# Patient Record
Sex: MALE | Race: ASIAN | NOT HISPANIC OR LATINO | URBAN - METROPOLITAN AREA
[De-identification: names, ages, dates, MRNs, and addresses within clinical notes are randomized per-mention and may not be internally consistent; named-entity substitution may affect disease eponyms.]

---

## 2017-10-23 ENCOUNTER — INPATIENT (INPATIENT)
Facility: HOSPITAL | Age: 52
LOS: 1 days | Discharge: HOME | DRG: 310 | End: 2017-10-25
Attending: INTERNAL MEDICINE | Admitting: INTERNAL MEDICINE
Payer: MEDICAID

## 2017-10-23 VITALS
HEART RATE: 79 BPM | DIASTOLIC BLOOD PRESSURE: 86 MMHG | RESPIRATION RATE: 18 BRPM | SYSTOLIC BLOOD PRESSURE: 136 MMHG | OXYGEN SATURATION: 97 %

## 2017-10-23 DIAGNOSIS — R00.2 PALPITATIONS: ICD-10-CM

## 2017-10-23 DIAGNOSIS — R42 DIZZINESS AND GIDDINESS: ICD-10-CM

## 2017-10-23 DIAGNOSIS — R07.9 CHEST PAIN, UNSPECIFIED: ICD-10-CM

## 2017-10-23 LAB
ALBUMIN SERPL ELPH-MCNC: 4.6 G/DL — SIGNIFICANT CHANGE UP (ref 3.3–5)
ALP SERPL-CCNC: 51 U/L — SIGNIFICANT CHANGE UP (ref 40–120)
ALT FLD-CCNC: 10 U/L RC — SIGNIFICANT CHANGE UP (ref 10–45)
ANION GAP SERPL CALC-SCNC: 12 MMOL/L — SIGNIFICANT CHANGE UP (ref 5–17)
APTT BLD: 26.8 SEC — LOW (ref 27.5–37.4)
AST SERPL-CCNC: 18 U/L — SIGNIFICANT CHANGE UP (ref 10–40)
BASOPHILS # BLD AUTO: 0.1 K/UL — SIGNIFICANT CHANGE UP (ref 0–0.2)
BASOPHILS NFR BLD AUTO: 0.9 % — SIGNIFICANT CHANGE UP (ref 0–2)
BILIRUB SERPL-MCNC: 0.4 MG/DL — SIGNIFICANT CHANGE UP (ref 0.2–1.2)
BUN SERPL-MCNC: 11 MG/DL — SIGNIFICANT CHANGE UP (ref 7–23)
CALCIUM SERPL-MCNC: 8.9 MG/DL — SIGNIFICANT CHANGE UP (ref 8.4–10.5)
CHLORIDE SERPL-SCNC: 106 MMOL/L — SIGNIFICANT CHANGE UP (ref 96–108)
CO2 SERPL-SCNC: 25 MMOL/L — SIGNIFICANT CHANGE UP (ref 22–31)
CREAT SERPL-MCNC: 0.79 MG/DL — SIGNIFICANT CHANGE UP (ref 0.5–1.3)
EOSINOPHIL # BLD AUTO: 0.2 K/UL — SIGNIFICANT CHANGE UP (ref 0–0.5)
EOSINOPHIL NFR BLD AUTO: 4 % — SIGNIFICANT CHANGE UP (ref 0–6)
GAS PNL BLDV: SIGNIFICANT CHANGE UP
GLUCOSE SERPL-MCNC: 97 MG/DL — SIGNIFICANT CHANGE UP (ref 70–99)
HCT VFR BLD CALC: 47.5 % — SIGNIFICANT CHANGE UP (ref 39–50)
HGB BLD-MCNC: 16 G/DL — SIGNIFICANT CHANGE UP (ref 13–17)
INR BLD: 0.93 RATIO — SIGNIFICANT CHANGE UP (ref 0.88–1.16)
LYMPHOCYTES # BLD AUTO: 1.3 K/UL — SIGNIFICANT CHANGE UP (ref 1–3.3)
LYMPHOCYTES # BLD AUTO: 21.6 % — SIGNIFICANT CHANGE UP (ref 13–44)
MCHC RBC-ENTMCNC: 30.4 PG — SIGNIFICANT CHANGE UP (ref 27–34)
MCHC RBC-ENTMCNC: 33.6 GM/DL — SIGNIFICANT CHANGE UP (ref 32–36)
MCV RBC AUTO: 90.4 FL — SIGNIFICANT CHANGE UP (ref 80–100)
MONOCYTES # BLD AUTO: 0.3 K/UL — SIGNIFICANT CHANGE UP (ref 0–0.9)
MONOCYTES NFR BLD AUTO: 4.4 % — SIGNIFICANT CHANGE UP (ref 2–14)
NEUTROPHILS # BLD AUTO: 4.2 K/UL — SIGNIFICANT CHANGE UP (ref 1.8–7.4)
NEUTROPHILS NFR BLD AUTO: 69.2 % — SIGNIFICANT CHANGE UP (ref 43–77)
PLATELET # BLD AUTO: 146 K/UL — LOW (ref 150–400)
POTASSIUM SERPL-MCNC: 4.3 MMOL/L — SIGNIFICANT CHANGE UP (ref 3.5–5.3)
POTASSIUM SERPL-SCNC: 4.3 MMOL/L — SIGNIFICANT CHANGE UP (ref 3.5–5.3)
PROT SERPL-MCNC: 6.8 G/DL — SIGNIFICANT CHANGE UP (ref 6–8.3)
PROTHROM AB SERPL-ACNC: 10 SEC — SIGNIFICANT CHANGE UP (ref 9.8–12.7)
RBC # BLD: 5.25 M/UL — SIGNIFICANT CHANGE UP (ref 4.2–5.8)
RBC # FLD: 11.6 % — SIGNIFICANT CHANGE UP (ref 10.3–14.5)
SODIUM SERPL-SCNC: 143 MMOL/L — SIGNIFICANT CHANGE UP (ref 135–145)
TROPONIN T SERPL-MCNC: <0.01 NG/ML — SIGNIFICANT CHANGE UP (ref 0–0.06)
TROPONIN T SERPL-MCNC: <0.01 NG/ML — SIGNIFICANT CHANGE UP (ref 0–0.06)
WBC # BLD: 6.2 K/UL — SIGNIFICANT CHANGE UP (ref 3.8–10.5)
WBC # FLD AUTO: 6.2 K/UL — SIGNIFICANT CHANGE UP (ref 3.8–10.5)

## 2017-10-23 PROCEDURE — 99285 EMERGENCY DEPT VISIT HI MDM: CPT

## 2017-10-23 PROCEDURE — 71020: CPT | Mod: 26

## 2017-10-23 RX ORDER — INFLUENZA VIRUS VACCINE 15; 15; 15; 15 UG/.5ML; UG/.5ML; UG/.5ML; UG/.5ML
0.5 SUSPENSION INTRAMUSCULAR ONCE
Qty: 0 | Refills: 0 | Status: DISCONTINUED | OUTPATIENT
Start: 2017-10-23 | End: 2017-10-25

## 2017-10-23 RX ORDER — ENOXAPARIN SODIUM 100 MG/ML
40 INJECTION SUBCUTANEOUS DAILY
Qty: 0 | Refills: 0 | Status: DISCONTINUED | OUTPATIENT
Start: 2017-10-23 | End: 2017-10-25

## 2017-10-23 RX ADMIN — ENOXAPARIN SODIUM 40 MILLIGRAM(S): 100 INJECTION SUBCUTANEOUS at 22:28

## 2017-10-23 NOTE — H&P ADULT - NEGATIVE GASTROINTESTINAL SYMPTOMS
no flatulence/no hematochezia/no abdominal pain/no vomiting/no constipation/no change in bowel habits/no diarrhea/no nausea/no melena

## 2017-10-23 NOTE — ED PROVIDER NOTE - ATTENDING CONTRIBUTION TO CARE
Attending Note (Justin): patient complaining of feeling dizzy. deneis chest pain.  h/o defibrilator, reports last shock 1 week ago.  last saw cardiologist 1 yr ago.  resting comfortably in stretcher.  ekg with st elevations without reciprocal changes.

## 2017-10-23 NOTE — ED PROVIDER NOTE - MEDICAL DECISION MAKING DETAILS
51 yo cardiac arrest 2 years ago, defib placed, recently went off 1 week ago, has not followed with cards over the last year, c/o dizziness that occurred this am after going to the gym. Required pulling over and calling EMS. No CP at the time or now. No other associated sx.   -Placed call to his cardiologist, Dr. Taylor, also consulted cards here and EP for interrogation   -pt HD stable, but with EKG changes concerning for potential ischemia, awaiting recs from cards, asa given, 1st trop was negative, r/o ACS  Evangelina Murillo, PGY-1 EM

## 2017-10-23 NOTE — PROCEDURE NOTE - ADDITIONAL PROCEDURE DETAILS
Indication for interrogation: r/o possible ICD shock   Measured data WNL, NL PM function; sensing and pacing thresholds are wnl. Pt is  not PM dependent  Stored data revealed 2 VT episodes on 10/19 which were stored in the VF zone,  lasting 10 and 32 seconds respectively.  First episode ( 10 seconds) was successfully treated with  shock therapy x1 and the second episode ( 32 seconds) was treated with  burst ATP during charging and shock therapy x1. Patient also with episodes of NSVT between 1/17/17  to  1019/17  lasting from 1 second to 5 seconds.   Changes made: none   Discussed above  findings with ED Attending Dr Marlo Levi.    Felicia/ [place]  [id number] Indication for interrogation: r/o possible ICD shock   Measured data WNL, NL PM function; sensing and pacing thresholds are wnl. Pt is  not PM dependent  Stored data revealed 2 VT episodes on  6/19/17 and 10/19/17 which were stored in the VF zone,  lasting 10 and 32 seconds respectively.  First episode ( 10 seconds) was successfully treated with  burst ATP during charging x1 and   shock therapy x1 and the second episode ( 32 seconds) was treated with  burst ATP during charging and shock therapy x1. Patient also with episodes of NSVT between 1/17/17  to  1019/17  lasting from 1 second to 5 seconds.   Changes made: none   Discussed above  findings with ED Attending Dr Marlo Levi.    Felicia/ [place]., [place]  [id number] Indication for interrogation:  Admitted with dizziness+ palpitations; check for possible ICD shock   Measured data WNL, NL PM function; sensing and pacing thresholds wnl. Pt is not PM dependent; Vpaced <0.1%  Stored data since last interrogation 11/28/16 revealed: 2 VT episodes on  6/19/17 and 10/19/17 which were stored in the VF zone,  lasting 10 and 32 seconds respectively.  Review of available EGMs c/w VT @ 250bpm  on 6/19/17 which was successfully treated with  burst ATP during charging x1 and   shock therapy x1. Review of available EGM on 10/19/17 c/w VT @ 240 bpm which  was also successfully treated with  burst [id number] shock therapy x1. Patient also with  8 episodes of NSVT between 1/17/17  to  10/19/17  lasting from 1 second to 5 seconds. No arrythmia episodes to correlate with today's symptom of dizziness.  OptiVol thresholds wnl indicating euvolemia.     Changes made: none   Discussed above  findings with ED Attending Dr Marlo Levi.    Felicia/ [place]  [id number]

## 2017-10-23 NOTE — CONSULT NOTE ADULT - SUBJECTIVE AND OBJECTIVE BOX
Date of Admission: 10/23/17     Patient is a 52y old  Male who presents with a chief complaint of lightheadedness    HISTORY OF PRESENT ILLNESS:     53 yo gentleman former smoker w/PMHx unclear cardiac surgery for "hole in my heart" suspect VSD/ASD approx 3 yrs ago at [place] with an ICD placed perioperatively for cardiac arrest per patient p/w lightheadedness during exercise. Reports that he exercises 5x a week cardio including cycling and light weight lifting. He reports that today he felt lightheaded without any associated chest pain, palpitations, SOB, or nausea/vomiting, diaphoresis. He does report that if he does strenuous lifting he describes his chest as "uncomfortable" denying pain or pressure. He does not take any medications, and quit smoking last week. Previous 1 PPD for 30 yrs. Reports that last week he experienced a "shock" from his defibrillator precipitated by only palpitations. He did seek medical care at that time and has not seen a cardiologist for 1 yr.     Allergies    No Known Allergies    Intolerances    	    MEDICATIONS:  None       PAST MEDICAL & SURGICAL HISTORY:  Unspecified cardiac surgery with Dr. Taylor.     FAMILY HISTORY: No known FHx of CAD      SOCIAL HISTORY:  Former smoker; 1PPD for 30 yrs quit 1 week ago    REVIEW OF SYSTEMS:    CONSTITUTIONAL: No weakness, fevers or chills  EYES/ENT: No visual changes;  No dysphagia  NECK: No pain or stiffness  RESPIRATORY: No cough, wheezing, hemoptysis; No shortness of breath  CARDIOVASCULAR: No chest pain or palpitations; No lower extremity edema  GASTROINTESTINAL: No abdominal or epigastric pain. No nausea, vomiting, or hematemesis; No diarrhea or constipation. No melena or hematochezia.  BACK: No back pain  GENITOURINARY: No dysuria, frequency or hematuria  NEUROLOGICAL: No numbness or weakness  SKIN: No itching, burning, rashes, or lesions   All other review of systems is negative unless indicated above.      PHYSICAL EXAM:  T(C): 37 (10-23-17 @ 10:39), Max: 37 (10-23-17 @ 10:39)  HR: 76 (10-23-17 @ 10:39) (76 - 79)  BP: 143/86 (10-23-17 @ 10:39) (136/86 - 143/86)  RR: 18 (10-23-17 @ 10:39) (18 - 18)  SpO2: 98% (10-23-17 @ 10:39) (97% - 98%)  Wt(kg): --  I&O's Summary      Appearance: Well appearing, sitting up comfortably, pleasant, conversant 	  HEENT:   Normal oral mucosa, PERRL, EOMI	  Lymphatic: No lymphadenopathy  Cardiovascular: Normal S1 S2, No JVD, No murmurs, No edema  Respiratory: Lungs clear to auscultation	  Psychiatry: A & O x 3, Mood & affect appropriate  Gastrointestinal:  Soft, Non-tender, + BS	  Skin: No rashes, No ecchymoses, No cyanosis	  Neurologic: Non-focal  Extremities: Normal range of motion, No clubbing, cyanosis or edema  Vascular: Peripheral pulses palpable 2+ bilaterally        LABS:	 	    CBC Full  -  ( 23 Oct 2017 11:06 )  WBC Count : 6.2 K/uL  Hemoglobin : 16.0 g/dL  Hematocrit : 47.5 %  Platelet Count - Automated : 146 K/uL  Mean Cell Volume : 90.4 fl  Mean Cell Hemoglobin : 30.4 pg  Mean Cell Hemoglobin Concentration : 33.6 gm/dL  Auto Neutrophil # : 4.2 K/uL  Auto Lymphocyte # : 1.3 K/uL  Auto Monocyte # : 0.3 K/uL  Auto Eosinophil # : 0.2 K/uL  Auto Basophil # : 0.1 K/uL  Auto Neutrophil % : 69.2 %  Auto Lymphocyte % : 21.6 %  Auto Monocyte % : 4.4 %  Auto Eosinophil % : 4.0 %  Auto Basophil % : 0.9 %    10-23    143  |  106  |  11  ----------------------------<  97  4.3   |  25  |  0.79    Ca    8.9      23 Oct 2017 11:06    TPro  6.8  /  Alb  4.6  /  TBili  0.4  /  DBili  x   /  AST  18  /  ALT  10  /  AlkPhos  51  10-23    ECG:  	SR with 1st degree block; J point elevation in V2-V4 with TWI V5-V6   	  ASSESSMENT/PLAN: 	    53 yo gentleman former smoker w/PMHx unclear cardiac surgery for "hole in my heart" suspect VSD/ASD approx 3 yrs ago at [place] with an ICD placed perioperatively for cardiac arrest per patient p/w lightheadedness during exercise cardiology consulted for EKG changes.       #EKG changes not consistent with acute STEMI; Currently asymptomatic, no CP or SOB/LANE.   - Recommend serial cardiac enzymes and EKGs; Initial trop negative.   - Check TTE  - Obtain OP records re of cardiac procedure and indication for ICD placement.   - ASA loaded en route per patient  - EP for ICD interrogation.   - Cath consult d/w Dr. Ramsey Date of Admission: 10/23/17     Patient is a 52y old  Male who presents with a chief complaint of lightheadedness    HISTORY OF PRESENT ILLNESS:     53 yo gentleman former smoker w/PMHx unclear cardiac surgery for "hole in my heart" suspect VSD/ASD approx 3 yrs ago at [place] with an ICD placed perioperatively for cardiac arrest per patient p/w lightheadedness during exercise. Reports that he exercises 5x a week cardio including cycling and light weight lifting. He reports that today he felt lightheaded without any associated chest pain, palpitations, SOB, or nausea/vomiting, diaphoresis. He does report that if he does strenuous lifting he describes his chest as "uncomfortable" denying pain or pressure. He does not take any medications, and quit smoking last week. Previous 1 PPD for 30 yrs. Reports that last week he experienced a "shock" from his defibrillator precipitated by only palpitations. He did seek medical care at that time and has not seen a cardiologist for 1 yr.     Allergies    No Known Allergies    Intolerances    	    MEDICATIONS:  None       PAST MEDICAL & SURGICAL HISTORY:  Unspecified cardiac surgery with Dr. Taylor.     FAMILY HISTORY: No known FHx of CAD      SOCIAL HISTORY:  Former smoker; 1PPD for 30 yrs quit 1 week ago    REVIEW OF SYSTEMS:    CONSTITUTIONAL: No weakness, fevers or chills  EYES/ENT: No visual changes;  No dysphagia  NECK: No pain or stiffness  RESPIRATORY: No cough, wheezing, hemoptysis; No shortness of breath  CARDIOVASCULAR: No chest pain or palpitations; No lower extremity edema  GASTROINTESTINAL: No abdominal or epigastric pain. No nausea, vomiting, or hematemesis; No diarrhea or constipation. No melena or hematochezia.  BACK: No back pain  GENITOURINARY: No dysuria, frequency or hematuria  NEUROLOGICAL: No numbness or weakness  SKIN: No itching, burning, rashes, or lesions   All other review of systems is negative unless indicated above.      PHYSICAL EXAM:  T(C): 37 (10-23-17 @ 10:39), Max: 37 (10-23-17 @ 10:39)  HR: 76 (10-23-17 @ 10:39) (76 - 79)  BP: 143/86 (10-23-17 @ 10:39) (136/86 - 143/86)  RR: 18 (10-23-17 @ 10:39) (18 - 18)  SpO2: 98% (10-23-17 @ 10:39) (97% - 98%)  Wt(kg): --  I&O's Summary      Appearance: Well appearing, sitting up comfortably, pleasant, conversant 	  HEENT:   Normal oral mucosa, PERRL, EOMI	  Lymphatic: No lymphadenopathy  Cardiovascular: Normal S1 S2, No JVD, No murmurs, No edema  Respiratory: Lungs clear to auscultation	  Psychiatry: A & O x 3, Mood & affect appropriate  Gastrointestinal:  Soft, Non-tender, + BS	  Skin: No rashes, No ecchymoses, No cyanosis	  Neurologic: Non-focal  Extremities: Normal range of motion, No clubbing, cyanosis or edema  Vascular: Peripheral pulses palpable 2+ bilaterally        LABS:	 	    CBC Full  -  ( 23 Oct 2017 11:06 )  WBC Count : 6.2 K/uL  Hemoglobin : 16.0 g/dL  Hematocrit : 47.5 %  Platelet Count - Automated : 146 K/uL  Mean Cell Volume : 90.4 fl  Mean Cell Hemoglobin : 30.4 pg  Mean Cell Hemoglobin Concentration : 33.6 gm/dL  Auto Neutrophil # : 4.2 K/uL  Auto Lymphocyte # : 1.3 K/uL  Auto Monocyte # : 0.3 K/uL  Auto Eosinophil # : 0.2 K/uL  Auto Basophil # : 0.1 K/uL  Auto Neutrophil % : 69.2 %  Auto Lymphocyte % : 21.6 %  Auto Monocyte % : 4.4 %  Auto Eosinophil % : 4.0 %  Auto Basophil % : 0.9 %    10-23    143  |  106  |  11  ----------------------------<  97  4.3   |  25  |  0.79    Ca    8.9      23 Oct 2017 11:06    TPro  6.8  /  Alb  4.6  /  TBili  0.4  /  DBili  x   /  AST  18  /  ALT  10  /  AlkPhos  51  10-23    ECG:  	SR with 1st degree block; J point elevation in V2-V4 with TWI V5-V6   	  ASSESSMENT/PLAN: 	    53 yo gentleman former smoker w/PMHx unclear cardiac surgery for "hole in my heart" suspect VSD/ASD approx 3 yrs ago at [place] with an ICD placed perioperatively for cardiac arrest per patient p/w lightheadedness during exercise cardiology consulted for EKG changes.       #EKG changes not consistent with acute STEMI; Currently asymptomatic, no CP or SOB/LANE.   - Recommend serial cardiac enzymes and EKGs; Initial trop negative.   - Check TTE  - Obtain OP records re of cardiac procedure and indication for ICD placement.   - ASA loaded en route per patient  - EP for ICD interrogation.   - Cath consult d/w Dr. Ramsey and no plan for urgent cath at this time.

## 2017-10-23 NOTE — ED PROVIDER NOTE - OBJECTIVE STATEMENT
51 yo M cardiac arrest 2 yrs ago, defibrillator placed, ASD also repaired at the same time (Dr. Devendra Taylor, [facility]) c/o dizziness that occurred this morning after going to the gym. Pt reports his defibrillator went off 3x over the last year, most recently last week-has not followed with Dr. Taylor or any cardiologist over the last year. Took asa this morning, given 2 more from EMS. Reports he was driving home and felt very dizzy, had to pull over at a gas station and EMS was called. Pt HD stable. Never had any chest pain, SOB, or other associated sx. Not having active CP right now. [employer] defib device. Patient denies ha, loc, sob, back pain, abd pain/n/v/d, urinary symptoms, recent travel and sickness.

## 2017-10-23 NOTE — H&P ADULT - ASSESSMENT
53 yo gentleman former smoker w/PMHx unclear cardiac surgery for "hole in my heart" suspect VSD/ASD approx 3 yrs ago at [place] with an ICD placed perioperatively for cardiac arrest per patient p/w lightheadedness during exercise. Reports that he exercises 5x a week cardio including cycling and light weight lifting. He reports that today he felt lightheaded without any associated chest pain, palpitations, SOB, or nausea/vomiting, diaphoresis. He does report that if he does strenuous lifting he describes his chest as "uncomfortable" denying pain or pressure. He does not take any medications, and quit smoking last week. Previous 1 PPD for 30 yrs. Reports that last week he experienced a "shock" from his defibrillator precipitated by only palpitations. He did seek medical care at that time and has not seen a cardiologist for 1 yr.

## 2017-10-23 NOTE — ED ADULT NURSE NOTE - OBJECTIVE STATEMENT
52 year old male presents to the ED complaining of SOB and dizziness after working out at the gym. Pt has a Hx of heart attack, defibrillator and open hear surgery in June of 2015. As per EMS the pt went into cardiac arrest in 2015 prior to open heart. As per patient his defibrillator has been making a sound every morning at 7:56am, it has fired three times since it was placed. Pt denies NVD, denies CP at this time. Pt is A&O x 4, VSS, afebrile, ambulating independently. Pt denies fever, chills, NVD, SOB, or chest pain. IV placed by EMS, + blood return flushes easily. Labs drawn, bed in low position, safety measures in place. EKG completed on arrival and given to attending, pt showing NS with T wave abnormality. As per patient he took  ASA, unsure of the dosage he took.

## 2017-10-24 LAB
ANION GAP SERPL CALC-SCNC: 15 MMOL/L — SIGNIFICANT CHANGE UP (ref 5–17)
BUN SERPL-MCNC: 13 MG/DL — SIGNIFICANT CHANGE UP (ref 7–23)
CALCIUM SERPL-MCNC: 9.3 MG/DL — SIGNIFICANT CHANGE UP (ref 8.4–10.5)
CHLORIDE SERPL-SCNC: 103 MMOL/L — SIGNIFICANT CHANGE UP (ref 96–108)
CO2 SERPL-SCNC: 25 MMOL/L — SIGNIFICANT CHANGE UP (ref 22–31)
CREAT SERPL-MCNC: 0.98 MG/DL — SIGNIFICANT CHANGE UP (ref 0.5–1.3)
GLUCOSE SERPL-MCNC: 90 MG/DL — SIGNIFICANT CHANGE UP (ref 70–99)
HCT VFR BLD CALC: 47.4 % — SIGNIFICANT CHANGE UP (ref 39–50)
HGB BLD-MCNC: 16.7 G/DL — SIGNIFICANT CHANGE UP (ref 13–17)
MCHC RBC-ENTMCNC: 31.7 PG — SIGNIFICANT CHANGE UP (ref 27–34)
MCHC RBC-ENTMCNC: 35.2 GM/DL — SIGNIFICANT CHANGE UP (ref 32–36)
MCV RBC AUTO: 90.2 FL — SIGNIFICANT CHANGE UP (ref 80–100)
PLATELET # BLD AUTO: 159 K/UL — SIGNIFICANT CHANGE UP (ref 150–400)
POTASSIUM SERPL-MCNC: 4.4 MMOL/L — SIGNIFICANT CHANGE UP (ref 3.5–5.3)
POTASSIUM SERPL-SCNC: 4.4 MMOL/L — SIGNIFICANT CHANGE UP (ref 3.5–5.3)
RBC # BLD: 5.26 M/UL — SIGNIFICANT CHANGE UP (ref 4.2–5.8)
RBC # FLD: 11.4 % — SIGNIFICANT CHANGE UP (ref 10.3–14.5)
SODIUM SERPL-SCNC: 143 MMOL/L — SIGNIFICANT CHANGE UP (ref 135–145)
T4 FREE SERPL-MCNC: 1.4 NG/DL — SIGNIFICANT CHANGE UP (ref 0.9–1.8)
TROPONIN T SERPL-MCNC: <0.01 NG/ML — SIGNIFICANT CHANGE UP (ref 0–0.06)
TSH SERPL-MCNC: 1.96 UIU/ML — SIGNIFICANT CHANGE UP (ref 0.27–4.2)
WBC # BLD: 5.4 K/UL — SIGNIFICANT CHANGE UP (ref 3.8–10.5)
WBC # FLD AUTO: 5.4 K/UL — SIGNIFICANT CHANGE UP (ref 3.8–10.5)

## 2017-10-24 PROCEDURE — 93306 TTE W/DOPPLER COMPLETE: CPT | Mod: 26

## 2017-10-24 PROCEDURE — 99223 1ST HOSP IP/OBS HIGH 75: CPT | Mod: GC

## 2017-10-24 RX ORDER — METOPROLOL TARTRATE 50 MG
12.5 TABLET ORAL
Qty: 0 | Refills: 0 | Status: DISCONTINUED | OUTPATIENT
Start: 2017-10-24 | End: 2017-10-25

## 2017-10-24 RX ADMIN — Medication 12.5 MILLIGRAM(S): at 21:37

## 2017-10-24 RX ADMIN — ENOXAPARIN SODIUM 40 MILLIGRAM(S): 100 INJECTION SUBCUTANEOUS at 09:15

## 2017-10-24 NOTE — PROVIDER CONTACT NOTE (OTHER) - ASSESSMENT
Patient asymptomatic. Patient denies chest pain and shortness of breath. Patient's Vital Signs: Temperature 97.8 F Oral, Heart Rate 70, Blood Pressure 121/75, Respiratory Rate 18 and O2 Saturation 98% Room Air.

## 2017-10-24 NOTE — PROGRESS NOTE ADULT - ASSESSMENT
51 yo gentleman former smoker w/PMHx unclear cardiac surgery for "hole in my heart" suspect VSD/ASD approx 3 yrs ago at [place] with an ICD placed perioperatively for cardiac arrest per patient p/w lightheadedness during exercise. Reports that he exercises 5x a week cardio including cycling and light weight lifting. He reports that today he felt lightheaded without any associated chest pain, palpitations, SOB, or nausea/vomiting, diaphoresis. He does report that if he does strenuous lifting he describes his chest as "uncomfortable" denying pain or pressure. He does not take any medications, and quit smoking last week. Previous 1 PPD for 30 yrs. Reports that last week he experienced a "shock" from his defibrillator precipitated by only palpitations. He did seek medical care at that time and has not seen a cardiologist for 1 yr.      Problem/Plan - 1:  ·  Problem: Dizziness.  Plan: Asymptomatic and walking around fine.  likely NSVT .      Problem/Plan - 2:  ·  Problem: Chest pain.  Plan: R/O ACS and cardiology to follow . Echo pending .      Problem/Plan - 3:  ·  Problem: Palpitations.  Plan: S/P AICD Interrogation so sec to NSVT  . EP consult pending. TFT okay and TTE pending.      Disposition: Patient  adamant in going home today.

## 2017-10-24 NOTE — PROVIDER CONTACT NOTE (OTHER) - BACKGROUND
Pt admitted for lightheadedness. Former smoker, Hx of unclear cardiac surgery, cardiac arrest s/p AICD placed 3 years ago

## 2017-10-24 NOTE — CONSULT NOTE ADULT - ASSESSMENT
-start metoprolol 12.mg BID  -patient would benefit from exercise stress test to evaluate for NSVT/VT while exercising   -patient may need AICD upgraded to dual chamber or may need ablation 53 yo gentleman former smoker w/PMHx unclear cardiac surgery for "hole in my heart" suspect VSD/ASD approx 3 yrs ago at [place] with an ICD placed perioperatively for cardiac arrest per patient p/w lightheadedness during exercise. Patient with VT a week ago, requiring shock. No correlating events to most recent symptoms.  -start metoprolol 12.mg BID  -patient would benefit from exercise stress test to evaluate for arrythmia while exercising   -patient may need AICD upgraded to dual chamber or may need ablation

## 2017-10-24 NOTE — PROVIDER CONTACT NOTE (OTHER) - BACKGROUND
Patient admitted for Dizziness, Palpitations, Chest Pain. History of Atrial Septal Defect. Patient has an AICD.

## 2017-10-24 NOTE — PROGRESS NOTE ADULT - SUBJECTIVE AND OBJECTIVE BOX
INTERVAL HPI/OVERNIGHT EVENTS: I want to go home today as working. I have to go.   Vital Signs Last 24 Hrs  T(C): 36.6 (24 Oct 2017 04:20), Max: 36.8 (24 Oct 2017 00:04)  T(F): 97.8 (24 Oct 2017 04:20), Max: 98.2 (24 Oct 2017 00:04)  HR: 57 (24 Oct 2017 04:20) (50 - 73)  BP: 128/82 (24 Oct 2017 04:20) (117/85 - 128/82)  BP(mean): --  RR: 18 (24 Oct 2017 04:20) (16 - 18)  SpO2: 98% (24 Oct 2017 04:20) (97% - 99%)  I&O's Summary    23 Oct 2017 07:01  -  24 Oct 2017 07:00  --------------------------------------------------------  IN: 480 mL / OUT: 0 mL / NET: 480 mL    24 Oct 2017 07:01  -  24 Oct 2017 13:16  --------------------------------------------------------  IN: 240 mL / OUT: 0 mL / NET: 240 mL      MEDICATIONS  (STANDING):  enoxaparin Injectable 40 milliGRAM(s) SubCutaneous daily  influenza   Vaccine 0.5 milliLiter(s) IntraMuscular once    MEDICATIONS  (PRN):    LABS:                        16.7   5.4   )-----------( 159      ( 24 Oct 2017 06:41 )             47.4     10-24    143  |  103  |  13  ----------------------------<  90  4.4   |  25  |  0.98    Ca    9.3      24 Oct 2017 06:41    TPro  6.8  /  Alb  4.6  /  TBili  0.4  /  DBili  x   /  AST  18  /  ALT  10  /  AlkPhos  51  10-23    PT/INR - ( 23 Oct 2017 11:06 )   PT: 10.0 sec;   INR: 0.93 ratio         PTT - ( 23 Oct 2017 11:06 )  PTT:26.8 sec    CAPILLARY BLOOD GLUCOSE              REVIEW OF SYSTEMS:  CONSTITUTIONAL: No fever, weight loss, or fatigue  EYES: No eye pain, visual disturbances, or discharge  ENMT:  No difficulty hearing, tinnitus, vertigo; No sinus or throat pain  NECK: No pain or stiffness  BREASTS: No pain, masses, or nipple discharge  RESPIRATORY: No cough, wheezing, chills or hemoptysis; No shortness of breath  CARDIOVASCULAR: No chest pain, palpitations, dizziness, or leg swelling  GASTROINTESTINAL: No abdominal or epigastric pain. No nausea, vomiting, or hematemesis; No diarrhea or constipation. No melena or hematochezia.  GENITOURINARY: No dysuria, frequency, hematuria, or incontinence  NEUROLOGICAL: No headaches, memory loss, loss of strength, numbness, or tremors  SKIN: No itching, burning, rashes, or lesions   LYMPH NODES: No enlarged glands  ENDOCRINE: No heat or cold intolerance; No hair loss  MUSCULOSKELETAL: No joint pain or swelling; No muscle, back, or extremity pain  PSYCHIATRIC: No depression, anxiety, mood swings, or difficulty sleeping  HEME/LYMPH: No easy bruising, or bleeding gums  ALLERY AND IMMUNOLOGIC: No hives or eczema    RADIOLOGY & ADDITIONAL TESTS:    Consultant(s) Notes Reviewed:  [x ] YES  [ ] NO    PHYSICAL EXAM:  GENERAL: NAD, well-groomed, well-developed,not in any distress ,  HEAD:  Atraumatic, Normocephalic  EYES: EOMI, PERRLA, conjunctiva and sclera clear  ENMT: No tonsillar erythema, exudates, or enlargement; Moist mucous membranes, Good dentition, No lesions  NECK: Supple, No JVD, Normal thyroid  NERVOUS SYSTEM:  Alert & Oriented X3, No focal deficit   CHEST/LUNG: Good air entry bilateral with no  rales, rhonchi, wheezing, or rubs  HEART: Regular rate and rhythm; No murmurs, rubs, or gallops,Scar of old surgery   ABDOMEN: Soft, Nontender, Nondistended; Bowel sounds present  EXTREMITIES:  2+ Peripheral Pulses, No clubbing, cyanosis, or edema  SKIN: No rashes or lesions    Care Discussed with Consultants/Other Providers [ x] YES  [ ] NO

## 2017-10-24 NOTE — PROVIDER CONTACT NOTE (OTHER) - SITUATION
Patient's Heart Rhythm converting back & forth from Normal Sinus Rhythm / Sinus Bradycardia to Accelerated Junctional Rhythm on the cardiac monitor and patient's Heart Rate 50's - 80's on the monitor.

## 2017-10-24 NOTE — PROVIDER CONTACT NOTE (OTHER) - ASSESSMENT
Pt Aox4. Pt was sleeping during the time of the event. Pt asymptomatic. Pt denies chest pain, sob or any other distress. VSS. Temp 98.2 F, /78, HR 50, RR 18, O2 99% on RA

## 2017-10-24 NOTE — PROVIDER CONTACT NOTE (OTHER) - ASSESSMENT
Pt Aox4. Pt was sleeping during the time of the event. Pt asymptomatic. Pt denies chest pain, sob or any other distress. VSS. Temp 98.2 F, /85, HR 65, RR 18, O2 99% on room air.

## 2017-10-24 NOTE — CONSULT NOTE ADULT - SUBJECTIVE AND OBJECTIVE BOX
Patient seen and evaluated at bedside    Chief Complaint:    HPI:  51 yo gentleman former smoker w/PMHx unclear cardiac surgery for "hole in my heart" suspect VSD/ASD approx 3 yrs ago at [place] with an ICD placed perioperatively for cardiac arrest per patient p/w lightheadedness during exercise. Reports that he exercises 5x a week cardio including cycling and light weight lifting. He reports that today he felt lightheaded without any associated chest pain, palpitations, SOB, or nausea/vomiting, diaphoresis. He does report that if he does strenuous lifting he describes his chest as "uncomfortable" denying pain or pressure. He does not take any medications, and quit smoking last week. Previous 1 PPD for 30 yrs. Reports that last week he experienced a "shock" from his defibrillator precipitated by only palpitations. He did seek medical care at that time and has not seen a cardiologist for 1 yr.   Patient had trop neg x3. TTE showed moderate concentric left ventricular hypertrophy, normal LV systolic function.    PMH:   ASD (atrial septal defect)  No pertinent past medical history      PSH:       Medications:   enoxaparin Injectable 40 milliGRAM(s) SubCutaneous daily  influenza   Vaccine 0.5 milliLiter(s) IntraMuscular once      Allergies:  No Known Allergies      FAMILY HISTORY:      Social History:  Smoking History:  Alcohol Use:  Drug Use:    Review of Systems:  REVIEW OF SYSTEMS:    CONSTITUTIONAL: No weakness, fevers or chills  EYES/ENT: No visual changes;  No dysphagia  NECK: No pain or stiffness  RESPIRATORY: No cough, wheezing, hemoptysis; No shortness of breath  CARDIOVASCULAR: No chest pain, + palpitations; No lower extremity edema  GASTROINTESTINAL: No abdominal or epigastric pain. No nausea, vomiting, or hematemesis; No diarrhea or constipation. No melena or hematochezia.  BACK: No back pain  GENITOURINARY: No dysuria, frequency or hematuria  NEUROLOGICAL: No numbness or weakness  SKIN: No itching, burning, rashes, or lesions   All other review of systems is negative unless indicated above.    Physical Exam:  T(F): 97.4 (10-24), Max: 98.2 (10-24)  HR: 67 (10-24) (50 - 71)  BP: 146/93 (10-24) (117/85 - 146/93)  RR: 17 (10-24)  SpO2: 99% (10-24)  GENERAL: No acute distress, well-developed  HEAD:  Atraumatic, Normocephalic  ENT: EOMI, PERRLA, conjunctiva and sclera clear, Neck supple, No JVD, moist mucosa  CHEST/LUNG: Clear to auscultation bilaterally; No wheeze, equal breath sounds bilaterally   BACK: No spinal tenderness  HEART: Regular rate and rhythm; No murmurs, rubs, or gallops  ABDOMEN: Soft, Nontender, Nondistended; Bowel sounds present  EXTREMITIES:  No clubbing, cyanosis, or edema  PSYCH: Nl behavior, nl affect  NEUROLOGY: AAOx3, non-focal, cranial nerves intact  SKIN: Normal color, No rashes or lesions  LINES:    Cardiovascular Diagnostic Testing:    ECG: Personally reviewed    Echo:    Stress Testing:    Cath:    Interpretation of Telemetry:    Imaging:    Labs: Personally reviewed                        16.7   5.4   )-----------( 159      ( 24 Oct 2017 06:41 )             47.4     10-24    143  |  103  |  13  ----------------------------<  90  4.4   |  25  |  0.98    Ca    9.3      24 Oct 2017 06:41    TPro  6.8  /  Alb  4.6  /  TBili  0.4  /  DBili  x   /  AST  18  /  ALT  10  /  AlkPhos  51  10-23    PT/INR - ( 23 Oct 2017 11:06 )   PT: 10.0 sec;   INR: 0.93 ratio         PTT - ( 23 Oct 2017 11:06 )  PTT:26.8 sec  CARDIAC MARKERS ( 24 Oct 2017 06:41 )  x     / <0.01 ng/mL / x     / x     / x      CARDIAC MARKERS ( 23 Oct 2017 18:57 )  x     / <0.01 ng/mL / x     / x     / x      CARDIAC MARKERS ( 23 Oct 2017 11:06 )  x     / <0.01 ng/mL / x     / x     / x                Thyroid Stimulating Hormone, Serum: 1.96 uIU/mL (10-24 @ 07:28) Patient seen and evaluated at bedside    Chief Complaint:  Lighthead/dizziness    HPI:  53 yo gentleman former smoker w/PMHx unclear cardiac surgery for "hole in my heart" suspect VSD/ASD approx 3 yrs ago at [place] with an ICD placed perioperatively for cardiac arrest per patient p/w lightheadedness during exercise. Reports that he exercises 5x a week cardio including cycling and light weight lifting. He reports that today he felt lightheaded without any associated chest pain, palpitations, SOB, or nausea/vomiting, diaphoresis. He does report that if he does strenuous lifting he describes his chest as "uncomfortable" denying pain or pressure. He does not take any medications, and quit smoking last week. Previous 1 PPD for 30 yrs. Reports that last week he experienced a "shock" from his defibrillator precipitated by only palpitations. He did seek medical care at that time and has not seen a cardiologist for 1 yr.   Patient had trop neg x3. TTE showed moderate concentric left ventricular hypertrophy, normal LV systolic function. AICD was interroaged, has only RV lead. Patient had 2 VT episodes on  6/19/17 and 10/19/17 which were stored in the VF zone,  lasting 10 and 32 seconds respectively.  Review of available EGMs c/w VT @ 250bpm  on 6/19/17 which was successfully treated with  burst ATP during charging x1 and   shock therapy x1. Review of available EGM on 10/19/17 c/w VT @ 240 bpm which  was also successfully treated with  burst [id number] shock therapy x1. Patient also with  8 episodes of NSVT between 1/17/17  to  10/19/17  lasting from 1 second to 5 seconds. No arrythmia episodes to correlate with today's symptom of dizziness.      PMH:   ASD (atrial septal defect)  No pertinent past medical history      PSH:       Medications:   enoxaparin Injectable 40 milliGRAM(s) SubCutaneous daily  influenza   Vaccine 0.5 milliLiter(s) IntraMuscular once      Allergies:  No Known Allergies      FAMILY HISTORY:      Social History:  Smoking History:  Alcohol Use:  Drug Use:    Review of Systems:  REVIEW OF SYSTEMS:    CONSTITUTIONAL: No weakness, fevers or chills  EYES/ENT: No visual changes;  No dysphagia  NECK: No pain or stiffness  RESPIRATORY: No cough, wheezing, hemoptysis; No shortness of breath  CARDIOVASCULAR: No chest pain, + palpitations; No lower extremity edema  GASTROINTESTINAL: No abdominal or epigastric pain. No nausea, vomiting, or hematemesis; No diarrhea or constipation. No melena or hematochezia.  BACK: No back pain  GENITOURINARY: No dysuria, frequency or hematuria  NEUROLOGICAL: No numbness or weakness  SKIN: No itching, burning, rashes, or lesions   All other review of systems is negative unless indicated above.    Physical Exam:  T(F): 97.4 (10-24), Max: 98.2 (10-24)  HR: 67 (10-24) (50 - 71)  BP: 146/93 (10-24) (117/85 - 146/93)  RR: 17 (10-24)  SpO2: 99% (10-24)  Appearance: Well appearing, sitting up comfortably, pleasant, conversant 	  HEENT:   Normal oral mucosa, PERRL, EOMI	  Lymphatic: No lymphadenopathy  Cardiovascular: Normal S1 S2, No JVD, No murmurs, No edema  Respiratory: Lungs clear to auscultation	  Psychiatry: A & O x 3, Mood & affect appropriate  Gastrointestinal:  Soft, Non-tender, + BS	  Skin: No rashes, No ecchymoses, No cyanosis	  Neurologic: Non-focal  Extremities: Normal range of motion, No clubbing, cyanosis or edema  Vascular: Peripheral pulses palpable 2+ bilaterally    ECG- SR with 1st degree block; J point elevation in V2-V4 with TWI V5-V6     Labs: Personally reviewed                        16.7   5.4   )-----------( 159      ( 24 Oct 2017 06:41 )             47.4     10-24    143  |  103  |  13  ----------------------------<  90  4.4   |  25  |  0.98    Ca    9.3      24 Oct 2017 06:41    TPro  6.8  /  Alb  4.6  /  TBili  0.4  /  DBili  x   /  AST  18  /  ALT  10  /  AlkPhos  51  10-23    PT/INR - ( 23 Oct 2017 11:06 )   PT: 10.0 sec;   INR: 0.93 ratio         PTT - ( 23 Oct 2017 11:06 )  PTT:26.8 sec  CARDIAC MARKERS ( 24 Oct 2017 06:41 )  x     / <0.01 ng/mL / x     / x     / x      CARDIAC MARKERS ( 23 Oct 2017 18:57 )  x     / <0.01 ng/mL / x     / x     / x      CARDIAC MARKERS ( 23 Oct 2017 11:06 )  x     / <0.01 ng/mL / x     / x     / x                Thyroid Stimulating Hormone, Serum: 1.96 uIU/mL (10-24 @ 07:28)

## 2017-10-25 ENCOUNTER — TRANSCRIPTION ENCOUNTER (OUTPATIENT)
Age: 52
End: 2017-10-25

## 2017-10-25 VITALS
OXYGEN SATURATION: 97 % | TEMPERATURE: 98 F | HEART RATE: 58 BPM | SYSTOLIC BLOOD PRESSURE: 121 MMHG | DIASTOLIC BLOOD PRESSURE: 78 MMHG | RESPIRATION RATE: 18 BRPM

## 2017-10-25 PROCEDURE — 82947 ASSAY GLUCOSE BLOOD QUANT: CPT

## 2017-10-25 PROCEDURE — 82330 ASSAY OF CALCIUM: CPT

## 2017-10-25 PROCEDURE — 84295 ASSAY OF SERUM SODIUM: CPT

## 2017-10-25 PROCEDURE — 80053 COMPREHEN METABOLIC PANEL: CPT

## 2017-10-25 PROCEDURE — 85610 PROTHROMBIN TIME: CPT

## 2017-10-25 PROCEDURE — 99285 EMERGENCY DEPT VISIT HI MDM: CPT

## 2017-10-25 PROCEDURE — 71046 X-RAY EXAM CHEST 2 VIEWS: CPT

## 2017-10-25 PROCEDURE — 99233 SBSQ HOSP IP/OBS HIGH 50: CPT | Mod: GC

## 2017-10-25 PROCEDURE — 84439 ASSAY OF FREE THYROXINE: CPT

## 2017-10-25 PROCEDURE — 93306 TTE W/DOPPLER COMPLETE: CPT

## 2017-10-25 PROCEDURE — 85027 COMPLETE CBC AUTOMATED: CPT

## 2017-10-25 PROCEDURE — 84443 ASSAY THYROID STIM HORMONE: CPT

## 2017-10-25 PROCEDURE — 84484 ASSAY OF TROPONIN QUANT: CPT

## 2017-10-25 PROCEDURE — 85730 THROMBOPLASTIN TIME PARTIAL: CPT

## 2017-10-25 PROCEDURE — 82435 ASSAY OF BLOOD CHLORIDE: CPT

## 2017-10-25 PROCEDURE — 83605 ASSAY OF LACTIC ACID: CPT

## 2017-10-25 PROCEDURE — 84132 ASSAY OF SERUM POTASSIUM: CPT

## 2017-10-25 PROCEDURE — 82803 BLOOD GASES ANY COMBINATION: CPT

## 2017-10-25 PROCEDURE — 80048 BASIC METABOLIC PNL TOTAL CA: CPT

## 2017-10-25 PROCEDURE — 85014 HEMATOCRIT: CPT

## 2017-10-25 RX ORDER — METOPROLOL TARTRATE 50 MG
1 TABLET ORAL
Qty: 60 | Refills: 0 | OUTPATIENT
Start: 2017-10-25 | End: 2017-11-24

## 2017-10-25 RX ORDER — METOPROLOL TARTRATE 50 MG
25 TABLET ORAL
Qty: 0 | Refills: 0 | Status: DISCONTINUED | OUTPATIENT
Start: 2017-10-25 | End: 2017-10-25

## 2017-10-25 RX ADMIN — Medication 25 MILLIGRAM(S): at 09:41

## 2017-10-25 NOTE — DISCHARGE NOTE ADULT - HOSPITAL COURSE
51 yo gentleman former smoker w/PMHx unclear cardiac surgery for "hole in my heart" suspect VSD/ASD approx 3 yrs ago at [place] with an ICD placed perioperatively for cardiac arrest per patient p/w lightheadedness during exercise. Reports that he exercises 5x a week cardio including cycling and light weight lifting. He reports that today he felt lightheaded without any associated chest pain, palpitations, SOB, or nausea/vomiting, diaphoresis. He does report that if he does strenuous lifting he describes his chest as "uncomfortable" denying pain or pressure. He does not take any medications, and quit smoking last week. Previous 1 PPD for 30 yrs. Reports that last week he experienced a "shock" from his defibrillator precipitated by only palpitations. He did seek medical care at that time and has not seen a cardiologist for 1 yr. EP followed pt here, S/P AICD Interrogation. Patient with VT a week ago, requiring shock. No correlating events to most recent symptoms. Started on Lopressor. Pt stable for discharge with follow up wit his cardiologist and EP

## 2017-10-25 NOTE — DISCHARGE NOTE ADULT - CARE PROVIDER_API CALL
Pedro Saavedra), Cardiac Electrophysiology; Cardiovascular Disease  [address], [place] [id number]  Phone: [phone]  Fax: [phone]    Claudia Logan  [facility]  Phone: (   )    -  Fax: (   )    -

## 2017-10-25 NOTE — DISCHARGE NOTE ADULT - CARE PROVIDERS DIRECT ADDRESSES
[email or web address]

## 2017-10-25 NOTE — DISCHARGE NOTE ADULT - CARE PLAN
Principal Discharge DX:	Palpitations  Goal:	improved  Instructions for follow-up, activity and diet:	likely related to arrythmia   AICD was interrogated on this admission  pt is to follow up with EP -Dr. Benedict in 1-2 weeks  started on Lopressor to control palpitations  Secondary Diagnosis:	Dizziness  Instructions for follow-up, activity and diet:	likely related to arrythmia   follow up with EP 1-2 weeks after discharge- Dr. BENEDICT

## 2017-10-25 NOTE — DISCHARGE NOTE ADULT - PLAN OF CARE
improved likely related to arrythmia   AICD was interrogated on this admission  pt is to follow up with EP -Dr. Saavedra in 1-2 weeks  started on Lopressor to control palpitations likely related to arrythmia   follow up with EP 1-2 weeks after discharge- Dr. BENEDICT

## 2017-10-25 NOTE — CHART NOTE - NSCHARTNOTEFT_GEN_A_CORE
Request from Dr. Shelby to facilitate discharge.  Medication reconciliation  reviewed, revised and resolved with Dr. Shelby who has medically cleared pt for discharge with follow up as advised. Please refer to discharge note for detailed hospital course.   Murtaza MACHADO

## 2017-10-25 NOTE — DISCHARGE NOTE ADULT - ADDITIONAL INSTRUCTIONS
follow up with cardiologist at [facility] - Dr. Devendra Taylor  follow up with EP - Dr. Saavedra

## 2017-10-25 NOTE — DISCHARGE NOTE ADULT - MEDICATION SUMMARY - MEDICATIONS TO TAKE
I will START or STAY ON the medications listed below when I get home from the hospital:    metoprolol tartrate 25 mg oral tablet  -- 1 tab(s) by mouth 2 times a day  -- Indication: For Arrythmia

## 2017-10-25 NOTE — DISCHARGE NOTE ADULT - PATIENT PORTAL LINK FT
“You can access the Follow[facility] Patient Portal, offered by [facility], by registering with the following website: [email or web address]”

## 2017-10-25 NOTE — PROVIDER CONTACT NOTE (OTHER) - ACTION/TREATMENT ORDERED:
NP notified. No new orders at this time. Will continue to monitor pt.
NP notified. No new orders at this time. Will continue to monitor pt.
will continue to monitor
NP aware. NP reviewed all of patient's orders and lab results. NP states no new orders at this time. Continue to monitor.

## 2017-10-25 NOTE — PROGRESS NOTE ADULT - SUBJECTIVE AND OBJECTIVE BOX
24H hour events:   No further episodes of VT. No further ICD discharges  MEDICATIONS:  enoxaparin Injectable 40 milliGRAM(s) SubCutaneous daily  metoprolol     tartrate 25 milliGRAM(s) Oral two times a day              influenza   Vaccine 0.5 milliLiter(s) IntraMuscular once      REVIEW OF SYSTEMS:  Complete 10point ROS negative.    PHYSICAL EXAM:  T(C): 36.3 (10-25-17 @ 03:44), Max: 36.6 (10-24-17 @ 20:58)  HR: 55 (10-25-17 @ 03:44) (55 - 67)  BP: 118/81 (10-25-17 @ 03:44) (118/81 - 146/93)  RR: 18 (10-25-17 @ 03:44) (17 - 18)  SpO2: 98% (10-25-17 @ 03:44) (97% - 99%)  Wt(kg): --  I&O's Summary    24 Oct 2017 07:01  -  25 Oct 2017 07:00  --------------------------------------------------------  IN: 1160 mL / OUT: 0 mL / NET: 1160 mL        Appearance: Normal	  HEENT:   Normal oral mucosa, PERRL, EOMI	  Lymphatic: No lymphadenopathy  Cardiovascular: Normal S1 S2, No JVD, No murmurs, No edema  Respiratory: Lungs clear to auscultation	  Psychiatry: A & O x 3, Mood & affect appropriate  Gastrointestinal:  Soft, Non-tender, + BS	  Skin: No rashes, No ecchymoses, No cyanosis	  Neurologic: Non-focal  Extremities: Normal range of motion, No clubbing, cyanosis or edema  Vascular: Peripheral pulses palpable 2+ bilaterally        LABS:	 	    CBC Full  -  ( 24 Oct 2017 06:41 )  WBC Count : 5.4 K/uL  Hemoglobin : 16.7 g/dL  Hematocrit : 47.4 %  Platelet Count - Automated : 159 K/uL  Mean Cell Volume : 90.2 fl  Mean Cell Hemoglobin : 31.7 pg  Mean Cell Hemoglobin Concentration : 35.2 gm/dL  Auto Neutrophil # : x  Auto Lymphocyte # : x  Auto Monocyte # : x  Auto Eosinophil # : x  Auto Basophil # : x  Auto Neutrophil % : x  Auto Lymphocyte % : x  Auto Monocyte % : x  Auto Eosinophil % : x  Auto Basophil % : x    10-24    143  |  103  |  13  ----------------------------<  90  4.4   |  25  |  0.98  10-23    143  |  106  |  11  ----------------------------<  97  4.3   |  25  |  0.79    Ca    9.3      24 Oct 2017 06:41  Ca    8.9      23 Oct 2017 11:06    TPro  6.8  /  Alb  4.6  /  TBili  0.4  /  DBili  x   /  AST  18  /  ALT  10  /  AlkPhos  51  10-23      TELEMETRY: 	NSR      PREVIOUS DIAGNOSTIC TESTING:    [ ] Echocardiogram:  Conclusions:  1. Moderate concentric left ventricular hypertrophy.  2. Normal left ventricular systolic function. No segmental  wall motion abnormalities.  *** No previous Echo exam    	  ASSESSMENT/PLAN: 	  51 yo gentleman former smoker w/PMHx unclear cardiac surgery for "hole in my heart" suspect VSD/ASD approx 3 yrs ago at [place] with an ICD placed perioperatively for cardiac arrest per patient p/w lightheadedness during exercise. Patient with VT a week ago, requiring shock. No correlating events to most recent symptoms.  -Increase metoprolol to 25mg BID  -He would benefit from exercise stress test to evaluate whether exercise induces the VT on an appropriate beta blocker dose  -He will likely need a VT ablation    Reggie Avila [id number]

## 2017-10-25 NOTE — DISCHARGE NOTE ADULT - PROVIDER TOKENS
TOKEN:'[id number]',FREE:[LAST:[Dr. Ramsay],FIRST:[Love],PHONE:[(   )    -],FAX:[(   )    -],ADDRESS:[[facility]]]

## 2017-10-25 NOTE — PROGRESS NOTE ADULT - SUBJECTIVE AND OBJECTIVE BOX
INTERVAL HPI/OVERNIGHT EVENTS: I want to go home so please discharge me. I will go to [place]   Vital Signs Last 24 Hrs  T(C): 36.4 (25 Oct 2017 12:09), Max: 36.6 (24 Oct 2017 20:58)  T(F): 97.6 (25 Oct 2017 12:09), Max: 97.9 (24 Oct 2017 20:58)  HR: 58 (25 Oct 2017 12:09) (55 - 64)  BP: 121/78 (25 Oct 2017 12:09) (118/81 - 132/81)  BP(mean): --  RR: 18 (25 Oct 2017 12:09) (18 - 18)  SpO2: 97% (25 Oct 2017 12:09) (97% - 98%)  I&O's Summary    24 Oct 2017 07:01  -  25 Oct 2017 07:00  --------------------------------------------------------  IN: 1160 mL / OUT: 0 mL / NET: 1160 mL    25 Oct 2017 07:01  -  25 Oct 2017 16:46  --------------------------------------------------------  IN: 360 mL / OUT: 0 mL / NET: 360 mL      MEDICATIONS  (STANDING):  enoxaparin Injectable 40 milliGRAM(s) SubCutaneous daily  influenza   Vaccine 0.5 milliLiter(s) IntraMuscular once  metoprolol     tartrate 25 milliGRAM(s) Oral two times a day    MEDICATIONS  (PRN):    LABS:                        16.7   5.4   )-----------( 159      ( 24 Oct 2017 06:41 )             47.4     10-24    143  |  103  |  13  ----------------------------<  90  4.4   |  25  |  0.98    Ca    9.3      24 Oct 2017 06:41          CAPILLARY BLOOD GLUCOSE            Consultant(s) Notes Reviewed:  [x ] YES  [ ] NO    PHYSICAL EXAM:  GENERAL: NAD, well-groomed, well-developed, not in any distress ,  HEAD:  Atraumatic, Normocephalic  EYES: EOMI, PERRLA, conjunctiva and sclera clear  ENMT: No tonsillar erythema, exudates, or enlargement; Moist mucous membranes, Good dentition, No lesions  NECK: Supple, No JVD, Normal thyroid  NERVOUS SYSTEM:  Alert & Oriented X3, No focal deficit   CHEST/LUNG: Good air entry bilateral with no  rales, rhonchi, wheezing, or rubs  HEART: Regular rate and rhythm; No murmurs, rubs, or gallops, sternal scar   ABDOMEN: Soft, Nontender, Nondistended; Bowel sounds present  EXTREMITIES:  2+ Peripheral Pulses, No clubbing, cyanosis, or edema  SKIN: No rashes or lesions    Care Discussed with Consultants/Other Providers [ x] YES  [ ] NO

## 2017-10-25 NOTE — PROGRESS NOTE ADULT - ASSESSMENT
53 yo gentleman former smoker w/PMHx unclear cardiac surgery for "hole in my heart" suspect VSD/ASD approx 3 yrs ago at [place] with an ICD placed perioperatively for cardiac arrest per patient p/w lightheadedness during exercise. Reports that he exercises 5x a week cardio including cycling and light weight lifting. He reports that today he felt lightheaded without any associated chest pain, palpitations, SOB, or nausea/vomiting, diaphoresis. He does report that if he does strenuous lifting he describes his chest as "uncomfortable" denying pain or pressure. He does not take any medications, and quit smoking last week. Previous 1 PPD for 30 yrs. Reports that last week he experienced a "shock" from his defibrillator precipitated by only palpitations. He did seek medical care at that time and has not seen a cardiologist for 1 yr.      Problem/Plan - 1:  ·  Problem: Dizziness.  Plan: Asymptomatic and walking around fine.  likely NSVT .      Problem/Plan - 2:  ·  Problem: Chest pain.  Plan: R/O ACS and cardiology to follow . Echo noted .      Problem/Plan - 3:  ·  Problem: Palpitations.  Plan: S/P AICD Interrogation showing  NSVT  . EP consult noted and D/w attending and will see pt.

## 2017-10-26 ENCOUNTER — INPATIENT (INPATIENT)
Facility: HOSPITAL | Age: 52
LOS: 0 days | Discharge: HOME | DRG: 310 | End: 2017-10-27
Attending: INTERNAL MEDICINE | Admitting: INTERNAL MEDICINE
Payer: MEDICAID

## 2017-10-26 VITALS
RESPIRATION RATE: 18 BRPM | DIASTOLIC BLOOD PRESSURE: 88 MMHG | WEIGHT: 141.98 LBS | OXYGEN SATURATION: 98 % | TEMPERATURE: 97 F | HEART RATE: 56 BPM | HEIGHT: 66 IN | SYSTOLIC BLOOD PRESSURE: 153 MMHG

## 2017-10-26 LAB
ALBUMIN SERPL ELPH-MCNC: 4.9 G/DL — SIGNIFICANT CHANGE UP (ref 3.3–5)
ALP SERPL-CCNC: 56 U/L — SIGNIFICANT CHANGE UP (ref 40–120)
ALT FLD-CCNC: 12 U/L RC — SIGNIFICANT CHANGE UP (ref 10–45)
ANION GAP SERPL CALC-SCNC: 12 MMOL/L — SIGNIFICANT CHANGE UP (ref 5–17)
APTT BLD: 29.5 SEC — SIGNIFICANT CHANGE UP (ref 27.5–37.4)
AST SERPL-CCNC: 21 U/L — SIGNIFICANT CHANGE UP (ref 10–40)
BASOPHILS # BLD AUTO: 0 K/UL — SIGNIFICANT CHANGE UP (ref 0–0.2)
BASOPHILS NFR BLD AUTO: 0.8 % — SIGNIFICANT CHANGE UP (ref 0–2)
BILIRUB SERPL-MCNC: 0.4 MG/DL — SIGNIFICANT CHANGE UP (ref 0.2–1.2)
BUN SERPL-MCNC: 12 MG/DL — SIGNIFICANT CHANGE UP (ref 7–23)
CALCIUM SERPL-MCNC: 9.2 MG/DL — SIGNIFICANT CHANGE UP (ref 8.4–10.5)
CHLORIDE SERPL-SCNC: 103 MMOL/L — SIGNIFICANT CHANGE UP (ref 96–108)
CK MB BLD-MCNC: 2.8 % — SIGNIFICANT CHANGE UP (ref 0–3.5)
CK MB CFR SERPL CALC: 3.6 NG/ML — SIGNIFICANT CHANGE UP (ref 0–6.7)
CK SERPL-CCNC: 130 U/L — SIGNIFICANT CHANGE UP (ref 30–200)
CO2 SERPL-SCNC: 27 MMOL/L — SIGNIFICANT CHANGE UP (ref 22–31)
CREAT SERPL-MCNC: 0.85 MG/DL — SIGNIFICANT CHANGE UP (ref 0.5–1.3)
EOSINOPHIL # BLD AUTO: 0.3 K/UL — SIGNIFICANT CHANGE UP (ref 0–0.5)
EOSINOPHIL NFR BLD AUTO: 5.6 % — SIGNIFICANT CHANGE UP (ref 0–6)
GAS PNL BLDV: SIGNIFICANT CHANGE UP
GLUCOSE SERPL-MCNC: 88 MG/DL — SIGNIFICANT CHANGE UP (ref 70–99)
HCT VFR BLD CALC: 45 % — SIGNIFICANT CHANGE UP (ref 39–50)
HGB BLD-MCNC: 16.1 G/DL — SIGNIFICANT CHANGE UP (ref 13–17)
INR BLD: 0.96 RATIO — SIGNIFICANT CHANGE UP (ref 0.88–1.16)
LYMPHOCYTES # BLD AUTO: 1.6 K/UL — SIGNIFICANT CHANGE UP (ref 1–3.3)
LYMPHOCYTES # BLD AUTO: 27.9 % — SIGNIFICANT CHANGE UP (ref 13–44)
MAGNESIUM SERPL-MCNC: 2.2 MG/DL — SIGNIFICANT CHANGE UP (ref 1.6–2.6)
MCHC RBC-ENTMCNC: 32.3 PG — SIGNIFICANT CHANGE UP (ref 27–34)
MCHC RBC-ENTMCNC: 35.8 GM/DL — SIGNIFICANT CHANGE UP (ref 32–36)
MCV RBC AUTO: 90.2 FL — SIGNIFICANT CHANGE UP (ref 80–100)
MONOCYTES # BLD AUTO: 0.4 K/UL — SIGNIFICANT CHANGE UP (ref 0–0.9)
MONOCYTES NFR BLD AUTO: 6.9 % — SIGNIFICANT CHANGE UP (ref 2–14)
NEUTROPHILS # BLD AUTO: 3.3 K/UL — SIGNIFICANT CHANGE UP (ref 1.8–7.4)
NEUTROPHILS NFR BLD AUTO: 58.8 % — SIGNIFICANT CHANGE UP (ref 43–77)
PHOSPHATE SERPL-MCNC: 3.2 MG/DL — SIGNIFICANT CHANGE UP (ref 2.5–4.5)
PLATELET # BLD AUTO: 145 K/UL — LOW (ref 150–400)
POTASSIUM SERPL-MCNC: 4.3 MMOL/L — SIGNIFICANT CHANGE UP (ref 3.5–5.3)
POTASSIUM SERPL-SCNC: 4.3 MMOL/L — SIGNIFICANT CHANGE UP (ref 3.5–5.3)
PROT SERPL-MCNC: 6.8 G/DL — SIGNIFICANT CHANGE UP (ref 6–8.3)
PROTHROM AB SERPL-ACNC: 10.5 SEC — SIGNIFICANT CHANGE UP (ref 9.8–12.7)
RBC # BLD: 4.99 M/UL — SIGNIFICANT CHANGE UP (ref 4.2–5.8)
RBC # FLD: 11.3 % — SIGNIFICANT CHANGE UP (ref 10.3–14.5)
SODIUM SERPL-SCNC: 142 MMOL/L — SIGNIFICANT CHANGE UP (ref 135–145)
TROPONIN T SERPL-MCNC: <0.01 NG/ML — SIGNIFICANT CHANGE UP (ref 0–0.06)
WBC # BLD: 5.7 K/UL — SIGNIFICANT CHANGE UP (ref 3.8–10.5)
WBC # FLD AUTO: 5.7 K/UL — SIGNIFICANT CHANGE UP (ref 3.8–10.5)

## 2017-10-26 PROCEDURE — 99220: CPT | Mod: 25

## 2017-10-26 PROCEDURE — 71010: CPT | Mod: 26

## 2017-10-26 PROCEDURE — 93010 ELECTROCARDIOGRAM REPORT: CPT

## 2017-10-26 RX ORDER — METOPROLOL TARTRATE 50 MG
25 TABLET ORAL
Qty: 0 | Refills: 0 | Status: DISCONTINUED | OUTPATIENT
Start: 2017-10-26 | End: 2017-10-27

## 2017-10-26 RX ORDER — ASPIRIN/CALCIUM CARB/MAGNESIUM 324 MG
325 TABLET ORAL ONCE
Qty: 0 | Refills: 0 | Status: COMPLETED | OUTPATIENT
Start: 2017-10-26 | End: 2017-10-26

## 2017-10-26 RX ORDER — SODIUM CHLORIDE 9 MG/ML
3 INJECTION INTRAMUSCULAR; INTRAVENOUS; SUBCUTANEOUS ONCE
Qty: 0 | Refills: 0 | Status: COMPLETED | OUTPATIENT
Start: 2017-10-26 | End: 2017-10-26

## 2017-10-26 RX ORDER — SODIUM CHLORIDE 9 MG/ML
1000 INJECTION INTRAMUSCULAR; INTRAVENOUS; SUBCUTANEOUS ONCE
Qty: 0 | Refills: 0 | Status: COMPLETED | OUTPATIENT
Start: 2017-10-26 | End: 2017-10-26

## 2017-10-26 RX ADMIN — SODIUM CHLORIDE 3 MILLILITER(S): 9 INJECTION INTRAMUSCULAR; INTRAVENOUS; SUBCUTANEOUS at 14:49

## 2017-10-26 RX ADMIN — Medication 325 MILLIGRAM(S): at 14:51

## 2017-10-26 RX ADMIN — SODIUM CHLORIDE 1000 MILLILITER(S): 9 INJECTION INTRAMUSCULAR; INTRAVENOUS; SUBCUTANEOUS at 23:46

## 2017-10-26 NOTE — ED CDU PROVIDER INITIAL DAY NOTE - OBJECTIVE STATEMENT
52 M pmh ASD repair, MI s/p AICD p/w dizziness, palpitations and chest pressure episode while walking at work today.  Pt was just discharged yesterday for same symptoms that occurred 3 days ago while walking. pt reports defibrillator "shocked" him last week, at that time just felt minimal discomfort to chest.  While in Hospital this week pt had A1CD interrogated and had TTE.  He was scheduled for stress in hospital but left prior to test.  Then today became alarmed when symptoms returned while walking at work.  pt reports when symptoms come, pt is very unsteady and needs to hold on to wall until it passes. Symptoms then pass on their own. pt now feels well without complaint.  Pt expresses sense of fear ad anxiety with current sensation.    Denies diaphoresis, jaw pain, arm pain, back pain, shortness of breath, abd pain, fever/chills, vomiting/diarrhea.  Pt just started taking metoprolol 25mg last night, prescribed by his EP Doctor.    	Dr. Saavedra (EP)   	PMD Dr. Devendra Taylor ([place])

## 2017-10-26 NOTE — ED PROVIDER NOTE - CARE PLAN
Principal Discharge DX:	Palpitations  Instructions for follow-up, activity and diet:	- Follow up with your EP doctor ( Dr. Saavedra)  - Please see your PMD in 1-2 days

## 2017-10-26 NOTE — ED PROVIDER NOTE - ATTENDING CONTRIBUTION TO CARE
ATTENDING MD:  I, Nacho Hoyos, personally have seen and examined this patient.  I have discussed all aspects of care with the resident physician. Resident note reviewed and agree on plan of care and except where noted.  See HPI, PE, and MDM for details.     generally well apeparing male in NAD. AOx4. NCAT, mucus membranes are moist, oropharynx is within normal limits, no JVD. lungs clear to auscultation bilaterally, equal breath sounds bilaterally. heart normal rate, regular rhythm. PM in place. ABd soft nontender. no extremity swelling or assymetry or tenderness. gross strength, sensation, facial symmetry and coordination intact, normal gait. normal skin exam.    MDM: cardiac workup, EP consult, CXR, tele, ICD interrogation, dispo pending workup.

## 2017-10-26 NOTE — ED PROVIDER NOTE - PHYSICAL EXAMINATION
Gen: NAD, AOx3  Head: NCAT  HEENT: PERRL, oral mucosa moist, normal conjunctiva  Lung: CTAB, no respiratory distress  CV: rrr, no murmurs, Normal perfusion  Abd: soft, NTND, no CVA tenderness  MSK: No edema, no visible deformities  Neuro: No focal neurologic deficits, normal gait  Skin: No rash   Psych: normal affect

## 2017-10-26 NOTE — ED PROVIDER NOTE - OBJECTIVE STATEMENT
52 M pmh ASD, MI s/p AICD p/w dizziness, palpitations and chest pressure.  Pt was just discharged yesterday for dizziness and defibrillator that "shocked" him last week that was checked by EP.  Today patient felt dizzy and with palpitations (described as racing heart) this morning and felt really unbalanced and unable to walk.  Pt expresses sense of fear ad anxiety with current sensation.  Pt had nausea on way to ED.  Denies shortness of breath, abd pain, fever/chills, vomiting/diarrhea.  Pt just started taking metoprolol 25mg last night.    Pt has follow up appointment with Dr. Saavedra (EP) next week.    PMD Dr. Devendra Taylor ([place])  - Chasidy Hutchins,  52 M pmh ASD, MI s/p AICD p/w dizziness, palpitations and chest pressure.  Pt was just discharged yesterday for dizziness and defibrillator that "shocked" him last week that was checked by EP.  Was recommended stress test but left AMA yesterday. Today patient felt dizzy and with palpitations (described as racing heart) this morning and felt really unbalanced and unable to walk.  Pt expresses sense of fear ad anxiety with current sensation.  Pt had nausea on way to ED.  Denies shortness of breath, abd pain, fever/chills, vomiting/diarrhea.  Pt just started taking metoprolol 25mg last night.    Pt has follow up appointment with Dr. Saavedra (EP) next week.    PMD Dr. Devendra Taylor ([place])  - Chasidy Hutchins DO

## 2017-10-26 NOTE — ED CDU PROVIDER INITIAL DAY NOTE - ATTENDING CONTRIBUTION TO CARE
I have personally performed a face to face diagnostic evaluation on this patient.  I have reviewed the ACP note and agree with the history, exam, and plan of care, except as noted.

## 2017-10-26 NOTE — ED CDU PROVIDER INITIAL DAY NOTE - PROGRESS NOTE DETAILS
CDU NOTE JAXON Robles: pt resting comfortably, feels well except for experiencing some palpitations. no cp. NAD VSS. no events on tele. EKG- unchanged. Discussed with Dr. Huang- recommends EP to consult, as pt has only had interrogation of AICD so far.

## 2017-10-26 NOTE — ED PROVIDER NOTE - PROGRESS NOTE DETAILS
JOAQUIN Meredith: Patient was endorsed to me by Dr. Hoyos at the usual hour of signout to follow up results of trop #2 and ppm eval and dispo pending these results and re-evaluation. At this time the patient feels ok, pacemaker wo events, 2 trops neg. Pt ambulating without difficulty in ED  - Chasidy Hutchins, DO

## 2017-10-26 NOTE — ED PROVIDER NOTE - MEDICAL DECISION MAKING DETAILS
52 M with AICD and recent admission for dizziness and defibrillator discharge p/w dizziness, palpitations, chest pressure.  Will obtain ekgs, cxr, labs, electrolytes, CE and likely admit. 52 M with AICD and recent admission for dizziness and defibrillator discharge p/w dizziness, palpitations, chest pressure.  Will obtain ekgs, cxr, labs, electrolytes, CE and consult cardiology/EP. Dispo per above.

## 2017-10-26 NOTE — ED PROVIDER NOTE - OBJECTIVE STATEMENT
52M former smoker h/o ?VSD/ASD approx 3 yrs ago (Dr. Taylor, [facility]) s/p ICD placed rl-cardiac arrest per patient, presents  Discharged yesterday, here for dizziness and ACS workup. ECHO demonstrated concentric left ventricular hypertrophy with normal left ventricular systolic function. No segmental wall motion abnormalities.

## 2017-10-26 NOTE — ED ADULT NURSE REASSESSMENT NOTE - NS ED NURSE REASSESS COMMENT FT1
No previous note from day shift RN
Pt report received from RN. Pt transferred to cdu for dizziness/palpitations. Pt a/ox3 denies respiratory distress, sob, dyspnea, C/P, no n/v/d at this time. Pt states feeling dizzy and periods of palpitations. Cardiac monitor in place NSR HR 63bpm. Awaiting lab results/ stress test. VSS, afebrile, IV clean/dry/intact. Pt aware of plan of care, call bell within reach ,safety maintained. Will monitor and assess.

## 2017-10-26 NOTE — ED ADULT NURSE NOTE - OBJECTIVE STATEMENT
52 year old male presents to the ED complaining of dizziness and chest palpitation. Pt was here on Monday and treated on 3DSU with instructions to follow up with his cardiologist. Pt denies CP at this time., Hx of open heart, MI, and defibrillator placement. Pt is A&O x 4, VSS, afebrile, ambulating independently. Pt denies fever, chills, NVD, SOB, or chest pain. IV placed, labs drawn, bed in low position, safety measures in place. EKG completed and given to MD, pt placed on , will continue to monitor.

## 2017-10-26 NOTE — PROCEDURE NOTE - ADDITIONAL PROCEDURE DETAILS
Indication for interrogation: Presents with dizziness, palpitations and chest pain  Measured data WNL, NL ICD function; sensing and pacing thresholds are wnl. Pt is not  PM dependent  Stored data revealed; no events for review since last interrogation 10/23/17.  Changes made: none     Felicia/ SARA Kaur.,  NPs  [id number]

## 2017-10-27 ENCOUNTER — TRANSCRIPTION ENCOUNTER (OUTPATIENT)
Age: 52
End: 2017-10-27

## 2017-10-27 VITALS — SYSTOLIC BLOOD PRESSURE: 116 MMHG | DIASTOLIC BLOOD PRESSURE: 71 MMHG | HEART RATE: 66 BPM

## 2017-10-27 DIAGNOSIS — Z29.9 ENCOUNTER FOR PROPHYLACTIC MEASURES, UNSPECIFIED: ICD-10-CM

## 2017-10-27 DIAGNOSIS — R63.8 OTHER SYMPTOMS AND SIGNS CONCERNING FOOD AND FLUID INTAKE: ICD-10-CM

## 2017-10-27 DIAGNOSIS — Q21.1 ATRIAL SEPTAL DEFECT: ICD-10-CM

## 2017-10-27 DIAGNOSIS — R00.2 PALPITATIONS: ICD-10-CM

## 2017-10-27 DIAGNOSIS — Z87.74 PERSONAL HISTORY OF (CORRECTED) CONGENITAL MALFORMATIONS OF HEART AND CIRCULATORY SYSTEM: Chronic | ICD-10-CM

## 2017-10-27 DIAGNOSIS — R42 DIZZINESS AND GIDDINESS: ICD-10-CM

## 2017-10-27 LAB — HBA1C BLD-MCNC: 5.3 % — SIGNIFICANT CHANGE UP (ref 4–5.6)

## 2017-10-27 PROCEDURE — 93017 CV STRESS TEST TRACING ONLY: CPT

## 2017-10-27 PROCEDURE — 93005 ELECTROCARDIOGRAM TRACING: CPT

## 2017-10-27 PROCEDURE — 82550 ASSAY OF CK (CPK): CPT

## 2017-10-27 PROCEDURE — 93016 CV STRESS TEST SUPVJ ONLY: CPT

## 2017-10-27 PROCEDURE — 84295 ASSAY OF SERUM SODIUM: CPT

## 2017-10-27 PROCEDURE — 99285 EMERGENCY DEPT VISIT HI MDM: CPT | Mod: 25

## 2017-10-27 PROCEDURE — 80053 COMPREHEN METABOLIC PANEL: CPT

## 2017-10-27 PROCEDURE — 83735 ASSAY OF MAGNESIUM: CPT

## 2017-10-27 PROCEDURE — 82435 ASSAY OF BLOOD CHLORIDE: CPT

## 2017-10-27 PROCEDURE — 85610 PROTHROMBIN TIME: CPT

## 2017-10-27 PROCEDURE — 83605 ASSAY OF LACTIC ACID: CPT

## 2017-10-27 PROCEDURE — 82565 ASSAY OF CREATININE: CPT

## 2017-10-27 PROCEDURE — 84100 ASSAY OF PHOSPHORUS: CPT

## 2017-10-27 PROCEDURE — 84132 ASSAY OF SERUM POTASSIUM: CPT

## 2017-10-27 PROCEDURE — 93018 CV STRESS TEST I&R ONLY: CPT

## 2017-10-27 PROCEDURE — 83036 HEMOGLOBIN GLYCOSYLATED A1C: CPT

## 2017-10-27 PROCEDURE — 82553 CREATINE MB FRACTION: CPT

## 2017-10-27 PROCEDURE — 84484 ASSAY OF TROPONIN QUANT: CPT

## 2017-10-27 PROCEDURE — 71045 X-RAY EXAM CHEST 1 VIEW: CPT

## 2017-10-27 PROCEDURE — 82947 ASSAY GLUCOSE BLOOD QUANT: CPT

## 2017-10-27 PROCEDURE — 85027 COMPLETE CBC AUTOMATED: CPT

## 2017-10-27 PROCEDURE — 99223 1ST HOSP IP/OBS HIGH 75: CPT

## 2017-10-27 PROCEDURE — G0378: CPT

## 2017-10-27 PROCEDURE — 80061 LIPID PANEL: CPT

## 2017-10-27 PROCEDURE — 99217: CPT | Mod: 25

## 2017-10-27 PROCEDURE — 82803 BLOOD GASES ANY COMBINATION: CPT

## 2017-10-27 PROCEDURE — 85014 HEMATOCRIT: CPT

## 2017-10-27 PROCEDURE — 82330 ASSAY OF CALCIUM: CPT

## 2017-10-27 PROCEDURE — 85730 THROMBOPLASTIN TIME PARTIAL: CPT

## 2017-10-27 RX ORDER — METOPROLOL TARTRATE 50 MG
25 TABLET ORAL
Qty: 0 | Refills: 0 | Status: DISCONTINUED | OUTPATIENT
Start: 2017-10-27 | End: 2017-10-27

## 2017-10-27 RX ORDER — METOPROLOL TARTRATE 50 MG
1 TABLET ORAL
Qty: 0 | Refills: 0 | COMMUNITY
Start: 2017-10-27

## 2017-10-27 RX ORDER — ENOXAPARIN SODIUM 100 MG/ML
40 INJECTION SUBCUTANEOUS EVERY 24 HOURS
Qty: 0 | Refills: 0 | Status: DISCONTINUED | OUTPATIENT
Start: 2017-10-27 | End: 2017-10-27

## 2017-10-27 RX ADMIN — Medication 25 MILLIGRAM(S): at 09:52

## 2017-10-27 RX ADMIN — Medication 25 MILLIGRAM(S): at 18:13

## 2017-10-27 NOTE — DISCHARGE NOTE ADULT - MEDICATION SUMMARY - MEDICATIONS TO TAKE
I will START or STAY ON the medications listed below when I get home from the hospital:    metoprolol tartrate 25 mg oral tablet  -- 1 tab(s) by mouth 2 times a day  -- Indication: For Palpitations

## 2017-10-27 NOTE — ED CDU PROVIDER DISPOSITION NOTE - CLINICAL COURSE
53 y/o M h/o MI, s/p AICD, ASD repair presented to ED after episode of dizziness, weakness, palpitations and chest pressure.  pt had similar episode 3 days prior and was admitted to hospital, had TTE and EP interrogation.  There was plan for stress test but patient left prior to testing.  pt returned yesterday because symptoms reoccurred. episodes occurred with walking. resolved with rest/time. Patient was sent to CDU for continuous telemetry monitoring, repeat cardiac enzymes and EKGs and a stress test while on BB.  pt followed by EP. Patient did not have any acute events on telemetry overnight while in the CDU,  **be completed by AM - and tolerated stress test well. Results of provocative cardiac testing were within normal limits and patient was discharged with instructions to continue to take home medications as directed and follow up with cardiologist this week for continued evaluation. 51 y/o M h/o MI, s/p AICD, ASD repair presented to ED after episode of dizziness, weakness, palpitations and chest pressure.  pt had similar episode 3 days prior and was admitted to hospital, had TTE and EP interrogation.  There was plan for stress test but patient left prior to testing.  pt returned yesterday because symptoms reoccurred. episodes occurred with walking. resolved with rest/time. Patient was sent to CDU for continuous telemetry monitoring, repeat cardiac enzymes and EKGs and a stress test while on BB.  pt followed by EP. Patient did not have any acute events on telemetry overnight while in the CDU,  Discussed case with EP fellow, recommending admission for potential angiogram.

## 2017-10-27 NOTE — DISCHARGE NOTE ADULT - CARE PLAN
Principal Discharge DX:	Dizziness  Goal:	symptoms resolving  Instructions for follow-up, activity and diet:	s/p ETT which had to be terminated due to symptoms of dizziness and had increased frequency of VPDs  - Continue with beta blocker   f/u with Dr. Pedro Saavedra  for possible ablation Principal Discharge DX:	Dizziness  Goal:	symptoms resolved  Instructions for follow-up, activity and diet:	s/p ETT which had to be terminated due to symptoms of dizziness and had increased frequency of VPDs.   Continue with beta blocker  denies any complains currently.   f/u with Dr. Pedro Saavedra  for possible ablation

## 2017-10-27 NOTE — DISCHARGE NOTE ADULT - CARE PROVIDER_API CALL
Pedro Saavedra), Cardiac Electrophysiology; Cardiovascular Disease  [address], [place] [id number]  Phone: [phone]  Fax: [phone]

## 2017-10-27 NOTE — ED CDU PROVIDER SUBSEQUENT DAY NOTE - ATTENDING CONTRIBUTION TO CARE
ED attending Dr Miguel Parmar note:  Patient re-evaluated and doing well.  No acute issues at  this time.  Lab and radiology tests reviewed with patient and/or family.  Patient stable for discharge.  I have personally performed a face to face diagnostic evaluation on this patient.  I have reviewed the ACP note and agree with the history, exam, and plan of care, except as noted.  History and Exam by me show improvement of sts, seen by ep  and felt patient can be d.c home with outpt holter monitor and follow up.

## 2017-10-27 NOTE — H&P ADULT - NSHPLABSRESULTS_GEN_ALL_CORE
.  LABS:                         16.1   5.7   )-----------( 145      ( 26 Oct 2017 14:31 )             45.0     10-26    142  |  103  |  12  ----------------------------<  88  4.3   |  27  |  0.85    Ca    9.2      26 Oct 2017 14:31  Phos  3.2     10-26  Mg     2.2     10-26    TPro  6.8  /  Alb  4.9  /  TBili  0.4  /  DBili  x   /  AST  21  /  ALT  12  /  AlkPhos  56  10-26    PT/INR - ( 26 Oct 2017 14:31 )   PT: 10.5 sec;   INR: 0.96 ratio         PTT - ( 26 Oct 2017 14:31 )  PTT:29.5 sec    CARDIAC MARKERS ( 26 Oct 2017 22:55 )  x     / <0.01 ng/mL / x     / x     / x      CARDIAC MARKERS ( 26 Oct 2017 16:31 )  x     / <0.01 ng/mL / x     / x     / x      CARDIAC MARKERS ( 26 Oct 2017 14:31 )  x     / <0.01 ng/mL / 130 U/L / x     / 3.6 ng/mL            EKG Reviewed by me: NSR with 1st degree AVB    Xray reviewed by me: No evidence of infiltrate    Consultant notes reviewed: ED

## 2017-10-27 NOTE — H&P ADULT - HISTORY OF PRESENT ILLNESS
53 yo male with pmhx of ASD repaired at [facility] 3 years ago complicated by cardiac arrest requiring ICD placement presents here today for further evaluation of dizziness.  Pt has been in the hospital over the past few days getting evaluated for symptomatic dizziness on exertion, for which he had an echo and interrogation of his ICD which did not show any dysfunction.  He has had multiple PVCs, and was started on Lopressor upon d/c.  Since d/c he has had continuous dizziness on minimal exertion.  He denies any CP, SOB, n/v.  Pt states that the dizziness is not positional in nature, and he has not had any syncopal episodes.  Pt states that this all began last week after he felt a shock of his ICD.  Prior to that he was exercising 4-5 days/week without any difficulty and no episodes of dizziness.  ROS otherwise negative.

## 2017-10-27 NOTE — DISCHARGE NOTE ADULT - HOSPITAL COURSE
51 y/o M h/o MI, s/p AICD, ASD repair presented to ED after episode of dizziness, weakness, palpitations and chest pressure.  pt had similar episode 3 days prior and was admitted to hospital, had TTE and EP interrogation.  There was plan for stress test but patient left prior to testing.  pt returned yesterday because symptoms reoccurred. episodes occurred with walking. resolved with rest/time.He had non diagnostic ETT during this admission and did developed dizziness after 1.23 of exercise and test was terminated. Frequency of VPD increased with Stress. Recommended  f/u with Dr. Pedro Saavedra  for possible ablation.

## 2017-10-27 NOTE — H&P ADULT - NSHPREVIEWOFSYSTEMS_GEN_ALL_CORE
Review of Systems:  Constitutional: no malaise/fatigue/fevers  HEENT: no headache/sore throat/rhinorrhea  Respiratory: no cough/SOB/chest pain  Cardiac: no palpitations/chest pain  Vascular: no leg swelling  Gastrointestinal: no nausea/vomiting/diarrhea/constipation  Genitourinary: no dysuria/nocturia/polyuria  MSK: no joint or muscle pain  Skin: no rashes  Neuro:+dizziness  Psych: no depression or anxiety

## 2017-10-27 NOTE — ED CDU PROVIDER SUBSEQUENT DAY NOTE - PROGRESS NOTE DETAILS
Patient seen at bedside in NAD.  VSS.  Patient resting comfortably without complaints. Patient denies recent episodes of palpiations/cp.  no cardiac events noted on tele.  WIll await final EP recs.  -Kranthi Oscar PA-C

## 2017-10-27 NOTE — DISCHARGE NOTE ADULT - PATIENT PORTAL LINK FT
“You can access the Follow[employer] Patient Portal, offered by [facility], by registering with the following website: [email or web address]”

## 2017-10-27 NOTE — DISCHARGE NOTE ADULT - CONDITION (STATED IN TERMS THAT PERMIT A SPECIFIC MEASURABLE COMPARISON WITH CONDITION ON ADMISSION):
Medically stable for discharge after discussing with PCP and EP. Medically stable for discharge after discussing with  (attending) and EP.

## 2017-10-27 NOTE — CONSULT NOTE ADULT - SUBJECTIVE AND OBJECTIVE BOX
Patient seen and evaluated at bedside    Chief Complaint: palpitations    HPI:  52 M with ASD repair, MI s/p AICD p/w dizziness, palpitations and chest pressure episode while walking at work today.  Pt was just discharged yesterday for same symptoms that occurred 3 days ago while walking. pt reports defibrillator "shocked" him last week, at that time just felt minimal discomfort to chest.  While in Hospital this week pt had A1CD interrogated and had TTE.  He was scheduled for stress in hospital but left prior to test.  Then today became alarmed when symptoms returned while walking at work.  pt reports when symptoms come, pt is very unsteady and needs to hold on to wall until it passes. Symptoms then pass on their own. pt now feels well without complaint.  Pt expresses sense of fear ad anxiety with current sensation. Denies diaphoresis, jaw pain, arm pain, back pain, shortness of breath, abd pain, fever/chills, vomiting/diarrhea.  Pt just started taking metoprolol 25mg last night, prescribed by his EP Doctor.    Last hospitalization metoprolol was initiated for VT and ablation as outpatient was considered.      PMH:   ASD (atrial septal defect)  No pertinent past medical history      Medications:   metoprolol     tartrate 25 milliGRAM(s) Oral two times a day      Allergies:  No Known Allergies      FAMILY HISTORY:  No pertinent family history in first degree relatives    Review of Systems:  REVIEW OF SYSTEMS:    CONSTITUTIONAL: No weakness, fevers or chills  EYES/ENT: No visual changes;  No dysphagia  RESPIRATORY: No cough, wheezing, hemoptysis; No shortness of breath  CARDIOVASCULAR: + palpitations; No lower extremity edema  GASTROINTESTINAL: No abdominal or epigastric pain. No nausea, vomiting, or hematemesis; No diarrhea or constipation. No melena or hematochezia.  GENITOURINARY: No dysuria, frequency or hematuria  NEUROLOGICAL: No numbness or weakness  SKIN: No itching, burning, rashes, or lesions   All other review of systems is negative unless indicated above.    Physical Exam:  T(F): 97.7 (10-27), Max: 98.3 (10-26)  HR: 55 (10-27) (54 - 63)  BP: 120/81 (10-27) (116/72 - 153/88)  RR: 18 (10-27)  SpO2: 97% (10-27)  GENERAL: No acute distress, well-developed  HEAD:  Atraumatic, Normocephalic  ENT: EOMI, PERRLA, conjunctiva and sclera clear, Neck supple, No JVD, moist mucosa  CHEST/LUNG: Clear to auscultation bilaterally; No wheeze, equal breath sounds bilaterally   HEART: Regular rate and rhythm; No murmurs, rubs, or gallops  ABDOMEN: Soft, Nontender, Nondistended; Bowel sounds present  EXTREMITIES:  No clubbing, cyanosis, or edema  NEUROLOGY: AAOx3, non-focal, cranial nerves intact      Cardiovascular Diagnostic Testing:    ECG: Personally reviewed  SR, 1st degree AV block, PVC    Labs: Personally reviewed                        16.1   5.7   )-----------( 145      ( 26 Oct 2017 14:31 )             45.0     10-26    142  |  103  |  12  ----------------------------<  88  4.3   |  27  |  0.85    Ca    9.2      26 Oct 2017 14:31  Phos  3.2     10-26  Mg     2.2     10-26    TPro  6.8  /  Alb  4.9  /  TBili  0.4  /  DBili  x   /  AST  21  /  ALT  12  /  AlkPhos  56  10-26    PT/INR - ( 26 Oct 2017 14:31 )   PT: 10.5 sec;   INR: 0.96 ratio         PTT - ( 26 Oct 2017 14:31 )  PTT:29.5 sec  CARDIAC MARKERS ( 26 Oct 2017 22:55 )  x     / <0.01 ng/mL / x     / x     / x      CARDIAC MARKERS ( 26 Oct 2017 16:31 )  x     / <0.01 ng/mL / x     / x     / x      CARDIAC MARKERS ( 26 Oct 2017 14:31 )  x     / <0.01 ng/mL / 130 U/L / x     / 3.6 ng/mL    Thyroid Stimulating Hormone, Serum: 1.96 uIU/mL (10-24 @ 07:28)      A/P:  52 M with ASD repair, MI s/p AICD p/w dizziness, palpitations and chest pressure episode while walking at work today.    Palpitations. Worse with exertion. Could be 2/2 PVCs.   - Can consider exercise stress test on BB  - Will speak with EP attending regarding ablation    Lane Suero  Cardiology fellow

## 2017-10-27 NOTE — DISCHARGE NOTE ADULT - PLAN OF CARE
symptoms resolving s/p ETT which had to be terminated due to symptoms of dizziness and had increased frequency of VPDs  - Continue with beta blocker   f/u with Dr. Pedro Saavedra  for possible ablation symptoms resolved s/p ETT which had to be terminated due to symptoms of dizziness and had increased frequency of VPDs.   Continue with beta blocker  denies any complains currently.   f/u with Dr. Pedro Saavedra  for possible ablation

## 2017-10-27 NOTE — ED CDU PROVIDER SUBSEQUENT DAY NOTE - HISTORY
No significant interval changes since initial CDU provider note. Pt feels well without complaint. pt had a few PVCs overnight with associated palpitations. no repeat episodes of dizziness/chest pressure. NAD VSS. no events on tele.  pt was seen by Cardiology Fellow, to continue monitoring. will hold Metoprolol for now and await attending consult in AM.

## 2017-10-27 NOTE — H&P ADULT - NSHPPHYSICALEXAM_GEN_ALL_CORE
VITALS  Vital Signs Last 24 Hrs  T(C): 36.9 (27 Oct 2017 11:37), Max: 36.9 (27 Oct 2017 11:37)  T(F): 98.4 (27 Oct 2017 11:37), Max: 98.4 (27 Oct 2017 11:37)  HR: 65 (27 Oct 2017 16:08) (55 - 65)  BP: 124/84 (27 Oct 2017 16:08) (107/65 - 125/78)  BP(mean): --  RR: 16 (27 Oct 2017 16:08) (14 - 19)  SpO2: 98% (27 Oct 2017 16:08) (97% - 99%)    I&O's Summary      PHYSICAL EXAM  General: A&Ox3; NAD  Head: NC/AT;  Eyes: PERRL; EOMI; anicteric sclera  Neck: Supple; no JVD  Respiratory: CTA B/L; no wheezes/crackles/rales   Cardiovascular: Regular rhythm/rate; S1/S2  Gastrointestinal: Soft; NTND w/out rebound tenderness or guarding; bowel sounds normal  Extremities: WWP; no edema or cyanosis  Neurological:  CNII-XII grossly intact; no obvious focal deficits

## 2017-11-03 DIAGNOSIS — R42 DIZZINESS AND GIDDINESS: ICD-10-CM

## 2017-11-03 DIAGNOSIS — R07.89 OTHER CHEST PAIN: ICD-10-CM

## 2017-11-06 ENCOUNTER — NON-APPOINTMENT (OUTPATIENT)
Age: 52
End: 2017-11-06

## 2017-11-06 ENCOUNTER — APPOINTMENT (OUTPATIENT)
Dept: ELECTROPHYSIOLOGY | Facility: CLINIC | Age: 52
End: 2017-11-06
Payer: MEDICAID

## 2017-11-06 VITALS
HEIGHT: 66 IN | DIASTOLIC BLOOD PRESSURE: 88 MMHG | HEART RATE: 67 BPM | OXYGEN SATURATION: 98 % | SYSTOLIC BLOOD PRESSURE: 153 MMHG | WEIGHT: 142 LBS | BODY MASS INDEX: 22.82 KG/M2

## 2017-11-06 PROCEDURE — 99215 OFFICE O/P EST HI 40 MIN: CPT

## 2017-11-06 PROCEDURE — 93000 ELECTROCARDIOGRAM COMPLETE: CPT

## 2017-12-20 ENCOUNTER — INPATIENT (INPATIENT)
Facility: HOSPITAL | Age: 52
LOS: 1 days | Discharge: HOME | DRG: 274 | End: 2017-12-22
Attending: INTERNAL MEDICINE | Admitting: HOSPITALIST
Payer: MEDICAID

## 2017-12-20 VITALS
SYSTOLIC BLOOD PRESSURE: 164 MMHG | DIASTOLIC BLOOD PRESSURE: 109 MMHG | HEART RATE: 83 BPM | WEIGHT: 141.98 LBS | OXYGEN SATURATION: 96 % | RESPIRATION RATE: 18 BRPM | TEMPERATURE: 98 F | HEIGHT: 66 IN

## 2017-12-20 DIAGNOSIS — Z95.810 PRESENCE OF AUTOMATIC (IMPLANTABLE) CARDIAC DEFIBRILLATOR: Chronic | ICD-10-CM

## 2017-12-20 DIAGNOSIS — R00.2 PALPITATIONS: ICD-10-CM

## 2017-12-20 DIAGNOSIS — Z87.74 PERSONAL HISTORY OF (CORRECTED) CONGENITAL MALFORMATIONS OF HEART AND CIRCULATORY SYSTEM: Chronic | ICD-10-CM

## 2017-12-20 LAB
ALBUMIN SERPL ELPH-MCNC: 4.6 G/DL — SIGNIFICANT CHANGE UP (ref 3.3–5)
ALP SERPL-CCNC: 57 U/L — SIGNIFICANT CHANGE UP (ref 40–120)
ALT FLD-CCNC: 32 U/L RC — SIGNIFICANT CHANGE UP (ref 10–45)
ANION GAP SERPL CALC-SCNC: 16 MMOL/L — SIGNIFICANT CHANGE UP (ref 5–17)
APTT BLD: 28.8 SEC — SIGNIFICANT CHANGE UP (ref 27.5–37.4)
AST SERPL-CCNC: 27 U/L — SIGNIFICANT CHANGE UP (ref 10–40)
BASE EXCESS BLDV CALC-SCNC: 0.1 MMOL/L — SIGNIFICANT CHANGE UP (ref -2–2)
BASOPHILS # BLD AUTO: 0.1 K/UL — SIGNIFICANT CHANGE UP (ref 0–0.2)
BASOPHILS NFR BLD AUTO: 1 % — SIGNIFICANT CHANGE UP (ref 0–2)
BILIRUB SERPL-MCNC: 0.5 MG/DL — SIGNIFICANT CHANGE UP (ref 0.2–1.2)
BUN SERPL-MCNC: 16 MG/DL — SIGNIFICANT CHANGE UP (ref 7–23)
CA-I SERPL-SCNC: 1.24 MMOL/L — SIGNIFICANT CHANGE UP (ref 1.12–1.3)
CALCIUM SERPL-MCNC: 9 MG/DL — SIGNIFICANT CHANGE UP (ref 8.4–10.5)
CHLORIDE BLDV-SCNC: 108 MMOL/L — SIGNIFICANT CHANGE UP (ref 96–108)
CHLORIDE SERPL-SCNC: 104 MMOL/L — SIGNIFICANT CHANGE UP (ref 96–108)
CK MB BLD-MCNC: 2.2 % — SIGNIFICANT CHANGE UP (ref 0–3.5)
CK MB CFR SERPL CALC: 4.2 NG/ML — SIGNIFICANT CHANGE UP (ref 0–6.7)
CK SERPL-CCNC: 187 U/L — SIGNIFICANT CHANGE UP (ref 30–200)
CO2 BLDV-SCNC: 27 MMOL/L — SIGNIFICANT CHANGE UP (ref 22–30)
CO2 SERPL-SCNC: 22 MMOL/L — SIGNIFICANT CHANGE UP (ref 22–31)
CREAT SERPL-MCNC: 0.9 MG/DL — SIGNIFICANT CHANGE UP (ref 0.5–1.3)
EOSINOPHIL # BLD AUTO: 0.2 K/UL — SIGNIFICANT CHANGE UP (ref 0–0.5)
EOSINOPHIL NFR BLD AUTO: 3.8 % — SIGNIFICANT CHANGE UP (ref 0–6)
GAS PNL BLDV: 143 MMOL/L — SIGNIFICANT CHANGE UP (ref 136–145)
GAS PNL BLDV: SIGNIFICANT CHANGE UP
GAS PNL BLDV: SIGNIFICANT CHANGE UP
GLUCOSE BLDV-MCNC: 129 MG/DL — HIGH (ref 70–99)
GLUCOSE SERPL-MCNC: 128 MG/DL — HIGH (ref 70–99)
HCO3 BLDV-SCNC: 25 MMOL/L — SIGNIFICANT CHANGE UP (ref 21–29)
HCT VFR BLD CALC: 44.9 % — SIGNIFICANT CHANGE UP (ref 39–50)
HCT VFR BLDA CALC: 48 % — SIGNIFICANT CHANGE UP (ref 39–50)
HGB BLD CALC-MCNC: 15.5 G/DL — SIGNIFICANT CHANGE UP (ref 13–17)
HGB BLD-MCNC: 16 G/DL — SIGNIFICANT CHANGE UP (ref 13–17)
INR BLD: 0.89 RATIO — SIGNIFICANT CHANGE UP (ref 0.88–1.16)
LACTATE BLDV-MCNC: 2.8 MMOL/L — HIGH (ref 0.7–2)
LYMPHOCYTES # BLD AUTO: 1.5 K/UL — SIGNIFICANT CHANGE UP (ref 1–3.3)
LYMPHOCYTES # BLD AUTO: 22.5 % — SIGNIFICANT CHANGE UP (ref 13–44)
MCHC RBC-ENTMCNC: 32 PG — SIGNIFICANT CHANGE UP (ref 27–34)
MCHC RBC-ENTMCNC: 35.7 GM/DL — SIGNIFICANT CHANGE UP (ref 32–36)
MCV RBC AUTO: 89.6 FL — SIGNIFICANT CHANGE UP (ref 80–100)
MONOCYTES # BLD AUTO: 0.4 K/UL — SIGNIFICANT CHANGE UP (ref 0–0.9)
MONOCYTES NFR BLD AUTO: 6.4 % — SIGNIFICANT CHANGE UP (ref 2–14)
NEUTROPHILS # BLD AUTO: 4.3 K/UL — SIGNIFICANT CHANGE UP (ref 1.8–7.4)
NEUTROPHILS NFR BLD AUTO: 66.3 % — SIGNIFICANT CHANGE UP (ref 43–77)
PCO2 BLDV: 45 MMHG — SIGNIFICANT CHANGE UP (ref 35–50)
PH BLDV: 7.37 — SIGNIFICANT CHANGE UP (ref 7.35–7.45)
PLATELET # BLD AUTO: 167 K/UL — SIGNIFICANT CHANGE UP (ref 150–400)
PO2 BLDV: 58 MMHG — HIGH (ref 25–45)
POTASSIUM BLDV-SCNC: 3.7 MMOL/L — SIGNIFICANT CHANGE UP (ref 3.5–5)
POTASSIUM SERPL-MCNC: 4 MMOL/L — SIGNIFICANT CHANGE UP (ref 3.5–5.3)
POTASSIUM SERPL-SCNC: 4 MMOL/L — SIGNIFICANT CHANGE UP (ref 3.5–5.3)
PROT SERPL-MCNC: 6.6 G/DL — SIGNIFICANT CHANGE UP (ref 6–8.3)
PROTHROM AB SERPL-ACNC: 9.7 SEC — LOW (ref 9.8–12.7)
RBC # BLD: 5.02 M/UL — SIGNIFICANT CHANGE UP (ref 4.2–5.8)
RBC # FLD: 11.5 % — SIGNIFICANT CHANGE UP (ref 10.3–14.5)
SAO2 % BLDV: 89 % — HIGH (ref 67–88)
SODIUM SERPL-SCNC: 142 MMOL/L — SIGNIFICANT CHANGE UP (ref 135–145)
TROPONIN T SERPL-MCNC: <0.01 NG/ML — SIGNIFICANT CHANGE UP (ref 0–0.06)
TSH SERPL-MCNC: 1.14 UIU/ML — SIGNIFICANT CHANGE UP (ref 0.27–4.2)
WBC # BLD: 6.5 K/UL — SIGNIFICANT CHANGE UP (ref 3.8–10.5)
WBC # FLD AUTO: 6.5 K/UL — SIGNIFICANT CHANGE UP (ref 3.8–10.5)

## 2017-12-20 PROCEDURE — 99223 1ST HOSP IP/OBS HIGH 75: CPT | Mod: GC

## 2017-12-20 PROCEDURE — 71020: CPT | Mod: 26

## 2017-12-20 PROCEDURE — 93010 ELECTROCARDIOGRAM REPORT: CPT

## 2017-12-20 PROCEDURE — 99285 EMERGENCY DEPT VISIT HI MDM: CPT | Mod: 25

## 2017-12-20 RX ORDER — SODIUM CHLORIDE 9 MG/ML
1000 INJECTION INTRAMUSCULAR; INTRAVENOUS; SUBCUTANEOUS ONCE
Qty: 0 | Refills: 0 | Status: COMPLETED | OUTPATIENT
Start: 2017-12-20 | End: 2017-12-20

## 2017-12-20 RX ORDER — METOPROLOL TARTRATE 50 MG
25 TABLET ORAL
Qty: 0 | Refills: 0 | Status: DISCONTINUED | OUTPATIENT
Start: 2017-12-20 | End: 2017-12-20

## 2017-12-20 RX ADMIN — SODIUM CHLORIDE 2000 MILLILITER(S): 9 INJECTION INTRAMUSCULAR; INTRAVENOUS; SUBCUTANEOUS at 18:39

## 2017-12-20 NOTE — ED PROVIDER NOTE - PHYSICAL EXAMINATION
CONSTITUTIONAL: Patient is awake, alert and oriented x 3. Patient is well appearing and in no acute distress.  EYES: PERRL b/l, EOMI,   ENT: Airway patent, Nasal mucosa clear. Mouth with normal mucosa. Throat has no vesicles, no oropharyngeal exudates and uvula is midline.  LUNGS: CTA B/L,  HEART: RRR.+S1S2 no murmurs,   ABDOMEN: Soft nd/nt+bs no rebound or guarding.   EXTREMITY: no edema or calf tenderness b/l, FROM upper and lower ext b/l  SKIN: with no rash or lesions.   NEURO: Cn3-12 grossly intact. Strength5/5UE/LE.NmlSensation.

## 2017-12-20 NOTE — ED PROVIDER NOTE - SHIFT CHANGE DETAILS
Attending MD Lyons: 52M with palpitations and dizziness, recent work up for same, pending EP consult, dispo as per EP recommendations

## 2017-12-20 NOTE — ED ADULT TRIAGE NOTE - CHIEF COMPLAINT QUOTE
pt states feels some dizziness and palpations has been going on for few days since Monday after lifting heavy objects Monday pt states no respitory difficulties

## 2017-12-20 NOTE — H&P ADULT - NSHPLABSRESULTS_GEN_ALL_CORE
LABS:                        16.0   6.5   )-----------( 167      ( 20 Dec 2017 15:25 )             44.9     12-20    142  |  104  |  16  ----------------------------<  128<H>  4.0   |  22  |  0.90    Ca    9.0      20 Dec 2017 15:25    TPro  6.6  /  Alb  4.6  /  TBili  0.5  /  DBili  x   /  AST  27  /  ALT  32  /  AlkPhos  57  12-20    PT/INR - ( 20 Dec 2017 15:25 )   PT: 9.7 sec;   INR: 0.89 ratio         PTT - ( 20 Dec 2017 15:25 )  PTT:28.8 sec        RADIOLOGY & ADDITIONAL TESTS:

## 2017-12-20 NOTE — H&P ADULT - HISTORY OF PRESENT ILLNESS
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  Patient with recent hospital stay in October for similar symptoms where stress test and echo was performed. He states that since d/c from hospital he has seen his cardiologist Dr. Saavedra regarding palpitations and was told he "will need a procedure"     He came to ED today secondary to worsening dizziness and some chest pain associated with his palpitations. Pt states that the dizziness is not positional in nature, and he has not had any syncopal episodes. He denies sob, fevers, chills, uri symptoms, nausea, vomiting.

## 2017-12-20 NOTE — ED PROVIDER NOTE - ATTENDING CONTRIBUTION TO CARE
Attending MD Bagley.  Agree with above.  Pt is a 52 yr old male presenting to ED with complaint of same complaint as 1 mo ago.  Pt has hx of ASD repair 3 yrs ago, defib placed.  Now has palpitations x 2 mos and has seen Dr. Saavedra (cards) previously after found down 1 mo ago with syncope.  Brought to ED and had work-up inc echo/stress test, d/c'd home.  Pt now endorses persistent palpitations and dizziness with exertion.  Over the past year he has felt his defibrillator fire 3 times.  Had last fire 1 mo ago.  Pt has seen Dr. Saavedra since that time.  No actual syncopal episodes in last week.  Intermittent CP assoc with palpitations.  No assoc sxs.  No other known major medical problems.  Pt takes lipitor only.

## 2017-12-20 NOTE — ED ADULT NURSE REASSESSMENT NOTE - NS ED NURSE REASSESS COMMENT FT1
patient is resting in the room waiting for a dispo. states palpitations have resolved. vss/nad. will continue to monitor.

## 2017-12-20 NOTE — ED ADULT NURSE NOTE - OBJECTIVE STATEMENT
52 y m came to the ed c/o of two months of palpitations. patient states having a stress test and seeing a cardiologist for his issue without any resolving of symptoms. denies any chest pain or sob. denies anything makes the pain worse or better. states the palpitations are intermittent. denies any fevers, chills, n/v/d. abdomen is soft and nontender. skin is warm and dry.

## 2017-12-20 NOTE — H&P ADULT - ASSESSMENT
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  Patient with recent hospital stay in October for similar symptoms where stress test and echo was performed. He states that since d/c from hospital he has seen his cardiologist Dr. Saavedra regarding palpitations and was told he "will need a procedure"     He came to ED today secondary to worsening dizziness and some chest pain associated with his palpitations. Pt states that the dizziness is not positional in nature, and he has not had any syncopal episodes. He denies sob, fevers, chills, uri symptoms, nausea, vomiting. 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 3 years ago complicated by cardiac arrest requiring ICD placement presents c/o palpitations for the past 2 months and dizziness.  Patient with recent hospital stay in October for similar symptoms where stress test and echo was performed. He states that since d/c from hospital he has seen his cardiologist Dr. Saavedra regarding palpitations and was told he "will need a procedure"     He came to ED today secondary to worsening dizziness and some chest pain associated with his palpitations. Pt states that the dizziness is not positional in nature, and he has not had any syncopal episodes. He denies sob, fevers, chills, uri symptoms, nausea, vomiting.     CV: EP consult note reviesed in full. Ablation in AM. Hold Lopressor per EP note. NPO after midnight.

## 2017-12-20 NOTE — CONSULT NOTE ADULT - PROBLEM SELECTOR RECOMMENDATION 9
-With VT and ICD shock in June and October 2017   -PVC's seen on EKG during treadmill test  -Hold Metoprolol   -NPO after midnight for PVC ablation on 12/21 with [place] Qu[place] [id number]

## 2017-12-20 NOTE — ED PROVIDER NOTE - OBJECTIVE STATEMENT
53 yo male with pmhx of ASD repaired at [facility] 3 years ago complicated by cardiac arrest requiring ICD placement presents c/o papitations for the past 2 months and dizziness.  Patient with recent hospital stay for similar symptoms.  He has had multiple PVCs, and was started on Lopressor upon d/c.  Since d/c he has had continuous dizziness on minimal exertion.  He denies any CP, SOB, n/v.  Pt states that the dizziness is not positional in nature, and he has not had any syncopal episodes. 53 yo male with pmhx of ASD repaired at [facility] 3 years ago complicated by cardiac arrest requiring ICD placement presents c/o palpitations for the past 2 months and dizziness.  Patient with recent hospital stay in October for similar symptoms where stress test and echo was performed. He states that since d/c from hospital he has seen his cardiologist Dr. Saavedra regarding palpitations and was told he "will need a procedure" He came to ED today secondary to worsening dizziness and some chest pain associated with his palpitations. Pt states that the dizziness is not positional in nature, and he has not had any syncopal episodes. He denies sob, fevers, chills, uri symptoms, nausea, vomiting,

## 2017-12-20 NOTE — H&P ADULT - NSHPPHYSICALEXAM_GEN_ALL_CORE
PHYSICAL EXAMINATION:  Vital Signs Last 24 Hrs  T(C): 36.9 (20 Dec 2017 19:16), Max: 37.1 (20 Dec 2017 14:33)  T(F): 98.5 (20 Dec 2017 19:16), Max: 98.7 (20 Dec 2017 14:33)  HR: 81 (20 Dec 2017 19:16) (74 - 83)  BP: 151/81 (20 Dec 2017 19:16) (137/76 - 167/94)  BP(mean): --  RR: 18 (20 Dec 2017 19:16) (17 - 18)  SpO2: 97% (20 Dec 2017 19:16) (95% - 97%)  CAPILLARY BLOOD GLUCOSE          GENERAL: NAD, well-groomed, well-developed  HEAD:  atraumatic, normocephalic  EYES: sclera anicteric  ENMT: mucous membranes moist  NECK: supple, No JVD  CHEST/LUNG: clear to auscultation bilaterally; no rales, rhonchi, or wheezing b/l  HEART: normal S1, S2  ABDOMEN: BS+, soft, ND, NT   EXTREMITIES:  pulses palpable; no clubbing, cyanosis, or edema b/l LEs  NEURO: awake, alert, interactive; moves all extremities  SKIN: no rashes or lesions

## 2017-12-20 NOTE — CONSULT NOTE ADULT - SUBJECTIVE AND OBJECTIVE BOX
HPI: 52 year old male with past medical history significant for congenital ASD s/p closure with ICD implantation at that time for VT, patient with ICD shock in June and October for VT.  Patient was started on a Betablocker and continues to complain of worsening palpitations and dizziness. Patient was noted to have PVC's on EKG during treadmill test. Patient admits to palpitations occurring both at rest and with exertion. Patient denies fever, chills, CP, SOB, n/v/d or abdominal pain.       PAST MEDICAL & SURGICAL HISTORY:  ASD (atrial septal defect)  AICD (automatic cardioverter/defibrillator) present  Spontaneous ASD closure      ROS:    General: Pt denies recent weight loss/fever/chills    Neurological: denies numbness or  sensation loss    HEENT: denies visual changes, no hearing loss, denies sore throat    Cardiovascular: denies chest pain, admits to worsening palpitations and dizziness     Respiratory and Thorax: denies SOB/cough/wheezing    Gastrointestinal: denies abdominal pain/diarrhea/constipation/bloody stool    Genitourinary: denies urinary frequency/urgency/ dysuria    Musculoskeletal: denies joint pain or swelling, denies restricted motion    Skin: denies rashes/sores    Endocrine: denies heat or cold intolerance/excessive thirst    Hematologic: denies abnormal bleeding  	    MEDICATIONS  (STANDING):  Metoprolol 25mg BID     Allergies    No Known Allergies    Social History: Former Smoker     FAMILY HISTORY:  No pertinent family history in first degree relatives      Vital Signs Last 24 Hrs  T(C): 37.1 (20 Dec 2017 14:33), Max: 37.1 (20 Dec 2017 14:33)  T(F): 98.7 (20 Dec 2017 14:33), Max: 98.7 (20 Dec 2017 14:33)  HR: 74 (20 Dec 2017 17:21) (74 - 83)  BP: 137/76 (20 Dec 2017 17:21) (137/76 - 167/94)  RR: 18 (20 Dec 2017 17:21) (17 - 18)  SpO2: 97% (20 Dec 2017 17:21) (95% - 97%)    Physical Exam:    Constitutional: well developed, well nourished, no deformities and no acute distress    Neurological: Alert & Oriented x 3, BUTCHER, no focal deficits    HEENT: NC/AT,  Neck supple.    Respiratory: CTA B/L, No wheezing/crackles/rhonchi    Cardiovascular: (+) S1 & S2, RRR, No m/r/g    Gastrointestinal: soft, NT, nondistended, (+) BS    Genitourinary: non distended bladder, voiding freely    Extremities: No pedal edema, No clubbing, No cyanosis    Skin:  normal skin color and pigmentation, no skin lesions      LABS:                        16.0   6.5   )-----------( 167      ( 20 Dec 2017 15:25 )             44.9     12-20    142  |  104  |  16  ----------------------------<  128<H>  4.0   |  22  |  0.90    Ca    9.0      20 Dec 2017 15:25    TPro  6.6  /  Alb  4.6  /  TBili  0.5  /  DBili  x   /  AST  27  /  ALT  32  /  AlkPhos  57  12-20    PT/INR - ( 20 Dec 2017 15:25 )   PT: 9.7 sec;   INR: 0.89 ratio         PTT - ( 20 Dec 2017 15:25 )  PTT:28.8 sec      TELE: NSR 80's   EKG: NSR, 1st Degree Heart block 80 bpm

## 2017-12-20 NOTE — CONSULT NOTE ADULT - ASSESSMENT
52 year old male with past medical history significant for congenital ASD s/p closure with ICD implantation at that time for VT, patient with ICD shock in June and October for VT.  Patient was started on a Betablocker and continues to complain of worsening palpitations and dizziness. Patient was noted to have PVC's on EKG during treadmill test. Patient admits to palpitations occurring both at rest and with exertion. Patient denies fever, chills, CP, SOB, n/v/d or abdominal pain.

## 2017-12-20 NOTE — ED PROVIDER NOTE - PROGRESS NOTE DETAILS
ARMY:  Pt endorses resolution of palpitations.  Given recurrent palpitations and recent cards work-up, EP called re concern for defib dysfunction etc.  Pt to be seen by EP. Stable at time of signout ot incoming team pending EP recs. Attending MD Lyons: Patient seen by EP, recommended admission for PVC ablation tomorrow, npo after midnight, will admit

## 2017-12-21 ENCOUNTER — TRANSCRIPTION ENCOUNTER (OUTPATIENT)
Age: 52
End: 2017-12-21

## 2017-12-21 PROCEDURE — 93623 PRGRMD STIMJ&PACG IV RX NFS: CPT | Mod: 26

## 2017-12-21 PROCEDURE — 93010 ELECTROCARDIOGRAM REPORT: CPT

## 2017-12-21 PROCEDURE — 93654 COMPRE EP EVAL TX VT: CPT

## 2017-12-21 PROCEDURE — 99233 SBSQ HOSP IP/OBS HIGH 50: CPT

## 2017-12-21 RX ORDER — ACETAMINOPHEN 500 MG
650 TABLET ORAL EVERY 6 HOURS
Qty: 0 | Refills: 0 | Status: DISCONTINUED | OUTPATIENT
Start: 2017-12-21 | End: 2017-12-22

## 2017-12-21 RX ORDER — SODIUM CHLORIDE 9 MG/ML
1000 INJECTION INTRAMUSCULAR; INTRAVENOUS; SUBCUTANEOUS
Qty: 0 | Refills: 0 | Status: DISCONTINUED | OUTPATIENT
Start: 2017-12-21 | End: 2017-12-21

## 2017-12-21 RX ORDER — METOPROLOL TARTRATE 50 MG
25 TABLET ORAL
Qty: 0 | Refills: 0 | Status: DISCONTINUED | OUTPATIENT
Start: 2017-12-21 | End: 2017-12-22

## 2017-12-21 RX ORDER — INFLUENZA VIRUS VACCINE 15; 15; 15; 15 UG/.5ML; UG/.5ML; UG/.5ML; UG/.5ML
0.5 SUSPENSION INTRAMUSCULAR ONCE
Qty: 0 | Refills: 0 | Status: COMPLETED | OUTPATIENT
Start: 2017-12-21 | End: 2017-12-22

## 2017-12-21 RX ADMIN — SODIUM CHLORIDE 75 MILLILITER(S): 9 INJECTION INTRAMUSCULAR; INTRAVENOUS; SUBCUTANEOUS at 09:57

## 2017-12-21 NOTE — PROGRESS NOTE ADULT - SUBJECTIVE AND OBJECTIVE BOX
INTERVAL HPI/OVERNIGHT EVENTS: Patient resting comfortably in bed. Denies c/o CP, palpitations or SOB.     MEDICATIONS  (STANDING):  sodium chloride 0.9%. 1000 milliLiter(s) (75 mL/Hr) IV Continuous <Continuous>      Allergies    No Known Allergies      ROS:  General: Pt denies fever/chills    Cardiovascular: denies chest pain/palpitations/leg edema    Respiratory and Thorax: denies SOB/cough/wheezing    Gastrointestinal: denies abdominal pain/diarrhea/constipation/bloody stool    Musculoskeletal: denies joint pain or swelling, denies restricted motion    Skin: denies rashes/sores    Hematologic: denies abnormal bleeding    Vital Signs Last 24 Hrs  T(C): 36.4 (21 Dec 2017 07:17), Max: 37.1 (20 Dec 2017 14:33)  T(F): 97.5 (21 Dec 2017 07:17), Max: 98.7 (20 Dec 2017 14:33)  HR: 59 (21 Dec 2017 07:17) (59 - 83)  BP: 123/77 (21 Dec 2017 07:17) (123/77 - 167/94)  RR: 18 (21 Dec 2017 07:17) (17 - 18)  SpO2: 96% (21 Dec 2017 07:17) (95% - 98%)    Physical Exam:  Constitutional: well developed, well nourished,  and no acute distress    Neurological: Alert & Oriented x 3,  no focal deficits    HEENT:   Neck supple.    Respiratory: CTA B/L, No wheezing/crackles/rhonchi    Cardiovascular: (+) S1 & S2, RRR    Gastrointestinal: soft, NT, nondistended, (+) BS    Genitourinary: non distended bladder, voiding freely    Extremities: No pedal edema, No clubbing, No cyanosis    Skin:  normal skin color and pigmentation, no skin lesions      LABS:                        16.0   6.5   )-----------( 167      ( 20 Dec 2017 15:25 )             44.9     12-20    142  |  104  |  16  ----------------------------<  128<H>  4.0   |  22  |  0.90    Ca    9.0      20 Dec 2017 15:25    TPro  6.6  /  Alb  4.6  /  TBili  0.5  /  DBili  x   /  AST  27  /  ALT  32  /  AlkPhos  57  12-20    PT/INR - ( 20 Dec 2017 15:25 )   PT: 9.7 sec;   INR: 0.89 ratio         PTT - ( 20 Dec 2017 15:25 )  PTT:28.8 sec      RADIOLOGY & ADDITIONAL TESTS:    TELE: NSR, 1st Degree HB  60    EKG: NSR, 1st Degree HB  60 bpm. T wave inversions V5, V6 same as prior EKG in October.

## 2017-12-21 NOTE — PROGRESS NOTE ADULT - SUBJECTIVE AND OBJECTIVE BOX
CC/F/U for:  arrhythmia    INTERVAL HPI/OVERNIGHT EVENTS:  Pt seen and examined at bedside.     Allergies/Intolerance: No Known Allergies      MEDICATIONS  (STANDING):    MEDICATIONS  (PRN):        ROS: all systems reviewed and wnl      PHYSICAL EXAMINATION:  Vital Signs Last 24 Hrs  T(C): 36.4 (21 Dec 2017 07:17), Max: 37.1 (20 Dec 2017 14:33)  T(F): 97.5 (21 Dec 2017 07:17), Max: 98.7 (20 Dec 2017 14:33)  HR: 59 (21 Dec 2017 07:17) (59 - 83)  BP: 123/77 (21 Dec 2017 07:17) (123/77 - 167/94)  BP(mean): --  RR: 18 (21 Dec 2017 07:17) (17 - 18)  SpO2: 96% (21 Dec 2017 07:17) (95% - 98%)  CAPILLARY BLOOD GLUCOSE            GENERAL:   NECK: supple, No JVD  CHEST/LUNG: clear to auscultation bilaterally; no rales, rhonchi, or wheezing b/l  HEART: normal S1, S2  ABDOMEN: BS+, soft, ND, NT   EXTREMITIES:  pulses palpable; no clubbing, cyanosis, or edema b/l LEs  NEURO: awake, alert, interactive; moves all extremities  SKIN: no rashes or lesions      LABS:                        16.0   6.5   )-----------( 167      ( 20 Dec 2017 15:25 )             44.9     12-20    142  |  104  |  16  ----------------------------<  128<H>  4.0   |  22  |  0.90    Ca    9.0      20 Dec 2017 15:25    TPro  6.6  /  Alb  4.6  /  TBili  0.5  /  DBili  x   /  AST  27  /  ALT  32  /  AlkPhos  57  12-20    PT/INR - ( 20 Dec 2017 15:25 )   PT: 9.7 sec;   INR: 0.89 ratio         PTT - ( 20 Dec 2017 15:25 )  PTT:28.8 sec CC/F/U for:  arrhythmia    INTERVAL HPI/OVERNIGHT EVENTS:  Pt seen and examined at bedside.     Allergies/Intolerance: No Known Allergies      MEDICATIONS  (STANDING):    MEDICATIONS  (PRN):        ROS: all systems reviewed and wnl      PHYSICAL EXAMINATION:  Vital Signs Last 24 Hrs  T(C): 36.4 (21 Dec 2017 07:17), Max: 37.1 (20 Dec 2017 14:33)  T(F): 97.5 (21 Dec 2017 07:17), Max: 98.7 (20 Dec 2017 14:33)  HR: 59 (21 Dec 2017 07:17) (59 - 83)  BP: 123/77 (21 Dec 2017 07:17) (123/77 - 167/94)  BP(mean): --  RR: 18 (21 Dec 2017 07:17) (17 - 18)  SpO2: 96% (21 Dec 2017 07:17) (95% - 98%)  CAPILLARY BLOOD GLUCOSE            GENERAL: stable, no complaints over night  NECK: supple, No JVD  CHEST/LUNG: clear to auscultation bilaterally; no rales, rhonchi, or wheezing b/l  HEART: normal S1, S2  ABDOMEN: BS+, soft, ND, NT   EXTREMITIES:  pulses palpable; no clubbing, cyanosis, or edema b/l LEs  NEURO: awake, alert, interactive; moves all extremities  SKIN: no rashes or lesions      LABS:                        16.0   6.5   )-----------( 167      ( 20 Dec 2017 15:25 )             44.9     12-20    142  |  104  |  16  ----------------------------<  128<H>  4.0   |  22  |  0.90    Ca    9.0      20 Dec 2017 15:25    TPro  6.6  /  Alb  4.6  /  TBili  0.5  /  DBili  x   /  AST  27  /  ALT  32  /  AlkPhos  57  12-20    PT/INR - ( 20 Dec 2017 15:25 )   PT: 9.7 sec;   INR: 0.89 ratio         PTT - ( 20 Dec 2017 15:25 )  PTT:28.8 sec

## 2017-12-22 ENCOUNTER — OTHER (OUTPATIENT)
Age: 52
End: 2017-12-22

## 2017-12-22 VITALS
RESPIRATION RATE: 18 BRPM | TEMPERATURE: 98 F | OXYGEN SATURATION: 100 % | SYSTOLIC BLOOD PRESSURE: 134 MMHG | DIASTOLIC BLOOD PRESSURE: 82 MMHG | HEART RATE: 76 BPM

## 2017-12-22 DIAGNOSIS — Z98.890 OTHER SPECIFIED POSTPROCEDURAL STATES: ICD-10-CM

## 2017-12-22 LAB
ANION GAP SERPL CALC-SCNC: 13 MMOL/L — SIGNIFICANT CHANGE UP (ref 5–17)
BUN SERPL-MCNC: 11 MG/DL — SIGNIFICANT CHANGE UP (ref 7–23)
CALCIUM SERPL-MCNC: 9.1 MG/DL — SIGNIFICANT CHANGE UP (ref 8.4–10.5)
CHLORIDE SERPL-SCNC: 104 MMOL/L — SIGNIFICANT CHANGE UP (ref 96–108)
CO2 SERPL-SCNC: 24 MMOL/L — SIGNIFICANT CHANGE UP (ref 22–31)
CREAT SERPL-MCNC: 0.85 MG/DL — SIGNIFICANT CHANGE UP (ref 0.5–1.3)
GLUCOSE SERPL-MCNC: 101 MG/DL — HIGH (ref 70–99)
HCT VFR BLD CALC: 43.9 % — SIGNIFICANT CHANGE UP (ref 39–50)
HGB BLD-MCNC: 15.7 G/DL — SIGNIFICANT CHANGE UP (ref 13–17)
MCHC RBC-ENTMCNC: 31.8 PG — SIGNIFICANT CHANGE UP (ref 27–34)
MCHC RBC-ENTMCNC: 35.6 GM/DL — SIGNIFICANT CHANGE UP (ref 32–36)
MCV RBC AUTO: 89.3 FL — SIGNIFICANT CHANGE UP (ref 80–100)
PLATELET # BLD AUTO: 152 K/UL — SIGNIFICANT CHANGE UP (ref 150–400)
POTASSIUM SERPL-MCNC: 3.9 MMOL/L — SIGNIFICANT CHANGE UP (ref 3.5–5.3)
POTASSIUM SERPL-SCNC: 3.9 MMOL/L — SIGNIFICANT CHANGE UP (ref 3.5–5.3)
RBC # BLD: 4.92 M/UL — SIGNIFICANT CHANGE UP (ref 4.2–5.8)
RBC # FLD: 11.6 % — SIGNIFICANT CHANGE UP (ref 10.3–14.5)
SODIUM SERPL-SCNC: 141 MMOL/L — SIGNIFICANT CHANGE UP (ref 135–145)
WBC # BLD: 6.6 K/UL — SIGNIFICANT CHANGE UP (ref 3.8–10.5)
WBC # FLD AUTO: 6.6 K/UL — SIGNIFICANT CHANGE UP (ref 3.8–10.5)

## 2017-12-22 PROCEDURE — 84443 ASSAY THYROID STIM HORMONE: CPT

## 2017-12-22 PROCEDURE — 93654 COMPRE EP EVAL TX VT: CPT

## 2017-12-22 PROCEDURE — 84484 ASSAY OF TROPONIN QUANT: CPT

## 2017-12-22 PROCEDURE — 85014 HEMATOCRIT: CPT

## 2017-12-22 PROCEDURE — 82550 ASSAY OF CK (CPK): CPT

## 2017-12-22 PROCEDURE — 84132 ASSAY OF SERUM POTASSIUM: CPT

## 2017-12-22 PROCEDURE — 82803 BLOOD GASES ANY COMBINATION: CPT

## 2017-12-22 PROCEDURE — 80048 BASIC METABOLIC PNL TOTAL CA: CPT

## 2017-12-22 PROCEDURE — 82330 ASSAY OF CALCIUM: CPT

## 2017-12-22 PROCEDURE — 93010 ELECTROCARDIOGRAM REPORT: CPT

## 2017-12-22 PROCEDURE — 84295 ASSAY OF SERUM SODIUM: CPT

## 2017-12-22 PROCEDURE — 93005 ELECTROCARDIOGRAM TRACING: CPT

## 2017-12-22 PROCEDURE — 90686 IIV4 VACC NO PRSV 0.5 ML IM: CPT

## 2017-12-22 PROCEDURE — 82553 CREATINE MB FRACTION: CPT

## 2017-12-22 PROCEDURE — 82947 ASSAY GLUCOSE BLOOD QUANT: CPT

## 2017-12-22 PROCEDURE — 85730 THROMBOPLASTIN TIME PARTIAL: CPT

## 2017-12-22 PROCEDURE — 71046 X-RAY EXAM CHEST 2 VIEWS: CPT

## 2017-12-22 PROCEDURE — 99285 EMERGENCY DEPT VISIT HI MDM: CPT | Mod: 25

## 2017-12-22 PROCEDURE — 85610 PROTHROMBIN TIME: CPT

## 2017-12-22 PROCEDURE — 99233 SBSQ HOSP IP/OBS HIGH 50: CPT

## 2017-12-22 PROCEDURE — 82435 ASSAY OF BLOOD CHLORIDE: CPT

## 2017-12-22 PROCEDURE — 83605 ASSAY OF LACTIC ACID: CPT

## 2017-12-22 PROCEDURE — C1894: CPT

## 2017-12-22 PROCEDURE — 80053 COMPREHEN METABOLIC PANEL: CPT

## 2017-12-22 PROCEDURE — 85027 COMPLETE CBC AUTOMATED: CPT

## 2017-12-22 PROCEDURE — 93623 PRGRMD STIMJ&PACG IV RX NFS: CPT

## 2017-12-22 RX ADMIN — Medication 25 MILLIGRAM(S): at 06:05

## 2017-12-22 RX ADMIN — INFLUENZA VIRUS VACCINE 0.5 MILLILITER(S): 15; 15; 15; 15 SUSPENSION INTRAMUSCULAR at 09:18

## 2017-12-22 NOTE — DISCHARGE NOTE ADULT - MEDICATION SUMMARY - MEDICATIONS TO TAKE
I will START or STAY ON the medications listed below when I get home from the hospital:    metoprolol tartrate 25 mg oral tablet  -- 1 tab(s) by mouth 2 times a day  -- Indication: For Palpitations I will START or STAY ON the medications listed below when I get home from the hospital:  None

## 2017-12-22 NOTE — DISCHARGE NOTE ADULT - CARE PROVIDERS DIRECT ADDRESSES
[email or web address]

## 2017-12-22 NOTE — DISCHARGE NOTE ADULT - HOSPITAL COURSE
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  Patient with recent hospital stay in October for similar symptoms where stress test and echo was performed. He states that since d/c from hospital he has seen his cardiologist Dr. Saavedra regarding palpitations and was told he "will need a procedure"   He came to ED today secondary to worsening dizziness and some chest pain associated with his palpitations. Pt states that the dizziness is not positional in nature, and he has not had any syncopal episodes. He denies sob, fevers, chills, uri symptoms, nausea, vomiting.   Pt s/p PVC ablation via R groin. Pt tolerated procedure well and overnight remained uneventful. No c/o chest pain or SOB. Pt is hemodynamically stable, EKG and all lab results reviewed. Insertion/incision site benign, no bleeding or hematoma. Discharge teaching provided to Pt/caretaker and verbalized understanding the instruction. Pt is stable for discharge home as per attending. 53 yo male PNH ASD repaired at [facility] 3 years ago complicated by cardiac arrest requiring ICD placement presents c/o palpitations for the past 2 months and dizziness.  Patient with recent hospital stay in October for similar symptoms where stress test and echo was performed. He states that since d/c from hospital he has seen his cardiologist Dr. Saavedra regarding palpitations and was told he "will need a procedure"   He came to ED today secondary to worsening dizziness and some chest pain associated with his palpitations. Pt states that the dizziness is not positional in nature, and he has not had any syncopal episodes. He denies sob, fevers, chills, uri symptoms, nausea, vomiting. Pt is now s/p PVC ablation via RFV access on 12/21/2017. He tolerated procedure well and had no events on Telemetry overnight.  He remains hemodynamically stable, EKG and all lab results reviewed. RFV site benign, no bleeding or hematoma, pedal pulses palpable, patient without complaints.  His hospital course was otherwise uneventful.  Case discussed with Dr. Saavedra and Dr. Harden, patient is now medically stable for discharge home today. 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 3 years ago complicated by cardiac arrest requiring ICD placement presents c/o palpitations for the past 2 months and dizziness.  Patient with recent hospital stay in October for similar symptoms where stress test and echo was performed. He states that since d/c from hospital he has seen his cardiologist Dr. Saavedra regarding palpitations and was told he "will need a procedure" .     He came to ED today secondary to worsening dizziness and some chest pain associated with his palpitations. Pt states that the dizziness is not positional in nature and he has not had any syncopal episodes. He denies sob, fevers, chills, uri symptoms, nausea, vomiting.     Pt had PVC ablation via RFV access on 12/21/2017. He tolerated procedure well and had no events on Telemetry overnight.  He remains hemodynamically stable, EKG and all lab results reviewed. RFV site benign, no bleeding or hematoma, pedal pulses palpable, patient without complaints.  His hospital course was otherwise uneventful.  Case discussed with Dr. Saavedra and Dr. Harden, patient is now medically stable for discharge home today.

## 2017-12-22 NOTE — DISCHARGE NOTE ADULT - CARE PROVIDER_API CALL
Pedro Saavedra), Cardiac Electrophysiology; Cardiovascular Disease  [address], [place] [id number]  Phone: [phone]  Fax: [phone]

## 2017-12-22 NOTE — PROGRESS NOTE ADULT - SUBJECTIVE AND OBJECTIVE BOX
CC/F/U for: ablation of arrhythmia    INTERVAL HPI/OVERNIGHT EVENTS:  Pt seen and examined at bedside.     Allergies/Intolerance: No Known Allergies      MEDICATIONS  (STANDING):  influenza   Vaccine 0.5 milliLiter(s) IntraMuscular once  metoprolol     tartrate 25 milliGRAM(s) Oral two times a day    MEDICATIONS  (PRN):  acetaminophen   Tablet. 650 milliGRAM(s) Oral every 6 hours PRN Moderate Pain (4 - 6)        ROS: all systems reviewed and wnl      PHYSICAL EXAMINATION:  Vital Signs Last 24 Hrs  T(C): 36.4 (22 Dec 2017 05:35), Max: 36.4 (21 Dec 2017 18:00)  T(F): 97.5 (22 Dec 2017 05:35), Max: 97.6 (21 Dec 2017 21:00)  HR: 69 (22 Dec 2017 05:35) (67 - 82)  BP: 130/78 (22 Dec 2017 05:35) (122/81 - 158/99)  BP(mean): --  RR: 16 (22 Dec 2017 05:35) (16 - 16)  SpO2: 96% (22 Dec 2017 05:35) (96% - 98%)  CAPILLARY BLOOD GLUCOSE          12-21 @ 07:01  -  12-22 @ 07:00  --------------------------------------------------------  IN: 820 mL / OUT: 700 mL / NET: 120 mL        GENERAL:   NECK: supple, No JVD  CHEST/LUNG: clear to auscultation bilaterally; no rales, rhonchi, or wheezing b/l  HEART: normal S1, S2  ABDOMEN: BS+, soft, ND, NT   EXTREMITIES:  pulses palpable; no clubbing, cyanosis, or edema b/l LEs  NEURO: awake, alert, interactive; moves all extremities  SKIN: no rashes or lesions      LABS:                        15.7   6.6   )-----------( 152      ( 22 Dec 2017 06:13 )             43.9     12-22    141  |  104  |  11  ----------------------------<  101<H>  3.9   |  24  |  0.85    Ca    9.1      22 Dec 2017 06:13    TPro  6.6  /  Alb  4.6  /  TBili  0.5  /  DBili  x   /  AST  27  /  ALT  32  /  AlkPhos  57  12-20    PT/INR - ( 20 Dec 2017 15:25 )   PT: 9.7 sec;   INR: 0.89 ratio         PTT - ( 20 Dec 2017 15:25 )  PTT:28.8 sec CC/F/U for: ablation of arrhythmia    INTERVAL HPI/OVERNIGHT EVENTS:  Pt seen and examined at bedside.     Allergies/Intolerance: No Known Allergies      MEDICATIONS  (STANDING):  influenza   Vaccine 0.5 milliLiter(s) IntraMuscular once  metoprolol     tartrate 25 milliGRAM(s) Oral two times a day    MEDICATIONS  (PRN):  acetaminophen   Tablet. 650 milliGRAM(s) Oral every 6 hours PRN Moderate Pain (4 - 6)        ROS: all systems reviewed and wnl      PHYSICAL EXAMINATION:  Vital Signs Last 24 Hrs  T(C): 36.4 (22 Dec 2017 05:35), Max: 36.4 (21 Dec 2017 18:00)  T(F): 97.5 (22 Dec 2017 05:35), Max: 97.6 (21 Dec 2017 21:00)  HR: 69 (22 Dec 2017 05:35) (67 - 82)  BP: 130/78 (22 Dec 2017 05:35) (122/81 - 158/99)  BP(mean): --  RR: 16 (22 Dec 2017 05:35) (16 - 16)  SpO2: 96% (22 Dec 2017 05:35) (96% - 98%)  CAPILLARY BLOOD GLUCOSE          12-21 @ 07:01  -  12-22 @ 07:00  --------------------------------------------------------  IN: 820 mL / OUT: 700 mL / NET: 120 mL        GENERAL: stable, No new complaints   NECK: supple, No JVD  CHEST/LUNG: clear to auscultation bilaterally; no rales, rhonchi, or wheezing b/l  HEART: normal S1, S2  ABDOMEN: BS+, soft, ND, NT   EXTREMITIES:  pulses palpable; no clubbing, cyanosis, or edema b/l LEs  NEURO: awake, alert, interactive; moves all extremities  SKIN: no rashes or lesions      LABS:                        15.7   6.6   )-----------( 152      ( 22 Dec 2017 06:13 )             43.9     12-22    141  |  104  |  11  ----------------------------<  101<H>  3.9   |  24  |  0.85    Ca    9.1      22 Dec 2017 06:13    TPro  6.6  /  Alb  4.6  /  TBili  0.5  /  DBili  x   /  AST  27  /  ALT  32  /  AlkPhos  57  12-20    PT/INR - ( 20 Dec 2017 15:25 )   PT: 9.7 sec;   INR: 0.89 ratio         PTT - ( 20 Dec 2017 15:25 )  PTT:28.8 sec

## 2017-12-22 NOTE — PROGRESS NOTE ADULT - SUBJECTIVE AND OBJECTIVE BOX
INTERVAL HPI/OVERNIGHT EVENTS: No significant cardiac events overnight.  Patient denies chest pain/sob/dizziness/palpitations.     MEDICATIONS  (STANDING):    MEDICATIONS  (PRN):  acetaminophen   Tablet. 650 milliGRAM(s) Oral every 6 hours PRN Moderate Pain (4 - 6)      Allergies  No Known Allergies    ROS:  General: Pt denies fevers/ constitutional discomfort  Cardiovascular: denies chest pain/palpitations  Respiratory and Thorax: denies difficulty breathing  Gastrointestinal: denies abdominal pain/constipation/bloody stool  Musculoskeletal: denies back pain/ restricted motion  Hematologic: denies abnormal bleeding    Vital Signs Last 24 Hrs  T(C): 36.4 (22 Dec 2017 05:35), Max: 36.4 (21 Dec 2017 18:00)  T(F): 97.5 (22 Dec 2017 05:35), Max: 97.6 (21 Dec 2017 21:00)  HR: 69 (22 Dec 2017 05:35) (67 - 82)  BP: 130/78 (22 Dec 2017 05:35) (122/81 - 158/99)  BP(mean): --  RR: 16 (22 Dec 2017 05:35) (16 - 16)  SpO2: 96% (22 Dec 2017 05:35) (96% - 98%)    Physical Exam:  Constitutional: well developed, well nourished,  and in  no acute distress  Neurological: Alert & Oriented x 3,  no focal deficits, BUTCHER  HEENT:   Neck supple.  Respiratory:  breathing nonlabored; CTA bilaterally.  Cardiovascular: (+) S1 & S2, RRR  Gastrointestinal: soft, NT, nondistended, (+) BS  Genitourinary: non distended bladder, voiding freely  Extremities: +2 bilateral radial and PT pulses.  No pedal edema  Skin:  Right groin site soft, with clean, dry and intact dressing. No hematoma/ active external bleed/erythema.   LABS:                        15.7   6.6   )-----------( 152      ( 22 Dec 2017 06:13 )             43.9     12-22    141  |  104  |  11  ----------------------------<  101<H>  3.9   |  24  |  0.85    Ca    9.1      22 Dec 2017 06:13    TPro  6.6  /  Alb  4.6  /  TBili  0.5  /  DBili  x   /  AST  27  /  ALT  32  /  AlkPhos  57  12-20    PT/INR - ( 20 Dec 2017 15:25 )   PT: 9.7 sec;   INR: 0.89 ratio         PTT - ( 20 Dec 2017 15:25 )  PTT:28.8 sec      RADIOLOGY & ADDITIONAL TESTS: Cxr 12/20: clear lungs     TELE: SR w/ first degree HB 60's to 80's ( 60's to 70's overnight                EKG: INTERVAL HPI/OVERNIGHT EVENTS: No significant cardiac events overnight.  Patient denies chest pain/sob/dizziness/palpitations.     MEDICATIONS  (STANDING):    MEDICATIONS  (PRN):  acetaminophen   Tablet. 650 milliGRAM(s) Oral every 6 hours PRN Moderate Pain (4 - 6)      Allergies  No Known Allergies    ROS:  General: Pt denies fevers/ constitutional discomfort  Cardiovascular: denies chest pain/palpitations  Respiratory and Thorax: denies difficulty breathing  Gastrointestinal: denies abdominal pain/constipation/bloody stool  Musculoskeletal: denies back pain/ restricted motion  Hematologic: denies abnormal bleeding    Vital Signs Last 24 Hrs  T(C): 36.4 (22 Dec 2017 05:35), Max: 36.4 (21 Dec 2017 18:00)  T(F): 97.5 (22 Dec 2017 05:35), Max: 97.6 (21 Dec 2017 21:00)  HR: 69 (22 Dec 2017 05:35) (67 - 82)  BP: 130/78 (22 Dec 2017 05:35) (122/81 - 158/99)  BP(mean): --  RR: 16 (22 Dec 2017 05:35) (16 - 16)  SpO2: 96% (22 Dec 2017 05:35) (96% - 98%)    Physical Exam:  Constitutional: well developed, well nourished,  and in  no acute distress  Neurological: Alert & Oriented x 3,  no focal deficits, BUTCHER  HEENT:   Neck supple.  Respiratory:  breathing nonlabored; CTA bilaterally.  Cardiovascular: (+) S1 & S2, RRR  Gastrointestinal: soft, NT, nondistended, (+) BS  Genitourinary: non distended bladder, voiding freely  Extremities: +2 bilateral radial and PT pulses.  No pedal edema  Skin:  Right groin site soft, with clean, dry and intact dressing. No hematoma/ active external bleed/erythema.   LABS:                        15.7   6.6   )-----------( 152      ( 22 Dec 2017 06:13 )             43.9         141  |  104  |  11  ----------------------------<  101<H>  3.9   |  24  |  0.85    Ca    9.1      22 Dec 2017 06:13    TPro  6.6  /  Alb  4.6  /  TBili  0.5  /  DBili  x   /  AST  27  /  ALT  32  /  AlkPhos  57  12-    PT/INR - ( 20 Dec 2017 15:25 )   PT: 9.7 sec;   INR: 0.89 ratio         PTT - ( 20 Dec 2017 15:25 )  PTT:28.8 sec      RADIOLOGY & ADDITIONAL TESTS: Cxr : clear lungs     TELE: SR w/ first degree HB 60's to 80's ( 60's to 70's overnight                EK/22 reviewed w/ attending:  SR w/ first degree HB ST /T wave changes unchanged from previous EKG 10/26/17. corrected QTC checked; 461ms

## 2017-12-22 NOTE — PROGRESS NOTE ADULT - SUBJECTIVE AND OBJECTIVE BOX
INTERVAL HPI/OVERNIGHT EVENTS: HPI:  51 yo male PNH ASD repaired at [facility] 3 years ago complicated by cardiac arrest requiring ICD placement presents c/o palpitations for the past 2 months and dizziness.  Patient with recent hospital stay in October for similar symptoms where stress test and echo was performed. He states that since d/c from hospital he has seen his cardiologist Dr. Saavedra regarding palpitations and was told he "will need a procedure"     He came to ED today secondary to worsening dizziness and some chest pain associated with his palpitations. Pt states that the dizziness is not positional in nature, and he has not had any syncopal episodes. He denies sob, fevers, chills, uri symptoms, nausea, vomiting. (20 Dec 2017 20:14)      MEDICATIONS  (STANDING):    MEDICATIONS  (PRN):  acetaminophen   Tablet. 650 milliGRAM(s) Oral every 6 hours PRN Moderate Pain (4 - 6)      Allergies    No Known Allergies    Intolerances      ROS:  General: Pt denies recent weight loss/fever/chills    Neurological: denies numbness or  sensation loss    HEENT: denies visual changes, no hearing loss, denies sore throat    Cardiovascular: denies chest pain/palpitations/leg edema    Respiratory and Thorax: denies SOB/cough/wheezing    Gastrointestinal: denies abdominal pain/diarrhea/constipation/bloody stool    Genitourinary: denies urinary frequency/urgency/ dysuria    Musculoskeletal: denies joint pain or swelling, denies restricted motion    Skin: denies rashes/sores    Endocrine: denies heat or cold intolerance/excessive thirst    Hematologic: denies abnormal bleeding  	    	  	    		        	    	            Vital Signs Last 24 Hrs  T(C): 36.4 (22 Dec 2017 05:35), Max: 36.4 (21 Dec 2017 18:00)  T(F): 97.5 (22 Dec 2017 05:35), Max: 97.6 (21 Dec 2017 21:00)  HR: 69 (22 Dec 2017 05:35) (67 - 82)  BP: 130/78 (22 Dec 2017 05:35) (122/81 - 158/99)  BP(mean): --  RR: 16 (22 Dec 2017 05:35) (16 - 16)  SpO2: 96% (22 Dec 2017 05:35) (96% - 98%)  Physical Exam:  Vital Signs : BP        HR       RR    Constitutional: well developed, well nourished, no deformities and no acute distress    Neurological: Alert & Oriented x 3, BUTCHER, no focal deficits    HEENT: NC/AT, PERRLA, EOMI,  Neck supple.    Respiratory: CTA B/L, No wheezing/crackles/rhonchi    Cardiovascular: (+) S1 & S2, RRR, No m/r/g    Gastrointestinal: soft, NT, nondistended, (+) BS    Genitourinary: non distended bladder, voiding freely    Extremities: No pedal edema, No clubbing, No cyanosis    Skin:  normal skin color and pigmentation, no skin lesions            LABS:                        15.7   6.6   )-----------( 152      ( 22 Dec 2017 06:13 )             43.9     12-22    141  |  104  |  11  ----------------------------<  101<H>  3.9   |  24  |  0.85    Ca    9.1      22 Dec 2017 06:13    TPro  6.6  /  Alb  4.6  /  TBili  0.5  /  DBili  x   /  AST  27  /  ALT  32  /  AlkPhos  57  12-20    PT/INR - ( 20 Dec 2017 15:25 )   PT: 9.7 sec;   INR: 0.89 ratio         PTT - ( 20 Dec 2017 15:25 )  PTT:28.8 sec      RADIOLOGY & ADDITIONAL TESTS:    TELE:    EKG: INTERVAL HPI/OVERNIGHT EVENTS: HPI:  53 yo male PNH ASD repaired at [facility] 3 years ago complicated by cardiac arrest requiring ICD placement presents c/o palpitations for the past 2 months and dizziness.  Patient with recent hospital stay in October for similar symptoms where stress test and echo was performed. He states that since d/c from hospital he has seen his cardiologist Dr. Saavedra regarding palpitations and was told he "will need a procedure"     He came to ED today secondary to worsening dizziness and some chest pain associated with his palpitations. Pt states that the dizziness is not positional in nature, and he has not had any syncopal episodes. He denies sob, fevers, chills, uri symptoms, nausea, vomiting. (20 Dec 2017 20:14)      MEDICATIONS  (STANDING):    MEDICATIONS  (PRN):  acetaminophen   Tablet. 650 milliGRAM(s) Oral every 6 hours PRN Moderate Pain (4 - 6)      Allergies    No Known Allergies    Intolerances      ROS:  General: Pt denies recent weight loss/fever/chills    Neurological: denies numbness or  sensation loss    HEENT: denies visual changes, no hearing loss, denies sore throat    Cardiovascular: denies chest pain/palpitations/leg edema    Respiratory and Thorax: denies SOB/cough/wheezing    Gastrointestinal: denies abdominal pain/diarrhea/constipation/bloody stool    Genitourinary: denies urinary frequency/urgency/ dysuria    Musculoskeletal: denies joint pain or swelling, denies restricted motion    Skin: denies rashes/sores    Endocrine: denies heat or cold intolerance/excessive thirst    Hematologic: denies abnormal bleeding  	    	  	    		        	    	            Vital Signs Last 24 Hrs  T(C): 36.4 (22 Dec 2017 05:35), Max: 36.4 (21 Dec 2017 18:00)  T(F): 97.5 (22 Dec 2017 05:35), Max: 97.6 (21 Dec 2017 21:00)  HR: 69 (22 Dec 2017 05:35) (67 - 82)  BP: 130/78 (22 Dec 2017 05:35) (122/81 - 158/99)  BP(mean): --  RR: 16 (22 Dec 2017 05:35) (16 - 16)  SpO2: 96% (22 Dec 2017 05:35) (96% - 98%)  Physical Exam:  Vital Signs : BP        HR       RR    Constitutional: well developed, well nourished, no deformities and no acute distress    Neurological: Alert & Oriented x 3, BUTCHER, no focal deficits    HEENT: NC/AT, PERRLA, EOMI,  Neck supple.    Respiratory: CTA B/L, No wheezing/crackles/rhonchi    Cardiovascular: (+) S1 & S2, RRR, No m/r/g    Gastrointestinal: soft, NT, nondistended, (+) BS    Genitourinary: non distended bladder, voiding freely    Extremities: No pedal edema, No clubbing, No cyanosis.  RFV access site benign without presence of bleeding/hematoma, pedal pulses palpable     Skin:  normal skin color and pigmentation, no skin lesions            LABS:                        15.7   6.6   )-----------( 152      ( 22 Dec 2017 06:13 )             43.9     12-22    141  |  104  |  11  ----------------------------<  101<H>  3.9   |  24  |  0.85    Ca    9.1      22 Dec 2017 06:13    TPro  6.6  /  Alb  4.6  /  TBili  0.5  /  DBili  x   /  AST  27  /  ALT  32  /  AlkPhos  57  12-20    PT/INR - ( 20 Dec 2017 15:25 )   PT: 9.7 sec;   INR: 0.89 ratio         PTT - ( 20 Dec 2017 15:25 )  PTT:28.8 sec      RADIOLOGY & ADDITIONAL TESTS:    TELE:  SR 60's-70's    EKG:  RSR @ 66bpm with 1st degree AVB, TWI I, aVL, prolonged QT

## 2017-12-22 NOTE — DISCHARGE NOTE ADULT - PLAN OF CARE
Your heartbeat will be controlled. Your catheter/groin site will be free of infection and severe bleeding. Appointments: please call [phone] for follow-up appointment with your electrophysiology (EP) cardiologist within 2-3 weeks after discharge.   Groin Site Care: You can take shower in 24 hours. Keep the area clean and dry. No submersion of your catheter/groin site in bath tubs, pools, or any water for 1 week.   Activity: No driving for 24 hours. No strenuous activity or heavy lifting more than 10 pounds for 1 week.  You may need to: Quit smoking. Limit your alcohol intake.   Call Your Doctor immediately if you have: A fast or irregular heart beat; lightheadedness; severe chest pain or shortness of breath; fever or chills; bleeding, pain, redness, swelling, warmth or drainage at or near the catheter/groin site at [phone]

## 2017-12-22 NOTE — DISCHARGE NOTE ADULT - ADDITIONAL INSTRUCTIONS
Follow-up with Dr. Saavedra in the EP clinic on 1/18/2018 @ 225 pm Follow-up with Dr. Saavedra in the EP clinic on 1/29/2018 on 1015 am  prior to your appointment you are scheduled to have a stress test on 1/29/2018 at 9am

## 2017-12-22 NOTE — DISCHARGE NOTE ADULT - CARE PLAN
Principal Discharge DX:	Palpitations  Goal:	Your heartbeat will be controlled. Your catheter/groin site will be free of infection and severe bleeding.  Instructions for follow-up, activity and diet:	Appointments: please call [phone] for follow-up appointment with your electrophysiology (EP) cardiologist within 2-3 weeks after discharge.   Groin Site Care: You can take shower in 24 hours. Keep the area clean and dry. No submersion of your catheter/groin site in bath tubs, pools, or any water for 1 week.   Activity: No driving for 24 hours. No strenuous activity or heavy lifting more than 10 pounds for 1 week.  You may need to: Quit smoking. Limit your alcohol intake.   Call Your Doctor immediately if you have: A fast or irregular heart beat; lightheadedness; severe chest pain or shortness of breath; fever or chills; bleeding, pain, redness, swelling, warmth or drainage at or near the catheter/groin site at [phone]

## 2017-12-22 NOTE — PROGRESS NOTE ADULT - ASSESSMENT
52 year old male with past medical history significant for congenital ASD s/p closure with ICD implantation at that time for VT, patient with ICD shock in June and October for VT.  Patient was started on a Betablocker and continues to complain of worsening palpitations and dizziness. Patient was noted to have PVC's on EKG during treadmill test. Patient admits to palpitations occurring both at rest and with exertion.
52 year old male with past medical history significant for congenital ASD s/p closure with ICD implantation at that time for VT, patient with ICD shock in June and October for VT.  Patient was started on a Betablocker and continues to complain of worsening palpitations and dizziness. Patient was noted to have PVC's on EKG during treadmill test. Patient now s/p PVC ablation 12/21/17.
53 yo male PNH ASD repaired at [facility] 3 years ago complicated by cardiac arrest requiring ICD placement presents c/o palpitations for the past 2 months and dizziness.  Patient with recent hospital stay in October for similar symptoms where stress test and echo was performed. He states that since d/c from hospital he has seen his cardiologist Dr. Saavedra regarding palpitations and was told he "will need a procedure" He came to ED today secondary to worsening dizziness and some chest pain associated with his palpitations. Pt states that the dizziness is not positional in nature, and he has not had any syncopal episodes. He denies sob, fevers, chills, uri symptoms, nausea, vomiting.     CV: EP consult note reviesed in full. Ablation in AM. Hold Lopressor per EP note. NPO after midnight. IVF while NPO. Discharge home today after ablation if OK with EP.
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  Patient with recent hospital stay in October for similar symptoms where stress test and echo was performed. He states that since d/c from hospital he has seen his cardiologist Dr. Saavedra regarding palpitations and was told he "will need a procedure" He came to ED today secondary to worsening dizziness and some chest pain associated with his palpitations. Pt states that the dizziness is not positional in nature, and he has not had any syncopal episodes. He denies sob, fevers, chills, uri symptoms, nausea, vomiting.     CV: EP consult note reviesed in full. Ablation completed yesterday. Lopressor resumed now. Regular diet. Discharge home later today after seen  by EP attending. Stress test as outpatient.
53 yo male PNH ASD repaired at [facility] 3 years ago complicated by cardiac arrest requiring ICD placement presents c/o palpitations for the past 2 months and dizziness.  Patient with recent hospital stay in October for similar symptoms where stress test and echo was performed. He states that since d/c from hospital he has seen his cardiologist Dr. Saavedra regarding palpitations and was told he "will need a procedure".  He came to ED on 12/20 secondary to worsening dizziness and some chest pain associated with his palpitations.   He is now s/p PVC ablation via RFV access on 12/21/2017.      Plan:  1. D/C Beta-blocker  2. Exercise stress test as outpatient for assess for inducible arrhythmia on 1/29/2018  3. D/C Home today  4. Follow-up in EP clinic 1/18/2018 @ 4121

## 2017-12-22 NOTE — DISCHARGE NOTE ADULT - MEDICATION SUMMARY - MEDICATIONS TO STOP TAKING
I will STOP taking the medications listed below when I get home from the hospital:  None I will STOP taking the medications listed below when I get home from the hospital:    metoprolol tartrate 25 mg oral tablet  -- 1 tab(s) by mouth 2 times a day

## 2017-12-22 NOTE — PROGRESS NOTE ADULT - SUBJECTIVE AND OBJECTIVE BOX
Subjective/Objective:  Patient is a 52y old  Male who presents with a chief complaint of palpitations (22 Dec 2017 01:26)  S/P PVC ablation via R groin.     Allergies    No Known Allergies    Intolerances      Medications:  acetaminophen   Tablet. 650 milliGRAM(s) Oral every 6 hours PRN  influenza   Vaccine 0.5 milliLiter(s) IntraMuscular once  metoprolol     tartrate 25 milliGRAM(s) Oral two times a day      Review of Systems:   No c/o chest pain or SOB, and all others negative.    Vitals:  T(C): 36.4 (12-21-17 @ 21:00), Max: 36.4 (12-21-17 @ 07:17)  HR: 80 (12-21-17 @ 23:00) (59 - 82)  BP: 132/89 (12-21-17 @ 23:00) (122/81 - 158/99)  BP(mean): --  RR: 16 (12-21-17 @ 23:00) (16 - 18)  SpO2: 98% (12-21-17 @ 23:00) (96% - 98%)  Wt(kg): --  Daily     Daily   I&O's Summary    21 Dec 2017 07:01  -  22 Dec 2017 01:30  --------------------------------------------------------  IN: 480 mL / OUT: 700 mL / NET: -220 mL      Physical Exam:  Appearance: Pt in NAD, non-toxic  Cardiovascular: S1 S2  Respiratory: Clear to auscultation bilaterally  Gastrointestinal: Soft, NT/ND, Bowel Sounds +  Neurologic: Non-focal                          16.0   6.5   )-----------( 167      ( 20 Dec 2017 15:25 )             44.9     12-20    142  |  104  |  16  ----------------------------<  128<H>  4.0   |  22  |  0.90    Ca    9.0      20 Dec 2017 15:25    TPro  6.6  /  Alb  4.6  /  TBili  0.5  /  DBili  x   /  AST  27  /  ALT  32  /  AlkPhos  57  12-20    PT/INR - ( 20 Dec 2017 15:25 )   PT: 9.7 sec;   INR: 0.89 ratio         PTT - ( 20 Dec 2017 15:25 )  PTT:28.8 sec  CARDIAC MARKERS ( 20 Dec 2017 15:25 )  x     / <0.01 ng/mL / 187 U/L / x     / 4.2 ng/mL        Lipid panel   Hgb A1c     ECG:  at HR , no significant changes from previous EKG.    Interpretation of Telemetry:  at HR .  No special event over night.    Assessment/Plan:   S/P PVC ablation via R groin. Pt tolerated procedure well and overnight remained uneventful. No c/o chest pain or SOB. Pt is hemodynamically stable, EKG and all lab results reviewed. Insertion/incision site benign, no bleeding or hematoma. Discharge teaching provided to Pt/caretaker and verbalized understanding the instruction. Pt is stable for discharge home as per attending.   F/U with cardiologist in 1-2 weeks.  This am labs pending at presents and need to be f/u before d/c home.  Plan to d/c home in Am if stable.  cont assess and monitor. Subjective/Objective:  Patient is a 52y old  Male who presents with a chief complaint of palpitations (22 Dec 2017 01:26)  S/P PVC ablation via R groin.     Allergies    No Known Allergies    Intolerances      Medications:  acetaminophen   Tablet. 650 milliGRAM(s) Oral every 6 hours PRN  influenza   Vaccine 0.5 milliLiter(s) IntraMuscular once  metoprolol     tartrate 25 milliGRAM(s) Oral two times a day      Review of Systems:   No c/o chest pain or SOB, and all others negative.    Vitals:  T(C): 36.4 (12-21-17 @ 21:00), Max: 36.4 (12-21-17 @ 07:17)  HR: 80 (12-21-17 @ 23:00) (59 - 82)  BP: 132/89 (12-21-17 @ 23:00) (122/81 - 158/99)  BP(mean): --  RR: 16 (12-21-17 @ 23:00) (16 - 18)  SpO2: 98% (12-21-17 @ 23:00) (96% - 98%)  Wt(kg): --  Daily     Daily   I&O's Summary    21 Dec 2017 07:01  -  22 Dec 2017 01:30  --------------------------------------------------------  IN: 480 mL / OUT: 700 mL / NET: -220 mL      Physical Exam:  Appearance: Pt in NAD, non-toxic  Cardiovascular: S1 S2  Respiratory: Clear to auscultation bilaterally  Gastrointestinal: Soft, NT/ND, Bowel Sounds +  Neurologic: Non-focal                          16.0   6.5   )-----------( 167      ( 20 Dec 2017 15:25 )             44.9     12-20    142  |  104  |  16  ----------------------------<  128<H>  4.0   |  22  |  0.90    Ca    9.0      20 Dec 2017 15:25    TPro  6.6  /  Alb  4.6  /  TBili  0.5  /  DBili  x   /  AST  27  /  ALT  32  /  AlkPhos  57  12-20    PT/INR - ( 20 Dec 2017 15:25 )   PT: 9.7 sec;   INR: 0.89 ratio         PTT - ( 20 Dec 2017 15:25 )  PTT:28.8 sec  CARDIAC MARKERS ( 20 Dec 2017 15:25 )  x     / <0.01 ng/mL / 187 U/L / x     / 4.2 ng/mL        Lipid panel   Hgb A1c     ECG: SR at HR 66, no significant changes from previous EKG.    Interpretation of Telemetry: SR at HR 60-70's.  No special event over night.    Assessment/Plan:   S/P PVC ablation via R groin. Pt tolerated procedure well and overnight remained uneventful. No c/o chest pain or SOB. Pt is hemodynamically stable, EKG and all lab results reviewed. Insertion/incision site benign, no bleeding or hematoma. Discharge teaching provided to Pt/caretaker and verbalized understanding the instruction. Pt is stable for discharge home as per attending.   F/U with cardiologist in 1-2 weeks.  This am labs pending at presents and need to be f/u before d/c home.  Plan to d/c home in Am if stable.  cont assess and monitor.

## 2017-12-22 NOTE — PROGRESS NOTE ADULT - PROBLEM SELECTOR PLAN 1
- Patient s/p successful ablation  of RVOT VT 12/21  - Patient tolerated procedure well  - currently NSR w/ first deg HB   - D/c betablocker therapy; d/w [facility] NP  -Post ablation activity restrictions  reviewed with patient  -Patient advised that if  fevers or swelling/redness/discharge from wound,  he should call EP clinic at [phone].  - Threadmill stress test inpatient vs outpatient; If  normal, patient to be observed x3 months with possible extraction of ICD if stable  - Patient to f/u in EP Clinic within 1 week of discharge   - Possible discharge today vs tomorrow. 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  - Threadmill stress test  outpatient booked for 1/29/18 at 9am prior to appointment with Dr Saavedra; If  normal, patient to be observed x3 months with possible extraction of ICD if stable  - Patient to be discharged today with fu with Dr Saavedra on 1/29/18 at 10:15 am; appointment provided.

## 2018-01-18 ENCOUNTER — APPOINTMENT (OUTPATIENT)
Dept: ELECTROPHYSIOLOGY | Facility: CLINIC | Age: 53
End: 2018-01-18

## 2018-01-29 ENCOUNTER — APPOINTMENT (OUTPATIENT)
Dept: ELECTROPHYSIOLOGY | Facility: CLINIC | Age: 53
End: 2018-01-29
Payer: SELF-PAY

## 2018-01-29 ENCOUNTER — APPOINTMENT (OUTPATIENT)
Dept: CV DIAGNOSTICS | Facility: HOSPITAL | Age: 53
End: 2018-01-29

## 2018-01-29 ENCOUNTER — OUTPATIENT (OUTPATIENT)
Dept: OUTPATIENT SERVICES | Facility: HOSPITAL | Age: 53
LOS: 1 days | End: 2018-01-29

## 2018-01-29 ENCOUNTER — NON-APPOINTMENT (OUTPATIENT)
Age: 53
End: 2018-01-29

## 2018-01-29 VITALS — OXYGEN SATURATION: 96 % | HEART RATE: 74 BPM | SYSTOLIC BLOOD PRESSURE: 156 MMHG | DIASTOLIC BLOOD PRESSURE: 81 MMHG

## 2018-01-29 DIAGNOSIS — I47.2 VENTRICULAR TACHYCARDIA: ICD-10-CM

## 2018-01-29 DIAGNOSIS — Z95.810 PRESENCE OF AUTOMATIC (IMPLANTABLE) CARDIAC DEFIBRILLATOR: Chronic | ICD-10-CM

## 2018-01-29 DIAGNOSIS — Z87.74 PERSONAL HISTORY OF (CORRECTED) CONGENITAL MALFORMATIONS OF HEART AND CIRCULATORY SYSTEM: Chronic | ICD-10-CM

## 2018-01-29 DIAGNOSIS — R00.2 PALPITATIONS: ICD-10-CM

## 2018-01-29 PROCEDURE — 99215 OFFICE O/P EST HI 40 MIN: CPT

## 2018-01-29 PROCEDURE — 93000 ELECTROCARDIOGRAM COMPLETE: CPT | Mod: 59

## 2018-01-29 PROCEDURE — 93282 PRGRMG EVAL IMPLANTABLE DFB: CPT

## 2018-01-29 RX ORDER — METOPROLOL TARTRATE 25 MG/1
25 TABLET, FILM COATED ORAL TWICE DAILY
Qty: 60 | Refills: 2 | Status: DISCONTINUED | COMMUNITY
End: 2018-01-29

## 2018-02-01 ENCOUNTER — INPATIENT (INPATIENT)
Facility: HOSPITAL | Age: 53
LOS: 5 days | Discharge: HOME | DRG: 287 | End: 2018-02-07
Attending: INTERNAL MEDICINE | Admitting: HOSPITALIST
Payer: MEDICAID

## 2018-02-01 VITALS
DIASTOLIC BLOOD PRESSURE: 89 MMHG | HEART RATE: 79 BPM | RESPIRATION RATE: 18 BRPM | OXYGEN SATURATION: 97 % | TEMPERATURE: 98 F | SYSTOLIC BLOOD PRESSURE: 163 MMHG

## 2018-02-01 DIAGNOSIS — Z95.810 PRESENCE OF AUTOMATIC (IMPLANTABLE) CARDIAC DEFIBRILLATOR: Chronic | ICD-10-CM

## 2018-02-01 DIAGNOSIS — Z87.74 PERSONAL HISTORY OF (CORRECTED) CONGENITAL MALFORMATIONS OF HEART AND CIRCULATORY SYSTEM: Chronic | ICD-10-CM

## 2018-02-01 DIAGNOSIS — I47.2 VENTRICULAR TACHYCARDIA: ICD-10-CM

## 2018-02-01 DIAGNOSIS — Z29.9 ENCOUNTER FOR PROPHYLACTIC MEASURES, UNSPECIFIED: ICD-10-CM

## 2018-02-01 DIAGNOSIS — R07.9 CHEST PAIN, UNSPECIFIED: ICD-10-CM

## 2018-02-01 LAB
ALBUMIN SERPL ELPH-MCNC: 4.8 G/DL — SIGNIFICANT CHANGE UP (ref 3.3–5)
ALP SERPL-CCNC: 57 U/L — SIGNIFICANT CHANGE UP (ref 40–120)
ALT FLD-CCNC: 20 U/L RC — SIGNIFICANT CHANGE UP (ref 10–45)
ANION GAP SERPL CALC-SCNC: 11 MMOL/L — SIGNIFICANT CHANGE UP (ref 5–17)
APTT BLD: 31 SEC — SIGNIFICANT CHANGE UP (ref 27.5–37.4)
APTT BLD: 43.3 SEC — HIGH (ref 27.5–37.4)
AST SERPL-CCNC: 22 U/L — SIGNIFICANT CHANGE UP (ref 10–40)
BASE EXCESS BLDV CALC-SCNC: 0.2 MMOL/L — SIGNIFICANT CHANGE UP (ref -2–2)
BASOPHILS # BLD AUTO: 0.1 K/UL — SIGNIFICANT CHANGE UP (ref 0–0.2)
BASOPHILS NFR BLD AUTO: 0.9 % — SIGNIFICANT CHANGE UP (ref 0–2)
BILIRUB SERPL-MCNC: 0.5 MG/DL — SIGNIFICANT CHANGE UP (ref 0.2–1.2)
BUN SERPL-MCNC: 20 MG/DL — SIGNIFICANT CHANGE UP (ref 7–23)
CA-I SERPL-SCNC: 1.23 MMOL/L — SIGNIFICANT CHANGE UP (ref 1.12–1.3)
CALCIUM SERPL-MCNC: 9.5 MG/DL — SIGNIFICANT CHANGE UP (ref 8.4–10.5)
CHLORIDE BLDV-SCNC: 110 MMOL/L — HIGH (ref 96–108)
CHLORIDE SERPL-SCNC: 106 MMOL/L — SIGNIFICANT CHANGE UP (ref 96–108)
CK MB BLD-MCNC: 2.5 % — SIGNIFICANT CHANGE UP (ref 0–3.5)
CK MB BLD-MCNC: 2.6 % — SIGNIFICANT CHANGE UP (ref 0–3.5)
CK MB CFR SERPL CALC: 2.7 NG/ML — SIGNIFICANT CHANGE UP (ref 0–6.7)
CK MB CFR SERPL CALC: 3.2 NG/ML — SIGNIFICANT CHANGE UP (ref 0–6.7)
CK SERPL-CCNC: 106 U/L — SIGNIFICANT CHANGE UP (ref 30–200)
CK SERPL-CCNC: 124 U/L — SIGNIFICANT CHANGE UP (ref 30–200)
CO2 BLDV-SCNC: 27 MMOL/L — SIGNIFICANT CHANGE UP (ref 22–30)
CO2 SERPL-SCNC: 25 MMOL/L — SIGNIFICANT CHANGE UP (ref 22–31)
CREAT SERPL-MCNC: 0.89 MG/DL — SIGNIFICANT CHANGE UP (ref 0.5–1.3)
EOSINOPHIL # BLD AUTO: 0.2 K/UL — SIGNIFICANT CHANGE UP (ref 0–0.5)
EOSINOPHIL NFR BLD AUTO: 3.1 % — SIGNIFICANT CHANGE UP (ref 0–6)
GAS PNL BLDV: 141 MMOL/L — SIGNIFICANT CHANGE UP (ref 136–145)
GAS PNL BLDV: SIGNIFICANT CHANGE UP
GAS PNL BLDV: SIGNIFICANT CHANGE UP
GLUCOSE BLDV-MCNC: 102 MG/DL — HIGH (ref 70–99)
GLUCOSE SERPL-MCNC: 107 MG/DL — HIGH (ref 70–99)
HCO3 BLDV-SCNC: 26 MMOL/L — SIGNIFICANT CHANGE UP (ref 21–29)
HCT VFR BLD CALC: 43.3 % — SIGNIFICANT CHANGE UP (ref 39–50)
HCT VFR BLD CALC: 43.5 % — SIGNIFICANT CHANGE UP (ref 39–50)
HCT VFR BLDA CALC: 51 % — HIGH (ref 39–50)
HGB BLD CALC-MCNC: 16.7 G/DL — SIGNIFICANT CHANGE UP (ref 13–17)
HGB BLD-MCNC: 16.1 G/DL — SIGNIFICANT CHANGE UP (ref 13–17)
HGB BLD-MCNC: 16.1 G/DL — SIGNIFICANT CHANGE UP (ref 13–17)
INR BLD: 0.93 RATIO — SIGNIFICANT CHANGE UP (ref 0.88–1.16)
LACTATE BLDV-MCNC: 1.4 MMOL/L — SIGNIFICANT CHANGE UP (ref 0.7–2)
LYMPHOCYTES # BLD AUTO: 1.5 K/UL — SIGNIFICANT CHANGE UP (ref 1–3.3)
LYMPHOCYTES # BLD AUTO: 26.6 % — SIGNIFICANT CHANGE UP (ref 13–44)
MCHC RBC-ENTMCNC: 32.7 PG — SIGNIFICANT CHANGE UP (ref 27–34)
MCHC RBC-ENTMCNC: 32.7 PG — SIGNIFICANT CHANGE UP (ref 27–34)
MCHC RBC-ENTMCNC: 36.9 GM/DL — HIGH (ref 32–36)
MCHC RBC-ENTMCNC: 37 GM/DL — HIGH (ref 32–36)
MCV RBC AUTO: 88.3 FL — SIGNIFICANT CHANGE UP (ref 80–100)
MCV RBC AUTO: 88.5 FL — SIGNIFICANT CHANGE UP (ref 80–100)
MONOCYTES # BLD AUTO: 0.3 K/UL — SIGNIFICANT CHANGE UP (ref 0–0.9)
MONOCYTES NFR BLD AUTO: 5.9 % — SIGNIFICANT CHANGE UP (ref 2–14)
NEUTROPHILS # BLD AUTO: 3.6 K/UL — SIGNIFICANT CHANGE UP (ref 1.8–7.4)
NEUTROPHILS NFR BLD AUTO: 63.5 % — SIGNIFICANT CHANGE UP (ref 43–77)
PCO2 BLDV: 47 MMHG — SIGNIFICANT CHANGE UP (ref 35–50)
PH BLDV: 7.36 — SIGNIFICANT CHANGE UP (ref 7.35–7.45)
PLATELET # BLD AUTO: 181 K/UL — SIGNIFICANT CHANGE UP (ref 150–400)
PLATELET # BLD AUTO: 211 K/UL — SIGNIFICANT CHANGE UP (ref 150–400)
PO2 BLDV: 42 MMHG — SIGNIFICANT CHANGE UP (ref 25–45)
POTASSIUM BLDV-SCNC: 4.2 MMOL/L — SIGNIFICANT CHANGE UP (ref 3.5–5)
POTASSIUM SERPL-MCNC: 4.4 MMOL/L — SIGNIFICANT CHANGE UP (ref 3.5–5.3)
POTASSIUM SERPL-SCNC: 4.4 MMOL/L — SIGNIFICANT CHANGE UP (ref 3.5–5.3)
PROT SERPL-MCNC: 7.1 G/DL — SIGNIFICANT CHANGE UP (ref 6–8.3)
PROTHROM AB SERPL-ACNC: 10 SEC — SIGNIFICANT CHANGE UP (ref 9.8–12.7)
RBC # BLD: 4.91 M/UL — SIGNIFICANT CHANGE UP (ref 4.2–5.8)
RBC # BLD: 4.92 M/UL — SIGNIFICANT CHANGE UP (ref 4.2–5.8)
RBC # FLD: 11.3 % — SIGNIFICANT CHANGE UP (ref 10.3–14.5)
RBC # FLD: 11.4 % — SIGNIFICANT CHANGE UP (ref 10.3–14.5)
SAO2 % BLDV: 73 % — SIGNIFICANT CHANGE UP (ref 67–88)
SODIUM SERPL-SCNC: 142 MMOL/L — SIGNIFICANT CHANGE UP (ref 135–145)
TROPONIN T SERPL-MCNC: <0.01 NG/ML — SIGNIFICANT CHANGE UP (ref 0–0.06)
TROPONIN T SERPL-MCNC: <0.01 NG/ML — SIGNIFICANT CHANGE UP (ref 0–0.06)
WBC # BLD: 5.7 K/UL — SIGNIFICANT CHANGE UP (ref 3.8–10.5)
WBC # BLD: 6.7 K/UL — SIGNIFICANT CHANGE UP (ref 3.8–10.5)
WBC # FLD AUTO: 5.7 K/UL — SIGNIFICANT CHANGE UP (ref 3.8–10.5)
WBC # FLD AUTO: 6.7 K/UL — SIGNIFICANT CHANGE UP (ref 3.8–10.5)

## 2018-02-01 PROCEDURE — 99285 EMERGENCY DEPT VISIT HI MDM: CPT | Mod: 25

## 2018-02-01 PROCEDURE — 99223 1ST HOSP IP/OBS HIGH 75: CPT | Mod: GC

## 2018-02-01 PROCEDURE — 93010 ELECTROCARDIOGRAM REPORT: CPT

## 2018-02-01 PROCEDURE — 71045 X-RAY EXAM CHEST 1 VIEW: CPT | Mod: 26

## 2018-02-01 RX ORDER — TICAGRELOR 90 MG/1
180 TABLET ORAL ONCE
Qty: 0 | Refills: 0 | Status: COMPLETED | OUTPATIENT
Start: 2018-02-01 | End: 2018-02-01

## 2018-02-01 RX ORDER — HEPARIN SODIUM 5000 [USP'U]/ML
5000 INJECTION INTRAVENOUS; SUBCUTANEOUS ONCE
Qty: 0 | Refills: 0 | Status: COMPLETED | OUTPATIENT
Start: 2018-02-01 | End: 2018-02-01

## 2018-02-01 RX ORDER — METOPROLOL TARTRATE 50 MG
25 TABLET ORAL DAILY
Qty: 0 | Refills: 0 | Status: DISCONTINUED | OUTPATIENT
Start: 2018-02-01 | End: 2018-02-07

## 2018-02-01 RX ORDER — NITROGLYCERIN 6.5 MG
0.4 CAPSULE, EXTENDED RELEASE ORAL
Qty: 0 | Refills: 0 | Status: DISCONTINUED | OUTPATIENT
Start: 2018-02-01 | End: 2018-02-07

## 2018-02-01 RX ORDER — ASPIRIN/CALCIUM CARB/MAGNESIUM 324 MG
324 TABLET ORAL ONCE
Qty: 0 | Refills: 0 | Status: COMPLETED | OUTPATIENT
Start: 2018-02-01 | End: 2018-02-01

## 2018-02-01 RX ORDER — SODIUM CHLORIDE 9 MG/ML
3 INJECTION INTRAMUSCULAR; INTRAVENOUS; SUBCUTANEOUS ONCE
Qty: 0 | Refills: 0 | Status: COMPLETED | OUTPATIENT
Start: 2018-02-01 | End: 2018-02-01

## 2018-02-01 RX ORDER — HEPARIN SODIUM 5000 [USP'U]/ML
INJECTION INTRAVENOUS; SUBCUTANEOUS
Qty: 25000 | Refills: 0 | Status: DISCONTINUED | OUTPATIENT
Start: 2018-02-01 | End: 2018-02-03

## 2018-02-01 RX ORDER — HEPARIN SODIUM 5000 [USP'U]/ML
4100 INJECTION INTRAVENOUS; SUBCUTANEOUS EVERY 6 HOURS
Qty: 0 | Refills: 0 | Status: DISCONTINUED | OUTPATIENT
Start: 2018-02-01 | End: 2018-02-03

## 2018-02-01 RX ORDER — FLECAINIDE ACETATE 50 MG
50 TABLET ORAL EVERY 12 HOURS
Qty: 0 | Refills: 0 | Status: DISCONTINUED | OUTPATIENT
Start: 2018-02-01 | End: 2018-02-07

## 2018-02-01 RX ADMIN — TICAGRELOR 180 MILLIGRAM(S): 90 TABLET ORAL at 17:46

## 2018-02-01 RX ADMIN — Medication 324 MILLIGRAM(S): at 11:55

## 2018-02-01 RX ADMIN — Medication 50 MILLIGRAM(S): at 21:29

## 2018-02-01 RX ADMIN — SODIUM CHLORIDE 3 MILLILITER(S): 9 INJECTION INTRAMUSCULAR; INTRAVENOUS; SUBCUTANEOUS at 11:56

## 2018-02-01 RX ADMIN — HEPARIN SODIUM 5000 UNIT(S): 5000 INJECTION INTRAVENOUS; SUBCUTANEOUS at 15:08

## 2018-02-01 RX ADMIN — HEPARIN SODIUM 800 UNIT(S)/HR: 5000 INJECTION INTRAVENOUS; SUBCUTANEOUS at 16:08

## 2018-02-01 RX ADMIN — HEPARIN SODIUM 950 UNIT(S)/HR: 5000 INJECTION INTRAVENOUS; SUBCUTANEOUS at 22:46

## 2018-02-01 RX ADMIN — Medication 0.4 MILLIGRAM(S): at 11:59

## 2018-02-01 NOTE — ED PROVIDER NOTE - PROGRESS NOTE DETAILS
Resolution of symptoms after SLN x 1.   provided after initial evaluation.  Cards consulted given significant symptoms for NSTEMI, agree c hep gtt and recommended Brilinta load.  Will follow.  --BMM

## 2018-02-01 NOTE — MEDICAL STUDENT ADULT H&P (EDUCATION) - NS MD HP STUD RESULTS RAD FT
CXR 2/1/2018  The heart is enlarged. The lungs are clear. Status post sternotomy. A   pacer is in good position.

## 2018-02-01 NOTE — H&P ADULT - PROBLEM SELECTOR PLAN 2
- Vtach and ICD shock multiple times in past year, more recently last week per outpatient records, started on metoprolol and flecainide x1 week  - management as above for ACS w/u - Vtach and ICD shock multiple times in past year, more recently last week per outpatient records, started on metoprolol and flecainide x1 week  - EP cardiology evaluated. ICD interrogation  - Ischemic work up in progress

## 2018-02-01 NOTE — ED PROVIDER NOTE - MEDICAL DECISION MAKING DETAILS
new EKG changes with concerning story for ACS. will give ASA/NO if no relief will start hep gtt, urgent cards consult. otherwise basic labs TBA new EKG changes (TWI V5-6, slight RILEY V2-3 that do not meet emergent cath criteria) with concerning story for NSTEMI/ACS. will give ASA/SLN if no relief will start hep gtt, urgent cards consult. otherwise basic labs and TBA.  --BMM

## 2018-02-01 NOTE — ED PROVIDER NOTE - NS ED ROS FT
ROS: +CP/SOB. no cough. no fever. no n/v/d/c. no abd pain. no rash. no bleeding. no urinary complaints. no weakness. no vision changes. no HA. no neck/back pain. no extremity swelling/deformity. No change in mental status.

## 2018-02-01 NOTE — H&P ADULT - HISTORY OF PRESENT ILLNESS
52M with history of ASD repaired at [facility] in 2015 c/b cardiac arrest and ICD placement presenting with chest pressure, left arm heaviness and left sided headache. He was recently admitted in December for feelings of palpitations and dizziness and had a PVC ablation. He reports since then he has not had any palpitations however he does feel an "abnormal" feeling daily in his heart that he describes as a "spark". He states it is worse with exertion especially at work when he lifts heavy objects. At rest it is improved. He was scheduled for a stress test last week however because of the chest pressure and shock he contacted his cardiologist. His ICD was interrogated and showed VT, he was started on metoprolol succ and flecainide on 1/29. He came to the hospital when the chest pressure continued and went to his left arm and head. He denies changes in vision, dizziness, SOB, cough, N/V.     In the ED, vitals were 163/89, HR 79, 97.9, 18-97%RA. EKG showed new TWI in I, aVL, V5, V6 as well as RILEY in V1-V3. First troponin negative. He was given asa and nitro. 52M with history of ASD repaired at [facility] in 2015 c/b cardiac arrest and ICD placement presenting with chest pressure, left arm heaviness and left sided headache. He was recently admitted in December for feelings of palpitations and dizziness and had a PVC ablation. He reports since then he has not had any palpitations however he does feel an "abnormal" feeling daily in his heart that he describes as a "spark". He states it is worse with exertion especially at work when he lifts heavy objects. At rest it is improved. He was scheduled for a stress test last week however because of the chest pressure and shock he contacted his cardiologist. His ICD was interrogated and showed VT, he was started on metoprolol succ and flecainide on 1/29. He came to the hospital when the chest pressure continued and went to his left arm and head. He denies changes in vision, dizziness, SOB, cough, N/V, edema.    In the ED, vitals were 163/89, HR 79, 97.9, 18-97%RA. EKG showed new TWI in I, aVL, V5, V6 as well as RILEY in V1-V3. First troponin negative. He was given asa and nitro with improvement in symptoms.

## 2018-02-01 NOTE — H&P ADULT - ASSESSMENT
52M with history of ASD repaired at [facility] in 2015 c/b cardiac arrest and ICD placement presenting with chest pressure, left arm heaviness and left sided headache 52M with history of ASD repaired at [facility] in 2015 c/b cardiac arrest and ICD placement presenting with chest pressure, left arm heaviness and left sided headache 2/2 recurrent PVCs vs NSTEMI

## 2018-02-01 NOTE — ED ADULT NURSE REASSESSMENT NOTE - NS ED NURSE REASSESS COMMENT FT1
Pt remains A+OX4. Pt resting comfortably in bed, in no apparent distress. Pt on telly monitor showing sinus rhythm in the 70's first degree. VSS. Pt complaining of mild discomfort on left side of chest. No other complaints at this time. Will continue to monitor.

## 2018-02-01 NOTE — ED ADULT NURSE NOTE - OBJECTIVE STATEMENT
52 year old male presents to the ED c/o chest pain. Pt reports a pressure feeling on Left side of chest, "electricity in heart," along with discomfort in his left arm/hand, nausea, and headache in occipital region  nausea, headache back of head,  denies diaphoresis   last december - had palpitations 52 year old male presents to the ED c/o chest pain. Pt reports hx of Atrial septal defect and has a defibrillator. Pt reports a pressure feeling on Left side of chest, "electricity in heart," along with discomfort in his left arm/hand, nausea, and headache in occipital region. Pt states he was shocked by his defibrillator last week. Upon assessment, patient is A+OX4, airway intact, lung sounds clear bilaterally,   denies diaphoresis   last december - had palpitations 52 year old male presents to the ED c/o chest pain. Pt reports hx of Atrial septal defect and has a defibrillator, which he was shocked by last week. Today pt reports a pressure feeling on Left side of chest, "electricity in heart," along with discomfort in his left arm/hand associated with nausea and headache in occipital region.  Upon assessment, patient is A+OX4, airway intact, lung sounds clear bilaterally,   denies diaphoresis 52 year old male presents to the ED c/o chest pain. Pt reports hx of Atrial septal defect and has a defibrillator, which he was shocked by last week. Today pt reports a pressure feeling on Left side of chest, "electricity in heart," along with discomfort in his left arm/hand associated with nausea and headache in occipital region that started this morning.  Upon assessment, patient is A+OX4, non ill appearing, airway intact, lung sounds clear bilaterally, cap refill <2 seconds, peripheral pulses present and equal bilaterally, pt denies SOB, diaphoresis 52 year old male presents to the ED c/o chest pain. Pt reports hx of Atrial septal defect and has a defibrillator, which he was shocked by last week. Today pt reports a pressure feeling on Left side of chest, "electricity in heart," along with tingling in his left arm/hand associated with nausea and headache in occipital region that started this morning.  Upon assessment, patient is A+OX4, non ill appearing, airway intact, lung sounds clear bilaterally, cap refill <2 seconds, peripheral pulses present and equal bilaterally, sensation to arms and legs equal bilaterally. Pt denies SOB, diaphoresis, numbness, syncopal episode, v/d. MD at bedside. 52 year old male presents to the ED c/o chest pain. Pt reports hx of Atrial septal defect and has a defibrillator, which he was shocked by last week. Today pt reports a pressure feeling on Left side of chest, describes as "electricity in heart," along with tingling in his left arm/hand associated with nausea and headache in occipital region that started this morning.  Upon assessment, patient is A+OX4, non ill appearing, chest pressure does not radiate, no work of breathing noted, lung sounds clear bilaterally, cap refill <2 seconds, peripheral pulses present and equal bilaterally, sensation to arms and legs equal bilaterally. Pt denies SOB, diaphoresis, numbness, syncopal episode, v/d, no dizziness,  MD at bedside.

## 2018-02-01 NOTE — MEDICAL STUDENT ADULT H&P (EDUCATION) - NS MD HP STUD RESULTS OTHER FT
Stress Test 10/27/2017  STRESS TEST IMPRESSIONS:  Exercise capacity: 2 METS, Poor for age and gender.  Chest Pain: No chest pain with exercise.  Symptom: dizziness.  HR Response: Appropriate.  BP Response: Appropriate.  Heart Rhythm: Sinus Rhythm - 60 BPM.  Conduction defects: 1 degree AV block.  Baseline ECG: ST wave abnormalities in II , III, aVF.  Early repolarization  T wave inversion I, aVL, V6.  ECG Abnormalities: None.  Arrhythmia: Single VPD occurred during stress. Frequency  of VPD increased with stress.  Non-diagnotic ETT due to submaximal HR achieved.  Patient developed dizziness after 1:23 of exercise and  test was terminated.

## 2018-02-01 NOTE — H&P ADULT - NSHPREVIEWOFSYSTEMS_GEN_ALL_CORE
CONSTITUTIONAL:  No weight loss, fever, chills, weakness or fatigue.  HEENT:  Eyes:  No visual loss, blurred vision, double vision or yellow sclerae.   Ears, Nose, Throat:  No hearing loss, sneezing, congestion, runny nose or dysphagia.  SKIN:  No rash or itching.  CARDIOVASCULAR:  see HPI  RESPIRATORY:  No shortness of breath, cough or sputum.  GASTROINTESTINAL:  No anorexia, nausea, vomiting or diarrhea. No abdominal pain or blood.  GENITOURINARY:  No hematuria, dysuria.   NEUROLOGICAL:  No dizziness, syncope, numbness or tingling in the extremities. No change in bowel or bladder control.  MUSCULOSKELETAL:  No muscle, back pain, joint pain or stiffness.  HEMATOLOGIC:  No anemia, bleeding or bruising.  ENDOCRINOLOGIC:  No reports of sweating, cold or heat intolerance. No polyuria or polydipsia.  ALLERGIES:  No history of asthma, hives, eczema or rhinitis.

## 2018-02-01 NOTE — MEDICAL STUDENT ADULT H&P (EDUCATION) - NS MD HP STUD HX OF PRESENT ILLNESS FT
Mr. Winkler is a 51 y/o M with PMHx of ASD s/p repair at [facility] in 2015 complicated with by cardiac arrest requiring ICD placement, questionable CABG in 2015, c/o chest pain for 1x week. Chest pain is described to be pressure-like, worsens with exertion, gets better at rest and radiates to left shoulder and neck. He states that the nitroglycerin did not make the pain better. Patient states that he is unable to work due to the chest pain. Patient recently had a cardiac catheter procedure done last week for PVC ablation for palpitations by Dr. Saavedra. He also c/o of shocks that have increased in frequency in his chest and attributes it to his AICD firing. He was started on metoprolol and flecainiade by Dr. Saavedra last week. Patient denies diaphoresis and SOB. He states that since getting the ablation, he has been feeling that his AICD has been firing more frequently. Mr. Winkler is a 51 y/o M with PMHx of ASD s/p repair at [facility] in 2015 complicated with by cardiac arrest requiring ICD placement,  c/o chest pain for 1x week. Chest pain is described to be pressure-like, worsens with exertion, maybe gets better at rest and radiates to left shoulder and neck. He states that the nitroglycerin did not make the pain better. Patient states that he is unable to work due to the chest pain. Patient recently had a cardiac catheter procedure done last week. On his previous admission in 12/2017, he had a  PVC ablation for palpitations by Dr. Saavedra. He also c/o of shocks that have increased in frequency in his chest and attributes it to his AICD firing. He was started on metoprolol and flecainiade by Dr. Saavedra last week. Patient denies diaphoresis and SOB. He states that since getting the ablation, he has been feeling that his AICD has been firing more frequently. Mr. Winkler is a 53 y/o M with PMHx of ASD s/p repair at [facility] in 2015 complicated with by cardiac arrest requiring ICD placement,  c/o chest pain for 1x week. Chest pain is described to be pressure-like, worsens with exertion, maybe gets better at rest and radiates to left shoulder and neck. He states that the nitroglycerin did not make the pain better. Patient states that he is unable to work due to the chest pain. Patient recently had a cardiac catheter procedure done last week. On his previous admission in 12/2017, he had a  PVC ablation for palpitations by Dr. Saavedra. He also c/o of shocks that have increased in frequency in his chest and attributes it to his AICD firing. He was started on metoprolol and flecainiade by Dr. Saavedra last week. Patient denies diaphoresis and SOB. He states that since getting the catheter done, he has been feeling that his AICD has been firing more frequently.

## 2018-02-01 NOTE — H&P ADULT - NSHPLABSRESULTS_GEN_ALL_CORE
Troponin T, Serum (02.01.18 @ 11:56)    Troponin T, Serum: <0.01 ng/mL    Complete Blood Count + Automated Diff (02.01.18 @ 11:56)    WBC Count: 5.7 K/uL    RBC Count: 4.92 M/uL    Hemoglobin: 16.1 g/dL    Hematocrit: 43.5 %    Mean Cell Volume: 88.5 fl    Mean Cell Hemoglobin: 32.7 pg    Mean Cell Hemoglobin Conc: 36.9 gm/dL    Red Cell Distrib Width: 11.3 %    Platelet Count - Automated: 181 K/uL    Auto Neutrophil #: 3.6 K/uL    Auto Lymphocyte #: 1.5 K/uL    Auto Monocyte #: 0.3 K/uL    Auto Eosinophil #: 0.2 K/uL    Auto Basophil #: 0.1 K/uL    Auto Neutrophil %: 63.5: Differential percentages must be correlated with absolute numbers for  clinical significance. %    Auto Lymphocyte %: 26.6 %    Auto Monocyte %: 5.9 %    Auto Eosinophil %: 3.1 %    Auto Basophil %: 0.9 %    Comprehensive Metabolic Panel (02.01.18 @ 11:56)    Sodium, Serum: 142 mmol/L    Potassium, Serum: 4.4 mmol/L    Chloride, Serum: 106 mmol/L    Carbon Dioxide, Serum: 25 mmol/L    Anion Gap, Serum: 11 mmol/L    Blood Urea Nitrogen, Serum: 20 mg/dL    Creatinine, Serum: 0.89 mg/dL    Glucose, Serum: 107 mg/dL    Calcium, Total Serum: 9.5 mg/dL    Protein Total, Serum: 7.1 g/dL    Albumin, Serum: 4.8 g/dL    Bilirubin Total, Serum: 0.5 mg/dL    Alkaline Phosphatase, Serum: 57 U/L    Aspartate Aminotransferase (AST/SGOT): 22 U/L    Alanine Aminotransferase (ALT/SGPT): 20 U/L RC    eGFR if Non : 99: Interpretative comment

## 2018-02-01 NOTE — MEDICAL STUDENT ADULT H&P (EDUCATION) - NS MD HP STUD RESULTS LAB FT
WBC 5.7, H/H 16.1/43.5,   Na 142, Cl 106, K 4.4, CO2 25, BUN/Cr 20/0.89 Glu 107  CKMB Pending  Troponin T <0.01

## 2018-02-01 NOTE — CONSULT NOTE ADULT - SUBJECTIVE AND OBJECTIVE BOX
Patient seen and evaluated at bedside    Chief Complaint: chest pain    HPI:  52M with ASD repair 3 years ago complicated by cardiac arrest s/p ICD and recent PVC ablation p/w chest pain. Patient has a h/o ICD shocks for VT and underwent ablation last month with Dr. Saavedra. Since the procedure patient has had substernal chest pressure that has been worsening with exertion. Today patient says he felt left arm heaviness and neck pain along with his worsening chest pressure. This is what made him come to the ED. He said the pain lasted around an hour and then improved.       PMH:   ASD (atrial septal defect)  No pertinent past medical history      PSH:   AICD (automatic cardioverter/defibrillator) present  Spontaneous ASD closure      Medications:   heparin  Infusion.  Unit(s)/Hr IV Continuous <Continuous>  heparin  Injectable 4100 Unit(s) IV Push every 6 hours PRN  nitroglycerin     SubLingual 0.4 milliGRAM(s) SubLingual every 5 minutes PRN  ticagrelor 180 milliGRAM(s) Oral once      Allergies:  No Known Allergies      FAMILY HISTORY:  No pertinent family history in first degree relatives      Social History:  Smoking History:  Alcohol Use:  Drug Use:    Review of Systems:  REVIEW OF SYSTEMS:    CONSTITUTIONAL: No weakness, fevers or chills  EYES/ENT: No visual changes;  No dysphagia  NECK: No pain or stiffness  RESPIRATORY: No cough, wheezing, hemoptysis; No shortness of breath  CARDIOVASCULAR: No chest pain or palpitations; No lower extremity edema  GASTROINTESTINAL: No abdominal or epigastric pain. No nausea, vomiting, or hematemesis; No diarrhea or constipation. No melena or hematochezia.  BACK: No back pain  GENITOURINARY: No dysuria, frequency or hematuria  NEUROLOGICAL: No numbness or weakness  SKIN: No itching, burning, rashes, or lesions   All other review of systems is negative unless indicated above.    Physical Exam:  T(F): 97.5 (02-01), Max: 98.5 (02-01)  HR: 64 (02-01) (64 - 79)  BP: 137/82 (02-01) (130/84 - 163/89)  RR: 18 (02-01)  SpO2: 99% (02-01)  GENERAL: No acute distress, well-developed  HEAD:  Atraumatic, Normocephalic  ENT: EOMI, PERRLA, conjunctiva and sclera clear, Neck supple, No JVD, moist mucosa  CHEST/LUNG: Clear to auscultation bilaterally; No wheeze, equal breath sounds bilaterally   BACK: No spinal tenderness  HEART: Regular rate and rhythm; No murmurs, rubs, or gallops  ABDOMEN: Soft, Nontender, Nondistended; Bowel sounds present  EXTREMITIES:  No clubbing, cyanosis, or edema  PSYCH: Nl behavior, nl affect  NEUROLOGY: AAOx3, non-focal, cranial nerves intact  SKIN: Normal color, No rashes or lesions  LINES:    Cardiovascular Diagnostic Testing:    ECG: Personally reviewed    Echo:    Stress Testing:    Cath:    Interpretation of Telemetry:    Imaging:    Labs: Personally reviewed                        16.1   5.7   )-----------( 181      ( 01 Feb 2018 11:56 )             43.5     02-01    142  |  106  |  20  ----------------------------<  107<H>  4.4   |  25  |  0.89    Ca    9.5      01 Feb 2018 11:56    TPro  7.1  /  Alb  4.8  /  TBili  0.5  /  DBili  x   /  AST  22  /  ALT  20  /  AlkPhos  57  02-01    PT/INR - ( 01 Feb 2018 11:56 )   PT: 10.0 sec;   INR: 0.93 ratio         PTT - ( 01 Feb 2018 11:56 )  PTT:31.0 sec  CARDIAC MARKERS ( 01 Feb 2018 11:56 )  x     / <0.01 ng/mL / x     / x     / x Patient seen and evaluated at bedside    Chief Complaint: chest pain    HPI:  52M with ASD repair 3 years ago complicated by cardiac arrest s/p ICD and recent PVC ablation p/w chest pain. Patient has a h/o ICD shocks for VT and underwent ablation last month with Dr. Saavedra. Since the procedure patient has had substernal chest pressure that has been worsening with exertion. His ICD was interrogated and showed VT, he was started on metoprolol succ and flecainide on 1/29. Today patient says he felt left arm heaviness and neck pain along with his worsening chest pressure. This is what made him come to the ED. He said the pain lasted around an hour and then improved. Since coming to the ED patient's pain has gone away. EKG showed TWI laterally and ST changes.       PMH:   ASD (atrial septal defect)  No pertinent past medical history      PSH:   AICD (automatic cardioverter/defibrillator) present  Spontaneous ASD closure      Medications:   heparin  Infusion.  Unit(s)/Hr IV Continuous <Continuous>  heparin  Injectable 4100 Unit(s) IV Push every 6 hours PRN  nitroglycerin     SubLingual 0.4 milliGRAM(s) SubLingual every 5 minutes PRN  ticagrelor 180 milliGRAM(s) Oral once      Allergies:  No Known Allergies      FAMILY HISTORY:  No pertinent family history in first degree relatives      Social History:  Smoking History: Former  Alcohol Use: denies    Review of Systems:  REVIEW OF SYSTEMS:    CONSTITUTIONAL: No weakness, fevers or chills  EYES/ENT: No visual changes;  No dysphagia  NECK: No pain or stiffness  RESPIRATORY: No cough, wheezing, hemoptysis; No shortness of breath  CARDIOVASCULAR: + chest pain; No lower extremity edema  GASTROINTESTINAL: No abdominal or epigastric pain. No nausea, vomiting, or hematemesis; No diarrhea or constipation. No melena or hematochezia.  GENITOURINARY: No dysuria, frequency or hematuria  NEUROLOGICAL: No numbness or weakness  SKIN: No itching, burning, rashes, or lesions   All other review of systems is negative unless indicated above.    Physical Exam:  T(F): 97.5 (02-01), Max: 98.5 (02-01)  HR: 64 (02-01) (64 - 79)  BP: 137/82 (02-01) (130/84 - 163/89)  RR: 18 (02-01)  SpO2: 99% (02-01)  GENERAL: No acute distress, well-developed  HEAD:  Atraumatic, Normocephalic  ENT: EOMI, No JVD, moist mucosa  CHEST/LUNG: Clear to auscultation bilaterally; No wheeze, equal breath sounds bilaterally   HEART: Regular rate and rhythm; No murmurs, rubs, or gallops  ABDOMEN: Soft, Nontender, Nondistended; Bowel sounds present  EXTREMITIES:  No clubbing, cyanosis, or edema  PSYCH: Nl behavior, nl affect  NEUROLOGY: AAOx3, non-focal    Cardiovascular Diagnostic Testing:    ECG: Personally reviewed  SR, normal axis, TWI laterally, new and ST elevations laterally not meeting criteria for STEMI.    Echo:  < from: Transthoracic Echocardiogram (10.24.17 @ 16:47) >  1. Moderateconcentric left ventricular hypertrophy.  2. Normal left ventricular systolic function. No segmental  wall motion abnormalities.    < end of copied text >    Stress Testing:  < from: Cardiac Treadmill Stress Test (Non Imaging) (10.27.17 @ 15:53) >  Exercise capacity: 2 METS, Poor for age and gender.  Chest Pain: No chest pain with exercise.  Symptom: dizziness.  HR Response: Appropriate.  BP Response: Appropriate.  Heart Rhythm: Sinus Rhythm - 60 BPM.  Conduction defects: 1 degree AV block.  Baseline ECG: ST wave abnormalities in II , III, aVF.  Early repolarization  T wave inversion I, aVL, V6.  ECG Abnormalities: None.  Arrhythmia: Single VPD occurred during stress. Frequency  of VPD increased with stress.  Non-diagnotic ETT due to submaximal HR achieved.  Patient developed dizziness after 1:23 of exercise and  test was terminated.    < end of copied text >    Labs: Personally reviewed                        16.1   5.7   )-----------( 181      ( 01 Feb 2018 11:56 )             43.5     02-01    142  |  106  |  20  ----------------------------<  107<H>  4.4   |  25  |  0.89    Ca    9.5      01 Feb 2018 11:56    TPro  7.1  /  Alb  4.8  /  TBili  0.5  /  DBili  x   /  AST  22  /  ALT  20  /  AlkPhos  57  02-01    PT/INR - ( 01 Feb 2018 11:56 )   PT: 10.0 sec;   INR: 0.93 ratio         PTT - ( 01 Feb 2018 11:56 )  PTT:31.0 sec  CARDIAC MARKERS ( 01 Feb 2018 11:56 )  x     / <0.01 ng/mL / x     / x     / x          A/P:  52M with ASD repair 3 years ago complicated by cardiac arrest s/p ICD and recent PVC ablation p/w chest pain    Chest pain. Concerning for unstable angia.  - Agree with treating as ACS, cont heparin gtt  - ASA and plavix daily  - Pharmacologic nuclear stress test tomorrow (ordered; Coronary CT machine malfunctioning)   - Will get records from [facility]    History of VT.   - Cont home BB and flecainide    Lane Suero MD  Cardiology Fellow [id number] Patient seen and evaluated at bedside    52M s/p ASD repair 3 years ago at [facility].  Course complicated by cardiac arrest, and AICD placed.    Admitted here in fall with complaint of lightheadedness; VPCs seen.  Stress test with EKG done on 10/27/17, but patient could only walk for 90 seconds before test stopped for dizziness at heart rate of 85 bpm; test was non-diagnostic given brief duration of test and low heart rate achieved.   VPCs attributed to outflow tract; PVC ablation performed by Dr. Saavedra.  Also placed on beta-blocker.    Seen recently by Dr. Saavedra for discharge of ICD.  Interrogation showed VPCs then VT, prompting firing of device.  Flecainide added to beta blocker.    Now p/w chest pain. Describes substernal chest pressure, worse with exertion. Today patient says he felt left arm heaviness and neck pain along with his worsening chest pressure.  Pain lasted around an hour and then resolved.       PMH:   ASD (atrial septal defect)  No pertinent past medical history      PSH:   AICD (automatic cardioverter/defibrillator) present  Spontaneous ASD closure      Medications:   heparin  Infusion.  Unit(s)/Hr IV Continuous <Continuous>  heparin  Injectable 4100 Unit(s) IV Push every 6 hours PRN  nitroglycerin     SubLingual 0.4 milliGRAM(s) SubLingual every 5 minutes PRN  ticagrelor 180 milliGRAM(s) Oral once      Allergies:  No Known Allergies    FAMILY HISTORY:  No pertinent family history in first degree relatives      Social History:  Smoking History: Former  Alcohol Use: denies    Review of Systems:  REVIEW OF SYSTEMS:    CONSTITUTIONAL: No weakness, fevers or chills  EYES/ENT: No visual changes;  No dysphagia  NECK: No pain or stiffness  RESPIRATORY: No cough, wheezing, hemoptysis; No shortness of breath  CARDIOVASCULAR: + chest pain; No lower extremity edema  GASTROINTESTINAL: No abdominal or epigastric pain. No nausea, vomiting, or hematemesis; No diarrhea or constipation. No melena or hematochezia.  GENITOURINARY: No dysuria, frequency or hematuria  NEUROLOGICAL: No numbness or weakness  SKIN: No itching, burning, rashes, or lesions   All other review of systems is negative unless indicated above.    Physical Exam:  T(F): 97.5 (02-01), Max: 98.5 (02-01)  HR: 64 (02-01) (64 - 79)  BP: 137/82 (02-01) (130/84 - 163/89)  RR: 18 (02-01)  SpO2: 99% (02-01)  GENERAL: No acute distress, well-developed  HEAD:  Atraumatic, Normocephalic  ENT: EOMI, No JVD, moist mucosa  CHEST/LUNG: Clear to auscultation bilaterally; No wheeze, equal breath sounds bilaterally   HEART: Regular rate and rhythm; No murmurs, rubs, or gallops  ABDOMEN: Soft, Nontender, Nondistended; Bowel sounds present  EXTREMITIES:  No clubbing, cyanosis, or edema  PSYCH: Nl behavior, nl affect  NEUROLOGY: AAOx3, non-focal    Cardiovascular Diagnostic Testing:    ECG: Personally reviewed  SR, normal axis, TWI laterally, new and ST elevations laterally not meeting criteria for STEMI.    Echo:  < from: Transthoracic Echocardiogram (10.24.17 @ 16:47) >  1. Moderate concentric left ventricular hypertrophy.  2. Normal left ventricular systolic function. No segmental wall motion abnormalities.    Stress Testing:  < from: Cardiac Treadmill Stress Test (Non Imaging) (10.27.17 @ 15:53) >  Exercise capacity: 2 METS, Poor for age and gender.  Chest Pain: No chest pain with exercise.  Symptom: dizziness.  HR Response: Appropriate.  BP Response: Appropriate.  Heart Rhythm: Sinus Rhythm - 60 BPM.  Conduction defects: 1 degree AV block.  Baseline ECG: ST wave abnormalities in II , III, aVF.  Early repolarization  T wave inversion I, aVL, V6.  ECG Abnormalities: None.  Arrhythmia: Single VPD occurred during stress. Frequency  of VPD increased with stress.  Non-diagnotic ETT due to submaximal HR achieved.  Patient developed dizziness after 1:23 of exercise and  test was terminated.    < end of copied text >    Labs: Personally reviewed                        16.1   5.7   )-----------( 181      ( 01 Feb 2018 11:56 )             43.5     02-01    142  |  106  |  20  ----------------------------<  107<H>  4.4   |  25  |  0.89    Ca    9.5      01 Feb 2018 11:56    TPro  7.1  /  Alb  4.8  /  TBili  0.5  /  DBili  x   /  AST  22  /  ALT  20  /  AlkPhos  57  02-01    PT/INR - ( 01 Feb 2018 11:56 )   PT: 10.0 sec;   INR: 0.93 ratio    PTT - ( 01 Feb 2018 11:56 )  PTT:31.0 sec    CARDIAC MARKERS ( 01 Feb 2018 11:56 ) Trop <0.01 ng/mL / x     / x     / x          A/P:  52M with ASD repair 3 years ago complicated by cardiac arrest s/p ICD and recent PVC ablation p/w chest pain    1.  Chest pain. Concerning for unstable angina.  - Agree with treating as ACS, cont heparin gtt  - ASA and Plavix daily  - Pharmacologic nuclear stress test tomorrow (ordered; Coronary CT machine malfunctioning)   - Will request records from [facility]    2.  History of VT.   - Cont home BB and flecainide    3.  s/p ASD repair    Lane Suero MD  Cardiology Fellow [id number] Patient seen and evaluated at bedside    52M s/p ASD repair 3 years ago at [facility].  Course complicated by cardiac arrest, and AICD placed.    Admitted here in fall with complaint of lightheadedness; VPCs seen.  Stress test with EKG done on 10/27/17, but patient could only walk for 90 seconds before test stopped for dizziness at heart rate of 85 bpm; test was non-diagnostic given brief duration of test and low heart rate achieved.   VPCs attributed to outflow tract; PVC ablation performed by Dr. Saavedra.  Also placed on beta-blocker.    Seen recently by Dr. Saavedra for discharge of ICD.  Interrogation showed VPCs then VT, prompting firing of device.  Flecainide added to beta blocker.    Now p/w chest pain. Describes substernal chest pressure, worse with exertion. Today patient says he felt left arm heaviness and neck pain along with his worsening chest pressure.  Pain lasted around an hour and then resolved.       PMH:   ASD (atrial septal defect)  No pertinent past medical history      PSH:   AICD (automatic cardioverter/defibrillator) present  Spontaneous ASD closure      Medications:   heparin  Infusion.  Unit(s)/Hr IV Continuous <Continuous>  heparin  Injectable 4100 Unit(s) IV Push every 6 hours PRN  nitroglycerin     SubLingual 0.4 milliGRAM(s) SubLingual every 5 minutes PRN  ticagrelor 180 milliGRAM(s) Oral once      Allergies:  No Known Allergies    FAMILY HISTORY:  No pertinent family history in first degree relatives      Social History:  Smoking History: Former  Alcohol Use: denies    Review of Systems:  REVIEW OF SYSTEMS:    CONSTITUTIONAL: No weakness, fevers or chills  EYES/ENT: No visual changes;  No dysphagia  NECK: No pain or stiffness  RESPIRATORY: No cough, wheezing, hemoptysis; No shortness of breath  CARDIOVASCULAR: + chest pain; No lower extremity edema  GASTROINTESTINAL: No abdominal or epigastric pain. No nausea, vomiting, or hematemesis; No diarrhea or constipation. No melena or hematochezia.  GENITOURINARY: No dysuria, frequency or hematuria  NEUROLOGICAL: No numbness or weakness  SKIN: No itching, burning, rashes, or lesions   All other review of systems is negative unless indicated above.    Physical Exam:  T(F): 97.5 (02-01), Max: 98.5 (02-01)  HR: 64 (02-01) (64 - 79)  BP: 137/82 (02-01) (130/84 - 163/89)  RR: 18 (02-01)  SpO2: 99% (02-01)  GENERAL: No acute distress, well-developed  HEAD:  Atraumatic, Normocephalic  ENT: EOMI, No JVD, moist mucosa  CHEST/LUNG: Clear to auscultation bilaterally; No wheeze, equal breath sounds bilaterally   HEART: Regular rate and rhythm; No murmurs, rubs, or gallops  ABDOMEN: Soft, Nontender, Nondistended; Bowel sounds present  EXTREMITIES:  No clubbing, cyanosis, or edema  PSYCH: Nl behavior, nl affect  NEUROLOGY: AAOx3, non-focal    Cardiovascular Diagnostic Testing:    ECG: Personally reviewed  SR, normal axis, TWI laterally, new and ST elevations laterally not meeting criteria for STEMI.    Echo:  < from: Transthoracic Echocardiogram (10.24.17 @ 16:47) >  1. Moderate concentric left ventricular hypertrophy.  2. Normal left ventricular systolic function. No segmental wall motion abnormalities.    Stress Testing:  < from: Cardiac Treadmill Stress Test (Non Imaging) (10.27.17 @ 15:53) >  Exercise capacity: 2 METS, Poor for age and gender.  Chest Pain: No chest pain with exercise.  Symptom: dizziness.  HR Response: Appropriate.  BP Response: Appropriate.  Heart Rhythm: Sinus Rhythm - 60 BPM.  Conduction defects: 1 degree AV block.  Baseline ECG: ST wave abnormalities in II , III, aVF.  Early repolarization  T wave inversion I, aVL, V6.  ECG Abnormalities: None.  Arrhythmia: Single VPD occurred during stress. Frequency  of VPD increased with stress.  Non-diagnotic ETT due to submaximal HR achieved.  Patient developed dizziness after 1:23 of exercise and  test was terminated.    < end of copied text >    Labs: Personally reviewed                        16.1   5.7   )-----------( 181      ( 01 Feb 2018 11:56 )             43.5     02-01    142  |  106  |  20  ----------------------------<  107<H>  4.4   |  25  |  0.89    Ca    9.5      01 Feb 2018 11:56    TPro  7.1  /  Alb  4.8  /  TBili  0.5  /  DBili  x   /  AST  22  /  ALT  20  /  AlkPhos  57  02-01    PT/INR - ( 01 Feb 2018 11:56 )   PT: 10.0 sec;   INR: 0.93 ratio    PTT - ( 01 Feb 2018 11:56 )  PTT:31.0 sec    CARDIAC MARKERS ( 01 Feb 2018 11:56 ) Trop <0.01 ng/mL / x     / x     / x          A/P:  52M with ASD repair 3 years ago complicated by cardiac arrest s/p ICD and recent PVC ablation p/w chest pain    1.  Chest pain. Concerning for unstable angina.  - Agree with treating as ACS, cont heparin gtt  - ASA and Plavix daily  - Pharmacologic nuclear stress test tomorrow (ordered; Coronary CT machine malfunctioning)   - Will request records from [facility]  - Obtain TTE    2.  History of VT.   - Cont home BB and flecainide    3.  s/p ASD repair    Lane Suero MD  Cardiology Fellow [id number]

## 2018-02-01 NOTE — H&P ADULT - NSHPPHYSICALEXAM_GEN_ALL_CORE
GENERAL: NAD, well-developed  HEAD:  Atraumatic, Normocephalic  EYES: EOMI, PERRLA, conjunctiva and sclera clear  NECK: Supple, No JVD  CHEST/LUNG: Clear to auscultation bilaterally; No wheeze  HEART: Regular rate and rhythm; No murmurs, rubs, or gallops  ABDOMEN: Soft, Nontender, Nondistended; Bowel sounds present  EXTREMITIES:  2+ Peripheral Pulses, No clubbing, cyanosis, or edema  PSYCH: AAOx3  NEUROLOGY: non-focal  SKIN: wound on right shin, erythematous surrounding with yellow pus in center, nontender

## 2018-02-01 NOTE — ED PROVIDER NOTE - OBJECTIVE STATEMENT
53yo M with h/o AICD/CABG 2015 with stenting, with chest pressure/tightness radiating down left arm, worse with exertion. mild SOB. no nausea/vomiting/diaphoresis. last week AICD fired started on metop/flecainiade by Dr. Saavedra. no syncope. no fever/chills. no cough    PCP/Cards- Pedro Saavedra 51yo M with h/o AICD/CABG 2015 with stenting, with chest pressure/tightness radiating down left arm, worse with exertion.  +diaphoresis +nausea. mild SOB on exertion. no vomiting. last week AICD fired started on metop/flecainiade by Dr. Saavedra. no syncope. no fever/chills. no cough    PCP/Cards- Pedro Saavedra

## 2018-02-01 NOTE — MEDICAL STUDENT ADULT H&P (EDUCATION) - NS MD HP STUD ASPLAN ASSES FT
Mr. Winkler is a 53 y/o M with PMHx of ASD s/p repair at [facility] in 2015 complicated with by cardiac arrest requiring ICD placement, questionable CABG in 2015, c/o chest pain for 1x week. EKG changes showed questionable ST depressions with onset of chest pain during ED admission is concerning for NSTEMI. However, Troponins T is negative X1, pending CKMB, making unstable angina more likely. Mr. Winkler is a 51 y/o M with PMHx of ASD s/p repair at [facility] in 2015 complicated with by cardiac arrest requiring ICD placement, c/o chest pain for 1x week. EKG changes showed questionable ST changes with onset of chest pain during ED admission is concerning for NSTEMI. However, Troponins T is negative X1, pending CKMB, making unstable angina more likely.

## 2018-02-01 NOTE — PATIENT PROFILE ADULT. - ATTEMPT TO OOB
Subjective   Patient is a 44 y.o. female presenting with nausea.   Nausea   The primary symptoms include nausea and vomiting. Primary symptoms do not include fever, fatigue, abdominal pain, dysuria, arthralgias or rash. The illness began today. The onset was sudden.   The illness is also significant for anorexia. The illness does not include chills or constipation. Associated medical issues do not include inflammatory bowel disease.       Review of Systems   Constitutional: Negative for activity change, appetite change, chills, diaphoresis, fatigue, fever and unexpected weight change.   HENT: Negative for congestion, dental problem, ear pain, hearing loss, mouth sores, nosebleeds, postnasal drip, rhinorrhea, sinus pressure, sneezing, sore throat, trouble swallowing and voice change.    Eyes: Negative for photophobia, pain, redness and visual disturbance.   Respiratory: Negative.    Cardiovascular: Negative for chest pain, palpitations and leg swelling.   Gastrointestinal: Positive for anorexia, nausea and vomiting. Negative for abdominal distention, abdominal pain, blood in stool and constipation.   Endocrine: Negative for cold intolerance and heat intolerance.   Genitourinary: Negative for decreased urine volume, difficulty urinating, dysuria, flank pain, hematuria, pelvic pain and urgency.   Musculoskeletal: Negative for arthralgias, neck pain and neck stiffness.   Skin: Negative for color change, pallor, rash and wound.   Allergic/Immunologic: Negative for environmental allergies.   Neurological: Negative for dizziness, seizures, syncope, facial asymmetry, speech difficulty, weakness, light-headedness, numbness and headaches.   Hematological: Negative for adenopathy. Does not bruise/bleed easily.   Psychiatric/Behavioral: Negative for confusion and sleep disturbance. The patient is not nervous/anxious.    All other systems reviewed and are negative.      Past Medical History:   Diagnosis Date   • Asthma    •  Migraine        No Known Allergies    Past Surgical History:   Procedure Laterality Date   • BREAST BIOPSY     • SPLENECTOMY     • TUBAL ABDOMINAL LIGATION         Family History   Problem Relation Age of Onset   • Arthritis Mother    • Hypertension Mother    • Hypertension Father    • Alcohol abuse Paternal Grandfather    • Diabetes Paternal Grandfather    • Hypertension Paternal Grandfather        Social History     Social History   • Marital status: Single     Spouse name: N/A   • Number of children: N/A   • Years of education: N/A     Social History Main Topics   • Smoking status: Never Smoker   • Smokeless tobacco: None   • Alcohol use Yes   • Drug use: No   • Sexual activity: Not Asked     Other Topics Concern   • None     Social History Narrative           Objective   Physical Exam   Constitutional: She is oriented to person, place, and time. She appears well-developed and well-nourished.   HENT:   Head: Normocephalic.   Nose: Nose normal.   Mouth/Throat: Oropharynx is clear and moist.   Eyes: Conjunctivae and EOM are normal. Pupils are equal, round, and reactive to light.   Neck: Normal range of motion. Neck supple. No JVD present. No thyromegaly present.   Cardiovascular: Normal rate, regular rhythm, normal heart sounds and intact distal pulses.    Pulmonary/Chest: Effort normal and breath sounds normal.   Abdominal: Soft. Bowel sounds are normal. She exhibits no distension and no mass. There is tenderness. There is no rebound and no guarding.       Musculoskeletal: Normal range of motion.   Lymphadenopathy:     She has no cervical adenopathy.   Neurological: She is alert and oriented to person, place, and time.   Skin: Skin is warm and dry. No rash noted. No erythema.   Psychiatric: She has a normal mood and affect. Her behavior is normal. Judgment and thought content normal.   Nursing note and vitals reviewed.      Procedures         ED Course  ED Course   Comment By Time   Symptoms resolved Tuan Moran  MD [place] 06/25 2008                  [facility]  Number of Diagnoses or Management Options  Gastroenteritis: new and requires workup  Non-intractable vomiting with nausea, unspecified vomiting type: new and requires workup     Amount and/or Complexity of Data Reviewed  Clinical lab tests: ordered and reviewed  Tests in the radiology section of CPT®: ordered and reviewed  Decide to obtain previous medical records or to obtain history from someone other than the patient: yes  Review and summarize past medical records: yes  Independent visualization of images, tracings, or specimens: yes    Risk of Complications, Morbidity, and/or Mortality  Presenting problems: low  Diagnostic procedures: low  Management options: low    Patient Progress  Patient progress: improved      Final diagnoses:   Non-intractable vomiting with nausea, unspecified vomiting type   Gastroenteritis            Tuan Rose MD  06/25/17 2033     no

## 2018-02-01 NOTE — MEDICAL STUDENT ADULT H&P (EDUCATION) - NS MD HP STUD ASPLAN PLAN FT
1. typical chest pain  - Concerning for unstable angina vs. NSTEMI due to EKG findings and description of chest pain. Patient is currently intermediate risk for ACS due to PMHx of questionable CABG in the past. Most of pt's cardiac work up was done at [facility].  - Obtain second and third set of troponins  - If troponins are negative, would recommend exercise stress test to further evaluate risk for ACS  - Recommend TTE 1. typical chest pain  - Concerning for unstable angina vs. NSTEMI due to EKG findings and description of chest pain. Most of pt's cardiac work up was done at [facility].  - Obtain second and third set of troponins  - If troponins are negative, would recommend exercise stress test to further evaluate risk for ACS  - Recommend TTE

## 2018-02-01 NOTE — H&P ADULT - NSHPSOCIALHISTORY_GEN_ALL_CORE
Former smoker with 30 pack year history, quit 5 months ago. Does not drink alcohol, occasional use prior to recent cardiac events. Lives with roommate. Works as .

## 2018-02-01 NOTE — PROCEDURE NOTE - ADDITIONAL PROCEDURE DETAILS
call to interrogate device patient with chest pain and history of shock   normal sensing stable lead impedence  stored data reveals no new episodes since last interrogation on Jan 29,2018 in ep office

## 2018-02-01 NOTE — CONSULT NOTE ADULT - NSHPATTENDINGPLANDISCUSS_GEN_ALL_CORE
cardiology fellow, Dr. HUMAIRA Suero; patient seen and examined.  Hx., exam and labs as above.  I agree with the assessment and recommendations.  Yassine Escalante M.D.  Cardiology Consult Service  [phone] or [phone]

## 2018-02-01 NOTE — H&P ADULT - PROBLEM SELECTOR PLAN 1
- presented with chest pressure, left arm heaviness, left sided headache with recurrent "spark" feeling x1 month  - Vtach and ICD shock last week per outpatient records, started on metoprolol and flecainide x1 week  - first set of CE negative, will trend x3  - EKGs show new TWI in I, aVL, V5, V6  - CXR with cardiomegaly and clear lungs  - cardiology consulted, started on heparin drip for possible NSTEMI  - s/p aspirin 324, nitro prn - presented with chest pressure, left arm heaviness, left sided headache with recurrent "spark" feeling x1 month  - Vtach and ICD shock last week per outpatient records, started on metoprolol and flecainide x1 week  - first set of CE negative, will trend x3  - EKGs show TWI in I, aVL, and also new TWI in V5, V6  - CXR with cardiomegaly and clear lungs  - cardiology consulted, started on heparin drip for possible NSTEMI  - s/p aspirin 324, nitro prn  - Stress test tomorrow - presented with chest pressure, left arm heaviness, left sided headache with recurrent "spark" feeling x1 month  - Vtach and ICD shock last week per outpatient records, started on metoprolol and flecainide x1 week  - first set of CE negative, will trend x3  - EKGs show TWI in I, aVL, and also new TWI in V5, V6  - CXR with cardiomegaly and clear lungs  - cardiology consulted, started on heparin drip, asa, plavix, c/w BB and flecainide, pharm stress test tomorrow  - s/p aspirin 324, nitro prn

## 2018-02-02 ENCOUNTER — TRANSCRIPTION ENCOUNTER (OUTPATIENT)
Age: 53
End: 2018-02-02

## 2018-02-02 LAB
ANION GAP SERPL CALC-SCNC: 14 MMOL/L — SIGNIFICANT CHANGE UP (ref 5–17)
APTT BLD: 45.7 SEC — HIGH (ref 27.5–37.4)
APTT BLD: 53.9 SEC — HIGH (ref 27.5–37.4)
APTT BLD: 56.6 SEC — HIGH (ref 27.5–37.4)
BUN SERPL-MCNC: 19 MG/DL — SIGNIFICANT CHANGE UP (ref 7–23)
CALCIUM SERPL-MCNC: 9.2 MG/DL — SIGNIFICANT CHANGE UP (ref 8.4–10.5)
CHLORIDE SERPL-SCNC: 103 MMOL/L — SIGNIFICANT CHANGE UP (ref 96–108)
CO2 SERPL-SCNC: 22 MMOL/L — SIGNIFICANT CHANGE UP (ref 22–31)
CREAT SERPL-MCNC: 1.07 MG/DL — SIGNIFICANT CHANGE UP (ref 0.5–1.3)
GLUCOSE SERPL-MCNC: 107 MG/DL — HIGH (ref 70–99)
HCT VFR BLD CALC: 44 % — SIGNIFICANT CHANGE UP (ref 39–50)
HGB BLD-MCNC: 16 G/DL — SIGNIFICANT CHANGE UP (ref 13–17)
MCHC RBC-ENTMCNC: 32.4 PG — SIGNIFICANT CHANGE UP (ref 27–34)
MCHC RBC-ENTMCNC: 36.4 GM/DL — HIGH (ref 32–36)
MCV RBC AUTO: 88.9 FL — SIGNIFICANT CHANGE UP (ref 80–100)
PLATELET # BLD AUTO: 190 K/UL — SIGNIFICANT CHANGE UP (ref 150–400)
POTASSIUM SERPL-MCNC: 4.2 MMOL/L — SIGNIFICANT CHANGE UP (ref 3.5–5.3)
POTASSIUM SERPL-SCNC: 4.2 MMOL/L — SIGNIFICANT CHANGE UP (ref 3.5–5.3)
RBC # BLD: 4.95 M/UL — SIGNIFICANT CHANGE UP (ref 4.2–5.8)
RBC # FLD: 11.4 % — SIGNIFICANT CHANGE UP (ref 10.3–14.5)
SODIUM SERPL-SCNC: 139 MMOL/L — SIGNIFICANT CHANGE UP (ref 135–145)
WBC # BLD: 6.3 K/UL — SIGNIFICANT CHANGE UP (ref 3.8–10.5)
WBC # FLD AUTO: 6.3 K/UL — SIGNIFICANT CHANGE UP (ref 3.8–10.5)

## 2018-02-02 PROCEDURE — 99233 SBSQ HOSP IP/OBS HIGH 50: CPT | Mod: GC

## 2018-02-02 PROCEDURE — 93016 CV STRESS TEST SUPVJ ONLY: CPT

## 2018-02-02 PROCEDURE — 93306 TTE W/DOPPLER COMPLETE: CPT | Mod: 26

## 2018-02-02 PROCEDURE — 93018 CV STRESS TEST I&R ONLY: CPT

## 2018-02-02 PROCEDURE — 78452 HT MUSCLE IMAGE SPECT MULT: CPT | Mod: 26

## 2018-02-02 RX ADMIN — Medication 50 MILLIGRAM(S): at 23:01

## 2018-02-02 RX ADMIN — Medication 25 MILLIGRAM(S): at 05:07

## 2018-02-02 RX ADMIN — HEPARIN SODIUM 1100 UNIT(S)/HR: 5000 INJECTION INTRAVENOUS; SUBCUTANEOUS at 12:21

## 2018-02-02 RX ADMIN — Medication 50 MILLIGRAM(S): at 12:20

## 2018-02-02 RX ADMIN — HEPARIN SODIUM 1100 UNIT(S)/HR: 5000 INJECTION INTRAVENOUS; SUBCUTANEOUS at 18:50

## 2018-02-02 RX ADMIN — HEPARIN SODIUM 1100 UNIT(S)/HR: 5000 INJECTION INTRAVENOUS; SUBCUTANEOUS at 05:28

## 2018-02-02 NOTE — PROGRESS NOTE ADULT - PROBLEM SELECTOR PLAN 1
- presented with chest pressure, left arm heaviness, left sided headache with recurrent "spark" feeling x1 month  - Vtach and ICD shock last week per outpatient records, started on metoprolol and flecainide x1 week  - first set of CE negative, will trend x3  - EKGs show TWI in I, aVL, and also new TWI in V5, V6  - CXR with cardiomegaly and clear lungs  - cardiology consulted, started on heparin drip, asa, plavix, c/w BB and flecainide, pharm stress test tomorrow  - s/p aspirin 324, nitro prn - presented with chest pressure, left arm heaviness, left sided headache with recurrent "spark" feeling x1 month  - Vtach and ICD shock last week per outpatient records, started on metoprolol and flecainide x1 week  - CE neg x2  - EKGs show TWI in I, aVL, and also new TWI in V5, V6  - CXR with cardiomegaly and clear lungs  - cardiology consulted, started on heparin drip, asa, plavix, c/w BB and flecainide, pharm stress test today  - EP consulted - ICD interrogated with no Vtach episodes  - s/p aspirin 324, nitro prn

## 2018-02-02 NOTE — DISCHARGE NOTE ADULT - CARE PLAN
Principal Discharge DX:	Chest pain  Secondary Diagnosis:	Recurrent ventricular tachycardia Principal Discharge DX:	Chest pain  Goal:	follow up with cardiologist within 1 week of discharge  Assessment and plan of treatment:	You were admitted to the hospital due to chest pressure with left arm heaviness as well concerning for injury to your heart. Because of this you had an echocardiogram and stress test done. The stress test showed some minor abnormalities and that cardiologists performed a catheterization to assess the vessels in your heart.  Secondary Diagnosis:	Recurrent ventricular tachycardia  Assessment and plan of treatment:	Please continue to take your metoprolol and flecainide as prescribed and follow up with your cardiologist within 1 week. Principal Discharge DX:	Chest pain  Goal:	follow up with cardiologist within 1 week of discharge  Assessment and plan of treatment:	You were admitted to the hospital due to chest pressure with left arm heaviness as well concerning for injury to your heart. Because of this you had an echocardiogram and stress test done. The stress test showed some minor abnormalities and that cardiologists performed a catheterization to assess the vessels in your heart.  The results of your heart cath showed ___________.  Secondary Diagnosis:	Recurrent ventricular tachycardia  Assessment and plan of treatment:	Please continue to take your metoprolol and flecainide as prescribed and follow up with your cardiologist within 1 week.. Principal Discharge DX:	Chest pain  Goal:	follow up with cardiologist within 1 week of discharge  Assessment and plan of treatment:	You were admitted to the hospital due to chest pressure with left arm heaviness as well concerning for injury to your heart. Because of this you had an echocardiogram and stress test done. The stress test showed some minor abnormalities and that cardiologists performed a catheterization to assess the vessels in your heart.  The results of your heart cath showed clean coronaries.  Please follow up with your cardiologist within one week and take your medication as prescribed.  Secondary Diagnosis:	Recurrent ventricular tachycardia  Assessment and plan of treatment:	Please continue to take your metoprolol and flecainide as prescribed and follow up with your cardiologist within 1 week.

## 2018-02-02 NOTE — PROGRESS NOTE ADULT - PROBLEM SELECTOR PLAN 2
- Vtach and ICD shock multiple times in past year, more recently last week per outpatient records, started on metoprolol and flecainide x1 week  - EP cardiology evaluated. ICD interrogation  - Ischemic work up in progress - Vtach and ICD shock multiple times in past year, more recently last week per outpatient records, started on metoprolol and flecainide x1 week  - EP cardiology evaluated. ICD interrogation with no Vtach episodes  - c/w bb and flecainide  - Ischemic work up in progress

## 2018-02-02 NOTE — PROGRESS NOTE ADULT - SUBJECTIVE AND OBJECTIVE BOX
Patient is a 52y old  Male who presents with a chief complaint of chest pressure (01 Feb 2018 15:37)      SUBJECTIVE / OVERNIGHT EVENTS:    MEDICATIONS  (STANDING):  flecainide 50 milliGRAM(s) Oral every 12 hours  heparin  Infusion.  Unit(s)/Hr (8 mL/Hr) IV Continuous <Continuous>  metoprolol succinate ER 25 milliGRAM(s) Oral daily    MEDICATIONS  (PRN):  heparin  Injectable 4100 Unit(s) IV Push every 6 hours PRN For aPTT less than 40  nitroglycerin     SubLingual 0.4 milliGRAM(s) SubLingual every 5 minutes PRN Chest Pain        CAPILLARY BLOOD GLUCOSE        I&O's Summary    01 Feb 2018 07:01  -  02 Feb 2018 07:00  --------------------------------------------------------  IN: 283 mL / OUT: 452 mL / NET: -169 mL        PHYSICAL EXAM:  Vital Signs Last 24 Hrs  T(C): 36.7 (02 Feb 2018 04:30), Max: 36.9 (01 Feb 2018 15:11)  T(F): 98 (02 Feb 2018 04:30), Max: 98.5 (01 Feb 2018 15:11)  HR: 64 (02 Feb 2018 05:05) (53 - 79)  BP: 129/76 (02 Feb 2018 04:30) (129/76 - 163/89)  BP(mean): --  RR: 18 (02 Feb 2018 04:30) (18 - 18)  SpO2: 97% (02 Feb 2018 04:30) (97% - 99%)    GENERAL: NAD, well-developed  	HEAD:  Atraumatic, Normocephalic  	EYES: EOMI, PERRLA, conjunctiva and sclera clear  	NECK: Supple, No JVD  	CHEST/LUNG: Clear to auscultation bilaterally; No wheeze  	HEART: Regular rate and rhythm; No murmurs, rubs, or gallops  	ABDOMEN: Soft, Nontender, Nondistended; Bowel sounds present  	EXTREMITIES:  2+ Peripheral Pulses, No clubbing, cyanosis, or edema  	PSYCH: AAOx3  	NEUROLOGY: non-focal  SKIN: wound on right shin, erythematous surrounding with yellow pus in center, nontender    LABS:  (02-02 @ 04:49)                        16.0  6.3 )-----------( 190                 44.0    Neutrophils = -- (--%)  Lymphocytes = -- (--%)  Eosinophils = -- (--%)  Basophils = -- (--%)  Monocytes = -- (--%)  Bands = --%    WBC Trend: 6.3<--, 6.7<--, 5.7<--  Hb Trend: 16.0<--, 16.1<--, 16.1<--  Plt Trend: 190<--, 211<--, 181<--  02-02    139  |  103  |  19  ----------------------------<  107<H>  4.2   |  22  |  1.07    Ca    9.2      02 Feb 2018 04:49    TPro  7.1  /  Alb  4.8  /  TBili  0.5  /  DBili  x   /  AST  22  /  ALT  20  /  AlkPhos  57  02-01    Creatinine Trend: 1.07<--, 0.89<--  ( 01 Feb 2018 11:56 )   PT: 10.0 sec;   INR: 0.93 ratio;       PTT:45.7 sec  CARDIAC MARKERS ( 01 Feb 2018 20:20 )  Trop <0.01 ng/mL /  U/L / CKMB x       CARDIAC MARKERS ( 01 Feb 2018 11:56 )  Trop <0.01 ng/mL /  U/L / CKMB x               Microbiology:  Urine Cx:  Blood Cx:    RADIOLOGY & ADDITIONAL TESTS:  X- Ray:  CT:  Ultrasound:  [ ] imaging personally reviewed and interpreted by me    Consultant(s) Notes Reviewed:      Care Discussed with Consultants/Other Providers: Patient is a 52y old  Male who presents with a chief complaint of chest pressure (01 Feb 2018 15:37)      SUBJECTIVE / OVERNIGHT EVENTS: No acute events overnight. Patient states he had 2 more episodes of "spark" feeling in his chest. denies pain, palpitations, HA, SOB,  N/V. HA and left arm heaviness have resolved.     MEDICATIONS  (STANDING):  flecainide 50 milliGRAM(s) Oral every 12 hours  heparin  Infusion.  Unit(s)/Hr (8 mL/Hr) IV Continuous <Continuous>  metoprolol succinate ER 25 milliGRAM(s) Oral daily    MEDICATIONS  (PRN):  heparin  Injectable 4100 Unit(s) IV Push every 6 hours PRN For aPTT less than 40  nitroglycerin     SubLingual 0.4 milliGRAM(s) SubLingual every 5 minutes PRN Chest Pain      I&O's Summary    01 Feb 2018 07:01  -  02 Feb 2018 07:00  --------------------------------------------------------  IN: 283 mL / OUT: 452 mL / NET: -169 mL        PHYSICAL EXAM:  Vital Signs Last 24 Hrs  T(C): 36.7 (02 Feb 2018 04:30), Max: 36.9 (01 Feb 2018 15:11)  T(F): 98 (02 Feb 2018 04:30), Max: 98.5 (01 Feb 2018 15:11)  HR: 64 (02 Feb 2018 05:05) (53 - 79)  BP: 129/76 (02 Feb 2018 04:30) (129/76 - 163/89)  BP(mean): --  RR: 18 (02 Feb 2018 04:30) (18 - 18)  SpO2: 97% (02 Feb 2018 04:30) (97% - 99%)    GENERAL: NAD, well-developed  	HEAD:  Atraumatic, Normocephalic  	EYES: EOMI, PERRLA, conjunctiva and sclera clear  	NECK: Supple, No JVD  	CHEST/LUNG: Clear to auscultation bilaterally; No wheeze  	HEART: Regular rate and rhythm; No murmurs, rubs, or gallops  	ABDOMEN: Soft, Nontender, Nondistended; Bowel sounds present  	EXTREMITIES:  2+ Peripheral Pulses, No clubbing, cyanosis, or edema  	PSYCH: AAOx3  	NEUROLOGY: non-focal  SKIN: wound on right shin, erythematous surrounding with yellow pus in center, nontender    LABS:  (02-02 @ 04:49)                        16.0  6.3 )-----------( 190                 44.0    Neutrophils = -- (--%)  Lymphocytes = -- (--%)  Eosinophils = -- (--%)  Basophils = -- (--%)  Monocytes = -- (--%)  Bands = --%    WBC Trend: 6.3<--, 6.7<--, 5.7<--  Hb Trend: 16.0<--, 16.1<--, 16.1<--  Plt Trend: 190<--, 211<--, 181<--  02-02    139  |  103  |  19  ----------------------------<  107<H>  4.2   |  22  |  1.07    Ca    9.2      02 Feb 2018 04:49    TPro  7.1  /  Alb  4.8  /  TBili  0.5  /  DBili  x   /  AST  22  /  ALT  20  /  AlkPhos  57  02-01    Creatinine Trend: 1.07<--, 0.89<--  ( 01 Feb 2018 11:56 )   PT: 10.0 sec;   INR: 0.93 ratio;       PTT:45.7 sec  CARDIAC MARKERS ( 01 Feb 2018 20:20 )  Trop <0.01 ng/mL /  U/L / CKMB x       CARDIAC MARKERS ( 01 Feb 2018 11:56 )  Trop <0.01 ng/mL /  U/L / CKMB x             RADIOLOGY & ADDITIONAL TESTS:  X- Ray:  CT:  Ultrasound:  [ ] imaging personally reviewed and interpreted by me    Consultant(s) Notes Reviewed:  cardiology    Care Discussed with Consultants/Other Providers:

## 2018-02-02 NOTE — PROGRESS NOTE ADULT - ASSESSMENT
52M with history of ASD repaired at [facility] in 2015 c/b cardiac arrest and ICD placement presenting with chest pressure, left arm heaviness and left sided headache 2/2 recurrent PVCs vs NSTEMI

## 2018-02-02 NOTE — DISCHARGE NOTE ADULT - CARE PROVIDER_API CALL
Pedro Saavedra), Cardiac Electrophysiology; Cardiovascular Disease  [address] [place], [place] [id number]  Phone: [phone]  Fax: [phone]

## 2018-02-02 NOTE — PROGRESS NOTE ADULT - NSHPATTENDINGPLANDISCUSS_GEN_ALL_CORE
cardiology fellow, Dr. HUMAIRA Suero; patient seen and examined.  Hx., exam and labs as above.  I agree with the assessment and recommendations.  Yassine Escalante M.D.  Cardiology Consult Service  [phone]
house staff

## 2018-02-02 NOTE — PROGRESS NOTE ADULT - SUBJECTIVE AND OBJECTIVE BOX
Patient seen and examined at bedside.    Overnight Events: Had some epsidoes of "hinkle" which could equate to palpitations. Telemetry does show a few episodes of relative tachycardia. No CP overnight.    Review Of Systems: No chest pain or shortness of breath.    Medications:  flecainide 50 milliGRAM(s) Oral every 12 hours  heparin  Infusion.  Unit(s)/Hr IV Continuous <Continuous>  heparin  Injectable 4100 Unit(s) IV Push every 6 hours PRN  metoprolol succinate ER 25 milliGRAM(s) Oral daily  nitroglycerin     SubLingual 0.4 milliGRAM(s) SubLingual every 5 minutes PRN      PAST MEDICAL & SURGICAL HISTORY:  ASD (atrial septal defect)  AICD (automatic cardioverter/defibrillator) present  Spontaneous ASD closure      Vitals:  T(F): 98 (02-02), Max: 98.5 (02-01)  HR: 64 (02-02) (53 - 68)  BP: 129/76 (02-02) (129/76 - 145/83)  RR: 18 (02-02)  SpO2: 97% (02-02)  I&O's Summary    01 Feb 2018 07:01  -  02 Feb 2018 07:00  --------------------------------------------------------  IN: 283 mL / OUT: 452 mL / NET: -169 mL    02 Feb 2018 07:01  -  02 Feb 2018 12:36  --------------------------------------------------------  IN: 360 mL / OUT: 0 mL / NET: 360 mL        Physical Exam:  GENERAL: No acute distress, well-developed  HEAD:  Atraumatic, Normocephalic  ENT: EOMI, No JVD, moist mucosa  CHEST/LUNG: Clear to auscultation bilaterally; No wheeze, equal breath sounds bilaterally   HEART: Regular rate and rhythm; No murmurs, rubs, or gallops  ABDOMEN: Soft, Nontender, Nondistended; Bowel sounds present  EXTREMITIES:  No clubbing, cyanosis, or edema  PSYCH: Nl behavior, nl affect    Telemetry: SR 60-70s, some episodes of 80-90s                          16.0   6.3   )-----------( 190      ( 02 Feb 2018 04:49 )             44.0     02-02    139  |  103  |  19  ----------------------------<  107<H>  4.2   |  22  |  1.07    Ca    9.2      02 Feb 2018 04:49    TPro  7.1  /  Alb  4.8  /  TBili  0.5  /  DBili  x   /  AST  22  /  ALT  20  /  AlkPhos  57  02-01    PT/INR - ( 01 Feb 2018 11:56 )   PT: 10.0 sec;   INR: 0.93 ratio         PTT - ( 02 Feb 2018 11:51 )  PTT:53.9 sec  CARDIAC MARKERS ( 01 Feb 2018 20:20 )  x     / <0.01 ng/mL / 106 U/L / x     / 2.7 ng/mL  CARDIAC MARKERS ( 01 Feb 2018 11:56 )  x     / <0.01 ng/mL / 124 U/L / x     / 3.2 ng/mL      A/P:  52M with ASD repair 3 years ago complicated by cardiac arrest s/p ICD and recent PVC ablation p/w chest pain    1.  Chest pain. Concerning for unstable angina.  - S/p treatment with ACS, cont heparin gtt  - ASA and Plavix daily  - Pharmacologic nuclear stress test today   - Will request records from [facility]  - Obtain TTE    2.  History of VT.   - Cont home BB and flecainide    3.  s/p ASD repair    Lane Suero MD  Cardiology Fellow [id number] Patient seen and examined at bedside.    Overnight Events: Had some epsiodes of "hinkle" (?palpitations). Telemetry shows a few episodes of relative tachycardia. No CP overnight.    Review Of Systems: No chest pain or shortness of breath.    Medications:  flecainide 50 milliGRAM(s) Oral every 12 hours  heparin  Infusion.  Unit(s)/Hr IV Continuous <Continuous>  heparin  Injectable 4100 Unit(s) IV Push every 6 hours PRN  metoprolol succinate ER 25 milliGRAM(s) Oral daily  nitroglycerin     SubLingual 0.4 milliGRAM(s) SubLingual every 5 minutes PRN      PAST MEDICAL & SURGICAL HISTORY:  ASD (atrial septal defect)  AICD (automatic cardioverter/defibrillator) present      Vitals:  T(F): 98 (02-02), Max: 98.5 (02-01)  HR: 64 (02-02) (53 - 68)  BP: 129/76 (02-02) (129/76 - 145/83)  RR: 18 (02-02)  SpO2: 97% (02-02)    Physical Exam:  GENERAL: No acute distress, well-developed  HEAD:  Atraumatic, Normocephalic  ENT: EOMI, No JVD, moist mucosa  CHEST/LUNG: Clear to auscultation bilaterally; No wheeze, equal breath sounds bilaterally   HEART: Regular rate and rhythm; No murmurs, rubs, or gallops  ABDOMEN: Soft, Nontender, Nondistended; Bowel sounds present  EXTREMITIES:  No clubbing, cyanosis, or edema  PSYCH: Nl behavior, nl affect    Telemetry: SR 60-70s, some episodes of 80-90s    I&O's Summary    01 Feb 2018 07:01  -  02 Feb 2018 07:00  --------------------------------------------------------  IN: 283 mL / OUT: 452 mL / NET: -169 mL    02 Feb 2018 07:01  -  02 Feb 2018 12:36  --------------------------------------------------------  IN: 360 mL / OUT: 0 mL / NET: 360 mL      LABS:                        16.0   6.3   )-----------( 190      ( 02 Feb 2018 04:49 )             44.0     02-02    139  |  103  |  19  ----------------------------<  107<H>  4.2   |  22  |  1.07    Ca    9.2      02 Feb 2018 04:49    TPro  7.1  /  Alb  4.8  /  TBili  0.5  /  DBili  x   /  AST  22  /  ALT  20  /  AlkPhos  57  02-01    PT/INR - ( 01 Feb 2018 11:56 )   PT: 10.0 sec;   INR: 0.93 ratio    PTT - ( 02 Feb 2018 11:51 )  PTT:53.9 sec    CARDIAC MARKERS ( 01 Feb 2018 20:20 )  <0.01 ng/mL / 106 U/L / x     / 2.7 ng/mL  CARDIAC MARKERS ( 01 Feb 2018 11:56 )  <0.01 ng/mL / 124 U/L / x     / 3.2 ng/mL      A/P:  52M with ASD repair 3 years ago complicated by cardiac arrest, s/p ICD.    Recent PVC ablation.  P/W chest pain    1.  Chest pain. Concerning for unstable angina.  - S/p treatment with ACS, cont heparin gtt  - ASA and Plavix daily  - Pharmacologic nuclear stress test today   - Obtain TTE    2.  History of VT.   - Cont home BB and flecainide    3.  s/p ASD repair  - Will request records from [facility]    Lane Suero MD  Cardiology Fellow [id number]

## 2018-02-02 NOTE — DISCHARGE NOTE ADULT - OTHER SIGNIFICANT FINDINGS
TTE:  Conclusions:  1. Moderate concentric left ventricular hypertrophy.  2. Normal left ventricular systolic function. Septal motion  consistent with right ventricular overload.  3. Mild right atrial enlargement.  Dilated coronary sinus.  4. Normal right ventricular size and function. A device  wire is noted in the right heart.  5. Estimated pulmonary artery systolic pressure equals 31  mm Hg, assuming right atrial pressure equals 8  mm Hg,  consistent with normal pulmonary pressures.  *** Compared with echocardiogram of 10/24/2017, no  significant changes noted.

## 2018-02-02 NOTE — PROGRESS NOTE ADULT - ATTENDING COMMENTS
Feels ok but c/o chest heaviness and palpitations during work. ICD interrogation showed normal function   - Reviewed labs, Imaging- Normal nuclear stress test  - spoke to Card. for plan, they will evaluate him  - Spoke to EP, if Card has no further plan will d/c Flecainide and increase BB dose  - c/w current meds

## 2018-02-02 NOTE — DISCHARGE NOTE ADULT - CARE PROVIDERS DIRECT ADDRESSES
[email or web address]

## 2018-02-02 NOTE — DISCHARGE NOTE ADULT - ADDITIONAL INSTRUCTIONS
Please take your medications as prescribed.  Please follow up with your cardiologist within one week. Please take your medications as prescribed.  Please follow up with your cardiologist within one week.  [facility] Dr[facility]р [facility] - [phone] Please take your medications as prescribed.  Please follow up with your cardiologist within one week.  Please see Dr. Александр Denise in the [facility] [phone] [place] [id number] on March 8, 2018 @ [facility] - [phone]

## 2018-02-02 NOTE — DISCHARGE NOTE ADULT - MEDICATION SUMMARY - MEDICATIONS TO TAKE
I will START or STAY ON the medications listed below when I get home from the hospital:    flecainide 50 mg oral tablet  -- 1 tab(s) by mouth every 12 hours  -- Indication: For Recurrent ventricular tachycardia    metoprolol succinate 25 mg oral tablet, extended release  -- 1 tab(s) by mouth once a day  -- Indication: For Recurrent ventricular tachycardia

## 2018-02-02 NOTE — DISCHARGE NOTE ADULT - HOSPITAL COURSE
52M with history of ASD repaired at [facility] in 2015 c/b cardiac arrest and ICD placement presenting with chest pressure, left arm heaviness and left sided headache. He was recently admitted in December for feelings of palpitations and dizziness and had a PVC ablation. He reports since then he has not had any palpitations however he does feel an "abnormal" feeling daily in his heart that he describes as a "spark". He states it is worse with exertion. His ICD was interrogated outpatient and showed VT, he was started on metoprolol succ and flecainide on 1/29. He came to the hospital when the chest pressure continued and went to his left arm and head. He denies changes in vision, dizziness, SOB, cough, N/V, edema.    In the ED, vitals were 163/89, HR 79, 97.9, 18-97%RA. EKG showed new TWI in I, aVL, V5, V6. CE neg x2. He was given asa and nitro with improvement in symptoms. He was started on heparin gtt for NSTEMI concern. TTE was done and was unchanged from previous TTE in october with RV overload, normal LV systolic function. Nuclear pharmacologic stress test was done and was normal. Patient was continued on metoprolol and flecainide. ICD was interrogated again and showed no more episodes of VT. 52M with history of ASD repaired at [facility] in 2015 c/b cardiac arrest and ICD placement presenting with chest pressure, left arm heaviness and left sided headache. He was recently admitted in December for feelings of palpitations and dizziness and had a PVC ablation. He reports since then he has not had any palpitations however he does feel an "abnormal" feeling daily in his heart that he describes as a "spark". He states it is worse with exertion. His ICD was interrogated outpatient and showed VT, he was started on metoprolol succ and flecainide on 1/29. He came to the hospital when the chest pressure continued and went to his left arm and head. He denies changes in vision, dizziness, SOB, cough, N/V, edema.    In the ED, vitals were 163/89, HR 79, 97.9, 18-97%RA. EKG showed new TWI in I, aVL, V5, V6. CE neg x2. He was given asa and nitro with improvement in symptoms. He was started on heparin gtt for NSTEMI concern. Patient was continued on metoprolol and flecainide. ICD was interrogated again and showed no more episodes of VT. TTE was done and was unchanged from previous TTE in october with RV overload, normal LV systolic function. Nuclear pharmacologic stress test was done and showed significant RV uptake and partially fixed defect in distal anterior wall. Because of this a catheterization was performed to assess for CAD. 52M with history of ASD repaired at [facility] in 2015 c/b cardiac arrest and ICD placement presenting with chest pressure, left arm heaviness and left sided headache. He was recently admitted in December for feelings of palpitations and dizziness and had a PVC ablation. He reports since then he has not had any palpitations however he does feel an "abnormal" feeling daily in his heart that he describes as a "spark". He states it is worse with exertion. His ICD was interrogated outpatient and showed VT, he was started on metoprolol succ and flecainide on 1/29. He came to the hospital when the chest pressure continued and went to his left arm and head. He denies changes in vision, dizziness, SOB, cough, N/V, edema.    In the ED, vitals were 163/89, HR 79, 97.9, 18-97%RA. EKG showed new TWI in I, aVL, V5, V6. CE neg x2. He was given asa and nitro with improvement in symptoms. He was started on heparin gtt for NSTEMI concern. Patient was continued on metoprolol and flecainide. ICD was interrogated again and showed no more episodes of VT. TTE was done and was unchanged from previous TTE in october with RV overload, normal LV systolic function. Nuclear pharmacologic stress test was done and showed significant RV uptake and partially fixed defect in distal anterior wall. Because of this a catheterization was performed to assess for CAD.   The [facility] showed ____________________.  PT stable for discharge home with outpatient cardiology and PCP follow up. 52M with history of ASD repaired at [facility] in 2015 c/b cardiac arrest and ICD placement presenting with chest pressure, left arm heaviness and left sided headache. He was recently admitted in December for feelings of palpitations and dizziness and had a PVC ablation. He reports since then he has not had any palpitations however he does feel an "abnormal" feeling daily in his heart that he describes as a "spark". He states it is worse with exertion. His ICD was interrogated outpatient and showed VT, he was started on metoprolol succ and flecainide on 1/29. He came to the hospital when the chest pressure continued and went to his left arm and head. He denies changes in vision, dizziness, SOB, cough, N/V, edema.    In the ED, vitals were 163/89, HR 79, 97.9, 18-97%RA. EKG showed new TWI in I, aVL, V5, V6. CE neg x2. He was given asa and nitro with improvement in symptoms. He was started on heparin gtt for NSTEMI concern. Patient was continued on metoprolol and flecainide. ICD was interrogated again and showed no more episodes of VT. TTE was done and was unchanged from previous TTE in october with RV overload, normal LV systolic function. Nuclear pharmacologic stress test was done and showed significant RV uptake and partially fixed defect in distal anterior wall. Because of this a catheterization was performed to assess for CAD.   The [facility] showed clean coronaries.  PT stable for discharge home with outpatient cardiology and PCP follow up.

## 2018-02-02 NOTE — DISCHARGE NOTE ADULT - PLAN OF CARE
follow up with cardiologist within 1 week of discharge You were admitted to the hospital due to chest pressure with left arm heaviness as well concerning for injury to your heart. Because of this you had an echocardiogram and stress test done. The stress test showed some minor abnormalities and that cardiologists performed a catheterization to assess the vessels in your heart. Please continue to take your metoprolol and flecainide as prescribed and follow up with your cardiologist within 1 week. You were admitted to the hospital due to chest pressure with left arm heaviness as well concerning for injury to your heart. Because of this you had an echocardiogram and stress test done. The stress test showed some minor abnormalities and that cardiologists performed a catheterization to assess the vessels in your heart.  The results of your heart cath showed ___________. Please continue to take your metoprolol and flecainide as prescribed and follow up with your cardiologist within 1 week.. You were admitted to the hospital due to chest pressure with left arm heaviness as well concerning for injury to your heart. Because of this you had an echocardiogram and stress test done. The stress test showed some minor abnormalities and that cardiologists performed a catheterization to assess the vessels in your heart.  The results of your heart cath showed clean coronaries.  Please follow up with your cardiologist within one week and take your medication as prescribed.

## 2018-02-03 LAB
ANION GAP SERPL CALC-SCNC: 14 MMOL/L — SIGNIFICANT CHANGE UP (ref 5–17)
APTT BLD: 72 SEC — HIGH (ref 27.5–37.4)
BUN SERPL-MCNC: 15 MG/DL — SIGNIFICANT CHANGE UP (ref 7–23)
CALCIUM SERPL-MCNC: 9.4 MG/DL — SIGNIFICANT CHANGE UP (ref 8.4–10.5)
CHLORIDE SERPL-SCNC: 104 MMOL/L — SIGNIFICANT CHANGE UP (ref 96–108)
CO2 SERPL-SCNC: 23 MMOL/L — SIGNIFICANT CHANGE UP (ref 22–31)
CREAT SERPL-MCNC: 0.84 MG/DL — SIGNIFICANT CHANGE UP (ref 0.5–1.3)
GLUCOSE SERPL-MCNC: 115 MG/DL — HIGH (ref 70–99)
HCT VFR BLD CALC: 46.5 % — SIGNIFICANT CHANGE UP (ref 39–50)
HGB BLD-MCNC: 16.6 G/DL — SIGNIFICANT CHANGE UP (ref 13–17)
MAGNESIUM SERPL-MCNC: 2.2 MG/DL — SIGNIFICANT CHANGE UP (ref 1.6–2.6)
MCHC RBC-ENTMCNC: 31.8 PG — SIGNIFICANT CHANGE UP (ref 27–34)
MCHC RBC-ENTMCNC: 35.8 GM/DL — SIGNIFICANT CHANGE UP (ref 32–36)
MCV RBC AUTO: 89 FL — SIGNIFICANT CHANGE UP (ref 80–100)
PHOSPHATE SERPL-MCNC: 3.3 MG/DL — SIGNIFICANT CHANGE UP (ref 2.5–4.5)
PLATELET # BLD AUTO: 207 K/UL — SIGNIFICANT CHANGE UP (ref 150–400)
POTASSIUM SERPL-MCNC: 4.3 MMOL/L — SIGNIFICANT CHANGE UP (ref 3.5–5.3)
POTASSIUM SERPL-SCNC: 4.3 MMOL/L — SIGNIFICANT CHANGE UP (ref 3.5–5.3)
RBC # BLD: 5.23 M/UL — SIGNIFICANT CHANGE UP (ref 4.2–5.8)
RBC # FLD: 11.4 % — SIGNIFICANT CHANGE UP (ref 10.3–14.5)
SODIUM SERPL-SCNC: 141 MMOL/L — SIGNIFICANT CHANGE UP (ref 135–145)
WBC # BLD: 6.4 K/UL — SIGNIFICANT CHANGE UP (ref 3.8–10.5)
WBC # FLD AUTO: 6.4 K/UL — SIGNIFICANT CHANGE UP (ref 3.8–10.5)

## 2018-02-03 PROCEDURE — 99232 SBSQ HOSP IP/OBS MODERATE 35: CPT

## 2018-02-03 PROCEDURE — 99233 SBSQ HOSP IP/OBS HIGH 50: CPT | Mod: GC

## 2018-02-03 RX ORDER — HEPARIN SODIUM 5000 [USP'U]/ML
5000 INJECTION INTRAVENOUS; SUBCUTANEOUS EVERY 8 HOURS
Qty: 0 | Refills: 0 | Status: DISCONTINUED | OUTPATIENT
Start: 2018-02-03 | End: 2018-02-07

## 2018-02-03 RX ADMIN — Medication 50 MILLIGRAM(S): at 22:16

## 2018-02-03 RX ADMIN — HEPARIN SODIUM 1100 UNIT(S)/HR: 5000 INJECTION INTRAVENOUS; SUBCUTANEOUS at 06:02

## 2018-02-03 RX ADMIN — Medication 25 MILLIGRAM(S): at 06:02

## 2018-02-03 RX ADMIN — HEPARIN SODIUM 5000 UNIT(S): 5000 INJECTION INTRAVENOUS; SUBCUTANEOUS at 13:12

## 2018-02-03 RX ADMIN — HEPARIN SODIUM 5000 UNIT(S): 5000 INJECTION INTRAVENOUS; SUBCUTANEOUS at 22:16

## 2018-02-03 RX ADMIN — Medication 50 MILLIGRAM(S): at 09:47

## 2018-02-03 NOTE — PROGRESS NOTE ADULT - ASSESSMENT
52M with history of ASD repaired at [facility] in 2015 c/b cardiac arrest and ICD placement presenting with chest pressure, left arm heaviness and left sided headache 2/2 recurrent PVCs vs NSTEMI. EKG with new TWI V5,V6.  CXR cardiomegaly, clear lungs.     Chest pain, recent ICD shocks.   - on metoprolol , flecanide  - CE negative, EKG with new TWI V5,V6  -  NST wnl.   - would favor stopping heparin,  Dc home with short term cardiology and EP follow up. 52M with history of ASD repaired at [facility] in 2015 c/b cardiac arrest and ICD placement presenting with chest pressure, left arm heaviness and left sided headache 2/2 recurrent PVCs vs NSTEMI. EKG with new TWI V5,V6.  CXR cardiomegaly, clear lungs.     Chest pain, recent ICD shocks.   - on metoprolol , flecanide  - CE negative, EKG with new TWI V5,V6  -  NST wnl.   - would favor stopping heparin.

## 2018-02-03 NOTE — PROGRESS NOTE ADULT - ATTENDING COMMENTS
Patient seen and examined. Agree with assessment and plan as outlined above. Stress test reviewed. I have concerns with post stress EF dropping to 44%. There is also significant RV uptake. While patient with history of ASD there is no significant RV enlargement on echo. Additionally there appears to be partially fixed defect in the distal anterior wall. Given on going symptoms of LANE and flecainide use I would keep patient for cardiac cath to definitively exclude CAD. D/C Heparin gtt.

## 2018-02-03 NOTE — PROGRESS NOTE ADULT - ASSESSMENT
52M with history of ASD repaired at [facility] in 2015 c/b cardiac arrest and ICD placement presenting with chest pressure, left arm heaviness and left sided headache 2/2 recurrent PVCs vs NSTEMI

## 2018-02-03 NOTE — PROGRESS NOTE ADULT - SUBJECTIVE AND OBJECTIVE BOX
24H hour events:  No acute events in last 24 hours. Patient seen in bed, states feels well, improved since admission.     MEDICATIONS:  flecainide 50 milliGRAM(s) Oral every 12 hours  heparin  Infusion.  Unit(s)/Hr IV Continuous <Continuous>  heparin  Injectable 4100 Unit(s) IV Push every 6 hours PRN  metoprolol succinate ER 25 milliGRAM(s) Oral daily  nitroglycerin     SubLingual 0.4 milliGRAM(s) SubLingual every 5 minutes PRN                    PHYSICAL EXAM:  T(C): 36.3 (02-03-18 @ 09:51), Max: 36.6 (02-02-18 @ 19:15)  HR: 63 (02-03-18 @ 09:51) (63 - 77)  BP: 132/93 (02-03-18 @ 09:51) (126/78 - 146/83)  RR: 18 (02-03-18 @ 09:51) (17 - 18)  SpO2: 95% (02-03-18 @ 09:51) (95% - 100%)  Wt(kg): --  I&O's Summary    02 Feb 2018 07:01  -  03 Feb 2018 07:00  --------------------------------------------------------  IN: 1035 mL / OUT: 0 mL / NET: 1035 mL    03 Feb 2018 07:01  -  03 Feb 2018 10:12  --------------------------------------------------------  IN: 480 mL / OUT: 0 mL / NET: 480 mL        Appearance: Normal	  HEENT:   Normal oral mucosa  Lymphatic: No lymphadenopathy  Cardiovascular: Normal S1 S2,         No JVD,      No murmurs,      No edema  Respiratory: Lungs clear to auscultation	  Psychiatry: Mood & affect appropriate  Gastrointestinal:  Soft, Non-tender	  Skin: No rashes, No ecchymoses, No cyanosis	  Neurologic: Non-focal  Extremities: Normal range of motion, No clubbing, cyanosis   Vascular: warm extremities        LABS:	 	    CBC Full  -  ( 03 Feb 2018 05:08 )  WBC Count : 6.4 K/uL  Hemoglobin : 16.6 g/dL  Hematocrit : 46.5 %  Platelet Count - Automated : 207 K/uL  Mean Cell Volume : 89.0 fl  Mean Cell Hemoglobin : 31.8 pg  Mean Cell Hemoglobin Concentration : 35.8 gm/dL  Auto Neutrophil # : x  Auto Lymphocyte # : x  Auto Monocyte # : x  Auto Eosinophil # : x  Auto Basophil # : x  Auto Neutrophil % : x  Auto Lymphocyte % : x  Auto Monocyte % : x  Auto Eosinophil % : x  Auto Basophil % : x    02-03    141  |  104  |  15  ----------------------------<  115<H>  4.3   |  23  |  0.84  02-02    139  |  103  |  19  ----------------------------<  107<H>  4.2   |  22  |  1.07    Ca    9.4      03 Feb 2018 05:08  Ca    9.2      02 Feb 2018 04:49  Phos  3.3     02-03  Mg     2.2     02-03    TPro  7.1  /  Alb  4.8  /  TBili  0.5  /  DBili  x   /  AST  22  /  ALT  20  /  AlkPhos  57  02-01      proBNP:     TELEMETRY: 24H hour events:  No acute events in last 24 hours. Patient seen in bed, states feels well, improved since admission.     MEDICATIONS:  flecainide 50 milliGRAM(s) Oral every 12 hours  heparin  Infusion.  Unit(s)/Hr IV Continuous <Continuous>  heparin  Injectable 4100 Unit(s) IV Push every 6 hours PRN  metoprolol succinate ER 25 milliGRAM(s) Oral daily  nitroglycerin     SubLingual 0.4 milliGRAM(s) SubLingual every 5 minutes PRN        PHYSICAL EXAM:  T(C): 36.3 (02-03-18 @ 09:51), Max: 36.6 (02-02-18 @ 19:15)  HR: 63 (02-03-18 @ 09:51) (63 - 77)  BP: 132/93 (02-03-18 @ 09:51) (126/78 - 146/83)  RR: 18 (02-03-18 @ 09:51) (17 - 18)  SpO2: 95% (02-03-18 @ 09:51) (95% - 100%)  Wt(kg): --  I&O's Summary    02 Feb 2018 07:01  -  03 Feb 2018 07:00  --------------------------------------------------------  IN: 1035 mL / OUT: 0 mL / NET: 1035 mL    03 Feb 2018 07:01  -  03 Feb 2018 10:12  --------------------------------------------------------  IN: 480 mL / OUT: 0 mL / NET: 480 mL        Appearance: Normal	  HEENT:   Normal oral mucosa  Lymphatic: No lymphadenopathy  Cardiovascular: Normal S1 S2,         No JVD,      No murmurs,      No edema  Respiratory: Lungs clear to auscultation	  Psychiatry: Mood & affect appropriate  Gastrointestinal:  Soft, Non-tender	  Skin: No rashes, No ecchymoses, No cyanosis	  Neurologic: Non-focal  Extremities: Normal range of motion, No clubbing, cyanosis   Vascular: warm extremities        LABS:	 	    CBC Full  -  ( 03 Feb 2018 05:08 )  WBC Count : 6.4 K/uL  Hemoglobin : 16.6 g/dL  Hematocrit : 46.5 %  Platelet Count - Automated : 207 K/uL  Mean Cell Volume : 89.0 fl  Mean Cell Hemoglobin : 31.8 pg  Mean Cell Hemoglobin Concentration : 35.8 gm/dL  Auto Neutrophil # : x  Auto Lymphocyte # : x  Auto Monocyte # : x  Auto Eosinophil # : x  Auto Basophil # : x  Auto Neutrophil % : x  Auto Lymphocyte % : x  Auto Monocyte % : x  Auto Eosinophil % : x  Auto Basophil % : x    02-03    141  |  104  |  15  ----------------------------<  115<H>  4.3   |  23  |  0.84  02-02    139  |  103  |  19  ----------------------------<  107<H>  4.2   |  22  |  1.07    Ca    9.4      03 Feb 2018 05:08  Ca    9.2      02 Feb 2018 04:49  Phos  3.3     02-03  Mg     2.2     02-03    TPro  7.1  /  Alb  4.8  /  TBili  0.5  /  DBili  x   /  AST  22  /  ALT  20  /  AlkPhos  57  02-01      proBNP:     TELEMETRY:

## 2018-02-03 NOTE — PROGRESS NOTE ADULT - PROBLEM SELECTOR PLAN 2
- Vtach and ICD shock multiple times in past year, more recently last week per outpatient records, started on metoprolol and flecainide x1 week  - EP cardiology evaluated. ICD interrogation with no Vtach episodes  - c/w bb and flecainide  - Ischemic work up negative - Vtach and ICD shock multiple times in past year, more recently last week per outpatient records, started on metoprolol and flecainide x1 week  - EP cardiology evaluated. ICD interrogation with no Vtach episodes  - c/w bb and flecainide  - cardiology following - cath monday

## 2018-02-03 NOTE — PROGRESS NOTE ADULT - PROBLEM SELECTOR PLAN 1
- presented with chest pressure, left arm heaviness, left sided headache with recurrent "spark" feeling x1 month  - Vtach and ICD shock last week per outpatient records, started on metoprolol and flecainide x1 week  - CE neg x2  - EKGs show TWI in I, aVL, and also new TWI in V5, V6  - CXR with cardiomegaly and clear lungs  - cardiology consulted, started on heparin drip, asa, plavix, c/w BB and flecainide, pharm stress test yesterday wnl, TTE unchanged from October  - EP consulted - ICD interrogated with no Vtach episodes - presented with chest pressure, left arm heaviness, left sided headache with recurrent "spark" feeling x1 month  - Vtach and ICD shock last week per outpatient records, started on metoprolol and flecainide x1 week  - CE neg x2  - EKGs show TWI in I, aVL, and also new TWI in V5, V6  - CXR with cardiomegaly and clear lungs  - cardiology consulted, started on heparin drip, asa, plavix, c/w BB and flecainide, pharm stress test yesterday wnl, TTE unchanged from October, will discuss any plan for further inpt testing  - EP consulted - ICD interrogated with no Vtach episodes - presented with chest pressure, left arm heaviness, left sided headache with recurrent "spark" feeling x1 month  - Vtach and ICD shock last week per outpatient records, started on metoprolol and flecainide x1 week  - CE neg x2  - EKGs show TWI in I, aVL, and also new TWI in V5, V6  - CXR with cardiomegaly and clear lungs  - cardiology consulted, started on heparin drip, asa, plavix, c/w BB and flecainide, pharm stress test yesterday reviewed by cardiology - no active ischemia however given symptomatology cath on monday, TTE unchanged from October  - EP consulted - ICD interrogated with no Vtach episodes - presented with chest pressure, left arm heaviness, left sided headache with recurrent "spark" feeling x1 month  - Vtach and ICD shock last week per outpatient records, started on metoprolol and flecainide x1 week  - CE neg x2  - EKGs show TWI in I, aVL, and also new TWI in V5, V6  - CXR with cardiomegaly and clear lungs  - cardiology consulted, started on heparin drip, asa, plavix, c/w BB and flecainide, pharm stress test yesterday reviewed by cardiology - no active ischemia however given symptomatology cath on monday, TTE unchanged from October. Heparin drip now held.   - EP consulted - ICD interrogated with no Vtach episodes

## 2018-02-03 NOTE — PROGRESS NOTE ADULT - PROBLEM SELECTOR PLAN 3
- DVT: heparin gtt      Diana Kwong MD  PGY1 | Internal Medicine  [phone] / [id number] - DVT: heparin sq      Diana Kwong MD  PGY1 | Internal Medicine  [phone] / [id number] - DVT PPx: heparin sq      Diana Kwong MD  PGY1 | Internal Medicine  [phone] / [phone]

## 2018-02-04 LAB
CK MB CFR SERPL CALC: 2.1 NG/ML — SIGNIFICANT CHANGE UP (ref 0–6.7)
CK SERPL-CCNC: 89 U/L — SIGNIFICANT CHANGE UP (ref 30–200)
TROPONIN T SERPL-MCNC: <0.01 NG/ML — SIGNIFICANT CHANGE UP (ref 0–0.06)

## 2018-02-04 PROCEDURE — 99232 SBSQ HOSP IP/OBS MODERATE 35: CPT

## 2018-02-04 PROCEDURE — 93010 ELECTROCARDIOGRAM REPORT: CPT

## 2018-02-04 PROCEDURE — 99233 SBSQ HOSP IP/OBS HIGH 50: CPT | Mod: GC

## 2018-02-04 RX ADMIN — Medication 25 MILLIGRAM(S): at 05:59

## 2018-02-04 RX ADMIN — HEPARIN SODIUM 5000 UNIT(S): 5000 INJECTION INTRAVENOUS; SUBCUTANEOUS at 13:09

## 2018-02-04 RX ADMIN — Medication 50 MILLIGRAM(S): at 06:00

## 2018-02-04 RX ADMIN — HEPARIN SODIUM 5000 UNIT(S): 5000 INJECTION INTRAVENOUS; SUBCUTANEOUS at 06:00

## 2018-02-04 RX ADMIN — HEPARIN SODIUM 5000 UNIT(S): 5000 INJECTION INTRAVENOUS; SUBCUTANEOUS at 22:04

## 2018-02-04 RX ADMIN — Medication 50 MILLIGRAM(S): at 17:12

## 2018-02-04 NOTE — PROGRESS NOTE ADULT - ASSESSMENT
52M with history of ASD repaired at [facility] in 2015 c/b cardiac arrest and ICD placement presenting with chest pressure, left arm heaviness and left sided headache 2/2 recurrent PVCs vs NSTEMI. EKG with new TWI V5,V6.  CXR cardiomegaly, clear lungs.     Chest pain, recent ICD shocks.   - on metoprolol , flecanide  - CE negative, however with initial c/o cp, EKG with new TWI V5,V6, and several concerning features on NST, plan for cardiac cath for further eval  -plan for cath quinten  -will cont to follow with you      Valentino Jovel MD [id number]

## 2018-02-04 NOTE — PROGRESS NOTE ADULT - PROBLEM SELECTOR PLAN 2
Vtach and ICD shock multiple times in past year, more recently last week per outpatient records, started on metoprolol and flecainide x1 week.  - EP cardiology evaluated. ICD interrogation with no Vtach episodes  - c/w bb and flecainide  - cardiology following - cath monday

## 2018-02-04 NOTE — PROGRESS NOTE ADULT - ATTENDING COMMENTS
-Patient seen/examined and discussed on 2/4/18. -Patient seen/examined and discussed on 2/4/18.  -Cath tomorrow.

## 2018-02-04 NOTE — PROGRESS NOTE ADULT - SUBJECTIVE AND OBJECTIVE BOX
24H hour events:   no acute events overnight  pt feeling well, no complaints this am    MEDICATIONS:  flecainide 50 milliGRAM(s) Oral every 12 hours  heparin  Injectable 5000 Unit(s) SubCutaneous every 8 hours  metoprolol succinate ER 25 milliGRAM(s) Oral daily  nitroglycerin     SubLingual 0.4 milliGRAM(s) SubLingual every 5 minutes PRN                      PHYSICAL EXAM:  T(C): 36.6 (02-04-18 @ 04:35), Max: 36.6 (02-03-18 @ 11:09)  HR: 66 (02-04-18 @ 04:35) (60 - 68)  BP: 119/77 (02-04-18 @ 04:35) (119/77 - 137/87)  RR: 17 (02-04-18 @ 04:35) (17 - 18)  SpO2: 99% (02-04-18 @ 04:35) (95% - 99%)  Wt(kg): --  I&O's Summary    03 Feb 2018 07:01  -  04 Feb 2018 07:00  --------------------------------------------------------  IN: 1100 mL / OUT: 0 mL / NET: 1100 mL        Appearance: Normal	  HEENT:   Normal oral mucosa, PERRL, EOMI	  Lymphatic: No lymphadenopathy  Cardiovascular: Normal S1 S2, No JVD, No murmurs, No edema  Respiratory: Lungs clear to auscultation	  Psychiatry: A & O x 3, Mood & affect appropriate  Gastrointestinal:  Soft, Non-tender, + BS	  Skin: No rashes, No ecchymoses, No cyanosis	  Neurologic: Non-focal  Extremities: Normal range of motion, No clubbing, cyanosis or edema  Vascular: Peripheral pulses palpable 2+ bilaterally        LABS:	 	    CBC Full  -  ( 03 Feb 2018 05:08 )  WBC Count : 6.4 K/uL  Hemoglobin : 16.6 g/dL  Hematocrit : 46.5 %  Platelet Count - Automated : 207 K/uL  Mean Cell Volume : 89.0 fl  Mean Cell Hemoglobin : 31.8 pg  Mean Cell Hemoglobin Concentration : 35.8 gm/dL  Auto Neutrophil # : x  Auto Lymphocyte # : x  Auto Monocyte # : x  Auto Eosinophil # : x  Auto Basophil # : x  Auto Neutrophil % : x  Auto Lymphocyte % : x  Auto Monocyte % : x  Auto Eosinophil % : x  Auto Basophil % : x    02-03    141  |  104  |  15  ----------------------------<  115<H>  4.3   |  23  |  0.84    Ca    9.4      03 Feb 2018 05:08  Phos  3.3     02-03  Mg     2.2     02-03        proBNP:   Lipid Profile:   HgA1c:   TSH:       CARDIAC MARKERS:            TELEMETRY: 	  sr 50-60. 1 degree avb  ECG:  	  RADIOLOGY:  OTHER: 	    PREVIOUS DIAGNOSTIC TESTING:    [ ] Echocardiogram:  [ ]  Catheterization:  [ ] Stress Test:  	  	  ASSESSMENT/PLAN: 24H hour events:   no acute events overnight  pt feeling well, no complaints this am    MEDICATIONS:  flecainide 50 milliGRAM(s) Oral every 12 hours  heparin  Injectable 5000 Unit(s) SubCutaneous every 8 hours  metoprolol succinate ER 25 milliGRAM(s) Oral daily  nitroglycerin     SubLingual 0.4 milliGRAM(s) SubLingual every 5 minutes PRN          PHYSICAL EXAM:  T(C): 36.6 (02-04-18 @ 04:35), Max: 36.6 (02-03-18 @ 11:09)  HR: 66 (02-04-18 @ 04:35) (60 - 68)  BP: 119/77 (02-04-18 @ 04:35) (119/77 - 137/87)  RR: 17 (02-04-18 @ 04:35) (17 - 18)  SpO2: 99% (02-04-18 @ 04:35) (95% - 99%)  Wt(kg): --  I&O's Summary    03 Feb 2018 07:01  -  04 Feb 2018 07:00  --------------------------------------------------------  IN: 1100 mL / OUT: 0 mL / NET: 1100 mL        Appearance: Normal	  HEENT:   Normal oral mucosa, PERRL, EOMI	  Lymphatic: No lymphadenopathy  Cardiovascular: Normal S1 S2, No JVD, No murmurs, No edema  Respiratory: Lungs clear to auscultation	  Psychiatry: A & O x 3, Mood & affect appropriate  Gastrointestinal:  Soft, Non-tender, + BS	  Skin: No rashes, No ecchymoses, No cyanosis	  Neurologic: Non-focal  Extremities: Normal range of motion, No clubbing, cyanosis or edema  Vascular: Peripheral pulses palpable 2+ bilaterally        LABS:	 	    CBC Full  -  ( 03 Feb 2018 05:08 )  WBC Count : 6.4 K/uL  Hemoglobin : 16.6 g/dL  Hematocrit : 46.5 %  Platelet Count - Automated : 207 K/uL  Mean Cell Volume : 89.0 fl  Mean Cell Hemoglobin : 31.8 pg  Mean Cell Hemoglobin Concentration : 35.8 gm/dL  Auto Neutrophil # : x  Auto Lymphocyte # : x  Auto Monocyte # : x  Auto Eosinophil # : x  Auto Basophil # : x  Auto Neutrophil % : x  Auto Lymphocyte % : x  Auto Monocyte % : x  Auto Eosinophil % : x  Auto Basophil % : x    02-03    141  |  104  |  15  ----------------------------<  115<H>  4.3   |  23  |  0.84    Ca    9.4      03 Feb 2018 05:08  Phos  3.3     02-03  Mg     2.2     02-03        proBNP:   Lipid Profile:   HgA1c:   TSH:       CARDIAC MARKERS:            TELEMETRY: 	  sr 50-60. 1 degree avb  ECG:  	  RADIOLOGY:  OTHER: 	    PREVIOUS DIAGNOSTIC TESTING:    [ ] Echocardiogram:  [ ]  Catheterization:  [ ] Stress Test:  	  	  ASSESSMENT/PLAN:

## 2018-02-04 NOTE — PROGRESS NOTE ADULT - PROBLEM SELECTOR PLAN 1
Presented with chest pressure, left arm heaviness, left sided headache with recurrent "spark" feeling x1 month. Still experiencing less frequent spark sensations, no chest pain.  - Vtach and ICD shock last week per outpatient records, started on metoprolol and flecainide x1 week  - CE neg x2  - EKGs show TWI in I, aVL, and also new TWI in V5, V6  - CXR with cardiomegaly and clear lungs  - cardiology consulted, started on heparin drip, asa, plavix, c/w BB and flecainide, pharm stress test reviewed by cardiology - no active ischemia however given symptomatology cath on monday.  -TTE unchanged from October. Heparin drip now held.   - EP consulted - ICD interrogated with no Vtach episodes  - pending cath tomorrow. Presented with chest pressure, left arm heaviness, left sided headache with recurrent "spark" feeling x1 month. Still experiencing less frequent spark sensations, no chest pain.  - Vtach and ICD shock last week per outpatient records, started on metoprolol and flecainide x1 week  - CE neg x2  - EKGs show TWI in I, aVL, and also new TWI in V5, V6  - CXR with cardiomegaly and clear lungs  - cardiology consulted, was started on heparin drip, asa, plavix, c/w BB and flecainide, pharm stress test reviewed by cardiology - no active ischemia however given symptomatology cath on monday.  -TTE unchanged from October. Heparin drip now held.   - EP consulted - ICD interrogated with no Vtach episodes  -C/w telemetry.   - pending cath tomorrow.

## 2018-02-04 NOTE — PROGRESS NOTE ADULT - SUBJECTIVE AND OBJECTIVE BOX
Patient is a 52y old  Male who presents with a chief complaint of chest pressure (02 Feb 2018 16:52)      SUBJECTIVE / OVERNIGHT EVENTS:    MEDICATIONS  (STANDING):  flecainide 50 milliGRAM(s) Oral every 12 hours  heparin  Injectable 5000 Unit(s) SubCutaneous every 8 hours  metoprolol succinate ER 25 milliGRAM(s) Oral daily    MEDICATIONS  (PRN):  nitroglycerin     SubLingual 0.4 milliGRAM(s) SubLingual every 5 minutes PRN Chest Pain        CAPILLARY BLOOD GLUCOSE        I&O's Summary    03 Feb 2018 07:01  -  04 Feb 2018 07:00  --------------------------------------------------------  IN: 1080 mL / OUT: 0 mL / NET: 1080 mL        PHYSICAL EXAM:    LABS:                        16.6   6.4   )-----------( 207      ( 03 Feb 2018 05:08 )             46.5     02-03    141  |  104  |  15  ----------------------------<  115<H>  4.3   |  23  |  0.84    Ca    9.4      03 Feb 2018 05:08  Phos  3.3     02-03  Mg     2.2     02-03      PTT - ( 03 Feb 2018 05:08 )  PTT:72.0 sec          RADIOLOGY & ADDITIONAL TESTS:    Imaging Personally Reviewed:    Consultant(s) Notes Reviewed:      Care Discussed with Consultants/Other Providers: Peter Villareral  Team 4 Medicine, PGY-2  [phone] / [phone]  If after 7pm on weekdays or after 12pm on weekends/holiday, please page [phone]    Patient is a 52y old  Male who presents with a chief complaint of chest pressure (02 Feb 2018 16:52)    SUBJECTIVE / OVERNIGHT EVENTS: No complaints this AM. No significant events on tele overnight. Denies chest pain this AM, no pain radiating to jaw or shoulders, no epigastric pain or discomfort, denying n/v/d/c, LE pain or swelling, orthopnea, joint pain, cough, congestion, fevers, chills. He still feels occasional "spark" sensations but frequency decreased.     MEDICATIONS  (STANDING):  flecainide 50 milliGRAM(s) Oral every 12 hours  heparin  Injectable 5000 Unit(s) SubCutaneous every 8 hours  metoprolol succinate ER 25 milliGRAM(s) Oral daily    MEDICATIONS  (PRN):  nitroglycerin     SubLingual 0.4 milliGRAM(s) SubLingual every 5 minutes PRN Chest Pain    CAPILLARY BLOOD GLUCOSE: n/a    I&O's Summary    03 Feb 2018 07:01  -  04 Feb 2018 07:00  --------------------------------------------------------  IN: 1080 mL / OUT: 0 mL / NET: 1080 mL    PHYSICAL EXAM:    GENERAL: NAD, well-developed, lying flat in bed playing on phone in NAD, not diaphoretic  	HEAD:  Atraumatic, Normocephalic  	EYES: EOMI, PERRLA, conjunctiva and sclera clear  	NECK: Supple, No JVD  	CHEST/LUNG: Clear to auscultation bilaterally; No wheeze  	HEART: RRR; No murmurs, rubs, or gallops, sternotomy scars well-healed.  	ABDOMEN: Soft, Nontender, Nondistended; Bowel sounds present  	EXTREMITIES:  2+ Peripheral Pulses, No clubbing, cyanosis, or edema  	PSYCH: AAOx3  	NEUROLOGY: non-focal  SKIN: wound on right shin, erythematous surrounding with yellow pus in center, nontender, unchanged    LABS:                        16.6   6.4   )-----------( 207      ( 03 Feb 2018 05:08 )             46.5     02-03    141  |  104  |  15  ----------------------------<  115<H>  4.3   |  23  |  0.84    Ca    9.4      03 Feb 2018 05:08  Phos  3.3     02-03  Mg     2.2     02-03      PTT - ( 03 Feb 2018 05:08 )  PTT:72.0 sec      RADIOLOGY & ADDITIONAL TESTS:    Imaging Personally Reviewed:   < from: Xray Chest 1 View AP/PA (02.01.18 @ 13:42) >  Impression:    The heart is enlarged. The lungs are clear. Status post sternotomy. A   pacer is in good position.    < end of copied text >    Consultant(s) Notes Reviewed:  cardiology    Care Discussed with Consultants/Other Providers: pending

## 2018-02-04 NOTE — PROGRESS NOTE ADULT - ASSESSMENT
52M with history of ASD repaired at [facility] in 2015 c/b cardiac arrest and ICD placement presenting with chest pressure, left arm heaviness and left sided headache 2/2 recurrent PVCs vs NSTEMI, pending cath monday.

## 2018-02-05 DIAGNOSIS — Q21.1 ATRIAL SEPTAL DEFECT: ICD-10-CM

## 2018-02-05 DIAGNOSIS — R07.9 CHEST PAIN, UNSPECIFIED: ICD-10-CM

## 2018-02-05 LAB
ALBUMIN SERPL ELPH-MCNC: 4.5 G/DL — SIGNIFICANT CHANGE UP (ref 3.3–5)
ALP SERPL-CCNC: 53 U/L — SIGNIFICANT CHANGE UP (ref 40–120)
ALT FLD-CCNC: 24 U/L RC — SIGNIFICANT CHANGE UP (ref 10–45)
ANION GAP SERPL CALC-SCNC: 14 MMOL/L — SIGNIFICANT CHANGE UP (ref 5–17)
APTT BLD: 31.9 SEC — SIGNIFICANT CHANGE UP (ref 27.5–37.4)
AST SERPL-CCNC: 30 U/L — SIGNIFICANT CHANGE UP (ref 10–40)
BILIRUB SERPL-MCNC: 0.6 MG/DL — SIGNIFICANT CHANGE UP (ref 0.2–1.2)
BUN SERPL-MCNC: 15 MG/DL — SIGNIFICANT CHANGE UP (ref 7–23)
CALCIUM SERPL-MCNC: 9.3 MG/DL — SIGNIFICANT CHANGE UP (ref 8.4–10.5)
CHLORIDE SERPL-SCNC: 102 MMOL/L — SIGNIFICANT CHANGE UP (ref 96–108)
CK MB CFR SERPL CALC: 1.8 NG/ML — SIGNIFICANT CHANGE UP (ref 0–6.7)
CK SERPL-CCNC: 86 U/L — SIGNIFICANT CHANGE UP (ref 30–200)
CO2 SERPL-SCNC: 23 MMOL/L — SIGNIFICANT CHANGE UP (ref 22–31)
CREAT SERPL-MCNC: 0.85 MG/DL — SIGNIFICANT CHANGE UP (ref 0.5–1.3)
GLUCOSE SERPL-MCNC: 92 MG/DL — SIGNIFICANT CHANGE UP (ref 70–99)
HCT VFR BLD CALC: 45.2 % — SIGNIFICANT CHANGE UP (ref 39–50)
HGB BLD-MCNC: 16.5 G/DL — SIGNIFICANT CHANGE UP (ref 13–17)
INR BLD: 0.98 RATIO — SIGNIFICANT CHANGE UP (ref 0.88–1.16)
MAGNESIUM SERPL-MCNC: 2.3 MG/DL — SIGNIFICANT CHANGE UP (ref 1.6–2.6)
MCHC RBC-ENTMCNC: 32.1 PG — SIGNIFICANT CHANGE UP (ref 27–34)
MCHC RBC-ENTMCNC: 36.6 GM/DL — HIGH (ref 32–36)
MCV RBC AUTO: 87.9 FL — SIGNIFICANT CHANGE UP (ref 80–100)
PHOSPHATE SERPL-MCNC: 3.2 MG/DL — SIGNIFICANT CHANGE UP (ref 2.5–4.5)
PLATELET # BLD AUTO: 188 K/UL — SIGNIFICANT CHANGE UP (ref 150–400)
POTASSIUM SERPL-MCNC: 3.9 MMOL/L — SIGNIFICANT CHANGE UP (ref 3.5–5.3)
POTASSIUM SERPL-SCNC: 3.9 MMOL/L — SIGNIFICANT CHANGE UP (ref 3.5–5.3)
PROT SERPL-MCNC: 7.1 G/DL — SIGNIFICANT CHANGE UP (ref 6–8.3)
PROTHROM AB SERPL-ACNC: 10.7 SEC — SIGNIFICANT CHANGE UP (ref 9.8–12.7)
RBC # BLD: 5.14 M/UL — SIGNIFICANT CHANGE UP (ref 4.2–5.8)
RBC # FLD: 11.5 % — SIGNIFICANT CHANGE UP (ref 10.3–14.5)
SODIUM SERPL-SCNC: 139 MMOL/L — SIGNIFICANT CHANGE UP (ref 135–145)
TROPONIN T SERPL-MCNC: <0.01 NG/ML — SIGNIFICANT CHANGE UP (ref 0–0.06)
WBC # BLD: 5.9 K/UL — SIGNIFICANT CHANGE UP (ref 3.8–10.5)
WBC # FLD AUTO: 5.9 K/UL — SIGNIFICANT CHANGE UP (ref 3.8–10.5)

## 2018-02-05 PROCEDURE — 99233 SBSQ HOSP IP/OBS HIGH 50: CPT | Mod: GC

## 2018-02-05 RX ADMIN — HEPARIN SODIUM 5000 UNIT(S): 5000 INJECTION INTRAVENOUS; SUBCUTANEOUS at 13:16

## 2018-02-05 RX ADMIN — HEPARIN SODIUM 5000 UNIT(S): 5000 INJECTION INTRAVENOUS; SUBCUTANEOUS at 21:55

## 2018-02-05 RX ADMIN — Medication 50 MILLIGRAM(S): at 17:18

## 2018-02-05 RX ADMIN — Medication 50 MILLIGRAM(S): at 05:07

## 2018-02-05 RX ADMIN — Medication 25 MILLIGRAM(S): at 05:07

## 2018-02-05 RX ADMIN — HEPARIN SODIUM 5000 UNIT(S): 5000 INJECTION INTRAVENOUS; SUBCUTANEOUS at 05:08

## 2018-02-05 NOTE — PROGRESS NOTE ADULT - ATTENDING COMMENTS
Patient interviewed, examined and chart reviewed.  Case discussed with fellow.  Agree w/ Assessment and Plan as outlined.  For [facility] tomorrow    Max Martin MD [facility]  P: [phone]  Spectra:  [phone]  Office: [phone]

## 2018-02-05 NOTE — PROGRESS NOTE ADULT - ATTENDING COMMENTS
patient seen and examined.   labs, tele strip reviewed  hospital course reviewed.  car diol input appreciated   plan for Left heart cath in AM  monitor on tele.

## 2018-02-05 NOTE — PROGRESS NOTE ADULT - PROBLEM SELECTOR PLAN 1
Presented with chest pressure, left arm heaviness, left sided headache with recurrent "spark" feeling x1 month. Still experiencing less frequent spark sensations, and chest heaviness; Vtach and ICD shock last week per outpatient records, started on metoprolol and flecainide x1 week; CE neg x3; EKG w/ TWI in I aVL; new in V5/6; CXR clear  - c/w BB and flecainide  - f/u EP recs - no VTAC on interrogation  - C/w telemetry  - cath today Presented with chest pressure, left arm heaviness, left sided headache with recurrent "spark" feeling x1 month. Still experiencing less frequent spark sensations, and chest heaviness; Vtach and ICD shock last week per outpatient records, started on metoprolol and flecainide x1 week; CE neg x3; EKG w/ TWI in I aVL; new in V5/6; CXR clear  - c/w BB and flecainide  - f/u EP recs - no VTAC on interrogation  - C/w telemetry  - cath tomorrow

## 2018-02-05 NOTE — PROGRESS NOTE ADULT - ASSESSMENT
52M with history of ASD repaired at [facility] in 2015 c/b cardiac arrest and ICD placement presenting with chest pressure, left arm heaviness and left sided headache 2/2 recurrent PVCs vs NSTEMI, pending cath monday. 52M with history of ASD repaired at [facility] in 2015 c/b cardiac arrest and ICD placement presenting with chest pressure, left arm heaviness and left sided headache 2/2 recurrent PVCs vs NSTEMI, plan for cath tomorrow

## 2018-02-05 NOTE — PROGRESS NOTE ADULT - SUBJECTIVE AND OBJECTIVE BOX
Patient seen and examined at bedside.    Overnight Events: Continues having episodes of chest pressure at rest and sometimes with exertion.    Review Of Systems: No chest pain, shortness of breath, or palpitations            Medications:  flecainide 50 milliGRAM(s) Oral every 12 hours  heparin  Injectable 5000 Unit(s) SubCutaneous every 8 hours  metoprolol succinate ER 25 milliGRAM(s) Oral daily  nitroglycerin     SubLingual 0.4 milliGRAM(s) SubLingual every 5 minutes PRN      PAST MEDICAL & SURGICAL HISTORY:  ASD (atrial septal defect)  AICD (automatic cardioverter/defibrillator) present  Spontaneous ASD closure      Vitals:  T(F): 97.7 (02-05), Max: 98.3 (02-04)  HR: 63 (02-05) (54 - 64)  BP: 119/78 (02-05) (113/73 - 133/82)  RR: 17 (02-05)  SpO2: 96% (02-05)  I&O's Summary    04 Feb 2018 07:01  -  05 Feb 2018 07:00  --------------------------------------------------------  IN: 680 mL / OUT: 0 mL / NET: 680 mL        Physical Exam:  Appearance: Normal	  HEENT:   Normal oral mucosa, PERRL, EOMI	  Lymphatic: No lymphadenopathy  Cardiovascular: Normal S1 S2, No JVD, No murmurs, No edema  Respiratory: Lungs clear to auscultation	  Psychiatry: A & O x 3, Mood & affect appropriate  Gastrointestinal:  Soft, Non-tender, + BS	  Skin: No rashes, No ecchymoses, No cyanosis	  Neurologic: Non-focal  Extremities: Normal range of motion, No clubbing, cyanosis or edema  Vascular: Peripheral pulses palpable 2+ bilaterally    Telemetry: SR with 1st degree AV block 50-80                        16.5   5.9   )-----------( 188      ( 05 Feb 2018 06:47 )             45.2     02-05    139  |  102  |  15  ----------------------------<  92  3.9   |  23  |  0.85    Ca    9.3      05 Feb 2018 06:49  Phos  3.2     02-05  Mg     2.3     02-05    TPro  7.1  /  Alb  4.5  /  TBili  0.6  /  DBili  x   /  AST  30  /  ALT  24  /  AlkPhos  53  02-05    PT/INR - ( 05 Feb 2018 06:47 )   PT: 10.7 sec;   INR: 0.98 ratio         PTT - ( 05 Feb 2018 06:47 )  PTT:31.9 sec  CARDIAC MARKERS ( 04 Feb 2018 23:53 )  x     / <0.01 ng/mL / 86 U/L / x     / 1.8 ng/mL  CARDIAC MARKERS ( 04 Feb 2018 16:27 )  x     / <0.01 ng/mL / 89 U/L / x     / 2.1 ng/mL      A/P:  52M with history of ASD repaired at [facility] in 2015 c/b cardiac arrest and ICD placement presenting with chest pressure, left arm heaviness and left sided headache 2/2 recurrent PVCs vs NSTEMI. EKG with new TWI V5,V6.  CXR cardiomegaly, clear lungs.     Chest pain, recent ICD shocks.   - on metoprolol , flecanide  - CE negative, however with initial c/o cp, EKG with new TWI V5,V6, and several concerning features on NST  - will try to get records from [place]; if unsuccessful then plan for [facility]    Lane Suero MD  Cardiology Fellow [phone]

## 2018-02-05 NOTE — PROGRESS NOTE ADULT - PROBLEM SELECTOR PLAN 2
Vtach and ICD shock multiple times in past year, more recently last week per outpatient records, started on metoprolol and flecainide x1 week; EP cardiology evaluated. ICD interrogation with no Vtach episodes  - c/w bb and flecainide  - cardiology following - cath today; f/u results

## 2018-02-05 NOTE — PROGRESS NOTE ADULT - SUBJECTIVE AND OBJECTIVE BOX
Александр Denise MD  Beeper: [phone] ([facility])/ [phone] (J)     Subjective:    Patient is a 52y old  Male who presents with a chief complaint of chest pressure (02 Feb 2018 16:52)      overnight events: none      MEDICATIONS  (STANDING):  flecainide 50 milliGRAM(s) Oral every 12 hours  heparin  Injectable 5000 Unit(s) SubCutaneous every 8 hours  metoprolol succinate ER 25 milliGRAM(s) Oral daily    MEDICATIONS  (PRN):  nitroglycerin     SubLingual 0.4 milliGRAM(s) SubLingual every 5 minutes PRN Chest Pain    Objective:    Vitals: Vital Signs Last 24 Hrs  T(C): 36.6 (02-05-18 @ 04:22), Max: 36.8 (02-04-18 @ 15:47)  T(F): 97.9 (02-05-18 @ 04:22), Max: 98.3 (02-04-18 @ 15:47)  HR: 54 (02-05-18 @ 04:22) (54 - 64)  BP: 119/77 (02-05-18 @ 04:22) (113/73 - 133/82)  RR: 18 (02-05-18 @ 04:22) (16 - 18)  SpO2: 97% (02-05-18 @ 04:22) (97% - 98%)                I&O's Summary    04 Feb 2018 07:01  -  05 Feb 2018 07:00  --------------------------------------------------------  IN: 680 mL / OUT: 0 mL / NET: 680 mL    PHYSICAL EXAM:  GENERAL: NAD,   HEAD:  Atraumatic, Normocephalic  EYES: EOMI, PERRLA, conjunctiva and sclera clear  CHEST/LUNG: Clear to percussion bilaterally; No rales, rhonchi, wheezing, or rubs  HEART: Regular rate and rhythm; No murmurs, rubs, or gallops  ABDOMEN: Soft, Nontender, Nondistended; Bowel sounds present  EXTREMITIES:  2+ Peripheral Pulses, No clubbing, cyanosis, or edema                                             LABS:  02-05    139  |  102  |  15  ----------------------------<  92  3.9   |  23  |  0.85  02-03    141  |  104  |  15  ----------------------------<  115<H>  4.3   |  23  |  0.84    Ca    9.3      05 Feb 2018 06:49  Ca    9.4      03 Feb 2018 05:08  Phos  3.2     02-05  Mg     2.3     02-05    TPro  7.1  /  Alb  4.5  /  TBili  0.6  /  DBili  x   /  AST  30  /  ALT  24  /  AlkPhos  53  02-05      PT/INR - ( 05 Feb 2018 06:47 )   PT: 10.7 sec;   INR: 0.98 ratio         PTT - ( 05 Feb 2018 06:47 )  PTT:31.9 sec                          16.5   5.9   )-----------( 188      ( 05 Feb 2018 06:47 )             45.2                         16.6   6.4   )-----------( 207      ( 03 Feb 2018 05:08 )             46.5     CAPILLARY BLOOD GLUCOSE    CARDIAC MARKERS ( 04 Feb 2018 23:53 )  x     / <0.01 ng/mL / 86 U/L / x     / 1.8 ng/mL  CARDIAC MARKERS ( 04 Feb 2018 16:27 )  x     / <0.01 ng/mL / 89 U/L / x     / 2.1 ng/mL      RECENT CULTURES:      TELEMETRY: 40s-60s 1 degree Александр Denise MD  Beeper: [phone] ([facility])/ [phone] (J)     Subjective:    Patient is a 52y old  Male who presents with a chief complaint of chest pressure (02 Feb 2018 16:52)      overnight events: none      MEDICATIONS  (STANDING):  flecainide 50 milliGRAM(s) Oral every 12 hours  heparin  Injectable 5000 Unit(s) SubCutaneous every 8 hours  metoprolol succinate ER 25 milliGRAM(s) Oral daily    MEDICATIONS  (PRN):  nitroglycerin     SubLingual 0.4 milliGRAM(s) SubLingual every 5 minutes PRN Chest Pain    Objective:    Vitals: Vital Signs Last 24 Hrs  T(C): 36.6 (02-05-18 @ 04:22), Max: 36.8 (02-04-18 @ 15:47)  T(F): 97.9 (02-05-18 @ 04:22), Max: 98.3 (02-04-18 @ 15:47)  HR: 54 (02-05-18 @ 04:22) (54 - 64)  BP: 119/77 (02-05-18 @ 04:22) (113/73 - 133/82)  RR: 18 (02-05-18 @ 04:22) (16 - 18)  SpO2: 97% (02-05-18 @ 04:22) (97% - 98%)                I&O's Summary    04 Feb 2018 07:01  -  05 Feb 2018 07:00  --------------------------------------------------------  IN: 680 mL / OUT: 0 mL / NET: 680 mL    PHYSICAL EXAM:  GENERAL: NAD,   HEAD:  Atraumatic, Normocephalic  EYES: EOMI, PERRLA, conjunctiva and sclera clear  CHEST/LUNG: Clear to percussion bilaterally; No rales, rhonchi, wheezing, or rubs  HEART: Regular rate and rhythm; No murmurs, rubs, or gallops  ABDOMEN: Soft, Nontender, Nondistended; Bowel sounds present  EXTREMITIES:  2+ Peripheral Pulses, No clubbing, cyanosis, or edema                                             LABS:  02-05    139  |  102  |  15  ----------------------------<  92  3.9   |  23  |  0.85  02-03    141  |  104  |  15  ----------------------------<  115<H>  4.3   |  23  |  0.84    Ca    9.3      05 Feb 2018 06:49  Ca    9.4      03 Feb 2018 05:08  Phos  3.2     02-05  Mg     2.3     02-05    TPro  7.1  /  Alb  4.5  /  TBili  0.6  /  DBili  x   /  AST  30  /  ALT  24  /  AlkPhos  53  02-05      PT/INR - ( 05 Feb 2018 06:47 )   PT: 10.7 sec;   INR: 0.98 ratio         PTT - ( 05 Feb 2018 06:47 )  PTT:31.9 sec                          16.5   5.9   )-----------( 188      ( 05 Feb 2018 06:47 )             45.2                         16.6   6.4   )-----------( 207      ( 03 Feb 2018 05:08 )             46.5     CAPILLARY BLOOD GLUCOSE    CARDIAC MARKERS ( 04 Feb 2018 23:53 )  x     / <0.01 ng/mL / 86 U/L / x     / 1.8 ng/mL  CARDIAC MARKERS ( 04 Feb 2018 16:27 )  x     / <0.01 ng/mL / 89 U/L / x     / 2.1 ng/mL      RECENT CULTURES:      TELEMETRY: Strip personally visualized -40s-60s 1 degree  Cardiology input appreciated

## 2018-02-06 DIAGNOSIS — R63.8 OTHER SYMPTOMS AND SIGNS CONCERNING FOOD AND FLUID INTAKE: ICD-10-CM

## 2018-02-06 LAB
ALBUMIN SERPL ELPH-MCNC: 4.5 G/DL — SIGNIFICANT CHANGE UP (ref 3.3–5)
ALP SERPL-CCNC: 55 U/L — SIGNIFICANT CHANGE UP (ref 40–120)
ALT FLD-CCNC: 28 U/L RC — SIGNIFICANT CHANGE UP (ref 10–45)
ANION GAP SERPL CALC-SCNC: 14 MMOL/L — SIGNIFICANT CHANGE UP (ref 5–17)
AST SERPL-CCNC: 32 U/L — SIGNIFICANT CHANGE UP (ref 10–40)
BASOPHILS # BLD AUTO: 0.1 K/UL — SIGNIFICANT CHANGE UP (ref 0–0.2)
BASOPHILS NFR BLD AUTO: 1.3 % — SIGNIFICANT CHANGE UP (ref 0–2)
BILIRUB SERPL-MCNC: 0.5 MG/DL — SIGNIFICANT CHANGE UP (ref 0.2–1.2)
BUN SERPL-MCNC: 18 MG/DL — SIGNIFICANT CHANGE UP (ref 7–23)
CALCIUM SERPL-MCNC: 9.4 MG/DL — SIGNIFICANT CHANGE UP (ref 8.4–10.5)
CHLORIDE SERPL-SCNC: 102 MMOL/L — SIGNIFICANT CHANGE UP (ref 96–108)
CO2 SERPL-SCNC: 24 MMOL/L — SIGNIFICANT CHANGE UP (ref 22–31)
CREAT SERPL-MCNC: 0.88 MG/DL — SIGNIFICANT CHANGE UP (ref 0.5–1.3)
EOSINOPHIL # BLD AUTO: 0.3 K/UL — SIGNIFICANT CHANGE UP (ref 0–0.5)
EOSINOPHIL NFR BLD AUTO: 5.2 % — SIGNIFICANT CHANGE UP (ref 0–6)
GLUCOSE SERPL-MCNC: 98 MG/DL — SIGNIFICANT CHANGE UP (ref 70–99)
HCT VFR BLD CALC: 46 % — SIGNIFICANT CHANGE UP (ref 39–50)
HGB BLD-MCNC: 16.6 G/DL — SIGNIFICANT CHANGE UP (ref 13–17)
LYMPHOCYTES # BLD AUTO: 1.9 K/UL — SIGNIFICANT CHANGE UP (ref 1–3.3)
LYMPHOCYTES # BLD AUTO: 29.7 % — SIGNIFICANT CHANGE UP (ref 13–44)
MAGNESIUM SERPL-MCNC: 2.3 MG/DL — SIGNIFICANT CHANGE UP (ref 1.6–2.6)
MCHC RBC-ENTMCNC: 31.5 PG — SIGNIFICANT CHANGE UP (ref 27–34)
MCHC RBC-ENTMCNC: 36 GM/DL — SIGNIFICANT CHANGE UP (ref 32–36)
MCV RBC AUTO: 87.4 FL — SIGNIFICANT CHANGE UP (ref 80–100)
MONOCYTES # BLD AUTO: 0.5 K/UL — SIGNIFICANT CHANGE UP (ref 0–0.9)
MONOCYTES NFR BLD AUTO: 7 % — SIGNIFICANT CHANGE UP (ref 2–14)
NEUTROPHILS # BLD AUTO: 3.7 K/UL — SIGNIFICANT CHANGE UP (ref 1.8–7.4)
NEUTROPHILS NFR BLD AUTO: 56.8 % — SIGNIFICANT CHANGE UP (ref 43–77)
PHOSPHATE SERPL-MCNC: 3.3 MG/DL — SIGNIFICANT CHANGE UP (ref 2.5–4.5)
PLATELET # BLD AUTO: 192 K/UL — SIGNIFICANT CHANGE UP (ref 150–400)
POTASSIUM SERPL-MCNC: 3.9 MMOL/L — SIGNIFICANT CHANGE UP (ref 3.5–5.3)
POTASSIUM SERPL-SCNC: 3.9 MMOL/L — SIGNIFICANT CHANGE UP (ref 3.5–5.3)
PROT SERPL-MCNC: 7 G/DL — SIGNIFICANT CHANGE UP (ref 6–8.3)
RBC # BLD: 5.26 M/UL — SIGNIFICANT CHANGE UP (ref 4.2–5.8)
RBC # FLD: 11.5 % — SIGNIFICANT CHANGE UP (ref 10.3–14.5)
SODIUM SERPL-SCNC: 140 MMOL/L — SIGNIFICANT CHANGE UP (ref 135–145)
WBC # BLD: 6.5 K/UL — SIGNIFICANT CHANGE UP (ref 3.8–10.5)
WBC # FLD AUTO: 6.5 K/UL — SIGNIFICANT CHANGE UP (ref 3.8–10.5)

## 2018-02-06 PROCEDURE — 99152 MOD SED SAME PHYS/QHP 5/>YRS: CPT

## 2018-02-06 PROCEDURE — 99232 SBSQ HOSP IP/OBS MODERATE 35: CPT | Mod: GC

## 2018-02-06 PROCEDURE — 93458 L HRT ARTERY/VENTRICLE ANGIO: CPT | Mod: 26

## 2018-02-06 RX ADMIN — Medication 50 MILLIGRAM(S): at 05:29

## 2018-02-06 RX ADMIN — Medication 50 MILLIGRAM(S): at 18:36

## 2018-02-06 RX ADMIN — HEPARIN SODIUM 5000 UNIT(S): 5000 INJECTION INTRAVENOUS; SUBCUTANEOUS at 21:07

## 2018-02-06 RX ADMIN — Medication 25 MILLIGRAM(S): at 13:27

## 2018-02-06 RX ADMIN — HEPARIN SODIUM 5000 UNIT(S): 5000 INJECTION INTRAVENOUS; SUBCUTANEOUS at 05:30

## 2018-02-06 NOTE — PROGRESS NOTE ADULT - PROBLEM SELECTOR PLAN 2
Pt has periodic episodes of VTAC resulting in ICD shocks; no VTAC on EP interrogation this admission  - c/w metoprolol and flecainide  - cardiology following - cath today; f/u results

## 2018-02-06 NOTE — PROGRESS NOTE ADULT - SUBJECTIVE AND OBJECTIVE BOX
Александр Denise MD  Beeper: [phone] ([facility])/ [id number] ([facility])     Subjective:    Patient is a 52y old  Male who presents with a chief complaint of chest pressure (02 Feb 2018 16:52)      HPI:       MEDICATIONS  (STANDING):  flecainide 50 milliGRAM(s) Oral every 12 hours  heparin  Injectable 5000 Unit(s) SubCutaneous every 8 hours  metoprolol succinate ER 25 milliGRAM(s) Oral daily    MEDICATIONS  (PRN):  nitroglycerin     SubLingual 0.4 milliGRAM(s) SubLingual every 5 minutes PRN Chest Pain        Objective:    Vitals: Vital Signs Last 24 Hrs  T(C): 36.7 (02-06-18 @ 04:19), Max: 36.7 (02-06-18 @ 04:19)  T(F): 98.1 (02-06-18 @ 04:19), Max: 98.1 (02-06-18 @ 04:19)  HR: 54 (02-06-18 @ 05:25) (54 - 63)  BP: 127/83 (02-06-18 @ 04:19) (119/78 - 127/83)  BP(mean): --  RR: 17 (02-06-18 @ 04:19) (17 - 17)  SpO2: 100% (02-06-18 @ 04:19) (96% - 100%)              I&O's Summary    05 Feb 2018 07:01  -  06 Feb 2018 07:00  --------------------------------------------------------  IN: 840 mL / OUT: 0 mL / NET: 840 mL    PHYSICAL EXAM:  GENERAL: NAD  HEAD:  NCAT  EYES: EOMI, PERRLA, conjunctiva and sclera clear  CHEST/LUNG: CTAB  HEART: RRR, S1/S2, no m/r/g appreciated  ABDOMEN: Soft, Nontender, Nondistended; Bowel sounds present  EXTREMITIES:  no edema                                         LABS:  02-06    140  |  102  |  18  ----------------------------<  98  3.9   |  24  |  0.88  02-05    139  |  102  |  15  ----------------------------<  92  3.9   |  23  |  0.85    Ca    9.4      06 Feb 2018 06:10  Ca    9.3      05 Feb 2018 06:49  Phos  3.3     02-06  Mg     2.3     02-06    TPro  7.0  /  Alb  4.5  /  TBili  0.5  /  DBili  x   /  AST  32  /  ALT  28  /  AlkPhos  55  02-06  TPro  7.1  /  Alb  4.5  /  TBili  0.6  /  DBili  x   /  AST  30  /  ALT  24  /  AlkPhos  53  02-05      PT/INR - ( 05 Feb 2018 06:47 )   PT: 10.7 sec;   INR: 0.98 ratio      PTT - ( 05 Feb 2018 06:47 )  PTT:31.9 sec                          16.6   6.5   )-----------( 192      ( 06 Feb 2018 06:11 )             46.0                         16.5   5.9   )-----------( 188      ( 05 Feb 2018 06:47 )             45.2     CAPILLARY BLOOD GLUCOSE    CARDIAC MARKERS ( 04 Feb 2018 23:53 )  x     / <0.01 ng/mL / 86 U/L / x     / 1.8 ng/mL  CARDIAC MARKERS ( 04 Feb 2018 16:27 )  x     / <0.01 ng/mL / 89 U/L / x     / 2.1 ng/mL

## 2018-02-06 NOTE — PROGRESS NOTE ADULT - PROBLEM SELECTOR PLAN 1
P/w chest pressure and left-sided arm pain, EKG benign, CE neg  - c/w metoprolol and flecainide  - C/w telemetry  - cath today; f/u results

## 2018-02-06 NOTE — PROGRESS NOTE ADULT - ATTENDING COMMENTS
Patient interviewed, examined and chart reviewed.  Case discussed with fellow.  Agree w/ Assessment and Plan as outlined.    Max Martin MD [facility]  P: [phone]  Spectra:  [phone]  Office: [phone]

## 2018-02-06 NOTE — PROGRESS NOTE ADULT - SUBJECTIVE AND OBJECTIVE BOX
Patient seen and examined at bedside.    Overnight Events: Has some chest pressure that lasts for a few minutes. Denies having any more shock sensations.    Review Of Systems: No shortness of breath, or palpitations            Medications:  flecainide 50 milliGRAM(s) Oral every 12 hours  heparin  Injectable 5000 Unit(s) SubCutaneous every 8 hours  metoprolol succinate ER 25 milliGRAM(s) Oral daily  nitroglycerin     SubLingual 0.4 milliGRAM(s) SubLingual every 5 minutes PRN      PAST MEDICAL & SURGICAL HISTORY:  ASD (atrial septal defect)  AICD (automatic cardioverter/defibrillator) present  Spontaneous ASD closure      Vitals:  T(F): 98.1 (02-06), Max: 98.1 (02-06)  HR: 54 (02-06) (54 - 63)  BP: 127/83 (02-06) (119/78 - 127/83)  RR: 17 (02-06)  SpO2: 100% (02-06)  I&O's Summary    05 Feb 2018 07:01  -  06 Feb 2018 07:00  --------------------------------------------------------  IN: 840 mL / OUT: 0 mL / NET: 840 mL        Physical Exam:  Appearance: Normal	  HEENT:   Normal oral mucosa, PERRL, EOMI	  Lymphatic: No lymphadenopathy  Cardiovascular: Normal S1 S2, No JVD, No murmurs, No edema  Respiratory: Lungs clear to auscultation	  Psychiatry: A & O x 3, Mood & affect appropriate  Gastrointestinal:  Soft, Non-tender, + BS	  Skin: No rashes, No ecchymoses, No cyanosis	  Neurologic: Non-focal  Extremities: Normal range of motion, No clubbing, cyanosis or edema  Vascular: Peripheral pulses palpable 2+ bilaterally    SR with 1st degree AV block 50-60s                          16.6   6.5   )-----------( 192      ( 06 Feb 2018 06:11 )             46.0     02-06    140  |  102  |  18  ----------------------------<  98  3.9   |  24  |  0.88    Ca    9.4      06 Feb 2018 06:10  Phos  3.3     02-06  Mg     2.3     02-06    TPro  7.0  /  Alb  4.5  /  TBili  0.5  /  DBili  x   /  AST  32  /  ALT  28  /  AlkPhos  55  02-06    PT/INR - ( 05 Feb 2018 06:47 )   PT: 10.7 sec;   INR: 0.98 ratio         PTT - ( 05 Feb 2018 06:47 )  PTT:31.9 sec  CARDIAC MARKERS ( 04 Feb 2018 23:53 )  x     / <0.01 ng/mL / 86 U/L / x     / 1.8 ng/mL  CARDIAC MARKERS ( 04 Feb 2018 16:27 )  x     / <0.01 ng/mL / 89 U/L / x     / 2.1 ng/mL      A/P:  52M with history of ASD repaired at [facility] in 2015 c/b cardiac arrest and ICD placement presenting with chest pressure, left arm heaviness and left sided headache 2/2 recurrent PVCs vs NSTEMI. EKG with new TWI V5,V6.  CXR cardiomegaly, clear lungs.     Chest pain, recent ICD shocks.   - on metoprolol , flecainide  - CE negative, however with initial c/o cp, EKG with new TWI V5,V6, and several concerning features on NST  - C today    Lane Suero MD  Cardiology Fellow [id number]

## 2018-02-06 NOTE — PROGRESS NOTE ADULT - ATTENDING COMMENTS
patient seen and examined in AM withe resident and team   HD stable . No Chest pain  for cath today if -ve d/c planning

## 2018-02-07 VITALS
RESPIRATION RATE: 18 BRPM | SYSTOLIC BLOOD PRESSURE: 113 MMHG | OXYGEN SATURATION: 98 % | DIASTOLIC BLOOD PRESSURE: 67 MMHG | HEART RATE: 61 BPM | TEMPERATURE: 98 F

## 2018-02-07 LAB
ALBUMIN SERPL ELPH-MCNC: 4.4 G/DL — SIGNIFICANT CHANGE UP (ref 3.3–5)
ALP SERPL-CCNC: 55 U/L — SIGNIFICANT CHANGE UP (ref 40–120)
ALT FLD-CCNC: 27 U/L RC — SIGNIFICANT CHANGE UP (ref 10–45)
ANION GAP SERPL CALC-SCNC: 10 MMOL/L — SIGNIFICANT CHANGE UP (ref 5–17)
AST SERPL-CCNC: 27 U/L — SIGNIFICANT CHANGE UP (ref 10–40)
BASOPHILS # BLD AUTO: 0.1 K/UL — SIGNIFICANT CHANGE UP (ref 0–0.2)
BASOPHILS NFR BLD AUTO: 1.1 % — SIGNIFICANT CHANGE UP (ref 0–2)
BILIRUB SERPL-MCNC: 0.5 MG/DL — SIGNIFICANT CHANGE UP (ref 0.2–1.2)
BUN SERPL-MCNC: 24 MG/DL — HIGH (ref 7–23)
CALCIUM SERPL-MCNC: 9.3 MG/DL — SIGNIFICANT CHANGE UP (ref 8.4–10.5)
CHLORIDE SERPL-SCNC: 103 MMOL/L — SIGNIFICANT CHANGE UP (ref 96–108)
CO2 SERPL-SCNC: 26 MMOL/L — SIGNIFICANT CHANGE UP (ref 22–31)
CREAT SERPL-MCNC: 1.04 MG/DL — SIGNIFICANT CHANGE UP (ref 0.5–1.3)
EOSINOPHIL # BLD AUTO: 0.3 K/UL — SIGNIFICANT CHANGE UP (ref 0–0.5)
EOSINOPHIL NFR BLD AUTO: 4.6 % — SIGNIFICANT CHANGE UP (ref 0–6)
GLUCOSE SERPL-MCNC: 95 MG/DL — SIGNIFICANT CHANGE UP (ref 70–99)
HCT VFR BLD CALC: 45 % — SIGNIFICANT CHANGE UP (ref 39–50)
HGB BLD-MCNC: 16.4 G/DL — SIGNIFICANT CHANGE UP (ref 13–17)
LYMPHOCYTES # BLD AUTO: 1.6 K/UL — SIGNIFICANT CHANGE UP (ref 1–3.3)
LYMPHOCYTES # BLD AUTO: 25.1 % — SIGNIFICANT CHANGE UP (ref 13–44)
MAGNESIUM SERPL-MCNC: 2.2 MG/DL — SIGNIFICANT CHANGE UP (ref 1.6–2.6)
MCHC RBC-ENTMCNC: 31.9 PG — SIGNIFICANT CHANGE UP (ref 27–34)
MCHC RBC-ENTMCNC: 36.4 GM/DL — HIGH (ref 32–36)
MCV RBC AUTO: 87.7 FL — SIGNIFICANT CHANGE UP (ref 80–100)
MONOCYTES # BLD AUTO: 0.5 K/UL — SIGNIFICANT CHANGE UP (ref 0–0.9)
MONOCYTES NFR BLD AUTO: 7.4 % — SIGNIFICANT CHANGE UP (ref 2–14)
NEUTROPHILS # BLD AUTO: 3.9 K/UL — SIGNIFICANT CHANGE UP (ref 1.8–7.4)
NEUTROPHILS NFR BLD AUTO: 61.8 % — SIGNIFICANT CHANGE UP (ref 43–77)
PHOSPHATE SERPL-MCNC: 3.7 MG/DL — SIGNIFICANT CHANGE UP (ref 2.5–4.5)
PLATELET # BLD AUTO: 189 K/UL — SIGNIFICANT CHANGE UP (ref 150–400)
POTASSIUM SERPL-MCNC: 4.5 MMOL/L — SIGNIFICANT CHANGE UP (ref 3.5–5.3)
POTASSIUM SERPL-SCNC: 4.5 MMOL/L — SIGNIFICANT CHANGE UP (ref 3.5–5.3)
PROT SERPL-MCNC: 6.9 G/DL — SIGNIFICANT CHANGE UP (ref 6–8.3)
RBC # BLD: 5.13 M/UL — SIGNIFICANT CHANGE UP (ref 4.2–5.8)
RBC # FLD: 11.4 % — SIGNIFICANT CHANGE UP (ref 10.3–14.5)
SODIUM SERPL-SCNC: 139 MMOL/L — SIGNIFICANT CHANGE UP (ref 135–145)
WBC # BLD: 6.3 K/UL — SIGNIFICANT CHANGE UP (ref 3.8–10.5)
WBC # FLD AUTO: 6.3 K/UL — SIGNIFICANT CHANGE UP (ref 3.8–10.5)

## 2018-02-07 PROCEDURE — 80048 BASIC METABOLIC PNL TOTAL CA: CPT

## 2018-02-07 PROCEDURE — 71045 X-RAY EXAM CHEST 1 VIEW: CPT

## 2018-02-07 PROCEDURE — 83605 ASSAY OF LACTIC ACID: CPT

## 2018-02-07 PROCEDURE — C1894: CPT

## 2018-02-07 PROCEDURE — 85014 HEMATOCRIT: CPT

## 2018-02-07 PROCEDURE — 99285 EMERGENCY DEPT VISIT HI MDM: CPT | Mod: 25

## 2018-02-07 PROCEDURE — 84295 ASSAY OF SERUM SODIUM: CPT

## 2018-02-07 PROCEDURE — C1760: CPT

## 2018-02-07 PROCEDURE — 82330 ASSAY OF CALCIUM: CPT

## 2018-02-07 PROCEDURE — 93017 CV STRESS TEST TRACING ONLY: CPT

## 2018-02-07 PROCEDURE — 84132 ASSAY OF SERUM POTASSIUM: CPT

## 2018-02-07 PROCEDURE — 78452 HT MUSCLE IMAGE SPECT MULT: CPT

## 2018-02-07 PROCEDURE — 85027 COMPLETE CBC AUTOMATED: CPT

## 2018-02-07 PROCEDURE — 93005 ELECTROCARDIOGRAM TRACING: CPT

## 2018-02-07 PROCEDURE — 83735 ASSAY OF MAGNESIUM: CPT

## 2018-02-07 PROCEDURE — 82947 ASSAY GLUCOSE BLOOD QUANT: CPT

## 2018-02-07 PROCEDURE — 99239 HOSP IP/OBS DSCHRG MGMT >30: CPT

## 2018-02-07 PROCEDURE — 84100 ASSAY OF PHOSPHORUS: CPT

## 2018-02-07 PROCEDURE — 82435 ASSAY OF BLOOD CHLORIDE: CPT

## 2018-02-07 PROCEDURE — 99153 MOD SED SAME PHYS/QHP EA: CPT

## 2018-02-07 PROCEDURE — 82550 ASSAY OF CK (CPK): CPT

## 2018-02-07 PROCEDURE — A9500: CPT

## 2018-02-07 PROCEDURE — 82553 CREATINE MB FRACTION: CPT

## 2018-02-07 PROCEDURE — 85730 THROMBOPLASTIN TIME PARTIAL: CPT

## 2018-02-07 PROCEDURE — 84484 ASSAY OF TROPONIN QUANT: CPT

## 2018-02-07 PROCEDURE — 85610 PROTHROMBIN TIME: CPT

## 2018-02-07 PROCEDURE — 80053 COMPREHEN METABOLIC PANEL: CPT

## 2018-02-07 PROCEDURE — C1769: CPT

## 2018-02-07 PROCEDURE — C1887: CPT

## 2018-02-07 PROCEDURE — 93458 L HRT ARTERY/VENTRICLE ANGIO: CPT

## 2018-02-07 PROCEDURE — 82803 BLOOD GASES ANY COMBINATION: CPT

## 2018-02-07 PROCEDURE — 93306 TTE W/DOPPLER COMPLETE: CPT

## 2018-02-07 PROCEDURE — 99152 MOD SED SAME PHYS/QHP 5/>YRS: CPT

## 2018-02-07 RX ORDER — FLECAINIDE ACETATE 50 MG
1 TABLET ORAL
Qty: 0 | Refills: 0 | COMMUNITY

## 2018-02-07 RX ORDER — METOPROLOL TARTRATE 50 MG
1 TABLET ORAL
Qty: 30 | Refills: 0 | OUTPATIENT
Start: 2018-02-07 | End: 2018-03-08

## 2018-02-07 RX ORDER — METOPROLOL TARTRATE 50 MG
1 TABLET ORAL
Qty: 0 | Refills: 0 | COMMUNITY

## 2018-02-07 RX ORDER — FLECAINIDE ACETATE 50 MG
1 TABLET ORAL
Qty: 60 | Refills: 0 | OUTPATIENT
Start: 2018-02-07 | End: 2018-03-08

## 2018-02-07 RX ADMIN — HEPARIN SODIUM 5000 UNIT(S): 5000 INJECTION INTRAVENOUS; SUBCUTANEOUS at 05:29

## 2018-02-07 RX ADMIN — Medication 25 MILLIGRAM(S): at 05:30

## 2018-02-07 RX ADMIN — Medication 50 MILLIGRAM(S): at 05:29

## 2018-02-07 NOTE — PROVIDER CONTACT NOTE (OTHER) - SITUATION
Patient had 4 beats of wide complex on the monitor
Pt Hr ti to 48.
Patient is complaining of chest pressure
Pt c/o electrical like feelings in chest

## 2018-02-07 NOTE — PROVIDER CONTACT NOTE (OTHER) - REASON
Patient had 4 beats of wide complex on the monitor
Patient is complaining of chest pressure
Pt c/o electrical like feelings in chest
pt HR ti to 48

## 2018-02-07 NOTE — PROGRESS NOTE ADULT - PROBLEM SELECTOR PROBLEM 4
Nutrition, metabolism, and development symptoms
Nutrition, metabolism, and development symptoms
ASD (atrial septal defect)

## 2018-02-07 NOTE — PROGRESS NOTE ADULT - PROBLEM SELECTOR PLAN 5
- DVT PPx: heparin sq  - touch base w/ SW Reg[facility] [id number] and update with plan
- DVT PPx: heparin sq  - touch base w/ SW Regina as pt is ready for DC today
plan for [facility]

## 2018-02-07 NOTE — PROGRESS NOTE ADULT - PROBLEM SELECTOR PROBLEM 3
Need for prophylactic measure
Need for prophylactic measure
ASD (atrial septal defect)
ASD (atrial septal defect)
Need for prophylactic measure
Need for prophylactic measure

## 2018-02-07 NOTE — PROVIDER CONTACT NOTE (OTHER) - ASSESSMENT
Patient is A&OX3, Patient is asymptomatic. VS T-97.8 P-68 R-17 Bp-137/87 O2-97% on room air
Pt Hr ti to 48, pt was sleeping at the time of occurrences, Pt denies chest pain, SOB, n/v/d, pt asymptomatic. BP-136/81, HR-52, RR-17, O2-97 on RA.
Patient is A&OX4. Patient is complaining of chest pressure and mild nausea. VS   T-98.3 P-62 R-16 Bp-129/80 O2-97% on room air
Pt A+Ox4, [place]ic speaking. Pt c/o "electrical like sensation" in his chest overnight. No ectopies noted on overnight tele. Rhythm: SB/SR with 1st degree AV block. Pt due for d/c today.

## 2018-02-07 NOTE — PROGRESS NOTE ADULT - PROVIDER SPECIALTY LIST ADULT
Cardiology
Internal Medicine

## 2018-02-07 NOTE — PROGRESS NOTE ADULT - PROBLEM SELECTOR PROBLEM 1
R/O Acute coronary syndrome

## 2018-02-07 NOTE — PROGRESS NOTE ADULT - ATTENDING COMMENTS
Patient is HD stable . s/p cath. clean coronaries. d/c planning. If patient agree consider making his appointment in medicine clinic

## 2018-02-07 NOTE — PROGRESS NOTE ADULT - PROBLEM SELECTOR PROBLEM 2
Recurrent ventricular tachycardia

## 2018-02-07 NOTE — PROVIDER CONTACT NOTE (OTHER) - ACTION/TREATMENT ORDERED:
No further intervention at time, will continue to monitor. MD Nacho aware.
NP notified and aware. Will continue to monitor
Await further orders. Continue to monitor & maintain safety.
MD notified and aware. STAT EKG and STAT cardiac enzyme as per MD. Will continue to monitor

## 2018-02-07 NOTE — PROGRESS NOTE ADULT - PROBLEM SELECTOR PLAN 2
Pt has periodic episodes of VTAC resulting in ICD shocks; no VTAC on EP interrogation this admission; clean coronaries per 2/6 cath  - c/w metoprolol and flecainide

## 2018-02-07 NOTE — PROGRESS NOTE ADULT - PROBLEM SELECTOR PLAN 1
No ACS as per EKG, neg Irene; no events on tele o/n; clean coronaries as per 2/6 cath  - c/w metoprolol and flecainide  - C/w telemetry

## 2018-02-07 NOTE — PROGRESS NOTE ADULT - ASSESSMENT
52M with history of ASD repaired at [facility] in 2015 c/b cardiac arrest and ICD placement presenting with chest pressure, left arm heaviness and left sided headache 2/2 recurrent PVCs vs NSTEMI, plan for cath today

## 2018-02-07 NOTE — PROVIDER CONTACT NOTE (OTHER) - BACKGROUND
Patient admitted with heart failure
Pt admitted for chest pain.
Admitted for CP
Patient admitted with chest pain

## 2018-02-07 NOTE — PROGRESS NOTE ADULT - SUBJECTIVE AND OBJECTIVE BOX
Александр Denise MD  Beeper: [phone] ([facility])/ [phone] (J)     Subjective:    Patient is a 52y old  Male who presents with a chief complaint of chest pressure (02 Feb 2018 16:52)    Overnight events: none     MEDICATIONS  (STANDING):  flecainide 50 milliGRAM(s) Oral every 12 hours  heparin  Injectable 5000 Unit(s) SubCutaneous every 8 hours  metoprolol succinate ER 25 milliGRAM(s) Oral daily    MEDICATIONS  (PRN):  nitroglycerin     SubLingual 0.4 milliGRAM(s) SubLingual every 5 minutes PRN Chest Pain    Objective:    Vitals: Vital Signs Last 24 Hrs  T(C): 36.7 (02-07-18 @ 04:13), Max: 36.9 (02-06-18 @ 12:07)  T(F): 98 (02-07-18 @ 04:13), Max: 98.4 (02-06-18 @ 12:07)  HR: 61 (02-07-18 @ 04:13) (55 - 74)  BP: 113/67 (02-07-18 @ 04:13) (113/67 - 136/85)  RR: 18 (02-07-18 @ 04:13) (18 - 18)  SpO2: 98% (02-07-18 @ 04:13) (97% - 99%)                I&O's Summary    06 Feb 2018 07:01  -  07 Feb 2018 07:00  --------------------------------------------------------  IN: 20 mL / OUT: 0 mL / NET: 20 mL      PHYSICAL EXAM:  GENERAL: NAD  HEAD:  NCAT  EYES: EOMI, PERRLA, conjunctiva and sclera clear  CHEST/LUNG: CTAB  HEART: RRR, S1/S2, no m/g/r  ABDOMEN: Soft,  NT/ND  EXTREMITIES:  no edema                                  LABS:  02-07    139  |  103  |  24<H>  ----------------------------<  95  4.5   |  26  |  1.04  02-06    140  |  102  |  18  ----------------------------<  98  3.9   |  24  |  0.88  02-05    139  |  102  |  15  ----------------------------<  92  3.9   |  23  |  0.85    Ca    9.3      07 Feb 2018 06:36  Ca    9.4      06 Feb 2018 06:10  Ca    9.3      05 Feb 2018 06:49  Phos  3.7     02-07  Mg     2.2     02-07    TPro  6.9  /  Alb  4.4  /  TBili  0.5  /  DBili  x   /  AST  27  /  ALT  27  /  AlkPhos  55  02-07  TPro  7.0  /  Alb  4.5  /  TBili  0.5  /  DBili  x   /  AST  32  /  ALT  28  /  AlkPhos  55  02-06  TPro  7.1  /  Alb  4.5  /  TBili  0.6  /  DBili  x   /  AST  30  /  ALT  24  /  AlkPhos  53  02-05                        16.6   6.5   )-----------( 192      ( 06 Feb 2018 06:11 )             46.0                         16.5   5.9   )-----------( 188      ( 05 Feb 2018 06:47 )             45.2     CAPILLARY BLOOD GLUCOSE

## 2018-02-22 ENCOUNTER — EMERGENCY (EMERGENCY)
Facility: HOSPITAL | Age: 53
LOS: 1 days | Discharge: HOME | End: 2018-02-22
Attending: PERSONAL EMERGENCY RESPONSE ATTENDANT | Admitting: PERSONAL EMERGENCY RESPONSE ATTENDANT
Payer: MEDICAID

## 2018-02-22 VITALS
DIASTOLIC BLOOD PRESSURE: 68 MMHG | RESPIRATION RATE: 18 BRPM | HEART RATE: 70 BPM | OXYGEN SATURATION: 100 % | TEMPERATURE: 98 F | SYSTOLIC BLOOD PRESSURE: 136 MMHG

## 2018-02-22 DIAGNOSIS — Z95.810 PRESENCE OF AUTOMATIC (IMPLANTABLE) CARDIAC DEFIBRILLATOR: Chronic | ICD-10-CM

## 2018-02-22 DIAGNOSIS — Z87.74 PERSONAL HISTORY OF (CORRECTED) CONGENITAL MALFORMATIONS OF HEART AND CIRCULATORY SYSTEM: Chronic | ICD-10-CM

## 2018-02-22 LAB
ALBUMIN SERPL ELPH-MCNC: 4.3 G/DL — SIGNIFICANT CHANGE UP (ref 3.3–5)
ALP SERPL-CCNC: 50 U/L — SIGNIFICANT CHANGE UP (ref 40–120)
ALT FLD-CCNC: 13 U/L RC — SIGNIFICANT CHANGE UP (ref 10–45)
ANION GAP SERPL CALC-SCNC: 13 MMOL/L — SIGNIFICANT CHANGE UP (ref 5–17)
APTT BLD: 29.2 SEC — SIGNIFICANT CHANGE UP (ref 27.5–37.4)
AST SERPL-CCNC: 21 U/L — SIGNIFICANT CHANGE UP (ref 10–40)
BASOPHILS # BLD AUTO: 0.1 K/UL — SIGNIFICANT CHANGE UP (ref 0–0.2)
BASOPHILS NFR BLD AUTO: 1.2 % — SIGNIFICANT CHANGE UP (ref 0–2)
BILIRUB SERPL-MCNC: 0.4 MG/DL — SIGNIFICANT CHANGE UP (ref 0.2–1.2)
BUN SERPL-MCNC: 16 MG/DL — SIGNIFICANT CHANGE UP (ref 7–23)
CALCIUM SERPL-MCNC: 9 MG/DL — SIGNIFICANT CHANGE UP (ref 8.4–10.5)
CHLORIDE SERPL-SCNC: 108 MMOL/L — SIGNIFICANT CHANGE UP (ref 96–108)
CK MB BLD-MCNC: 2.4 % — SIGNIFICANT CHANGE UP (ref 0–3.5)
CK MB CFR SERPL CALC: 3.5 NG/ML — SIGNIFICANT CHANGE UP (ref 0–6.7)
CK SERPL-CCNC: 146 U/L — SIGNIFICANT CHANGE UP (ref 30–200)
CO2 SERPL-SCNC: 22 MMOL/L — SIGNIFICANT CHANGE UP (ref 22–31)
CREAT SERPL-MCNC: 0.75 MG/DL — SIGNIFICANT CHANGE UP (ref 0.5–1.3)
EOSINOPHIL # BLD AUTO: 0.3 K/UL — SIGNIFICANT CHANGE UP (ref 0–0.5)
EOSINOPHIL NFR BLD AUTO: 5.3 % — SIGNIFICANT CHANGE UP (ref 0–6)
GLUCOSE SERPL-MCNC: 146 MG/DL — HIGH (ref 70–99)
HCT VFR BLD CALC: 40.8 % — SIGNIFICANT CHANGE UP (ref 39–50)
HGB BLD-MCNC: 14.7 G/DL — SIGNIFICANT CHANGE UP (ref 13–17)
INR BLD: 0.96 RATIO — SIGNIFICANT CHANGE UP (ref 0.88–1.16)
LYMPHOCYTES # BLD AUTO: 1.6 K/UL — SIGNIFICANT CHANGE UP (ref 1–3.3)
LYMPHOCYTES # BLD AUTO: 27.9 % — SIGNIFICANT CHANGE UP (ref 13–44)
MCHC RBC-ENTMCNC: 31.7 PG — SIGNIFICANT CHANGE UP (ref 27–34)
MCHC RBC-ENTMCNC: 36.1 GM/DL — HIGH (ref 32–36)
MCV RBC AUTO: 87.7 FL — SIGNIFICANT CHANGE UP (ref 80–100)
MONOCYTES # BLD AUTO: 0.4 K/UL — SIGNIFICANT CHANGE UP (ref 0–0.9)
MONOCYTES NFR BLD AUTO: 6 % — SIGNIFICANT CHANGE UP (ref 2–14)
NEUTROPHILS # BLD AUTO: 3.5 K/UL — SIGNIFICANT CHANGE UP (ref 1.8–7.4)
NEUTROPHILS NFR BLD AUTO: 59.5 % — SIGNIFICANT CHANGE UP (ref 43–77)
PLATELET # BLD AUTO: 174 K/UL — SIGNIFICANT CHANGE UP (ref 150–400)
POTASSIUM SERPL-MCNC: 3.7 MMOL/L — SIGNIFICANT CHANGE UP (ref 3.5–5.3)
POTASSIUM SERPL-SCNC: 3.7 MMOL/L — SIGNIFICANT CHANGE UP (ref 3.5–5.3)
PROT SERPL-MCNC: 6.8 G/DL — SIGNIFICANT CHANGE UP (ref 6–8.3)
PROTHROM AB SERPL-ACNC: 10.4 SEC — SIGNIFICANT CHANGE UP (ref 9.8–12.7)
RBC # BLD: 4.66 M/UL — SIGNIFICANT CHANGE UP (ref 4.2–5.8)
RBC # FLD: 11.2 % — SIGNIFICANT CHANGE UP (ref 10.3–14.5)
SODIUM SERPL-SCNC: 143 MMOL/L — SIGNIFICANT CHANGE UP (ref 135–145)
TROPONIN T SERPL-MCNC: <0.01 NG/ML — SIGNIFICANT CHANGE UP (ref 0–0.06)
WBC # BLD: 5.8 K/UL — SIGNIFICANT CHANGE UP (ref 3.8–10.5)
WBC # FLD AUTO: 5.8 K/UL — SIGNIFICANT CHANGE UP (ref 3.8–10.5)

## 2018-02-22 PROCEDURE — 71046 X-RAY EXAM CHEST 2 VIEWS: CPT | Mod: 26

## 2018-02-22 PROCEDURE — 93005 ELECTROCARDIOGRAM TRACING: CPT

## 2018-02-22 PROCEDURE — 84484 ASSAY OF TROPONIN QUANT: CPT

## 2018-02-22 PROCEDURE — 82550 ASSAY OF CK (CPK): CPT

## 2018-02-22 PROCEDURE — 99285 EMERGENCY DEPT VISIT HI MDM: CPT | Mod: 25

## 2018-02-22 PROCEDURE — 85610 PROTHROMBIN TIME: CPT

## 2018-02-22 PROCEDURE — 85730 THROMBOPLASTIN TIME PARTIAL: CPT

## 2018-02-22 PROCEDURE — 71046 X-RAY EXAM CHEST 2 VIEWS: CPT

## 2018-02-22 PROCEDURE — 82553 CREATINE MB FRACTION: CPT

## 2018-02-22 PROCEDURE — 85027 COMPLETE CBC AUTOMATED: CPT

## 2018-02-22 PROCEDURE — 99284 EMERGENCY DEPT VISIT MOD MDM: CPT | Mod: 25

## 2018-02-22 PROCEDURE — 80053 COMPREHEN METABOLIC PANEL: CPT

## 2018-02-22 PROCEDURE — 93010 ELECTROCARDIOGRAM REPORT: CPT

## 2018-02-22 PROCEDURE — 99283 EMERGENCY DEPT VISIT LOW MDM: CPT

## 2018-02-22 NOTE — ED PROVIDER NOTE - ATTENDING CONTRIBUTION TO CARE
Attending MD Bagley.  Agree with above.  PT is a 52 yr old male with hx of ASD repair, cardiac arrest, AICD placement. Pt presents with complaint of 'pressure in his chest all day'. He states he was 'just walking around when it talks'.  No fevers/n/v/d.  No LE edema.  No orthopnea.  Does not have a PMD.  Pt is well appearing but appears anxious.  Sternotomy scar, rest of exam wnl. States defibrilator has not been firing.  PT cathed on 2/6 of this month with clean cath.  Pt is on metoprolol, flecanide, denies blood thinner on board.  Pt's cardiologist is Dr. Saavedra.  GIven recent negative cath pt is felt to be low risk however he has very high risk hx.  Will obtain EKG/troponins and discuss with cardiology.  PT is anxious but well appearing on arrival with stable vital signs.

## 2018-02-22 NOTE — ED PROVIDER NOTE - OBJECTIVE STATEMENT
52 y M with hx ICD placement on flecainide and metoprolol, ASD repaired at [facility] in 2015 c/b cardiac arrest  presenting with chest pressure, left arm heaviness SOB, and left sided headache. He states that onset of pain was while walking today. States that his defibrillator did not go off but he feels and electrical buzzing in his chest. He denies fever, nausea, vomiting, diaphoresis, LE edema, orthopnea, weakness, LOC.

## 2018-02-22 NOTE — ED PROVIDER NOTE - CARE PLAN
Principal Discharge DX:	Chest pain, unspecified type  Secondary Diagnosis:	AICD (automatic cardioverter/defibrillator) present Principal Discharge DX:	Chest pain, unspecified type  Assessment and plan of treatment:	You need to follow up with Dr. Saavedra regarding the sensation in your chest. Some of this may be due to anxiety and that should be investigated by your primary doctor. If you have any worsening of chest pain, shortness of breath please come back to the emergency room. If you have any other new or concerning symptoms please come back.  Secondary Diagnosis:	AICD (automatic cardioverter/defibrillator) present

## 2018-02-22 NOTE — ED PROVIDER NOTE - PLAN OF CARE
You need to follow up with Dr. Saavedra regarding the sensation in your chest. Some of this may be due to anxiety and that should be investigated by your primary doctor. If you have any worsening of chest pain, shortness of breath please come back to the emergency room. If you have any other new or concerning symptoms please come back.

## 2018-02-22 NOTE — ED ADULT NURSE NOTE - OBJECTIVE STATEMENT
51 yo M pmh of MI and open heart surgery with internal defibrillator placed two weeks ago came to ED c/o chest pressure and SOB and dizziness with exertion starting today when pt woke up.   Pt states that ambulating and exertion makes the pain worse, pain radiates to the left shoulder and left upper back.  Denies fevers/chills, n/v/d, diaphoresis, changes in urinary or bowel habits.  A&Ox4, EKG done, placed on cardiac monitor, NSR.  Lungs clear bilaterally.  +peripheral pulses bilaterally.  skin w/d/i, scar noted on the sternal aspect of the chest, no redness, swelling or signs of infection noted.  Safety and comfort maintained.  Will continue to monitor.

## 2018-02-22 NOTE — ED PROVIDER NOTE - MEDICAL DECISION MAKING DETAILS
52 Y M with extensive cardiac hx presents with chest pressure in setting of negative cath 2/6. DDx: ACS, defibrillator dysfunction, anxiety. Will get troponin and EKG, as well as CXR and basic labs, will get in touch with pts cardiologist for dispo.

## 2018-02-23 VITALS
RESPIRATION RATE: 18 BRPM | OXYGEN SATURATION: 97 % | TEMPERATURE: 98 F | DIASTOLIC BLOOD PRESSURE: 80 MMHG | SYSTOLIC BLOOD PRESSURE: 128 MMHG | HEART RATE: 59 BPM

## 2018-02-23 LAB — TROPONIN T SERPL-MCNC: <0.01 NG/ML — SIGNIFICANT CHANGE UP (ref 0–0.06)

## 2018-02-23 NOTE — CONSULT NOTE ADULT - SUBJECTIVE AND OBJECTIVE BOX
MRN-[id number]    CHIEF COMPLAINT:  Chest Discomfort    HISTORY OF PRESENT ILLNESS:  MICHOACANO NEWELL is a 52y Male patient with past medical history of s/p ASD repair 2015 at [facility] complicated by cardiac arrest, and AICD placed. He was noted to have frequent PVCs and had a PVC ablation to address this problem 12/2017.     Admitted here in fall with complaint of lightheadedness; VPCs seen.  Stress test with EKG done on 10/27/17, but patient could only walk for 90 seconds before test stopped for dizziness at heart rate of 85 bpm; test was non-diagnostic given brief duration of test and low heart rate achieved.   VPCs attributed to outflow tract; PVC ablation performed by Dr. Saavedra.  Also placed on beta-blocker.    Seen recently by Dr. Saavedra for discharge of ICD.  Interrogation showed VPCs then VT, prompting firing of device.  Flecainide added to beta blocker.    Now p/w chest pain. Describes substernal chest pressure, worse with exertion. Today patient says he felt left arm heaviness and neck pain along with his worsening chest pressure.  Pain lasted around an hour and then resolved.       Allergies  No Known Allergies    PAST MEDICAL & SURGICAL HISTORY:  ASD (atrial septal defect)  AICD (automatic cardioverter/defibrillator) present  Spontaneous ASD closure    FAMILY HISTORY:  No pertinent family history in first degree relatives    SOCIAL HISTORY:    Non-smoker    REVIEW OF SYSTEMS:  CONSTITUTIONAL: No fever, weight loss, or fatigue  EYES: No eye pain, visual disturbances, or discharge  ENMT:  No difficulty hearing, tinnitus, vertigo; No sinus or throat pain  NECK: No pain or stiffness  RESPIRATORY: No cough, wheezing, chills or hemoptysis; No Shortness of Breath  CARDIOVASCULAR: No chest pain, palpitations, passing out, dizziness, or leg swelling  GASTROINTESTINAL: No abdominal or epigastric pain. No nausea, vomiting, or hematemesis; No diarrhea or constipation. No melena or hematochezia.  GENITOURINARY: No dysuria, frequency, hematuria, or incontinence  NEUROLOGICAL: No headaches, memory loss, loss of strength, numbness, or tremors  SKIN: No itching, burning, rashes, or lesions   LYMPH Nodes: No enlarged glands  ENDOCRINE: No heat or cold intolerance; No hair loss  MUSCULOSKELETAL: No joint pain or swelling; No muscle, back, or extremity pain  PSYCHIATRIC: No depression, anxiety, mood swings, or difficulty sleeping  HEME/LYMPH: No easy bruising, or bleeding gums  ALLERY AND IMMUNOLOGIC: No hives or eczema	    PHYSICAL EXAM:  Vital Signs Last 24 Hrs  T(C): 36.6 (23 Feb 2018 02:29), Max: 36.6 (22 Feb 2018 19:49)  T(F): 97.8 (23 Feb 2018 02:29), Max: 97.9 (22 Feb 2018 19:49)  HR: 59 (23 Feb 2018 02:29) (59 - 70)  BP: 128/80 (23 Feb 2018 02:29) (123/68 - 136/68)  BP(mean): --  RR: 18 (23 Feb 2018 02:29) (18 - 18)  SpO2: 97% (23 Feb 2018 02:29) (97% - 100%)  Appearance: Normal	  HEENT:   Normal oral mucosa, PERRL, EOMI	  Lymphatic: No lymphadenopathy  Cardiovascular: Normal S1 S2, No JVD, No murmurs, No edema  Respiratory: Lungs clear to auscultation	  Psychiatry: A & O x 3, Mood & affect appropriate  Gastrointestinal:  Soft, Non-tender, + BS	  Skin: No rashes, No ecchymoses, No cyanosis	  Neurologic: Non-focal  Extremities: Normal range of motion, No clubbing, cyanosis or edema  Vascular: Peripheral pulses palpable 2+ bilaterally    MEDICATIONS:  MEDICATIONS  (STANDING):    MEDICATIONS  (PRN):      LABS:	 	  CBC Full  -  ( 22 Feb 2018 20:18 )  WBC Count : 5.8 K/uL  Hemoglobin : 14.7 g/dL  Hematocrit : 40.8 %  Platelet Count - Automated : 174 K/uL  Mean Cell Volume : 87.7 fl  Mean Cell Hemoglobin : 31.7 pg  Mean Cell Hemoglobin Concentration : 36.1 gm/dL  Auto Neutrophil # : 3.5 K/uL  Auto Lymphocyte # : 1.6 K/uL  Auto Monocyte # : 0.4 K/uL  Auto Eosinophil # : 0.3 K/uL  Auto Basophil # : 0.1 K/uL  Auto Neutrophil % : 59.5 %  Auto Lymphocyte % : 27.9 %  Auto Monocyte % : 6.0 %  Auto Eosinophil % : 5.3 %  Auto Basophil % : 1.2 %    02-22    143  |  108  |  16  ----------------------------<  146<H>  3.7   |  22  |  0.75    Ca    9.0      22 Feb 2018 20:18    TPro  6.8  /  Alb  4.3  /  TBili  0.4  /  DBili  x   /  AST  21  /  ALT  13  /  AlkPhos  50  02-22    PT/INR - ( 22 Feb 2018 20:18 )   PT: 10.4 sec;   INR: 0.96 ratio         PTT - ( 22 Feb 2018 20:18 )  PTT:29.2 sec  CARDIAC MARKERS ( 22 Feb 2018 23:54 )  x     / <0.01 ng/mL / x     / x     / x      CARDIAC MARKERS ( 22 Feb 2018 20:18 )  x     / <0.01 ng/mL / 146 U/L / x     / 3.5 ng/mL    proBNP:   Lipid Profile:   HgA1c:   TSH:     TELEMETRY: 	    ECG:  	  RADIOLOGY:  OTHER: 	    CARDIAC TESTING/STUDIES:    [ ] Echocardiogram:  [ ]  Catheterization:  [ ] Stress Test:  	  	  ASSESSMENT/PLAN: 	      Soha Hoyos MD, MPH, CHRIS  Cardiovascular Specialist Attending  [facility]  C: [phone]  E: [email or web address]  (Cardiology Nocturnist cell number available 7 pm - 7 am every night; available daytime week days for follow-up only; daytime weekends covered by general cardiology consult service) MRN-[id number]    CHIEF COMPLAINT:  Chest Discomfort    HISTORY OF PRESENT ILLNESS:  MICHOACANO NEWELL is a 52y Male patient with past medical history of s/p ASD repair 2015 at [facility] complicated by cardiac arrest, and AICD placed presenting with chest discomfort. He has had prior ICD discharges secondary to PVCs leading to VT and prompting the firing of the ICD. 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 Saavedra. He remains on beta-blocker and flecainide. He was recently admitted 2/1/2018 for chest discomfort, tingling, light electricity sensation. Extensive workup did not demonstrate any organic etiology including non-obstructive cardiac cath, negative enzymes, and some mild TWI on ECG. He was discharged and now presents again with identical symptoms of vague electricity sensation, shortness of breath with exertion, and non-specific chest pressure. He has severe anxiety from the ICD due to prior shocks and states that when he walks or goes up stairs he feels short of breath. Cardiac enzymes today are negative, ECG unchanged from prior. Telemetry without events. Cardiology consulted for further management.     Allergies  No Known Allergies    PAST MEDICAL & SURGICAL HISTORY:  ASD (atrial septal defect)  AICD (automatic cardioverter/defibrillator) present  Spontaneous ASD closure    FAMILY HISTORY:  No pertinent family history in first degree relatives    SOCIAL HISTORY:    Non-smoker    REVIEW OF SYSTEMS:  CONSTITUTIONAL: No fever, weight loss, or fatigue  EYES: No eye pain, visual disturbances, or discharge  ENMT:  No difficulty hearing, tinnitus, vertigo; No sinus or throat pain  NECK: No pain or stiffness  RESPIRATORY: No cough, wheezing, chills or hemoptysis; Positive Shortness of Breath on exertion  CARDIOVASCULAR: No palpitations, passing out, or leg swelling. Positive chest discomfort and dizziness.  GASTROINTESTINAL: No abdominal or epigastric pain. No nausea, vomiting, or hematemesis; No diarrhea or constipation. No melena or hematochezia.  GENITOURINARY: No dysuria, frequency, hematuria, or incontinence  NEUROLOGICAL: No headaches, memory loss, loss of strength, numbness, or tremors  SKIN: No itching, burning, rashes, or lesions   MUSCULOSKELETAL: No joint pain or swelling; No muscle, back, or extremity pain  PSYCHIATRIC: No depression, mood swings, or difficulty sleeping. Positive severe anxiety.   HEME/LYMPH: No easy bruising, or bleeding gums  ALLERY AND IMMUNOLOGIC: No hives or eczema	    PHYSICAL EXAM:  Vital Signs Last 24 Hrs  T(C): 36.6 (23 Feb 2018 02:29), Max: 36.6 (22 Feb 2018 19:49)  T(F): 97.8 (23 Feb 2018 02:29), Max: 97.9 (22 Feb 2018 19:49)  HR: 59 (23 Feb 2018 02:29) (59 - 70)  BP: 128/80 (23 Feb 2018 02:29) (123/68 - 136/68)  RR: 18 (23 Feb 2018 02:29) (18 - 18)  SpO2: 97% (23 Feb 2018 02:29) (97% - 100%)    Appearance: Normal	  HEENT:   Normal oral mucosa	  Lymphatic: No lymphadenopathy  Cardiovascular: Normal S1 S2, No JVD, No murmurs, No edema  Respiratory: Lungs clear to auscultation	  Psychiatry: A & O x 3  Gastrointestinal:  Soft, Non-tender  Skin: No rashes, No ecchymoses, No cyanosis	  Neurologic: Non-focal  Extremities: No clubbing, cyanosis or edema  Vascular: Peripheral pulses palpable 2+ bilaterally    Home Medications:   * Patient Currently Takes Medications as of 07-Feb-2018 11:00 documented in Structured Notes  · 	metoprolol succinate 25 mg oral tablet, extended release: 1 tab(s) orally once a day  · 	flecainide 50 mg oral tablet: 1 tab(s) orally every 12 hours    LABS:	 	  CBC Full  -  ( 22 Feb 2018 20:18 )  WBC Count : 5.8 K/uL  Hemoglobin : 14.7 g/dL  Hematocrit : 40.8 %  Platelet Count - Automated : 174 K/uL  Mean Cell Volume : 87.7 fl  Mean Cell Hemoglobin : 31.7 pg  Mean Cell Hemoglobin Concentration : 36.1 gm/dL  Auto Neutrophil # : 3.5 K/uL  Auto Lymphocyte # : 1.6 K/uL  Auto Monocyte # : 0.4 K/uL  Auto Eosinophil # : 0.3 K/uL  Auto Basophil # : 0.1 K/uL  Auto Neutrophil % : 59.5 %  Auto Lymphocyte % : 27.9 %  Auto Monocyte % : 6.0 %  Auto Eosinophil % : 5.3 %  Auto Basophil % : 1.2 %    02-22    143  |  108  |  16  ----------------------------<  146<H>  3.7   |  22  |  0.75    Ca    9.0      22 Feb 2018 20:18    TPro  6.8  /  Alb  4.3  /  TBili  0.4  /  DBili  x   /  AST  21  /  ALT  13  /  AlkPhos  50  02-22    PT/INR - ( 22 Feb 2018 20:18 )   PT: 10.4 sec;   INR: 0.96 ratio      PTT - ( 22 Feb 2018 20:18 )  PTT:29.2 sec  CARDIAC MARKERS ( 22 Feb 2018 23:54 )  x     / <0.01 ng/mL / x     / x     / x      CARDIAC MARKERS ( 22 Feb 2018 20:18 )  x     / <0.01 ng/mL / 146 U/L / x     / 3.5 ng/mL    TELEMETRY: sinus  ECG: NSR, TWI V5-6, normal axis, good r wave progression, 1st degree AV block.     CARDIAC TESTING/STUDIES:    Cardiac Cath 2/6/2018  PROCEDURE:  --  Left heart catheterization.  --  Left coronary angiography.  --  Right coronary angiography.  --  Hemostasis with Angioseal.    VENTRICLES: No left ventriculogram was performed.    CORONARY VESSELS: The coronary circulation is right dominant.  LM:   --  LM: Normal.  LAD:   --  LAD: Angiography showed minor luminal irregularities with no  flow limiting lesions.  CX:   --  Circumflex: Angiography showed minor luminal irregularities with  no flow limiting lesions.  RCA:   --  RCA: Angiography showed minor luminal irregularities with no  flow limiting lesions.    COMPLICATIONS: There were no complications.    DIAGNOSTIC RECOMMENDATIONS: Medical management is recommended.    ECHO 2/2/2018  Conclusions:  1. Moderate concentric left ventricular hypertrophy.  2. Normal left ventricular systolic function. Septal motion  consistent with right ventricular overload.  3. Mild right atrial enlargement.  Dilated coronary sinus.  4. Normal right ventricular size and function. A device  wire is noted in the right heart.  5. Estimated pulmonary artery systolic pressure equals 31  mm Hg, assuming right atrial pressure equals 8  mm Hg,  consistent with normal pulmonary pressures.  *** Compared with echocardiogram of 10/24/2017, no  significant changes noted.    Pharm MPI 2/3/2018  IMPRESSIONS: Normal Study  * Chest Pain: No chest pain with administration of  Regadenoson.  * Symptom: Nausea.  * HR Response: Appropriate.  * BP Response: Appropriate.  * Heart Rhythm: Sinus Rhythm - 64 BPM.  * Conduction defects: 1 degree AV block.  * Baseline ECG: T wave abnormality in I  , aVL, V6. early  repol, .  * ECG Abnormalities: None.  * Arrhythmia: None.  * The left ventricle was normal in size. Normal myocardial  perfusion scan, with no evidence of infarction or  inducible ischemia.  * Post-stress gated wall motion analysis was performed  (LVEF = 44 %;LVEDV = 118 ml.) Mildly depressed LV EF. ,  Right ventricular hypertrophy., Left ventricular  hypertrophy.  * Stress test in 10/2017 was treadmill only.    ASSESSMENT/PLAN: 	   52y Male patient with past medical history of s/p ASD repair 2015 at [facility] complicated by cardiac arrest, and AICD placed presenting with chest discomfort.    1) Chest Discomfort  Severe Anxiety   Cardiac cath 2/6/2018 with non-obstructive CAD   ECHO preserved EF  Hx of PVC ablation  s/p ICD implant  Troponin negative  ECG unchanged without ischemia.     ·	Reassurance given and encouraged to follow up with Dr. Saavedra.  ·	Discussed anxiety management and referral to PCP.  ·	Counseled on increased aerobic exercise for improved conditioning.   ·	Continue medical management with metoprolol succinate 25 mg daily and flecainide 50 mg BID.     2) PVC    ·	Plan as above.     3) ASD  s/p repair 2015  s/p ICD implant    ·	Plan as above.     Soha Hoyos MD, MPH, CHRIS  Cardiovascular Specialist Attending  [facility]  C: [phone]  E: [email or web address]  (Cardiology Nocturnist cell number available 7 pm - 7 am every night; available daytime week days for follow-up only; daytime weekends covered by general cardiology consult service)

## 2018-03-05 ENCOUNTER — NON-APPOINTMENT (OUTPATIENT)
Age: 53
End: 2018-03-05

## 2018-03-05 ENCOUNTER — APPOINTMENT (OUTPATIENT)
Dept: ELECTROPHYSIOLOGY | Facility: CLINIC | Age: 53
End: 2018-03-05
Payer: MEDICAID

## 2018-03-05 VITALS
BODY MASS INDEX: 23.3 KG/M2 | WEIGHT: 145 LBS | DIASTOLIC BLOOD PRESSURE: 83 MMHG | HEIGHT: 66 IN | OXYGEN SATURATION: 97 % | SYSTOLIC BLOOD PRESSURE: 162 MMHG | HEART RATE: 77 BPM

## 2018-03-05 PROCEDURE — 93282 PRGRMG EVAL IMPLANTABLE DFB: CPT

## 2018-03-07 ENCOUNTER — INPATIENT (INPATIENT)
Facility: HOSPITAL | Age: 53
LOS: 1 days | Discharge: HOME | End: 2018-03-09
Attending: INTERNAL MEDICINE | Admitting: INTERNAL MEDICINE
Payer: MEDICAID

## 2018-03-07 VITALS
HEART RATE: 84 BPM | TEMPERATURE: 98 F | RESPIRATION RATE: 16 BRPM | SYSTOLIC BLOOD PRESSURE: 145 MMHG | OXYGEN SATURATION: 97 % | DIASTOLIC BLOOD PRESSURE: 89 MMHG

## 2018-03-07 DIAGNOSIS — I47.2 VENTRICULAR TACHYCARDIA: ICD-10-CM

## 2018-03-07 DIAGNOSIS — Z87.74 PERSONAL HISTORY OF (CORRECTED) CONGENITAL MALFORMATIONS OF HEART AND CIRCULATORY SYSTEM: Chronic | ICD-10-CM

## 2018-03-07 DIAGNOSIS — Z95.810 PRESENCE OF AUTOMATIC (IMPLANTABLE) CARDIAC DEFIBRILLATOR: Chronic | ICD-10-CM

## 2018-03-07 DIAGNOSIS — I10 ESSENTIAL (PRIMARY) HYPERTENSION: ICD-10-CM

## 2018-03-07 DIAGNOSIS — Z98.890 OTHER SPECIFIED POSTPROCEDURAL STATES: Chronic | ICD-10-CM

## 2018-03-07 LAB
ALBUMIN SERPL ELPH-MCNC: 5 G/DL — SIGNIFICANT CHANGE UP (ref 3.3–5)
ALP SERPL-CCNC: 61 U/L — SIGNIFICANT CHANGE UP (ref 40–120)
ALT FLD-CCNC: 21 U/L — SIGNIFICANT CHANGE UP (ref 4–41)
APTT BLD: 31.5 SEC — SIGNIFICANT CHANGE UP (ref 27.5–37.4)
AST SERPL-CCNC: 24 U/L — SIGNIFICANT CHANGE UP (ref 4–40)
BASOPHILS # BLD AUTO: 0.07 K/UL — SIGNIFICANT CHANGE UP (ref 0–0.2)
BASOPHILS NFR BLD AUTO: 0.8 % — SIGNIFICANT CHANGE UP (ref 0–2)
BILIRUB SERPL-MCNC: 0.5 MG/DL — SIGNIFICANT CHANGE UP (ref 0.2–1.2)
BUN SERPL-MCNC: 13 MG/DL — SIGNIFICANT CHANGE UP (ref 7–23)
CALCIUM SERPL-MCNC: 9.7 MG/DL — SIGNIFICANT CHANGE UP (ref 8.4–10.5)
CHLORIDE SERPL-SCNC: 105 MMOL/L — SIGNIFICANT CHANGE UP (ref 98–107)
CK MB BLD-MCNC: 4.46 NG/ML — SIGNIFICANT CHANGE UP (ref 1–6.6)
CK SERPL-CCNC: 256 U/L — HIGH (ref 30–200)
CO2 SERPL-SCNC: 28 MMOL/L — SIGNIFICANT CHANGE UP (ref 22–31)
CREAT SERPL-MCNC: 0.92 MG/DL — SIGNIFICANT CHANGE UP (ref 0.5–1.3)
EOSINOPHIL # BLD AUTO: 0.24 K/UL — SIGNIFICANT CHANGE UP (ref 0–0.5)
EOSINOPHIL NFR BLD AUTO: 2.6 % — SIGNIFICANT CHANGE UP (ref 0–6)
GLUCOSE SERPL-MCNC: 64 MG/DL — LOW (ref 70–99)
HCT VFR BLD CALC: 48.8 % — SIGNIFICANT CHANGE UP (ref 39–50)
HGB BLD-MCNC: 16.1 G/DL — SIGNIFICANT CHANGE UP (ref 13–17)
IMM GRANULOCYTES # BLD AUTO: 0.03 # — SIGNIFICANT CHANGE UP
IMM GRANULOCYTES NFR BLD AUTO: 0.3 % — SIGNIFICANT CHANGE UP (ref 0–1.5)
INR BLD: 0.95 — SIGNIFICANT CHANGE UP (ref 0.88–1.17)
LYMPHOCYTES # BLD AUTO: 1.21 K/UL — SIGNIFICANT CHANGE UP (ref 1–3.3)
LYMPHOCYTES # BLD AUTO: 13.3 % — SIGNIFICANT CHANGE UP (ref 13–44)
MAGNESIUM SERPL-MCNC: 2.4 MG/DL — SIGNIFICANT CHANGE UP (ref 1.6–2.6)
MCHC RBC-ENTMCNC: 28.6 PG — SIGNIFICANT CHANGE UP (ref 27–34)
MCHC RBC-ENTMCNC: 33 % — SIGNIFICANT CHANGE UP (ref 32–36)
MCV RBC AUTO: 86.8 FL — SIGNIFICANT CHANGE UP (ref 80–100)
MONOCYTES # BLD AUTO: 0.78 K/UL — SIGNIFICANT CHANGE UP (ref 0–0.9)
MONOCYTES NFR BLD AUTO: 8.6 % — SIGNIFICANT CHANGE UP (ref 2–14)
NEUTROPHILS # BLD AUTO: 6.74 K/UL — SIGNIFICANT CHANGE UP (ref 1.8–7.4)
NEUTROPHILS NFR BLD AUTO: 74.4 % — SIGNIFICANT CHANGE UP (ref 43–77)
NRBC # FLD: 0 — SIGNIFICANT CHANGE UP
PLATELET # BLD AUTO: 216 K/UL — SIGNIFICANT CHANGE UP (ref 150–400)
PMV BLD: 11.3 FL — SIGNIFICANT CHANGE UP (ref 7–13)
POTASSIUM SERPL-MCNC: 5.2 MMOL/L — SIGNIFICANT CHANGE UP (ref 3.5–5.3)
POTASSIUM SERPL-SCNC: 5.2 MMOL/L — SIGNIFICANT CHANGE UP (ref 3.5–5.3)
PROT SERPL-MCNC: 7.6 G/DL — SIGNIFICANT CHANGE UP (ref 6–8.3)
PROTHROM AB SERPL-ACNC: 10.5 SEC — SIGNIFICANT CHANGE UP (ref 9.8–13.1)
RBC # BLD: 5.62 M/UL — SIGNIFICANT CHANGE UP (ref 4.2–5.8)
RBC # FLD: 11.9 % — SIGNIFICANT CHANGE UP (ref 10.3–14.5)
SODIUM SERPL-SCNC: 147 MMOL/L — HIGH (ref 135–145)
TROPONIN T SERPL-MCNC: < 0.06 NG/ML — SIGNIFICANT CHANGE UP (ref 0–0.06)
WBC # BLD: 9.07 K/UL — SIGNIFICANT CHANGE UP (ref 3.8–10.5)
WBC # FLD AUTO: 9.07 K/UL — SIGNIFICANT CHANGE UP (ref 3.8–10.5)

## 2018-03-07 PROCEDURE — 93282 PRGRMG EVAL IMPLANTABLE DFB: CPT | Mod: 26

## 2018-03-07 PROCEDURE — 99233 SBSQ HOSP IP/OBS HIGH 50: CPT

## 2018-03-07 PROCEDURE — 71045 X-RAY EXAM CHEST 1 VIEW: CPT | Mod: 26

## 2018-03-07 RX ORDER — ASPIRIN/CALCIUM CARB/MAGNESIUM 324 MG
325 TABLET ORAL ONCE
Qty: 0 | Refills: 0 | Status: DISCONTINUED | OUTPATIENT
Start: 2018-03-07 | End: 2018-03-07

## 2018-03-07 RX ORDER — ASPIRIN/CALCIUM CARB/MAGNESIUM 324 MG
162 TABLET ORAL DAILY
Qty: 0 | Refills: 0 | Status: DISCONTINUED | OUTPATIENT
Start: 2018-03-07 | End: 2018-03-08

## 2018-03-07 RX ORDER — SOTALOL HCL 120 MG
80 TABLET ORAL ONCE
Qty: 0 | Refills: 0 | Status: COMPLETED | OUTPATIENT
Start: 2018-03-07 | End: 2018-03-07

## 2018-03-07 RX ORDER — CHLORHEXIDINE GLUCONATE 213 G/1000ML
1 SOLUTION TOPICAL DAILY
Qty: 0 | Refills: 0 | Status: DISCONTINUED | OUTPATIENT
Start: 2018-03-07 | End: 2018-03-09

## 2018-03-07 RX ORDER — SOTALOL HCL 120 MG
80 TABLET ORAL
Qty: 0 | Refills: 0 | Status: DISCONTINUED | OUTPATIENT
Start: 2018-03-08 | End: 2018-03-09

## 2018-03-07 RX ADMIN — Medication 162 MILLIGRAM(S): at 17:47

## 2018-03-07 RX ADMIN — Medication 80 MILLIGRAM(S): at 21:29

## 2018-03-07 NOTE — H&P ADULT - NEGATIVE ENMT SYMPTOMS
no nasal discharge/no ear pain/no tinnitus/no hearing difficulty/no vertigo/no nasal congestion/no sinus symptoms

## 2018-03-07 NOTE — H&P ADULT - ASSESSMENT
52y old male with past medical history of hypertension, ASD repair at [facility] in 2015 s/b cardiac arrest and ICD placement who p/w multiple ICD shocks. Admit to CCU for monitoring. 52y old male with past medical history of hypertension, ASD repair at [facility] in 2015 s/b cardiac arrest and ICD placement who p/w chest pressure w/ multiple ICD shocks. Admit to CCU for monitoring.

## 2018-03-07 NOTE — H&P ADULT - NSHPLABSRESULTS_GEN_ALL_CORE
INTERPRETATION OF TELEMETRY:  Sinus rhythm 84 B/min    ECG:  Sinus rhythm with 1st degree AV block. Repolarization changes in precordial leads.   QTc 470ms.    LABS:                        16.1   9.07  )-----------( 216      ( 07 Mar 2018 17:00 )             48.8     03-07    147<H>  |  105  |  13  ----------------------------<  64<L>  5.2   |  28  |  0.92    Ca    9.7      07 Mar 2018 17:00    TPro  7.6  /  Alb  5.0  /  TBili  0.5  /  DBili  x   /  AST  24  /  ALT  21  /  AlkPhos  61  03-07    CARDIAC MARKERS ( 07 Mar 2018 17:00 )  x     / < 0.06 ng/mL / 256 u/L / 4.46 ng/mL / x        PT/INR - ( 07 Mar 2018 17:00 )   PT: 10.5 SEC;   INR: 0.95     PTT - ( 07 Mar 2018 17:00 )  PTT:31.5 SEC    RADIOLOGY & ADDITIONAL STUDIES:    CXR: Portable upright AP chest from 3/7/2018 at 1727. No prior chest x-ray   available at this institution for comparison.    IMPRESSION:  Underinflated but otherwise clear lungs appear no pleural effusions or   pneumothorax.  Sternal wires and right chest wall single-lead AICD present. Cardiomegaly.  Trachea midline.  Unremarkable osseous structures.      CATHETERIZATION  FINDINGS:     2/2018: normal cors

## 2018-03-07 NOTE — H&P ADULT - NEGATIVE MUSCULOSKELETAL SYMPTOMS
no muscle weakness/no arthralgia/no joint swelling/no muscle cramps/no neck pain/no arthritis/no stiffness/no myalgia

## 2018-03-07 NOTE — CONSULT NOTE ADULT - ASSESSMENT
This is a 53 yo male with HTN, ASD repair 2016 c/b cardiac arrest, ICD placement and VT s/p catheter ablation 12/2018, on Flecainide and beta blocker, admitted with ICD shocks x3 for VT storm.   Plan:  Admit to CCU           D/C Flecainide and start Sotalol 80mg bid           Use Lidocaine, not Amiodarone, for sustained VT.            Likely will recommend repeat catheter ablation if patient agrees. This is a 53 yo male with HTN, ASD repair 2016 c/b cardiac arrest, ICD placement and VT s/p catheter ablation 12/2018, on Flecainide and beta blocker, admitted with ICD shocks x3 for VT storm.   Plan:  Admit to CCU           Keep K+.4.0 and Mg >2.0           D/C Flecainide and start Sotalol 80mg bid           Use Lidocaine, not Amiodarone, for sustained VT.            Likely will recommend repeat catheter ablation if patient agrees.

## 2018-03-07 NOTE — ED ADULT NURSE NOTE - OBJECTIVE STATEMENT
BIBA to room 8, A&Ox4, respirations even and unlabored, skin warm and dry good for color, ambulatory, AICD to right side of chest. Patient reports was at pharmacy today and suddenly felt SOB and AICD fired three times accompanied with chest pain at time. Patient currently reports chest pain on palpation to AICD, denies SOB. Hx MI (2015), AICD. Left AC 18g IV placed, labs drawn and sent.

## 2018-03-07 NOTE — ED PROVIDER NOTE - MEDICAL DECISION MAKING DETAILS
52 year-old male with history of defibrillator placement 3 years ago and open heart surgery (?) presents to the Emergency Department for shortness of breath with concerns about ischemic changes on EKG and recent defib shocks - consult cardiology, EP eval and cardiac labs.  Likely admission to the CCU for further management.

## 2018-03-07 NOTE — ED PROVIDER NOTE - ATTENDING CONTRIBUTION TO CARE
CHING Attending Note - Dr. Orlando  52 year-old male with history of defibrillator placement 3 years ago and open heart surgery (?) presents to the Emergency Department for palpitations lasting 2-3 seconds followed by AICD discharge x3 now with shortness of breath.   PE: pt is alert and oriented, perrl, ent normal, membranes are moist, neck supple. no lymphadenopathy or thyroid enlargement, No JVD.  Chest clear to P&A, Heart- reg rhythm without murmur, rubs or gallops, radial pulses equal bilaterally.  Abd is soft, non-tender, Bowel sounds are active. no mass or organomegaly. : No CVA tenderness. Neuro:  Pt alert and oriented x 3. Perrl    Distal neurosensory is intact. Motor function is 5/5 strength bilaterally.  No focal deficits. Extremities:  No edema.  Skin: warm and dry.  Plan: Labs with troponin, EKG, Cardiology consult, Interrogation of AICD,       Impression: Ventricular tachycardia, Pt to be admitted to CCU

## 2018-03-07 NOTE — ED PROVIDER NOTE - PHYSICAL EXAMINATION
*Gen: NAD, AAO*3  *HEENT: NC/AT, MMM, airway patent, trachea midline  *CV: RRR, S1/S2 present, no murmurs/rubs/gallops  *Resp: no respiratory distress, LCTAB, no wheezing/rales/rhonchi  *Abd: non-distended, soft N/Tx4, no guarding or rigidity  *Neuro: no focal neuro deficits, moving all limbs appropriately  *Extremities: no gross deformity  *Skin: no rashes, no wounds   ~ Lilly Freedman M.D.

## 2018-03-07 NOTE — ED PROVIDER NOTE - PROGRESS NOTE DETAILS
Tano HAN: Patient has an EKG that shows concerns for ischemic changes w/o any associated CP - no prior comparison available.  EKG sent to [email or web address] and spoke with cardiology about patient. Tano HAN: Patient had a repeat EKG - was compared with prior.  Dynamic changes noted on V6 with worsening of T-wave inversion.  Cardiology fellow aware and has seen the EKG.  They report that they would like to have the patient assessed by EP. Tano HAN: Patient was found to have a 38-second run of V-Tach via EP review.  Cardiology aware and admitted to CCU.

## 2018-03-07 NOTE — H&P ADULT - MUSCULOSKELETAL
details… detailed exam ROM intact/normal strength/no joint erythema/no calf tenderness/normal/no joint swelling/no joint warmth

## 2018-03-07 NOTE — H&P ADULT - HISTORY OF PRESENT ILLNESS
52y old male with past medical history of hypertension, ASD repair at [facility] in 2015 s/b cardiac arrest and ICD placement ([employer]tronic single chamber) who presents with multiple ICD shocks.   Patient endorsed  L sided sharp chest pain yesterday and today.   He felt shocked three times without warning.  He denied prior chest pain, shortness of breath, dizziness or palpitations. As per patient, he is compliant with his home medications (Flecainide and metoprolol). He has had multiple episodes of VT with ICD shocks during the year and  had a PVC ablation in December 2017.  He was admitted in [facility] and had a normal cardiac cath in February.  In ED,  EP interrogated his ICD, which showed 3 appropriate shocks for monomorphic ventricular tachycardia at 230 b/min and multiple episodes of NSVT.       Admit to CCU for monitoring. 52y old male with past medical history of hypertension, ASD repair at [facility] in 2015 s/b cardiac arrest and ICD placement (Medtronic single chamber) who presents with multiple ICD shocks.   Patient endorsed  L sided chest pain/pressure yesterday and today.   He felt shocked three times without warning.  He denied prior chest pain, shortness of breath, dizziness or palpitations. As per patient, he is compliant with his home medications (Flecainide and metoprolol). He has had multiple episodes of VT with ICD shocks during the year and  had a PVC ablation in December 2017.  He was admitted in [facility] and had a normal cardiac cath in February.  In ED,  EP interrogated his ICD, which showed 3 appropriate shocks for monomorphic ventricular tachycardia at 230 b/min and multiple episodes of NSVT.       Admit to CCU for monitoring.

## 2018-03-07 NOTE — H&P ADULT - NEGATIVE NEUROLOGICAL SYMPTOMS
no generalized seizures/no paresthesias/no focal seizures/no tremors/no transient paralysis/no weakness/no syncope/no vertigo

## 2018-03-07 NOTE — H&P ADULT - NEGATIVE CARDIOVASCULAR SYMPTOMS
no palpitations/no dyspnea on exertion/no orthopnea/no paroxysmal nocturnal dyspnea/no peripheral edema

## 2018-03-07 NOTE — ED ADULT TRIAGE NOTE - CHIEF COMPLAINT QUOTE
pt states was at his pharmacy when suddenly felt SOB and then felt defibrillator fire 3 times. denies any chest pain, dizziness, or sob at present. EKG in progress. hx stemi 2015.

## 2018-03-07 NOTE — H&P ADULT - NEGATIVE OPHTHALMOLOGIC SYMPTOMS
no lacrimation R/no blurred vision L/no blurred vision R/no diplopia/no photophobia/no lacrimation L

## 2018-03-07 NOTE — CONSULT NOTE ADULT - ATTENDING COMMENTS
Sustained VT s/p ICD shocks, currently on flec and metop. Will dc flec and start sotalol. Check TTE. EKG suggestive of possible LVH. Will follow.

## 2018-03-07 NOTE — ED PROVIDER NOTE - SKIN, MLM
Skin normal color for race, warm, dry and intact. No evidence of rash. Pt has mild pain over AICD site

## 2018-03-07 NOTE — ED ADULT NURSE REASSESSMENT NOTE - NS ED NURSE REASSESS COMMENT FT1
Report received from day shift RN Cliff, Pt aaox4, c/o moderate chest discomfort, VSS, CCU NP notified about chest discomfort, will continue to monitor.

## 2018-03-07 NOTE — CONSULT NOTE ADULT - SUBJECTIVE AND OBJECTIVE BOX
Patient is a 52y old male with past medical history of hypertension, ASD repair at [facility] in 2015 s/b cardiac arrest and ICD placement who presents with multiple ICD shocks.   Patient states that he had intermittent left sided chest pressure yesterday and today, while in a pharmacy parking lot, was shocked three times without warning.  No associated chest pain, shortness of breath, dizziness or palpitations. Interrogation of his Medtronic single chamber ICD showed 3 appropriate shocks for monomorphic ventricular tachycardia at 230 b/min and multiple episodes of NSVT.  He is compliant with medications (Flecainide and metoprolol). He has had multiple episodes of VT with ICD shocks during the year and  had a PVC ablation in December.  A cardiac cath in February was normal.       PAST MEDICAL & SURGICAL HISTORY:  Hypertension  AICD (automatic cardioverter/defibrillator) present  Status post cardiac surgery    ALLERGIES:  NKDA    MEDICATIONS  (STANDING):  aspirin  chewable 162 milliGRAM(s) Oral daily  Flecainide 50mg bid  metoprolol     MEDICATIONS  (PRN):      FAMILY HISTORY: noncontribuatory      SOCIAL HISTORY:    CIGARETTES: never    ALCOHOL:  none    REVIEW OF SYSTEMS:    CONSTITUTIONAL: No fever, weight loss, chills, shakes, or fatigue  EYES: No eye pain, visual disturbances, or discharge  ENMT:  No difficulty hearing, tinnitus, vertigo; No sinus or throat pain  NECK: No pain or stiffness  BREASTS: No pain, masses, or nipple discharge  RESPIRATORY: No cough, wheezing, hemoptysis, or shortness of breath  CARDIOVASCULAR: +chest pressure yesterday     No dyspnea, palpitations, dizziness, syncope, paroxysmal nocturnal dyspnea, orthopnea, or arm or leg swelling  GASTROINTESTINAL: No abdominal  or epigastric pain, nausea, vomiting, hematemesis, diarrhea, constipation, melena or bright red blood.  GENITOURINARY: No dysuria, nocturia, hematuria, or urinary incontinence  NEUROLOGICAL: No headaches, memory loss, slurred speech, limb weakness, loss of strength, numbness, or tremors  SKIN: No itching, burning, rashes, or lesions   LYMPH NODES: No enlarged glands  ENDOCRINE: No heat or cold intolerance, or hair loss  MUSCULOSKELETAL: No joint pain or swelling, muscle, back, or extremity pain  PSYCHIATRIC: No depression, anxiety, or difficulty sleeping  HEME/LYMPH: No easy bruising or bleeding gums  ALLERGY AND IMMUNOLOGIC: No hives or rash.      Vital Signs Last 24 Hrs  T(C): 36.8 (07 Mar 2018 17:12), Max: 36.8 (07 Mar 2018 17:12)  T(F): 98.3 (07 Mar 2018 17:12), Max: 98.3 (07 Mar 2018 17:12)  HR: 97 (07 Mar 2018 17:12) (84 - 97)  BP: 163/92 (07 Mar 2018 17:12) (145/89 - 163/92)  BP(mean): --  RR: 23 (07 Mar 2018 17:12) (16 - 23)  SpO2: 96% (07 Mar 2018 17:12) (96% - 97%)    PHYSICAL EXAM:    GENERAL: In no apparent distress, well nourished, and hydrated.  HEAD:  Atraumatic, Normocephalic  EYES: EOMI, PERRLA, conjunctiva and sclera clear  ENMT: No tonsillar erythema, exudates, or enlargement; Moist mucous membranes, Good dentition, No lesions  NECK: Supple and normal thyroid.  No JVD or carotid bruit.  Carotid pulse is 2+ bilaterally.  HEART: Regular rate and rhythm; No murmurs, rubs, or gallops.  PULMONARY: Clear to auscultation and perfusion.  No rales, wheezing, or rhonchi bilaterally.  ABDOMEN: Soft, Nontender, Nondistended; Bowel sounds present  EXTREMITIES:  2+ Peripheral Pulses, No clubbing, cyanosis, or edema  LYMPH: No lymphadenopathy noted  NEUROLOGICAL: Grossly nonfocal          INTERPRETATION OF TELEMETRY:  Sinus rhythm 84 B/min    ECG:  Sinus rhythm with 1st degree AV block. Repolarization changes in precordial leads.   QTc 470ms.          LABS:                        16.1   9.07  )-----------( 216      ( 07 Mar 2018 17:00 )             48.8     03-07    147<H>  |  105  |  13  ----------------------------<  64<L>  5.2   |  28  |  0.92    Ca    9.7      07 Mar 2018 17:00    TPro  7.6  /  Alb  5.0  /  TBili  0.5  /  DBili  x   /  AST  24  /  ALT  21  /  AlkPhos  61  03-07    CARDIAC MARKERS ( 07 Mar 2018 17:00 )  x     / < 0.06 ng/mL / 256 u/L / 4.46 ng/mL / x          PT/INR - ( 07 Mar 2018 17:00 )   PT: 10.5 SEC;   INR: 0.95          PTT - ( 07 Mar 2018 17:00 )  PTT:31.5 SEC          RADIOLOGY & ADDITIONAL STUDIES:  PREVIOUS DIAGNOSTIC TESTING:      EXAM:  XR CHEST AP OR PA 1V        PROCEDURE DATE:  Mar  7 2018         INTERPRETATION:  CLINICAL INDICATION: chest pain    EXAM:  Portable upright AP chest from 3/7/2018 at 1727. No prior chest x-ray   available at this institution for comparison.    IMPRESSION:  Underinflated but otherwise clear lungs appear no pleural effusions or   pneumothorax.    Sternal wires and right chest wall single-lead AICD present. Cardiomegaly.    Trachea midline.    Unremarkable osseous structures.                  BRO HAWLEY M.D., ATTENDING RADIOLOGIST  This document has been electronically signed. Mar  7 2018  5:33PM              ECHO  FINDINGS:    STRESS  FINDINGS:    CATHETERIZATION  FINDINGS:     2/2018: normal cors

## 2018-03-07 NOTE — ED PROVIDER NOTE - OBJECTIVE STATEMENT
52 year-old male with history of AICD placement 2 years ago 52 year-old male with history of defibrillator placement 3 years ago and open heart surgery (?) presents to the Emergency Department for shortness of breath.  Patient mentions that approx. an hour ago he was at the pharmacy when he noticed some palpitations that lasted for a few seconds with associated shortness of breath.  Reports feeling his defibrillator go off three times during a course of 1 minute.  No associated 52 year-old male with history of defibrillator placement 3 years ago and open heart surgery (?) presents to the Emergency Department for shortness of breath.  Patient mentions that approx. an hour ago he was at the pharmacy when he noticed some palpitations that lasted for a few seconds with associated shortness of breath.  Reports feeling his defibrillator go off three times during a course of 1 minute.  No associated syncope, lightheadedness, fevers, chills, recent URI, nausea, vomiting, diaphoresis.

## 2018-03-08 ENCOUNTER — TRANSCRIPTION ENCOUNTER (OUTPATIENT)
Age: 53
End: 2018-03-08

## 2018-03-08 ENCOUNTER — APPOINTMENT (OUTPATIENT)
Dept: INTERNAL MEDICINE | Facility: HOSPITAL | Age: 53
End: 2018-03-08

## 2018-03-08 ENCOUNTER — MEDICATION RENEWAL (OUTPATIENT)
Age: 53
End: 2018-03-08

## 2018-03-08 DIAGNOSIS — Z29.9 ENCOUNTER FOR PROPHYLACTIC MEASURES, UNSPECIFIED: ICD-10-CM

## 2018-03-08 LAB
ALBUMIN SERPL ELPH-MCNC: 4.4 G/DL — SIGNIFICANT CHANGE UP (ref 3.3–5)
ALP SERPL-CCNC: 56 U/L — SIGNIFICANT CHANGE UP (ref 40–120)
ALT FLD-CCNC: 18 U/L — SIGNIFICANT CHANGE UP (ref 4–41)
APPEARANCE UR: CLEAR — SIGNIFICANT CHANGE UP
AST SERPL-CCNC: 27 U/L — SIGNIFICANT CHANGE UP (ref 4–40)
BILIRUB SERPL-MCNC: 0.7 MG/DL — SIGNIFICANT CHANGE UP (ref 0.2–1.2)
BILIRUB UR-MCNC: NEGATIVE — SIGNIFICANT CHANGE UP
BLOOD UR QL VISUAL: NEGATIVE — SIGNIFICANT CHANGE UP
BUN SERPL-MCNC: 15 MG/DL — SIGNIFICANT CHANGE UP (ref 7–23)
CALCIUM SERPL-MCNC: 9.2 MG/DL — SIGNIFICANT CHANGE UP (ref 8.4–10.5)
CHLORIDE SERPL-SCNC: 104 MMOL/L — SIGNIFICANT CHANGE UP (ref 98–107)
CK MB BLD-MCNC: 4.74 NG/ML — SIGNIFICANT CHANGE UP (ref 1–6.6)
CK SERPL-CCNC: 542 U/L — HIGH (ref 30–200)
CO2 SERPL-SCNC: 18 MMOL/L — LOW (ref 22–31)
COLOR SPEC: YELLOW — SIGNIFICANT CHANGE UP
CREAT SERPL-MCNC: 0.75 MG/DL — SIGNIFICANT CHANGE UP (ref 0.5–1.3)
GLUCOSE SERPL-MCNC: 102 MG/DL — HIGH (ref 70–99)
GLUCOSE UR-MCNC: NEGATIVE — SIGNIFICANT CHANGE UP
HCT VFR BLD CALC: 46 % — SIGNIFICANT CHANGE UP (ref 39–50)
HGB BLD-MCNC: 15.2 G/DL — SIGNIFICANT CHANGE UP (ref 13–17)
KETONES UR-MCNC: NEGATIVE — SIGNIFICANT CHANGE UP
LEUKOCYTE ESTERASE UR-ACNC: NEGATIVE — SIGNIFICANT CHANGE UP
MAGNESIUM SERPL-MCNC: 2.2 MG/DL — SIGNIFICANT CHANGE UP (ref 1.6–2.6)
MCHC RBC-ENTMCNC: 28.6 PG — SIGNIFICANT CHANGE UP (ref 27–34)
MCHC RBC-ENTMCNC: 33 % — SIGNIFICANT CHANGE UP (ref 32–36)
MCV RBC AUTO: 86.5 FL — SIGNIFICANT CHANGE UP (ref 80–100)
MUCOUS THREADS # UR AUTO: SIGNIFICANT CHANGE UP
NITRITE UR-MCNC: NEGATIVE — SIGNIFICANT CHANGE UP
NRBC # FLD: 0 — SIGNIFICANT CHANGE UP
PH UR: 6 — SIGNIFICANT CHANGE UP (ref 4.6–8)
PHOSPHATE SERPL-MCNC: 4.1 MG/DL — SIGNIFICANT CHANGE UP (ref 2.5–4.5)
PLATELET # BLD AUTO: 198 K/UL — SIGNIFICANT CHANGE UP (ref 150–400)
PMV BLD: 11.5 FL — SIGNIFICANT CHANGE UP (ref 7–13)
POTASSIUM SERPL-MCNC: 4.3 MMOL/L — SIGNIFICANT CHANGE UP (ref 3.5–5.3)
POTASSIUM SERPL-SCNC: 4.3 MMOL/L — SIGNIFICANT CHANGE UP (ref 3.5–5.3)
PROT SERPL-MCNC: 7 G/DL — SIGNIFICANT CHANGE UP (ref 6–8.3)
PROT UR-MCNC: 10 MG/DL — SIGNIFICANT CHANGE UP
RBC # BLD: 5.32 M/UL — SIGNIFICANT CHANGE UP (ref 4.2–5.8)
RBC # FLD: 11.9 % — SIGNIFICANT CHANGE UP (ref 10.3–14.5)
RBC CASTS # UR COMP ASSIST: SIGNIFICANT CHANGE UP (ref 0–?)
SODIUM SERPL-SCNC: 140 MMOL/L — SIGNIFICANT CHANGE UP (ref 135–145)
SP GR SPEC: 1.03 — SIGNIFICANT CHANGE UP (ref 1–1.04)
TROPONIN T SERPL-MCNC: < 0.06 NG/ML — SIGNIFICANT CHANGE UP (ref 0–0.06)
TSH SERPL-MCNC: 3.29 UIU/ML — SIGNIFICANT CHANGE UP (ref 0.27–4.2)
UROBILINOGEN FLD QL: NORMAL MG/DL — SIGNIFICANT CHANGE UP
WBC # BLD: 8.42 K/UL — SIGNIFICANT CHANGE UP (ref 3.8–10.5)
WBC # FLD AUTO: 8.42 K/UL — SIGNIFICANT CHANGE UP (ref 3.8–10.5)
WBC UR QL: SIGNIFICANT CHANGE UP (ref 0–?)

## 2018-03-08 PROCEDURE — 99233 SBSQ HOSP IP/OBS HIGH 50: CPT

## 2018-03-08 PROCEDURE — 93306 TTE W/DOPPLER COMPLETE: CPT | Mod: 26

## 2018-03-08 RX ORDER — ASPIRIN/CALCIUM CARB/MAGNESIUM 324 MG
81 TABLET ORAL DAILY
Qty: 0 | Refills: 0 | Status: DISCONTINUED | OUTPATIENT
Start: 2018-03-08 | End: 2018-03-09

## 2018-03-08 RX ADMIN — Medication 80 MILLIGRAM(S): at 17:46

## 2018-03-08 RX ADMIN — Medication 80 MILLIGRAM(S): at 06:06

## 2018-03-08 RX ADMIN — CHLORHEXIDINE GLUCONATE 1 APPLICATION(S): 213 SOLUTION TOPICAL at 11:48

## 2018-03-08 RX ADMIN — Medication 162 MILLIGRAM(S): at 11:48

## 2018-03-08 NOTE — DISCHARGE NOTE ADULT - PLAN OF CARE
Management and resolution You were admitted to the hospital due to an irregular heart rhythm (Ventricular Tachycardia) causing your ICD to shock you. You were stabilized and discharged so you can be followed up at [facility] for an ablation. You were admitted to the hospital due to an irregular heart rhythm (Ventricular Tachycardia) causing your ICD to shock you. You were stabilized and given a medication called sotalol. You were then discharged so you can be followed by Dr. Pedro Saavedra at [facility] for an ablation. You were admitted to the hospital due to an irregular heart rhythm (Ventricular Tachycardia) causing your ICD to shock you. You were stabilized and given a medication called sotalol. Please STOP taking flecainide and metoprolol. You were then discharged so you can be followed by Dr. Pedro Saavedra at [facility] for an ablation. You were admitted to the hospital due to an irregular heart rhythm (Ventricular Tachycardia) causing your ICD to shock you. You were stabilized and given a medication called sotalol. Please STOP taking flecainide and metoprolol. You were then discharged with follow up appointment with Dr. Pedro Saavedra on March 26th at 8:15AM

## 2018-03-08 NOTE — DISCHARGE NOTE ADULT - PATIENT PORTAL LINK FT
You can access the [facility] Patient Portal, offered by [facility], by registering with the following website: [email or web address]

## 2018-03-08 NOTE — DISCHARGE NOTE ADULT - CARE PROVIDER_API CALL
Pedro Saavedra), Cardiac Electrophysiology; Cardiovascular Disease  [address], [place] [id number]  Phone: [phone]  Fax: [phone]

## 2018-03-08 NOTE — DISCHARGE NOTE ADULT - ADDITIONAL INSTRUCTIONS
Patient is a 53 yo M w/ PMH significant for ASD repair in 2015 at [facility] c/b cardiac arrest and ICD placement (Medtronic single chamber) who presented to the hospital with multiple ICD shocks. As per patient, he is compliant with his home medications (Flecainide and metoprolol). In ED, EP interrogated his ICD, which showed 3 appropriate shocks for monomorphic ventricular tachycardia at 230 b/min and multiple episodes of NSVT. Flecainide and metoprolol were discontinued and patient started on sotalol. He remained stable and was discharged for him to go to [facility] where he will be evaluated by EP for possible ablation.

## 2018-03-08 NOTE — DISCHARGE NOTE ADULT - CARE PLAN
Principal Discharge DX:	Ventricular tachycardia  Goal:	Management and resolution  Assessment and plan of treatment:	You were admitted to the hospital due to an irregular heart rhythm (Ventricular Tachycardia) causing your ICD to shock you. You were stabilized and discharged so you can be followed up at [facility] for an ablation. Principal Discharge DX:	Ventricular tachycardia  Goal:	Management and resolution  Assessment and plan of treatment:	You were admitted to the hospital due to an irregular heart rhythm (Ventricular Tachycardia) causing your ICD to shock you. You were stabilized and given a medication called sotalol. You were then discharged so you can be followed by Dr. Pedro Saavedra at [facility] for an ablation. Principal Discharge DX:	Ventricular tachycardia  Goal:	Management and resolution  Assessment and plan of treatment:	You were admitted to the hospital due to an irregular heart rhythm (Ventricular Tachycardia) causing your ICD to shock you. You were stabilized and given a medication called sotalol. Please STOP taking flecainide and metoprolol. You were then discharged so you can be followed by Dr. Pedro Saavedra at [facility] for an ablation. Principal Discharge DX:	Ventricular tachycardia  Goal:	Management and resolution  Assessment and plan of treatment:	You were admitted to the hospital due to an irregular heart rhythm (Ventricular Tachycardia) causing your ICD to shock you. You were stabilized and given a medication called sotalol. Please STOP taking flecainide and metoprolol. You were then discharged with follow up appointment with Dr. Pedro Saavedra on March 26th at 8:15AM

## 2018-03-08 NOTE — DISCHARGE NOTE ADULT - DO YOU HAVE DIFFICULTY CLIMBING STAIRS
"        Joshua Escaleras   2017 3:45 PM   Office Visit   MRN: [id number]    Department:  [facility]   Dept Phone:  [phone]    Description:  Male : 1932   Provider:  Ced Horton M.D.           Reason for Visit     New Patient           Allergies as of 2017     No Known Allergies      You were diagnosed with     Atherosclerosis of native coronary artery with angina pectoris, unspecified whether native or transplanted heart (CMS-HCC)   [7993565]       Cardiovascular disease   [119763]       Dyslipidemia   [196041]       Angina at rest (CMS-HCC)   [857350]         Vital Signs     Blood Pressure Pulse Height Weight Body Mass Index Oxygen Saturation    138/80 mmHg 88 1.727 m (5' 7.99\") 89.359 kg (197 lb) 29.96 kg/m2 94%    Smoking Status                   Former Smoker           Basic Information     Date Of Birth Sex Race Ethnicity Preferred Language    1932 Male White Non- English      Your appointments     Oct 19, 2017  1:20 PM   New Patient with Nima Hsieh M.D.   [facility] (--)    [address] [place] [address], [address]  [facility] [id number] [phone]           Please bring Photo ID, Insurance Cards, All Medication Bottles and copies of any legal documents (such as Living Will, Power of ) If speaking a language besides English please bring an adult . Please arrive 30 minutes prior for check in and registration. You will be receiving a confirmation call a few days before your appointment from our automated call confirmation system.            2017  8:00 AM   Established Patient with Sourav Stevenson D.O.   [facility] ([facility])    [address] [facility] [address]  [facility] [id number] [phone]           You will be receiving a confirmation call a few days before your appointment from our automated call confirmation system.            Nov 10, 2017  3:00 PM   FOLLOW UP with Ced Horton M.D.   [facility] (--)    [address], [address]  [place] [id number] [phone]              Problem List              ICD-10-CM Priority Class Noted - Resolved    Memory change R41.3   8/4/2017 - Present    Vitamin D deficiency disease E55.9   8/4/2017 - Present    Abnormal CBC R79.89   8/4/2017 - Present    Angina at rest (CMS-HCC) I20.8   8/8/2017 - Present      Health Maintenance        Date Due Completion Dates    IMM DTaP/Tdap/Td Vaccine (1 - Tdap) 4/30/1952 ---    COLONOSCOPY 4/30/1983 ---    IMM ZOSTER VACCINE 4/30/1993 ---    IMM PNEUMOCOCCAL 65+ (ADULT) LOW/MEDIUM RISK SERIES (1 of 2 - PCV13) 4/30/1998 ---    IMM INFLUENZA (1) 9/1/2017 ---            Results       Current Immunizations     Tuberculin Skin Test 6/19/2017  9:52 AM, 6/12/2017  9:26 AM      Below and/or attached are the medications your provider expects you to take. Review all of your home medications and newly ordered medications with your provider and/or pharmacist. Follow medication instructions as directed by your provider and/or pharmacist. Please keep your medication list with you and share with your provider. Update the information when medications are discontinued, doses are changed, or new medications (including over-the-counter products) are added; and carry medication information at all times in the event of emergency situations     Allergies:  No Known Allergies          Medications  Valid as of: August 08, 2017 -  4:09 PM    Generic Name Brand Name Tablet Size Instructions for use    Atorvastatin Calcium (Tab) LIPITOR 40 MG Take 1 Tab by mouth every day.        DilTIAZem HCl Coated Beads (CAPSULE SR 24 HR) CARDIZEM  MG Take 1 Cap by mouth every day.        TraZODone HCl (Tab) DESYREL 50 MG Take 1 Tab by mouth at bedtime as needed for Sleep.        .                 Medicines prescribed today were sent to:     [id number] - [place], [place] - [address]    [address] [place] [id number]    Phone:  [phone] Fax: [phone]    Open 24 Hours?: No      Medication refill instructions:       If your prescription bottle indicates you have medication refills left, it is not necessary to call your provider’s office. Please contact your pharmacy and they will refill your medication.    If your prescription bottle indicates you do not have any refills left, you may request refills at any time through one of the following ways: The online bContext system (except Urgent Care), by calling your provider’s office, or by asking your pharmacy to contact your provider’s office with a refill request. Medication refills are processed only during regular business hours and may not be available until the next business day. Your provider may request additional information or to have a follow-up visit with you prior to refilling your medication.   *Please Note: Medication refills are assigned a new Rx number when refilled electronically. Your pharmacy may indicate that no refills were authorized even though a new prescription for the same medication is available at the pharmacy. Please request the medicine by name with the pharmacy before contacting your provider for a refill.           bContext Access Code: Activation code not generated  Current bContext Status: Active           No

## 2018-03-08 NOTE — DISCHARGE NOTE ADULT - OTHER SIGNIFICANT FINDINGS
< from: Transthoracic Echocardiogram (03.08.18 @ 13:12) >    Patient name: MICHOACANO NEWELL  YOB: 1965   Age: 52 (M)   MR#: [id number]  Study Date: 3/8/2018  [facility]  Study quality: Technically good  Referring Physician: Jody Dubon MD  Blood Pressure: 128/68 mmHg  Height: 168 cm  Weight: 61 kg  BSA: 1.7 m2  ------------------------------------------------------------------------  PROCEDURE: Transthoracic echocardiogram with 2-D, M-Mode  and complete spectral and color flow Doppler.  Patient was injected with 10 cc's of aerosolized saline.  INDICATION: Ventricular tachycardia (I47.2)  ------------------------------------------------------------------------  DIMENSIONS:  Dimensions:     Normal Values:  LA:     3.9 cm    2.0 - 4.0 cm  Ao:     3.1 cm    2.0 - 3.8 cm  SEPTUM: 1.2 cm    0.6 - 1.2 cm  PWT:    1.2 cm    0.6 - 1.1 cm  LVIDd:  5.0 cm    3.0 - 5.6 cm  LVIDs:  3.2 cm    1.8 - 4.0 cm  Derived Variables:  LVMI: 138 g/m2  RWT: 0.48  Fractional short: 36 %  Ejection Fraction (Teicholtz): 65 %  ------------------------------------------------------------------------  OBSERVATIONS:  Mitral Valve: Normal mitral valve. Minimal mitral  regurgitation.  Aortic Root: Normal aortic root.  Aortic Valve: Normal trileaflet aortic valve.  Left Atrium: Normal left atrium.  LA volume index = 28  cc/m2.  Left Ventricle: Normal left ventricular systolic function.  No segmental wall motion abnormalities. Moderate concentric  left ventricular hypertrophy.  Right Heart: Moderate right atrial enlargement. Normal  right ventricular size and function.  A device wire is  noted in the right heart. Normal tricuspid valve. Mild  tricuspid regurgitation. Normal pulmonic valve.  Mild  pulmonic regurgitation.  Pericardium/PleuraNormal pericardium with no pericardial  effusion.  Hemodynamic: Estimated right ventricular systolic pressure  equals 43 mm Hg, assuming right atrial pressure equals 10  mm Hg, consistent with mild pulmonary hypertension.  ------------------------------------------------------------------------  CONCLUSIONS:  1. Normal mitral valve. Minimal mitral regurgitation.  2. Moderate concentric left ventricular hypertrophy.  3. Normal left ventricular systolic function. No segmental  wall motion abnormalities.  4. Normal right ventricular size and function.  A device  wire is noted in the right heart.  5. Estimated right ventricular systolic pressure equals 43  mm Hg, assuming right atrial pressure equals 10 mm Hg,  consistent with mild pulmonary hypertension.  6. The coronary sinus is dilated.  A bubble study was  performed with the intravenous injection of agitated saline  contrast from a left upper extremity vein and confirmed the  diagnosis of a persistent left superior vena cava (LSVC).  *** No previous Echo exam.  ------------------------------------------------------------------------  Confirmed on  3/8/2018 - 17:17:25 by Richard Ochoa M.D.  ------------------------------------------------------------------------    < end of copied text >

## 2018-03-08 NOTE — DISCHARGE NOTE ADULT - CONDITIONS AT DISCHARGE
Pt is in stable condition.  Vitals signs are stable.  Pt without arrhythmias. Continued with NSR.  He denies and CP and or SOB. Breath sounds are clear with a respiratory rate of 16-20.  GI: +bowel sounds audible.  : voiding qs.    Pt informed of medication to take upon  discharge.  Written material given.  Appointment gien to see Dr Saavedra

## 2018-03-08 NOTE — PROGRESS NOTE ADULT - PROBLEM SELECTOR PLAN 3
DVT ppx: No chemical ppx at this time pending ablation.  Diet: NPO pending VT ablation; otherwise, DASH diet

## 2018-03-08 NOTE — DISCHARGE NOTE ADULT - MEDICATION SUMMARY - MEDICATIONS TO TAKE
I will START or STAY ON the medications listed below when I get home from the hospital:    aspirin 81 mg oral delayed release tablet  -- 1 tab(s) by mouth once a day  -- Indication: For Prophylactic measure    sotalol 80 mg oral tablet  -- 1 tab(s) by mouth 2 times a day  -- Indication: For Ventricular tachycardia

## 2018-03-08 NOTE — DISCHARGE NOTE ADULT - HOSPITAL COURSE
52y old male with past medical history of hypertension, ASD repair at [facility] in 2015 c/b cardiac arrest and ICD placement who presented with chest pressure and multiple ICD shocks and admitted to CCU for monitoring. Interrogation of the ICD showed appropriate shocks for VT. Patient's flecainide and metoprolol was discontinued and he was started on sotalol. Patient remained stable with no further episodes and will follow up with EP Dr. Pedro Saavedra at [facility] for evaluation for VT ablation. Patient is a 53 yo M w/ PMH significant for ASD repair in 2015 at [facility] c/b cardiac arrest and ICD placement (Medtronic single chamber) who presented to the hospital with multiple ICD shocks. As per patient, he is compliant with his home medications (Flecainide and metoprolol). In ED, EP interrogated his ICD, which showed 3 appropriate shocks for monomorphic ventricular tachycardia at 230 b/min and multiple episodes of NSVT. Flecainide and metoprolol were discontinued and patient started on sotalol. He remained stable with no further episodes and was discharged with follow with EP Dr. Pedro Saavedra on March 26th at 8:15AM for evaluation of his VT.

## 2018-03-09 VITALS — RESPIRATION RATE: 13 BRPM | HEART RATE: 77 BPM

## 2018-03-09 LAB
ALBUMIN SERPL ELPH-MCNC: 4.2 G/DL — SIGNIFICANT CHANGE UP (ref 3.3–5)
ALP SERPL-CCNC: 59 U/L — SIGNIFICANT CHANGE UP (ref 40–120)
ALT FLD-CCNC: 19 U/L — SIGNIFICANT CHANGE UP (ref 4–41)
AST SERPL-CCNC: 18 U/L — SIGNIFICANT CHANGE UP (ref 4–40)
BASOPHILS # BLD AUTO: 0.04 K/UL — SIGNIFICANT CHANGE UP (ref 0–0.2)
BASOPHILS NFR BLD AUTO: 0.5 % — SIGNIFICANT CHANGE UP (ref 0–2)
BILIRUB SERPL-MCNC: 0.8 MG/DL — SIGNIFICANT CHANGE UP (ref 0.2–1.2)
BUN SERPL-MCNC: 15 MG/DL — SIGNIFICANT CHANGE UP (ref 7–23)
CALCIUM SERPL-MCNC: 9 MG/DL — SIGNIFICANT CHANGE UP (ref 8.4–10.5)
CHLORIDE SERPL-SCNC: 102 MMOL/L — SIGNIFICANT CHANGE UP (ref 98–107)
CO2 SERPL-SCNC: 22 MMOL/L — SIGNIFICANT CHANGE UP (ref 22–31)
CREAT SERPL-MCNC: 0.81 MG/DL — SIGNIFICANT CHANGE UP (ref 0.5–1.3)
EOSINOPHIL # BLD AUTO: 0.21 K/UL — SIGNIFICANT CHANGE UP (ref 0–0.5)
EOSINOPHIL NFR BLD AUTO: 2.7 % — SIGNIFICANT CHANGE UP (ref 0–6)
GLUCOSE SERPL-MCNC: 109 MG/DL — HIGH (ref 70–99)
HCT VFR BLD CALC: 45.4 % — SIGNIFICANT CHANGE UP (ref 39–50)
HGB BLD-MCNC: 15.9 G/DL — SIGNIFICANT CHANGE UP (ref 13–17)
IMM GRANULOCYTES # BLD AUTO: 0.02 # — SIGNIFICANT CHANGE UP
IMM GRANULOCYTES NFR BLD AUTO: 0.3 % — SIGNIFICANT CHANGE UP (ref 0–1.5)
LYMPHOCYTES # BLD AUTO: 1.36 K/UL — SIGNIFICANT CHANGE UP (ref 1–3.3)
LYMPHOCYTES # BLD AUTO: 17.5 % — SIGNIFICANT CHANGE UP (ref 13–44)
MAGNESIUM SERPL-MCNC: 2.2 MG/DL — SIGNIFICANT CHANGE UP (ref 1.6–2.6)
MCHC RBC-ENTMCNC: 29.6 PG — SIGNIFICANT CHANGE UP (ref 27–34)
MCHC RBC-ENTMCNC: 35 % — SIGNIFICANT CHANGE UP (ref 32–36)
MCV RBC AUTO: 84.5 FL — SIGNIFICANT CHANGE UP (ref 80–100)
MONOCYTES # BLD AUTO: 0.68 K/UL — SIGNIFICANT CHANGE UP (ref 0–0.9)
MONOCYTES NFR BLD AUTO: 8.7 % — SIGNIFICANT CHANGE UP (ref 2–14)
NEUTROPHILS # BLD AUTO: 5.48 K/UL — SIGNIFICANT CHANGE UP (ref 1.8–7.4)
NEUTROPHILS NFR BLD AUTO: 70.3 % — SIGNIFICANT CHANGE UP (ref 43–77)
NRBC # FLD: 0 — SIGNIFICANT CHANGE UP
PHOSPHATE SERPL-MCNC: 4.1 MG/DL — SIGNIFICANT CHANGE UP (ref 2.5–4.5)
PLATELET # BLD AUTO: 176 K/UL — SIGNIFICANT CHANGE UP (ref 150–400)
PMV BLD: 11.2 FL — SIGNIFICANT CHANGE UP (ref 7–13)
POTASSIUM SERPL-MCNC: 4.5 MMOL/L — SIGNIFICANT CHANGE UP (ref 3.5–5.3)
POTASSIUM SERPL-SCNC: 4.5 MMOL/L — SIGNIFICANT CHANGE UP (ref 3.5–5.3)
PROT SERPL-MCNC: 6.9 G/DL — SIGNIFICANT CHANGE UP (ref 6–8.3)
RBC # BLD: 5.37 M/UL — SIGNIFICANT CHANGE UP (ref 4.2–5.8)
RBC # FLD: 11.9 % — SIGNIFICANT CHANGE UP (ref 10.3–14.5)
SODIUM SERPL-SCNC: 138 MMOL/L — SIGNIFICANT CHANGE UP (ref 135–145)
WBC # BLD: 7.79 K/UL — SIGNIFICANT CHANGE UP (ref 3.8–10.5)
WBC # FLD AUTO: 7.79 K/UL — SIGNIFICANT CHANGE UP (ref 3.8–10.5)

## 2018-03-09 PROCEDURE — 99232 SBSQ HOSP IP/OBS MODERATE 35: CPT

## 2018-03-09 PROCEDURE — 99233 SBSQ HOSP IP/OBS HIGH 50: CPT

## 2018-03-09 RX ORDER — SOTALOL HCL 120 MG
1 TABLET ORAL
Qty: 120 | Refills: 0 | OUTPATIENT
Start: 2018-03-09 | End: 2018-05-07

## 2018-03-09 RX ORDER — ASPIRIN/CALCIUM CARB/MAGNESIUM 324 MG
1 TABLET ORAL
Qty: 0 | Refills: 0 | COMMUNITY
Start: 2018-03-09

## 2018-03-09 RX ADMIN — CHLORHEXIDINE GLUCONATE 1 APPLICATION(S): 213 SOLUTION TOPICAL at 12:11

## 2018-03-09 RX ADMIN — Medication 80 MILLIGRAM(S): at 13:36

## 2018-03-09 RX ADMIN — Medication 81 MILLIGRAM(S): at 12:11

## 2018-03-09 NOTE — PROGRESS NOTE ADULT - ASSESSMENT
52y old male with past medical history of hypertension, ASD repair at [facility] in 2015 s/b cardiac arrest and ICD placement who p/w chest pressure w/ multiple ICD shocks. Admit to CCU for monitoring.
This is a 51 yo male with HTN, ASD repair 2016 c/b cardiac arrest, ICD placement and VT s/p catheter ablation 12/2018, on Flecainide and beta blocker, admitted with ICD shocks x3 for VT storm.   Started on Sotalol 80 mg BID.  QT/QTc stable so far.  No recurrent VT seen on Sotalol.    - Keep K+.4.0 and Mg >2.0   -Continue Sotalol 80mg bid, please administer next dose soon  -Patient has a follow up with his EP Dr. Saavedra at [facility] next month  -Check EKG 2 hrs post Sotatlol, if QT/QTC no significant (>15% increase from baseline), may be discharged from EP standpoint
52y old male with past medical history of hypertension, ASD repair at [facility] in 2015 s/b cardiac arrest and ICD placement who p/w chest pressure w/ multiple ICD shocks. Admit to CCU for monitoring.
This is a 53 yo male with HTN, ASD repair 2016 c/b cardiac arrest, ICD placement and VT s/p catheter ablation 12/2018, on Flecainide and beta blocker, admitted with ICD shocks x3 for VT storm.   He was admitted to CCU. Flecainide discontinued. Started on Sotalol 80mg bid. Has done well. No further ectopy.  PLAN:     Use Lidocaine, not Amiodarone, for sustained VT.                Continue Sotalol 80mg bid               Keep K+ >4.0 and Mg >2.0              He prefers to be discharged when stable and follow-up with Dr. Pedro Saavedra at [facility],

## 2018-03-09 NOTE — PROGRESS NOTE ADULT - PROBLEM SELECTOR PLAN 1
s/p ICD firing  Keep K+.4.0 and Mg >2.0  D/C Flecainide and start Sotalol 80mg bid  Use Lidocaine, not Amiodarone, for sustained VT.   c/w aspirin  Likely will recommend repeat catheter ablation if patient agrees.
Plan to start patient on sotalol and monitor prior to discharge where he will follow up with Dr. Pedro Saavedra at [facility] for possible ablation.   - AM sotalol on 3/9 held due to prolonged QTc. Will contact EP about dose correction vs. earlier appointment.  - Keep K+.4.0 and Mg >2.0  - D/C Flecainide and start Sotalol 80mg bid  - Use Lidocaine, not Amiodarone, for sustained VT.   - c/w aspirin

## 2018-03-09 NOTE — PROGRESS NOTE ADULT - ATTENDING COMMENTS
Agree with above. Multiple ICD shocks and started on sotalol  Patient QTc slightly prolonged- await EP recs  Will follow

## 2018-03-09 NOTE — PROGRESS NOTE ADULT - SUBJECTIVE AND OBJECTIVE BOX
Emery Daly MD, Internal Medicine, PGY1  Pager - [place]: [phone] ; [facility]: [phone]    Patient is a 52y old  Male who presents with a chief complaint of ICD shock (07 Mar 2018 20:46)    HPI:  52y old male with past medical history of hypertension, ASD repair at [facility] in 2015 c/b cardiac arrest and ICD placement (Medtronic single chamber) who presents with multiple ICD shocks.   Patient endorsed  L sided chest pain/pressure yesterday and today.   He felt shocked three times without warning.  He denied prior chest pain, shortness of breath, dizziness or palpitations. As per patient, he is compliant with his home medications (Flecainide and metoprolol). He has had multiple episodes of VT with ICD shocks during the year and had a PVC ablation in December 2017.  He was admitted in [facility] and had a normal cardiac cath in February.  In ED,  EP interrogated his ICD, which showed 3 appropriate shocks for monomorphic ventricular tachycardia at 230 b/min and multiple episodes of NSVT.        SUBJECTIVE / OVERNIGHT EVENTS: Patient remaining in NSR overnight. No complaints.      MEDICATIONS  (STANDING):  aspirin  chewable 162 milliGRAM(s) Oral daily  chlorhexidine 4% Liquid 1 Application(s) Topical daily  sotalol 80 milliGRAM(s) Oral two times a day    MEDICATIONS  (PRN):      T(C): 36.7 (03-08-18 @ 08:00), Max: 36.8 (03-07-18 @ 17:12)  T(F): 98 (03-08-18 @ 08:00), Max: 98.3 (03-07-18 @ 17:12)  HR: 60 (03-08-18 @ 08:00) (58 - 97)  BP: 115/78 (03-08-18 @ 08:00) (105/73 - 163/92)  ABP: --  ABP(mean): --  RR: 14 (03-08-18 @ 08:00) (13 - 23)  SpO2: 97% (03-08-18 @ 08:00) (96% - 99%)    CAPILLARY BLOOD GLUCOSE        I&O's Summary      PHYSICAL EXAM  GENERAL: NAD, well-developed  HEAD:  Atraumatic, Normocephalic  EYES: EOMI, PERRLA, conjunctiva and sclera clear  NECK: Supple, No JVD  CHEST/LUNG: Clear to auscultation bilaterally; No wheeze  HEART: Regular rate and rhythm; No murmurs, rubs, or gallops  ABDOMEN: Soft, Nontender, Nondistended; Bowel sounds present  EXTREMITIES:  2+ Peripheral Pulses, No clubbing, cyanosis, or edema  NEURO:  AOx3, No focal deficits, CN II-XII intact  PSYCH: Appropriate mood and affect  SKIN: No rashes or lesions    LABS:                        15.2   8.42  )-----------( 198      ( 08 Mar 2018 00:20 )             46.0     03-08    140  |  104  |  15  ----------------------------<  102<H>  4.3   |  18<L>  |  0.75    Ca    9.2      08 Mar 2018 00:20  Phos  4.1     03-08  Mg     2.2     03-08    TPro  7.0  /  Alb  4.4  /  TBili  0.7  /  DBili  x   /  AST  27  /  ALT  18  /  AlkPhos  56  03-08    PT/INR - ( 07 Mar 2018 17:00 )   PT: 10.5 SEC;   INR: 0.95          PTT - ( 07 Mar 2018 17:00 )  PTT:31.5 SEC  CARDIAC MARKERS ( 08 Mar 2018 00:20 )  x     / < 0.06 ng/mL / 542 u/L / 4.74 ng/mL / x      CARDIAC MARKERS ( 07 Mar 2018 17:00 )  x     / < 0.06 ng/mL / 256 u/L / 4.46 ng/mL / x              RADIOLOGY & ADDITIONAL TESTS:    TTE on 2/2/2018  Dimensions:    Normal Values:  LA:     3.9    2.0 - 4.0 cm  Ao:     3.5    2.0 - 3.8 cm  SEPTUM: 1.2    0.6 - 1.2 cm  PWT:    1.1    0.6 - 1.1 cm  LVIDd:  5.3    3.0 - 5.6 cm  LVIDs:  3.2    1.8 - 4.0 cm  Derived variables:  LVMI: 137 g/m2  RWT: 0.41  Fractional short: 40 %  ------------------------------------------------------------------------  Observations:  Mitral Valve: Mitral annular calcification, otherwise  normal mitral valve. Minimal mitral regurgitation.  Aortic Valve/Aorta: Normal trileaflet aortic valve.  Aortic Root: 3.5 cm.  Left Atrium: Mildly dilated left atrium.  LA volume index =  36 cc/m2.  Left Ventricle: Normal left ventricular systolic function.  Septal motion consistent with right ventricular overload.  Moderate concentric left ventricular hypertrophy.  Right Heart: Mild right atrial enlargement.  Dilated  coronary sinus. Normal right ventricular size and function.  Adevice wire is noted in the right heart. Normal tricuspid  valve. Mild tricuspid regurgitation. Normal pulmonic valve.  Minimal pulmonic regurgitation.  Pericardium/Pleura: Normal pericardium with no pericardial  effusion.  Hemodynamic: Estimated right atrial pressure is 8 mm Hg.  Estimated right ventricular systolic pressure equals 31 mm  Hg, assuming right atrial pressure equals 8 mm Hg,  consistent with normal pulmonary pressures.  ------------------------------------------------------------------------  Conclusions:  1. Moderate concentric left ventricular hypertrophy.  2. Normal left ventricular systolic function. Septal motion  consistent with right ventricular overload.  3. Mild right atrial enlargement.  Dilated coronary sinus.  4. Normal right ventricular size and function. A device  wire is noted in the right heart.  5. Estimated pulmonary artery systolic pressure equals 31  mm Hg, assuming right atrial pressure equals 8  mm Hg,  consistent with normal pulmonary pressures.  *** Compared with echocardiogram of 10/24/2017, no  significant changes noted.  ------------------------------------------------------------------------  Confirmed on  2/2/2018 - 14:23:15 by Pablo Whitehead M.D.  ------------------------------------------------------------------------    [facility] on 2/8/2018  VENTRICLES: No left ventriculogram was performed.  CORONARY VESSELS: The coronary circulation is right dominant.  LM:   --  LM: Normal.  LAD:   --  LAD: Angiography showed minor luminal irregularities with no  flow limiting lesions.  CX:   --  Circumflex: Angiography showed minor luminal irregularities with  no flow limiting lesions.  RCA:   --  RCA: Angiography showed minor luminal irregularities with no  flow limiting lesions.  COMPLICATIONS: There were no complications.  DIAGNOSTIC RECOMMENDATIONS: Medical management is recommended.  Imaging Personally Reviewed:  Consultant(s) Notes Reviewed:    Care Discussed with Consultants/Other Providers:
Patient is a 52y old  Male who presents with a chief complaint of ICD shock (08 Mar 2018 12:47)    Feeling better.  No palpitations or dizziness.  6am Sotalol was held due to ?prolonged QT/QTc  PAST MEDICAL & SURGICAL HISTORY:  Hypertension  AICD (automatic cardioverter/defibrillator) present  ASD (atrial septal defect)  No pertinent past medical history  AICD (automatic cardioverter/defibrillator) present  Status post cardiac surgery  AICD (automatic cardioverter/defibrillator) present  Spontaneous ASD closure      MEDICATIONS  (STANDING):  aspirin enteric coated 81 milliGRAM(s) Oral daily  chlorhexidine 4% Liquid 1 Application(s) Topical daily  sotalol 80 milliGRAM(s) Oral two times a day    MEDICATIONS  (PRN):            Vital Signs Last 24 Hrs  T(C): 37.3 (09 Mar 2018 04:00), Max: 37.6 (08 Mar 2018 15:30)  T(F): 99.2 (09 Mar 2018 04:00), Max: 99.7 (08 Mar 2018 15:30)  HR: 84 (09 Mar 2018 13:00) (58 - 94)  BP: 131/80 (09 Mar 2018 13:00) (83/51 - 134/76)  BP(mean): 92 (09 Mar 2018 13:00) (58 - 92)  RR: 23 (09 Mar 2018 13:00) (12 - 23)  SpO2: 98% (09 Mar 2018 10:00) (95% - 99%)            INTERPRETATION OF TELEMETRY: SR with 1 st degree AVB at 70-90's bpm    ECG:  3/9: SR 71 QT//490 ms  (baseline 470/508ms on 3/8 at 0:10)        LABS:                        15.9   7.79  )-----------( 176      ( 09 Mar 2018 05:25 )             45.4     03-09    138  |  102  |  15  ----------------------------<  109<H>  4.5   |  22  |  0.81    Ca    9.0      09 Mar 2018 05:25  Phos  4.1     03-09  Mg     2.2     03-09    TPro  6.9  /  Alb  4.2  /  TBili  0.8  /  DBili  x   /  AST  18  /  ALT  19  /  AlkPhos  59  03-09    CARDIAC MARKERS ( 08 Mar 2018 00:20 )  x     / < 0.06 ng/mL / 542 u/L / 4.74 ng/mL / x      CARDIAC MARKERS ( 07 Mar 2018 17:00 )  x     / < 0.06 ng/mL / 256 u/L / 4.46 ng/mL / x          PT/INR - ( 07 Mar 2018 17:00 )   PT: 10.5 SEC;   INR: 0.95          PTT - ( 07 Mar 2018 17:00 )  PTT:31.5 SEC  Urinalysis Basic - ( 08 Mar 2018 09:26 )    Color: YELLOW / Appearance: CLEAR / S.026 / pH: 6.0  Gluc: NEGATIVE / Ketone: NEGATIVE  / Bili: NEGATIVE / Urobili: NORMAL mg/dL   Blood: NEGATIVE / Protein: 10 mg/dL / Nitrite: NEGATIVE   Leuk Esterase: NEGATIVE / RBC: 0-2 / WBC 0-2   Sq Epi: x / Non Sq Epi: x / Bacteria: x        BNP  RADIOLOGY & ADDITIONAL STUDIES:      PHYSICAL EXAM:    GENERAL: In no apparent distress, well nourished, and hydrated.  NECK: Supple and normal thyroid.  No JVD or carotid bruit.  Carotid pulse is 2+ bilaterally.  HEART: Regular rate and rhythm; No murmurs, rubs, or gallops.  PULMONARY: Clear to auscultation and perfusion.  No rales, wheezing, or rhonchi bilaterally.  ABDOMEN: Soft, Nontender, Nondistended; Bowel sounds present  EXTREMITIES:  2+ Peripheral Pulses, No clubbing, cyanosis, or edema  NEUROLOGICAL: Grossly nonfocal
Emery Daly MD, Internal Medicine, PGY1  Pager - [place]: [phone] ; [facility]: [phone]    Patient is a 52y old  Male who presents with a chief complaint of ICD shock (08 Mar 2018 12:47)        SUBJECTIVE / OVERNIGHT EVENTS: AM sotalol was held due to prolonged QTc (s/p 2 doses of sotalol 80mg po). Otherwise patient is doing well. No events overnight. No complaints.      MEDICATIONS  (STANDING):  aspirin enteric coated 81 milliGRAM(s) Oral daily  chlorhexidine 4% Liquid 1 Application(s) Topical daily  sotalol 80 milliGRAM(s) Oral two times a day    MEDICATIONS  (PRN):      T(C): 37.3 (18 @ 04:00), Max: 37.6 (18 @ 15:30)  T(F): 99.2 (18 @ 04:00), Max: 99.7 (18 @ 15:30)  HR: 60 (18 @ 08:00) (58 - 80)  BP: 108/70 (18 @ 08:00) (103/60 - 129/66)  ABP: --  ABP(mean): --  RR: 14 (18 @ 08:00) (12 - 23)  SpO2: 96% (18 @ 06:50) (95% - 100%)    CAPILLARY BLOOD GLUCOSE        I&O's Summary    08 Mar 2018 07:01  -  09 Mar 2018 07:00  --------------------------------------------------------  IN: 700 mL / OUT: 800 mL / NET: -100 mL        PHYSICAL EXAM  GENERAL: NAD, well-developed  HEAD:  Atraumatic, Normocephalic  EYES: EOMI, PERRLA, conjunctiva and sclera clear  NECK: Supple, No JVD  CHEST/LUNG: Clear to auscultation bilaterally; No wheeze  HEART: Regular rate and rhythm; No murmurs, rubs, or gallops  ABDOMEN: Soft, Nontender, Nondistended; Bowel sounds present  EXTREMITIES:  2+ Peripheral Pulses, No clubbing, cyanosis, or edema  NEURO:  AOx3, No focal deficits  PSYCH: Appropriate mood and affect  SKIN: No rashes or lesions    LABS:                        15.9   7.79  )-----------( 176      ( 09 Mar 2018 05:25 )             45.4     03-09    138  |  102  |  15  ----------------------------<  109<H>  4.5   |  22  |  0.81    Ca    9.0      09 Mar 2018 05:25  Phos  4.1     03-09  Mg     2.2     -09    TPro  6.9  /  Alb  4.2  /  TBili  0.8  /  DBili  x   /  AST  18  /  ALT  19  /  AlkPhos  59  03-09    PT/INR - ( 07 Mar 2018 17:00 )   PT: 10.5 SEC;   INR: 0.95          PTT - ( 07 Mar 2018 17:00 )  PTT:31.5 SEC  CARDIAC MARKERS ( 08 Mar 2018 00:20 )  x     / < 0.06 ng/mL / 542 u/L / 4.74 ng/mL / x      CARDIAC MARKERS ( 07 Mar 2018 17:00 )  x     / < 0.06 ng/mL / 256 u/L / 4.46 ng/mL / x          Urinalysis Basic - ( 08 Mar 2018 09:26 )    Color: YELLOW / Appearance: CLEAR / S.026 / pH: 6.0  Gluc: NEGATIVE / Ketone: NEGATIVE  / Bili: NEGATIVE / Urobili: NORMAL mg/dL   Blood: NEGATIVE / Protein: 10 mg/dL / Nitrite: NEGATIVE   Leuk Esterase: NEGATIVE / RBC: 0-2 / WBC 0-2   Sq Epi: x / Non Sq Epi: x / Bacteria: x        RADIOLOGY & ADDITIONAL TESTS:  < from: Transthoracic Echocardiogram (18 @ 13:12) >  DIMENSIONS:  Dimensions:     Normal Values:  LA:     3.9 cm    2.0 - 4.0 cm  Ao:     3.1 cm    2.0 - 3.8 cm  SEPTUM: 1.2 cm    0.6 - 1.2 cm  PWT:    1.2 cm    0.6 - 1.1 cm  LVIDd:  5.0 cm    3.0 - 5.6 cm  LVIDs:  3.2 cm    1.8 - 4.0 cm  Derived Variables:  LVMI: 138 g/m2  RWT: 0.48  Fractional short: 36 %  Ejection Fraction (Teicholtz): 65 %  ------------------------------------------------------------------------  OBSERVATIONS:  Mitral Valve: Normal mitral valve. Minimal mitral  regurgitation.  Aortic Root: Normal aortic root.  Aortic Valve: Normal trileaflet aortic valve.  Left Atrium: Normal left atrium.  LA volume index = 28  cc/m2.  Left Ventricle: Normal left ventricular systolic function.  No segmental wall motion abnormalities. Moderate concentric  left ventricular hypertrophy.  Right Heart: Moderate right atrial enlargement. Normal  right ventricular size and function.  A device wire is  noted in the right heart. Normal tricuspid valve. Mild  tricuspid regurgitation. Normal pulmonic valve.  Mild  pulmonic regurgitation.  Pericardium/PleuraNormal pericardium with no pericardial  effusion.  Hemodynamic: Estimated right ventricular systolic pressure  equals 43 mm Hg, assuming right atrial pressure equals 10  mm Hg, consistent with mild pulmonary hypertension.  ------------------------------------------------------------------------  CONCLUSIONS:  1. Normal mitral valve. Minimal mitral regurgitation.  2. Moderate concentric left ventricular hypertrophy.  3. Normal left ventricular systolic function. No segmental  wall motion abnormalities.  4. Normal right ventricular size and function.  A device  wire is noted in the right heart.  5. Estimated right ventricular systolic pressure equals 43  mm Hg, assuming right atrial pressure equals 10 mm Hg,  consistent with mild pulmonary hypertension.  6. The coronary sinus is dilated.  A bubble study was  performed with the intravenous injection of agitated saline  contrast from a left upper extremity vein and confirmed the  diagnosis of a persistent left superior vena cava (LSVC).  *** No previous Echo exam.    < end of copied text >    Imaging Personally Reviewed:  Consultant(s) Notes Reviewed:    Care Discussed with Consultants/Other Providers:
Patient feeling much better today. No chest pain, shortness of breath, palpitations or lightheadedness. No events overnight.  Cardiac enzymes negative x3.  He has had Sotalol 80mg x 2 doses.  Off Flecainide.     Vital Signs Last 24 Hrs  T(C): 36.7 (08 Mar 2018 11:55), Max: 36.8 (07 Mar 2018 17:12)  T(F): 98 (08 Mar 2018 11:55), Max: 98.3 (07 Mar 2018 17:12)  HR: 61 (08 Mar 2018 11:55) (58 - 97)  BP: 118/77 (08 Mar 2018 11:55) (105/73 - 163/92)  BP(mean): 83 (08 Mar 2018 11:55) (79 - 99)  RR: 17 (08 Mar 2018 11:55) (12 - 23)  SpO2: 97% (08 Mar 2018 11:55) (96% - 100%)      EKG: Normal sinus rhythm 70 b/min.  NH interval 210ms.  Qtc 500ms.   Telemetry:  Normal sinus rhythm 50's -90's. No PVC's/ectopy.    MEDICATIONS  (STANDING):  aspirin  chewable 162 milliGRAM(s) Oral daily  chlorhexidine 4% Liquid 1 Application(s) Topical daily  sotalol 80 milliGRAM(s) Oral two times a day    MEDICATIONS  (PRN):          Physical exam:   Gen- well developed. Well nourished. NAD.   Resp- clear to auscultation. No wheezing, rales or rhonchi  CV- S1 and S2 normal.  RRR  +murmur  ABD-soft nontender +bowel sounds  EXT- no edema.   Neuro- grossly nonfocal                            15.2   8.42  )-----------( 198      ( 08 Mar 2018 00:20 )             46.0     PT/INR - ( 07 Mar 2018 17:00 )   PT: 10.5 SEC;   INR: 0.95          PTT - ( 07 Mar 2018 17:00 )  PTT:31.5 SEC  03-08    140  |  104  |  15  ----------------------------<  102<H>  4.3   |  18<L>  |  0.75    Ca    9.2      08 Mar 2018 00:20  Phos  4.1     03-08  Mg     2.2     03-08    TPro  7.0  /  Alb  4.4  /  TBili  0.7  /  DBili  x   /  AST  27  /  ALT  18  /  AlkPhos  56  03-08    CARDIAC MARKERS ( 08 Mar 2018 00:20 )  x     / < 0.06 ng/mL / 542 u/L / 4.74 ng/mL / x      CARDIAC MARKERS ( 07 Mar 2018 17:00 )  x     / < 0.06 ng/mL / 256 u/L / 4.46 ng/mL / x

## 2018-03-25 ENCOUNTER — INPATIENT (INPATIENT)
Facility: HOSPITAL | Age: 53
LOS: 0 days | Discharge: HOME | DRG: 313 | End: 2018-03-26
Attending: HOSPITALIST | Admitting: INTERNAL MEDICINE
Payer: MEDICAID

## 2018-03-25 VITALS
RESPIRATION RATE: 16 BRPM | OXYGEN SATURATION: 96 % | DIASTOLIC BLOOD PRESSURE: 83 MMHG | SYSTOLIC BLOOD PRESSURE: 151 MMHG | HEART RATE: 56 BPM

## 2018-03-25 DIAGNOSIS — Z87.74 PERSONAL HISTORY OF (CORRECTED) CONGENITAL MALFORMATIONS OF HEART AND CIRCULATORY SYSTEM: Chronic | ICD-10-CM

## 2018-03-25 DIAGNOSIS — Z95.810 PRESENCE OF AUTOMATIC (IMPLANTABLE) CARDIAC DEFIBRILLATOR: Chronic | ICD-10-CM

## 2018-03-25 DIAGNOSIS — Z98.890 OTHER SPECIFIED POSTPROCEDURAL STATES: Chronic | ICD-10-CM

## 2018-03-25 DIAGNOSIS — R07.9 CHEST PAIN, UNSPECIFIED: ICD-10-CM

## 2018-03-25 LAB
ALBUMIN SERPL ELPH-MCNC: 4.2 G/DL — SIGNIFICANT CHANGE UP (ref 3.3–5)
ALP SERPL-CCNC: 52 U/L — SIGNIFICANT CHANGE UP (ref 40–120)
ALT FLD-CCNC: 20 U/L RC — SIGNIFICANT CHANGE UP (ref 10–45)
ANION GAP SERPL CALC-SCNC: 15 MMOL/L — SIGNIFICANT CHANGE UP (ref 5–17)
AST SERPL-CCNC: 21 U/L — SIGNIFICANT CHANGE UP (ref 10–40)
BASOPHILS # BLD AUTO: 0.1 K/UL — SIGNIFICANT CHANGE UP (ref 0–0.2)
BASOPHILS NFR BLD AUTO: 0.9 % — SIGNIFICANT CHANGE UP (ref 0–2)
BILIRUB SERPL-MCNC: 0.4 MG/DL — SIGNIFICANT CHANGE UP (ref 0.2–1.2)
BUN SERPL-MCNC: 10 MG/DL — SIGNIFICANT CHANGE UP (ref 7–23)
CALCIUM SERPL-MCNC: 9.3 MG/DL — SIGNIFICANT CHANGE UP (ref 8.4–10.5)
CHLORIDE SERPL-SCNC: 104 MMOL/L — SIGNIFICANT CHANGE UP (ref 96–108)
CK MB BLD-MCNC: 2.5 % — SIGNIFICANT CHANGE UP (ref 0–3.5)
CK MB CFR SERPL CALC: 2.7 NG/ML — SIGNIFICANT CHANGE UP (ref 0–6.7)
CK SERPL-CCNC: 106 U/L — SIGNIFICANT CHANGE UP (ref 30–200)
CO2 SERPL-SCNC: 22 MMOL/L — SIGNIFICANT CHANGE UP (ref 22–31)
CREAT SERPL-MCNC: 0.73 MG/DL — SIGNIFICANT CHANGE UP (ref 0.5–1.3)
EOSINOPHIL # BLD AUTO: 0.3 K/UL — SIGNIFICANT CHANGE UP (ref 0–0.5)
EOSINOPHIL NFR BLD AUTO: 5.1 % — SIGNIFICANT CHANGE UP (ref 0–6)
GAS PNL BLDV: SIGNIFICANT CHANGE UP
GLUCOSE SERPL-MCNC: 95 MG/DL — SIGNIFICANT CHANGE UP (ref 70–99)
HCT VFR BLD CALC: 42.3 % — SIGNIFICANT CHANGE UP (ref 39–50)
HGB BLD-MCNC: 15.2 G/DL — SIGNIFICANT CHANGE UP (ref 13–17)
LYMPHOCYTES # BLD AUTO: 2 K/UL — SIGNIFICANT CHANGE UP (ref 1–3.3)
LYMPHOCYTES # BLD AUTO: 33.1 % — SIGNIFICANT CHANGE UP (ref 13–44)
MCHC RBC-ENTMCNC: 31.4 PG — SIGNIFICANT CHANGE UP (ref 27–34)
MCHC RBC-ENTMCNC: 36 GM/DL — SIGNIFICANT CHANGE UP (ref 32–36)
MCV RBC AUTO: 87.1 FL — SIGNIFICANT CHANGE UP (ref 80–100)
MONOCYTES # BLD AUTO: 0.5 K/UL — SIGNIFICANT CHANGE UP (ref 0–0.9)
MONOCYTES NFR BLD AUTO: 8.8 % — SIGNIFICANT CHANGE UP (ref 2–14)
NEUTROPHILS # BLD AUTO: 3.1 K/UL — SIGNIFICANT CHANGE UP (ref 1.8–7.4)
NEUTROPHILS NFR BLD AUTO: 52.1 % — SIGNIFICANT CHANGE UP (ref 43–77)
PLATELET # BLD AUTO: 188 K/UL — SIGNIFICANT CHANGE UP (ref 150–400)
POTASSIUM SERPL-MCNC: 4.1 MMOL/L — SIGNIFICANT CHANGE UP (ref 3.5–5.3)
POTASSIUM SERPL-SCNC: 4.1 MMOL/L — SIGNIFICANT CHANGE UP (ref 3.5–5.3)
PROT SERPL-MCNC: 6.7 G/DL — SIGNIFICANT CHANGE UP (ref 6–8.3)
RBC # BLD: 4.85 M/UL — SIGNIFICANT CHANGE UP (ref 4.2–5.8)
RBC # FLD: 11.4 % — SIGNIFICANT CHANGE UP (ref 10.3–14.5)
SODIUM SERPL-SCNC: 141 MMOL/L — SIGNIFICANT CHANGE UP (ref 135–145)
TROPONIN T SERPL-MCNC: <0.01 NG/ML — SIGNIFICANT CHANGE UP (ref 0–0.06)
WBC # BLD: 6 K/UL — SIGNIFICANT CHANGE UP (ref 3.8–10.5)
WBC # FLD AUTO: 6 K/UL — SIGNIFICANT CHANGE UP (ref 3.8–10.5)

## 2018-03-25 PROCEDURE — 99285 EMERGENCY DEPT VISIT HI MDM: CPT

## 2018-03-25 PROCEDURE — 99223 1ST HOSP IP/OBS HIGH 75: CPT | Mod: GC

## 2018-03-25 PROCEDURE — 71045 X-RAY EXAM CHEST 1 VIEW: CPT | Mod: 26

## 2018-03-25 NOTE — H&P ADULT - HISTORY OF PRESENT ILLNESS
52M hx of HTN, ASD repair in 2015 at [place] c/b cardiac arrest s/p AICD placement (medtronic single chamber), recently admitted to [facility] CCU three weeks ago for multiple ICD shocks presenting with constant left sided chest pain since this morning with no radiation, worse with exertion. Patient states pain began after [place] walking home and has been constant since. Patient states he has an appointment with Dr. Saavedra today, and held off on coming to the hospital. This afternoon he began to notice a fluttering sensation in his chest as if his ICD was triggering. En route patient received nitro x2, ASA 162mg. Patient states he took ASA 81mg in the morning. Denies fever, chills, cp, sob, n/v/d, dysuria    Of note, during his [facility] CCU stay, interrogation of pt's ICD showed 3 shocks for monomorphic ventricular tachycardia at 230 b/min and multiple episodes of NSVT. Flecainide and metoprolol were discontinued and patient started on sotalol. He remained stable with no further episodes and was discharged with follow with EP Dr. Pedro Saavedra today, March 26th at 8:15AM for evaluation of his VT.    In the ER, patients vitals are Temp 97.3, HR 50-57, /89, RR 18, satting 98 on room air. 52M hx of HTN, ASD repair in 2015 at [place] c/b cardiac arrest s/p AICD placement (medtronic single chamber), recently admitted to [facility] CCU three weeks ago for multiple ICD shocks, presenting today with constant left sided chest pain since this morning with no radiation, worse with exertion. He also noticed associated dizziness with exertion after walking 2 blocks. Patient states pain began after [employer] walking home and has been constant since. Patient states he has an appointment with Dr. Saavedra today, and held off on coming to the hospital. However this afternoon he began to notice a fluttering sensation in his chest as if his ICD was triggering, and came to the hospital. En route patient received nitro x2, ASA 162mg. Patient states he took ASA 81mg in the morning. Currently the chest pain is 2/10 in intensity. Denies fever, chills, cp, sob, n/v/d, dysuria.     Of note, during his [facility] CCU stay, interrogation of pt's ICD showed 3 shocks for monomorphic ventricular tachycardia at 230 b/min and multiple episodes of NSVT. Flecainide and metoprolol were discontinued and patient started on sotalol. He remained stable with no further episodes and was discharged with follow with EP.    In the ER, patients vitals are Temp 97.3, HR 50-57, /89, RR 18, satting 98 on room air. 52M hx of HTN, ASD repair in 2015 at [place] c/b cardiac arrest s/p AICD placement (medtronic single chamber), recently admitted to [facility] CCU three weeks ago for multiple ICD shocks, presenting today with constant left sided chest pain since this morning with no radiation, worse with exertion. He also noticed associated dizziness with exertion after walking 2 blocks. Patient states pain began after [employer] walking home and has been constant since. Patient states he has an appointment with Dr. Saavedra today, and held off on coming to the hospital. However this afternoon he began to notice a fluttering sensation in his chest as if his ICD was triggering, and came to the hospital. En route patient received nitro x2, ASA 162mg. Patient states he took ASA 81mg in the morning. Currently the chest pain is 2/10 in intensity. Denies fever, chills, sob, n/v/d, dysuria.     Of note, during his [facility] CCU stay, interrogation of pt's ICD showed 3 shocks for monomorphic ventricular tachycardia at 230 b/min and multiple episodes of NSVT. Flecainide and metoprolol were discontinued and patient started on sotalol. He remained stable with no further episodes and was discharged with follow with EP.    In the ER, patients vitals are Temp 97.3, HR 50-57, /89, RR 18, satting 98 on room air.

## 2018-03-25 NOTE — H&P ADULT - NSHPLABSRESULTS_GEN_ALL_CORE
Labs and imaging personally reviewed. CBC and BMP WNL, cardiac enzymes negative x 1 set, Xray negative, TTE from 3/8 showing EF 65% otherwise normal, Stress test 2/18, recent cath normal    CARDIAC MARKERS ( 25 Mar 2018 20:26 )  x     / <0.01 ng/mL / 106 U/L / x     / 2.7 ng/mL                            15.2   6.0   )-----------( 188      ( 25 Mar 2018 20:26 )             42.3     03-25    141  |  104  |  10  ----------------------------<  95  4.1   |  22  |  0.73    Ca    9.3      25 Mar 2018 20:26    TPro  6.7  /  Alb  4.2  /  TBili  0.4  /  DBili  x   /  AST  21  /  ALT  20  /  AlkPhos  52  03-25    CAPILLARY BLOOD GLUCOSE    < from: Xray Chest 1 View- PORTABLE-Urgent (03.25.18 @ 21:07) >    ******PRELIMINARY REPORT******    ******PRELIMINARY REPORT******          EXAM:  XR CHEST PORTABLE URGENT 1V                            PROCEDURE DATE:  03/25/2018      ******PRELIMINARY REPORT******    ******PRELIMINARY REPORT******              INTERPRETATION:  no emergent findings              ******PRELIMINARY REPORT******    ******PRELIMINARY REPORT******          SOBIA GURROLA M.D., RADIOLOGY RESIDENT    < end of copied text >    < from: Transthoracic Echocardiogram (03.08.18 @ 13:12) >    CONCLUSIONS:  1. Normal mitral valve. Minimal mitral regurgitation.  2. Moderate concentric left ventricular hypertrophy.  3. Normal left ventricular systolic function. No segmental  wall motion abnormalities.  4. Normal right ventricular size and function.  A device  wire is noted in the right heart.  5. Estimated right ventricular systolic pressure equals 43  mm Hg, assuming right atrial pressure equals 10 mm Hg,  consistent with mild pulmonary hypertension.  6. The coronary sinus is dilated.  A bubble study was  performed with the intravenous injection of agitated saline  contrast from a left upper extremity vein and confirmed the  diagnosis of a persistent left superior vena cava (LSVC).  *** No previous Echo exam.    < end of copied text > Labs and imaging personally reviewed. CBC and BMP WNL, cardiac enzymes negative x 1 set, Xray negative, TTE from 3/8 showing EF 65% otherwise normal, Stress test 2/18, recent cath normal  Sinus ti HR 55 w/first degree AVB, early repolarization changes in precordial leads,        CARDIAC MARKERS ( 25 Mar 2018 20:26 )  x     / <0.01 ng/mL / 106 U/L / x     / 2.7 ng/mL                            15.2   6.0   )-----------( 188      ( 25 Mar 2018 20:26 )             42.3     03-25    141  |  104  |  10  ----------------------------<  95  4.1   |  22  |  0.73    Ca    9.3      25 Mar 2018 20:26    TPro  6.7  /  Alb  4.2  /  TBili  0.4  /  DBili  x   /  AST  21  /  ALT  20  /  AlkPhos  52  03-25    CAPILLARY BLOOD GLUCOSE    < from: Xray Chest 1 View- PORTABLE-Urgent (03.25.18 @ 21:07) >    ******PRELIMINARY REPORT******    ******PRELIMINARY REPORT******          EXAM:  XR CHEST PORTABLE URGENT 1V                            PROCEDURE DATE:  03/25/2018      ******PRELIMINARY REPORT******    ******PRELIMINARY REPORT******              INTERPRETATION:  no emergent findings              ******PRELIMINARY REPORT******    ******PRELIMINARY REPORT******          SOBIA GURROLA M.D., RADIOLOGY RESIDENT    < end of copied text >    < from: Transthoracic Echocardiogram (03.08.18 @ 13:12) >    CONCLUSIONS:  1. Normal mitral valve. Minimal mitral regurgitation.  2. Moderate concentric left ventricular hypertrophy.  3. Normal left ventricular systolic function. No segmental  wall motion abnormalities.  4. Normal right ventricular size and function.  A device  wire is noted in the right heart.  5. Estimated right ventricular systolic pressure equals 43  mm Hg, assuming right atrial pressure equals 10 mm Hg,  consistent with mild pulmonary hypertension.  6. The coronary sinus is dilated.  A bubble study was  performed with the intravenous injection of agitated saline  contrast from a left upper extremity vein and confirmed the  diagnosis of a persistent left superior vena cava (LSVC).  *** No previous Echo exam.    < end of copied text >

## 2018-03-25 NOTE — ED PROVIDER NOTE - OBJECTIVE STATEMENT
52M hx of HTN, ASD repair c/b cardiac arrest s/p AICD placement (medtronic) presenting with left sided chest pain 52M hx of HTN, ASD repair c/b cardiac arrest s/p AICD placement (medtronic) presenting with constant  left sided chest pain since this morning with no radiation, worse with exertion. Patient states pain began after [place] walking home and has been constant 52M hx of HTN, ASD repair c/b cardiac arrest s/p AICD placement (medtronic) presenting with constant  left sided chest pain since this morning with no radiation, worse with exertion. Patient states pain began after [employer] walking home and has been constant since. Patient states he has an apointment with his cardiologist tomorrow and held off on coming to the hospital. This afternoon he began to notice a fluttering sensation in his chest as if his ICD was triggering. Of note patient was seen in ED 3 weeks ago for ACID discharge 3 appropriate events for Vtach and NSVT. En route patient received nitro x2, ASA 162mg. Patient states he took ASA 81mg in the morning. Denies fever, chills, cp, sob, n/v/d, dysuria

## 2018-03-25 NOTE — CONSULT NOTE ADULT - SUBJECTIVE AND OBJECTIVE BOX
Date of Admission: 3/25/18     Patient is a 52y old  Male who presents with a chief complaint of chest pain     HISTORY OF PRESENT ILLNESS:     51yo gentleman w/PMHx hypertension, ASD repair at [facility] in 2015 s/b cardiac arrest and ICD placement recently hospitalized at [facility] for monomorphic VT w/appropriate ICD shocks 3/7-9 with medication adjustment started on sotalol now p/w L chest pain. He reports exertional chest discomfort described as a "fluttering pressure" associated with lightheadedness. He reports that the exertional lightheadedness has been going on since his discharge from [facility] but today he developed exertional chest discomfort without associated SOB, nausea/vomiting, diaphoresis, or palpitations prompting him to present to the ED. He denied any ICD shocks. No chest pressure at present.     He has been adherent with his medication regimen.  No complaints of fevers/chills, LE edema, orthopnea, sick contacts, abdominal pain, change in bowel habits or vision changes.       Allergies  No Known Allergies  	    MEDICATIONS:  Sotalol 80mg BID  Aspirin 81mg qd    PAST MEDICAL & SURGICAL HISTORY:  Hypertension  AICD (automatic cardioverter/defibrillator) present  ASD (atrial septal defect)  AICD (automatic cardioverter/defibrillator) present  Status post cardiac surgery  AICD (automatic cardioverter/defibrillator) present  Spontaneous ASD closure      FAMILY HISTORY:  No pertinent family history in first degree relatives      SOCIAL HISTORY:    [ ] Non-smoker        REVIEW OF SYSTEMS:    CONSTITUTIONAL: No weakness, fevers or chills  EYES/ENT: No visual changes;  No dysphagia  NECK: No pain or stiffness  RESPIRATORY: No cough, wheezing, hemoptysis; No shortness of breath  CARDIOVASCULAR: No palpitations; No lower extremity edema  GASTROINTESTINAL: No abdominal or epigastric pain. No nausea, vomiting, or hematemesis; No diarrhea or constipation. No melena or hematochezia.  BACK: No back pain  GENITOURINARY: No dysuria, frequency or hematuria  NEUROLOGICAL: No numbness or weakness  SKIN: No itching, burning, rashes, or lesions   All other review of systems is negative unless indicated above.    PHYSICAL EXAM:  T(C): 36.3 (03-25-18 @ 20:27), Max: 36.3 (03-25-18 @ 20:27)  HR: 51 (03-25-18 @ 20:27) (51 - 57)  BP: 147/88 (03-25-18 @ 20:27) (133/89 - 151/83)  RR: 17 (03-25-18 @ 20:27) (16 - 17)  SpO2: 97% (03-25-18 @ 20:27) (95% - 97%)  Wt(kg): --  I&O's Summary    Appearance: Well appearing, pleasant, no distress noted  HEENT:   Normal oral mucosa, PERRL, EOMI	  Lymphatic: No lymphadenopathy  Cardiovascular: Normal S1 S2, No JVD, No murmurs, No edema  Respiratory: Lungs clear to auscultation	  Psychiatry: A & O x 3, Mood & affect appropriate  Gastrointestinal:  Soft, Non-tender, + BS	  Skin: No rashes, No ecchymoses, No cyanosis	  Neurologic: Non-focal  Extremities: Normal range of motion, No clubbing, cyanosis or edema  Vascular: Peripheral pulses palpable 2+ bilaterally        LABS:	 	    CBC Full  -  ( 25 Mar 2018 20:26 )  WBC Count : 6.0 K/uL  Hemoglobin : 15.2 g/dL  Hematocrit : 42.3 %  Platelet Count - Automated : 188 K/uL  Mean Cell Volume : 87.1 fl  Mean Cell Hemoglobin : 31.4 pg  Mean Cell Hemoglobin Concentration : 36.0 gm/dL  Auto Neutrophil # : 3.1 K/uL  Auto Lymphocyte # : 2.0 K/uL  Auto Monocyte # : 0.5 K/uL  Auto Eosinophil # : 0.3 K/uL  Auto Basophil # : 0.1 K/uL  Auto Neutrophil % : 52.1 %  Auto Lymphocyte % : 33.1 %  Auto Monocyte % : 8.8 %  Auto Eosinophil % : 5.1 %  Auto Basophil % : 0.9 %    03-25    141  |  104  |  10  ----------------------------<  95  4.1   |  22  |  0.73    Ca    9.3      25 Mar 2018 20:26    TPro  6.7  /  Alb  4.2  /  TBili  0.4  /  DBili  x   /  AST  21  /  ALT  20  /  AlkPhos  52  03-25      ECG:  	Sinus ti HR 55 w/first degree AVB, early repolarization changes in precordial leads,      RADIOLOGY:    CXR-clear lungs     PREVIOUS DIAGNOSTIC TESTING:    [ ] Echocardiogram: < from: Transthoracic Echocardiogram (03.08.18 @ 13:12) >  DIMENSIONS:  Dimensions:     Normal Values:  LA:     3.9 cm    2.0 - 4.0 cm  Ao:     3.1 cm    2.0 - 3.8 cm  SEPTUM: 1.2 cm    0.6 - 1.2 cm  PWT:    1.2 cm    0.6 - 1.1 cm  LVIDd:  5.0 cm    3.0 - 5.6 cm  LVIDs:  3.2 cm    1.8 - 4.0 cm  Derived Variables:  LVMI: 138 g/m2  RWT: 0.48  Fractional short: 36 %  Ejection Fraction (Teicholtz): 65 %    CONCLUSIONS:  1. Normal mitral valve. Minimal mitral regurgitation.  2. Moderate concentric left ventricular hypertrophy.  3. Normal left ventricular systolic function. No segmental  wall motion abnormalities.  4. Normal right ventricular size and function.  A device  wire is noted in the right heart.  5. Estimated right ventricular systolic pressure equals 43  mm Hg, assuming right atrial pressure equals 10 mm Hg,  consistent with mild pulmonary hypertension.  6. The coronary sinus is dilated.  A bubble study was  performed with the intravenous injection of agitated saline  contrast from a left upper extremity vein and confirmed the  diagnosis of a persistent left superior vena cava (LSVC).  *** No previous Echo exam.      [ ]  Catheterization: < from: Cardiac Cath Lab - Adult (02.06.18 @ 15:59) >  VENTRICLES: No left ventriculogram was performed.  CORONARY VESSELS: The coronary circulation is right dominant.  LM:   --  LM: Normal.  LAD:   --  LAD: Angiography showed minor luminal irregularities with no  flow limiting lesions.  CX:   --  Circumflex: Angiography showed minor luminal irregularities with  no flow limiting lesions.  RCA:   --  RCA: Angiography showed minor luminal irregularities with no  flow limiting lesions.  COMPLICATIONS: There were no complications.  DIAGNOSTIC RECOMMENDATIONS: Medical management is recommended.  Prepared and signed by  Jimy Garcia M.D.  Signed 02/09/2018 17:43:33    	  ASSESSMENT/PLAN: 	    51yo gentleman w/PMHx hypertension, ASD repair at [facility] in 2015 s/b cardiac arrest and ICD placement recently hospitalized at [facility] for monomorphic VT w/appropriate ICD shocks 3/7-9 with medication adjustment started on sotalol now p/w L chest pain.    #Exertional chest pain without any other high risk features. No acute EKG changes. Recent cath 3/18 with minor luminal irregularities, no obstructive CAD. Initial trop negative.   - c/w ASA 81mg qd  - Trend cardiac enzymes    #Hx of VT s/p ICD placement  - Interrogated ICD--no episodes of VT or ICD discharges  - c/w sotalol 80mg BID  - Monitor EKG qd and monitor QTC    Cardiology to follow    Case d/w ED provider regarding interrogation.     J Delphin   [id number]

## 2018-03-25 NOTE — ED ADULT NURSE REASSESSMENT NOTE - NS ED NURSE REASSESS COMMENT FT1
pt was given Turkey sandwich for dinner. pt awaiting admin, bed assignment. pt denies chest pain at this time. cardiac monitor inplace. will cont to monitor.

## 2018-03-25 NOTE — H&P ADULT - NSHPPHYSICALEXAM_GEN_ALL_CORE
T(C): 36.4 (26 Mar 2018 00:39), Max: 36.4 (26 Mar 2018 00:39)  T(F): 97.6 (26 Mar 2018 00:39), Max: 97.6 (26 Mar 2018 00:39)  HR: 54 (26 Mar 2018 00:39) (50 - 57)  BP: 131/82 (26 Mar 2018 00:39) (131/82 - 151/83)  BP(mean): 106 (25 Mar 2018 20:27) (106 - 109)  RR: 17 (26 Mar 2018 00:39) (16 - 18)  SpO2: 98% (26 Mar 2018 00:39) (95% - 98%)    PHYSICAL EXAM:  GENERAL: NAD, well-groomed, well-developed  HEAD:  Atraumatic, Normocephalic  EYES: EOMI, PERRLA, conjunctiva and sclera clear  ENMT: No tonsillar erythema, exudates, or enlargement; Moist mucous membranes, Good dentition, No lesions  NECK: Supple, No JVD, Normal thyroid  CHEST/LUNG: AICD located in right chest. There is no reproducible chest pain on the left side. Clear to auscultation bilaterally; No rales, rhonchi, wheezing, or rubs  HEART: Bradycardia; No murmurs, rubs, or gallops  ABDOMEN: Soft, Nontender, Nondistended; Bowel sounds present  VASCULAR:  2+ Peripheral Pulses, No clubbing, cyanosis, or edema  LYMPH: No lymphadenopathy noted  SKIN: No rashes or lesions  NERVOUS SYSTEM:  Alert & Oriented X3, Good concentration; Motor Strength 5/5 B/L upper and lower extremities

## 2018-03-25 NOTE — H&P ADULT - NSHPREVIEWOFSYSTEMS_GEN_ALL_CORE
REVIEW OF SYSTEMS:    CONSTITUTIONAL: No weakness, fevers or chills  EYES/ENT: No visual changes; No vertigo or throat pain   NECK: No pain or stiffness  RESPIRATORY: No cough, wheezing, hemoptysis; No shortness of breath  CARDIOVASCULAR: Chest pain and fluttering sensation. No palpitations.  GASTROINTESTINAL: No abdominal or epigastric pain. No nausea, vomiting, or hematemesis; No diarrhea or constipation. No melena or hematochezia.  GENITOURINARY: No dysuria, frequency or hematuria  SKIN: No itching, burning, rashes, or lesions

## 2018-03-25 NOTE — H&P ADULT - PROBLEM SELECTOR PLAN 2
Likely 2/2 common side effect of sotalol, pt experiencing lightheadedness and dizziness on exertion, otherwise no other EKG abnormalities  - Continue to monitor on telemetry, EKG daily

## 2018-03-25 NOTE — ED PROVIDER NOTE - PROGRESS NOTE DETAILS
Pacemaker interrogated by cardiology, did not show VT or shocks. Given significan cardiac hx, will admit for further eval/mgt

## 2018-03-25 NOTE — H&P ADULT - NSHPSOCIALHISTORY_GEN_ALL_CORE
Patient lives with a roommate. No sick contacts or recent travel. He works as a . Former smoker, quit 6months ago, smoked 1ppd for 30 years, no alcohol or illicit drug use.

## 2018-03-25 NOTE — ED PROVIDER NOTE - MEDICAL DECISION MAKING DETAILS
52M presenting with CP since this morning. Concerning for ACS. Will order cardiac enzymes, rpt ekg, reassess 52M presenting with CP since this morning. Concerning for ACS. Will order cardiac enzymes, rpt ekg, reassess    MJ Hernández MD: Pt is a 53 y/o male with PMH of HTN, ASD repair c/b cardiac arrest, s/p AICD placement (medtronic) who p/w left sided chest pain since this morning with no radiation, worse with exertion. Patient states pain began after [employer] walking home and has been constant since. This afternoon he began to notice a fluttering sensation in his chest as if his ICD was triggering. Of note patient was seen in ED 3 weeks ago for AICD discharge 3 appropriate events for Vtach and NSVT. En route patient received nitro x2, ASA 162mg. Patient states he took ASA 81mg in the morning. Denies fever, chills, cp, sob, n/v/d, dysuria. Denies: SOB, F/C, focal numbness/weakness, cough, leg pain/swelling, travel, trauma, immobilization.  DDx: ACS, MSK pain, GERD  Plan: basic labs, EKG, CXR, Cardiology consult for possible unstable angina

## 2018-03-25 NOTE — H&P ADULT - PROBLEM SELECTOR PLAN 3
AICD interrogated, with no evidence of episodes of VT or ICD discharges  - Continue to monitor on telemetry, EKG daily

## 2018-03-25 NOTE — H&P ADULT - ASSESSMENT
52M hx of HTN, ASD repair in 2015 at [place] c/b cardiac arrest s/p AICD placement (medtronic single chamber), recently admitted to [facility] CCU three weeks ago for multiple ICD shocks, presenting today with constant left sided chest pain since this morning with no radiation, worse with exertion, associated with dizziness, found to be bradycardic on heart monitor.

## 2018-03-25 NOTE — ED ADULT NURSE NOTE - PSH
AICD (automatic cardioverter/defibrillator) present    AICD (automatic cardioverter/defibrillator) present    Spontaneous ASD closure    Status post cardiac surgery

## 2018-03-25 NOTE — H&P ADULT - PROBLEM SELECTOR PLAN 1
Pt presenting with left sided chest pain associated with dizziness and bradycardia, which he states all started after he was discharged from [facility]. The only new medication he was started on was sotalol. There is need to rule out ACS, however, there is also high concern that patient is experiencing common side effects of Sotalol, which are listed in multiple sources as: chest pain and severe dizziness, fainting, slow heartbeats, most of which patient is experiencing (denies syncope)   - Will continue to trend cardiac enzymes  - check orthostatics  - will continue sotalol however, will need to discuss with cardiology in AM other alternatives as pt may be experiencing adverse side effects from the drug.

## 2018-03-25 NOTE — ED ADULT NURSE NOTE - OBJECTIVE STATEMENT
53 y/o male presented to the ED via EMS. pt stated he woke up this morning c/o chest pain that worsens with exertion. pt stated he has had this pain for the past several weeks but has been progressively worse. pt enroute to hospital received X2 Nitro, 162  mg Aspirin. pt had some relief with Nitro but is complaining of headache. pt has history. of HTN, had cath lab done last year. CABG 2015. pt on cardiac monitor is Sinus [place] 1st degree block. pt on assessment is A&Ox3. Neuro intact. PERRL. on cardiac monitor and EKG Performed. 2 Large Bore IV placed. S1S2. BS +, no pain on palpation to abdomen. skin intact. on room air with no signs of distress. Dr. Armijo next to bedside. awaiting MD orders.

## 2018-03-26 ENCOUNTER — APPOINTMENT (OUTPATIENT)
Dept: ELECTROPHYSIOLOGY | Facility: CLINIC | Age: 53
End: 2018-03-26

## 2018-03-26 ENCOUNTER — TRANSCRIPTION ENCOUNTER (OUTPATIENT)
Age: 53
End: 2018-03-26

## 2018-03-26 VITALS
TEMPERATURE: 98 F | SYSTOLIC BLOOD PRESSURE: 126 MMHG | OXYGEN SATURATION: 96 % | RESPIRATION RATE: 18 BRPM | HEART RATE: 57 BPM | DIASTOLIC BLOOD PRESSURE: 80 MMHG

## 2018-03-26 DIAGNOSIS — F41.9 ANXIETY DISORDER, UNSPECIFIED: ICD-10-CM

## 2018-03-26 DIAGNOSIS — R00.1 BRADYCARDIA, UNSPECIFIED: ICD-10-CM

## 2018-03-26 DIAGNOSIS — Z29.9 ENCOUNTER FOR PROPHYLACTIC MEASURES, UNSPECIFIED: ICD-10-CM

## 2018-03-26 DIAGNOSIS — Z95.810 PRESENCE OF AUTOMATIC (IMPLANTABLE) CARDIAC DEFIBRILLATOR: ICD-10-CM

## 2018-03-26 DIAGNOSIS — I47.2 VENTRICULAR TACHYCARDIA: ICD-10-CM

## 2018-03-26 DIAGNOSIS — R07.9 CHEST PAIN, UNSPECIFIED: ICD-10-CM

## 2018-03-26 PROBLEM — I10 ESSENTIAL (PRIMARY) HYPERTENSION: Chronic | Status: ACTIVE | Noted: 2018-03-07

## 2018-03-26 LAB
ANION GAP SERPL CALC-SCNC: 14 MMOL/L — SIGNIFICANT CHANGE UP (ref 5–17)
BUN SERPL-MCNC: 10 MG/DL — SIGNIFICANT CHANGE UP (ref 7–23)
CALCIUM SERPL-MCNC: 9.3 MG/DL — SIGNIFICANT CHANGE UP (ref 8.4–10.5)
CHLORIDE SERPL-SCNC: 105 MMOL/L — SIGNIFICANT CHANGE UP (ref 96–108)
CK MB BLD-MCNC: 2.4 % — SIGNIFICANT CHANGE UP (ref 0–3.5)
CK MB BLD-MCNC: 2.5 % — SIGNIFICANT CHANGE UP (ref 0–3.5)
CK MB CFR SERPL CALC: 2.5 NG/ML — SIGNIFICANT CHANGE UP (ref 0–6.7)
CK MB CFR SERPL CALC: 2.5 NG/ML — SIGNIFICANT CHANGE UP (ref 0–6.7)
CK SERPL-CCNC: 101 U/L — SIGNIFICANT CHANGE UP (ref 30–200)
CK SERPL-CCNC: 105 U/L — SIGNIFICANT CHANGE UP (ref 30–200)
CO2 SERPL-SCNC: 23 MMOL/L — SIGNIFICANT CHANGE UP (ref 22–31)
CREAT SERPL-MCNC: 0.77 MG/DL — SIGNIFICANT CHANGE UP (ref 0.5–1.3)
GLUCOSE SERPL-MCNC: 106 MG/DL — HIGH (ref 70–99)
HCT VFR BLD CALC: 43.3 % — SIGNIFICANT CHANGE UP (ref 39–50)
HGB BLD-MCNC: 15.2 G/DL — SIGNIFICANT CHANGE UP (ref 13–17)
MCHC RBC-ENTMCNC: 30.7 PG — SIGNIFICANT CHANGE UP (ref 27–34)
MCHC RBC-ENTMCNC: 35.1 GM/DL — SIGNIFICANT CHANGE UP (ref 32–36)
MCV RBC AUTO: 87.5 FL — SIGNIFICANT CHANGE UP (ref 80–100)
PLATELET # BLD AUTO: 168 K/UL — SIGNIFICANT CHANGE UP (ref 150–400)
POTASSIUM SERPL-MCNC: 4 MMOL/L — SIGNIFICANT CHANGE UP (ref 3.5–5.3)
POTASSIUM SERPL-SCNC: 4 MMOL/L — SIGNIFICANT CHANGE UP (ref 3.5–5.3)
RBC # BLD: 4.95 M/UL — SIGNIFICANT CHANGE UP (ref 4.2–5.8)
RBC # FLD: 11.5 % — SIGNIFICANT CHANGE UP (ref 10.3–14.5)
SODIUM SERPL-SCNC: 142 MMOL/L — SIGNIFICANT CHANGE UP (ref 135–145)
TROPONIN T SERPL-MCNC: <0.01 NG/ML — SIGNIFICANT CHANGE UP (ref 0–0.06)
TROPONIN T SERPL-MCNC: <0.01 NG/ML — SIGNIFICANT CHANGE UP (ref 0–0.06)
WBC # BLD: 5.6 K/UL — SIGNIFICANT CHANGE UP (ref 3.8–10.5)
WBC # FLD AUTO: 5.6 K/UL — SIGNIFICANT CHANGE UP (ref 3.8–10.5)

## 2018-03-26 PROCEDURE — 82550 ASSAY OF CK (CPK): CPT

## 2018-03-26 PROCEDURE — 80048 BASIC METABOLIC PNL TOTAL CA: CPT

## 2018-03-26 PROCEDURE — 80053 COMPREHEN METABOLIC PANEL: CPT

## 2018-03-26 PROCEDURE — 99285 EMERGENCY DEPT VISIT HI MDM: CPT

## 2018-03-26 PROCEDURE — 82803 BLOOD GASES ANY COMBINATION: CPT

## 2018-03-26 PROCEDURE — 84484 ASSAY OF TROPONIN QUANT: CPT

## 2018-03-26 PROCEDURE — 84132 ASSAY OF SERUM POTASSIUM: CPT

## 2018-03-26 PROCEDURE — 85027 COMPLETE CBC AUTOMATED: CPT

## 2018-03-26 PROCEDURE — 82435 ASSAY OF BLOOD CHLORIDE: CPT

## 2018-03-26 PROCEDURE — 93005 ELECTROCARDIOGRAM TRACING: CPT

## 2018-03-26 PROCEDURE — 85014 HEMATOCRIT: CPT

## 2018-03-26 PROCEDURE — 82947 ASSAY GLUCOSE BLOOD QUANT: CPT

## 2018-03-26 PROCEDURE — 82553 CREATINE MB FRACTION: CPT

## 2018-03-26 PROCEDURE — 83605 ASSAY OF LACTIC ACID: CPT

## 2018-03-26 PROCEDURE — 99233 SBSQ HOSP IP/OBS HIGH 50: CPT

## 2018-03-26 PROCEDURE — 71045 X-RAY EXAM CHEST 1 VIEW: CPT

## 2018-03-26 PROCEDURE — 93010 ELECTROCARDIOGRAM REPORT: CPT

## 2018-03-26 PROCEDURE — 82330 ASSAY OF CALCIUM: CPT

## 2018-03-26 PROCEDURE — 99239 HOSP IP/OBS DSCHRG MGMT >30: CPT

## 2018-03-26 PROCEDURE — 84295 ASSAY OF SERUM SODIUM: CPT

## 2018-03-26 RX ORDER — ASPIRIN/CALCIUM CARB/MAGNESIUM 324 MG
81 TABLET ORAL DAILY
Qty: 0 | Refills: 0 | Status: DISCONTINUED | OUTPATIENT
Start: 2018-03-26 | End: 2018-03-26

## 2018-03-26 RX ORDER — ENOXAPARIN SODIUM 100 MG/ML
40 INJECTION SUBCUTANEOUS DAILY
Qty: 0 | Refills: 0 | Status: DISCONTINUED | OUTPATIENT
Start: 2018-03-26 | End: 2018-03-26

## 2018-03-26 RX ORDER — SOTALOL HCL 120 MG
80 TABLET ORAL
Qty: 0 | Refills: 0 | Status: DISCONTINUED | OUTPATIENT
Start: 2018-03-26 | End: 2018-03-26

## 2018-03-26 RX ADMIN — Medication 81 MILLIGRAM(S): at 11:13

## 2018-03-26 RX ADMIN — Medication 80 MILLIGRAM(S): at 05:05

## 2018-03-26 RX ADMIN — ENOXAPARIN SODIUM 40 MILLIGRAM(S): 100 INJECTION SUBCUTANEOUS at 11:13

## 2018-03-26 RX ADMIN — Medication 0.5 MILLIGRAM(S): at 11:15

## 2018-03-26 NOTE — DISCHARGE NOTE ADULT - CARE PLAN
Principal Discharge DX:	Chest pain, unspecified type  Goal:	resolved  Assessment and plan of treatment:	Low salt, low fat diet.   Weight management.   Take medications as prescribed.    No smoking.  Follow up appointments with your doctor(s)  as instructed.  Secondary Diagnosis:	AICD (automatic cardioverter/defibrillator) present  Assessment and plan of treatment:	EP outpatient follow up   please Follow up with PMD at the Medicine clinic  Secondary Diagnosis:	VT (ventricular tachycardia)  Assessment and plan of treatment:	Patient with AICD  continue with Sotalol  80mg oral two daily   patient as medication  Secondary Diagnosis:	Hypertension, unspecified type  Assessment and plan of treatment:	Low salt diet  Activity as tolerated.  Take all medication as prescribed.  Follow up with your medical doctor for routine blood pressure monitoring at your next visit.  Notify your doctor if you have any of the following symptoms:   Dizziness, Lightheadedness, Blurry vision, Headache, Chest pain, Shortness of breath Principal Discharge DX:	Chest pain, unspecified type  Goal:	resolved  Assessment and plan of treatment:	Low salt, low fat diet.   Weight management.   Take medications as prescribed.    No smoking.  Follow up appointments with your doctor(s)  as instructed.  Secondary Diagnosis:	AICD (automatic cardioverter/defibrillator) present  Assessment and plan of treatment:	EP outpatient follow up   please Follow up with PMD at the Medicine clinic  Secondary Diagnosis:	VT (ventricular tachycardia)  Assessment and plan of treatment:	Patient with AICD  continue with Sotalol  80mg oral two daily   ICD interrogated no further VT episodes since 3/7/18 (Official report in computer)  VT monitor zone added at 167 bpm, with detection interval of 80 bpm as discussed with Dr. MAYA Saavedra  Continue with Sotalol 80mg BID as ordered. (Corrected QTC  460)  Agree with DC planning for today as per primary team however F/U with EP clinic in 2 weeks with Dr. Saavedra.  patient as medication  Secondary Diagnosis:	Hypertension, unspecified type  Assessment and plan of treatment:	Low salt diet  Activity as tolerated.  Take all medication as prescribed.  Follow up with your medical doctor for routine blood pressure monitoring at your next visit.  Notify your doctor if you have any of the following symptoms:   Dizziness, Lightheadedness, Blurry vision, Headache, Chest pain, Shortness of breath

## 2018-03-26 NOTE — PROGRESS NOTE ADULT - SUBJECTIVE AND OBJECTIVE BOX
Patient is a 52y old  Male who presents with a chief complaint of Chest pain (26 Mar 2018 13:42)      SUBJECTIVE / OVERNIGHT EVENTS:    pt reports significant anxiety, that he feels like could be attributing to sensation of palpitations.   denies cp, sob, current dizziness upon standing.     MEDICATIONS  (STANDING):  aspirin enteric coated 81 milliGRAM(s) Oral daily  enoxaparin Injectable 40 milliGRAM(s) SubCutaneous daily  sotalol 80 milliGRAM(s) Oral two times a day    MEDICATIONS  (PRN):        CAPILLARY BLOOD GLUCOSE        I&O's Summary    25 Mar 2018 07:01  -  26 Mar 2018 07:00  --------------------------------------------------------  IN: 180 mL / OUT: 0 mL / NET: 180 mL    26 Mar 2018 07:01  -  26 Mar 2018 15:10  --------------------------------------------------------  IN: 840 mL / OUT: 0 mL / NET: 840 mL      TELE: sinus 50-60 1st degree av block, pvcs.  VS T 97.8, P 61, /87, R 18, O2 98% RA  PHYSICAL EXAM:  HEAD:  Atraumatic, Normocephalic  	EYES: EOMI, PERRLA, conjunctiva and sclera clear  	ENMT: No tonsillar erythema, exudates, or enlargement; Moist mucous membranes, Good dentition, No lesions  	NECK: Supple, No JVD, Normal thyroid  	CHEST/LUNG: AICD located in right chest. There is no reproducible chest pain on the left side. Clear to auscultation bilaterally; No rales, rhonchi, wheezing, or rubs  	HEART: Bradycardia; No murmurs, rubs, or gallops  	ABDOMEN: Soft, Nontender, Nondistended; Bowel sounds present  	VASCULAR:  2+ Peripheral Pulses, No clubbing, cyanosis, or edema  	LYMPH: No lymphadenopathy noted  	SKIN: No rashes or lesions  NERVOUS SYSTEM:  Alert & Oriented X3, Good concentration; Motor Strength 5/5 B/L upper and lower extremities    LABS:                        15.2   5.6   )-----------( 168      ( 26 Mar 2018 04:11 )             43.3     03-26    142  |  105  |  10  ----------------------------<  106<H>  4.0   |  23  |  0.77    Ca    9.3      26 Mar 2018 04:11    TPro  6.7  /  Alb  4.2  /  TBili  0.4  /  DBili  x   /  AST  21  /  ALT  20  /  AlkPhos  52  03-25      CARDIAC MARKERS ( 26 Mar 2018 12:32 )  x     / <0.01 ng/mL / 105 U/L / x     / 2.5 ng/mL  CARDIAC MARKERS ( 26 Mar 2018 04:11 )  x     / <0.01 ng/mL / 101 U/L / x     / 2.5 ng/mL  CARDIAC MARKERS ( 25 Mar 2018 20:26 )  x     / <0.01 ng/mL / 106 U/L / x     / 2.7 ng/mL          RADIOLOGY & ADDITIONAL TESTS:    Imaging Personally Reviewed:    Consultant(s) Notes Reviewed:  EP    Care Discussed with Consultants/Other Providers: EP

## 2018-03-26 NOTE — PROGRESS NOTE ADULT - PROBLEM SELECTOR PLAN 3
Likely 2/2 common side effect of sotalol, pt experiencing lightheadedness and dizziness on exertion, otherwise no other EKG abnormalities  Tele reviewed by EP. No medication changes to be made.

## 2018-03-26 NOTE — CONSULT NOTE ADULT - SUBJECTIVE AND OBJECTIVE BOX
HPI:  Patient is a 52 yeaM hx of HTN, ASD repair in 2015 at [place] c/b cardiac arrest s/p AICD placement (medtronic single chamber), recently admitted to [facility] CCU three weeks ago for multiple ICD shocks, presenting today with constant left sided chest pain since this morning with no radiation, worse with exertion. He also noticed associated dizziness with exertion after walking 2 blocks. Patient states pain began after [facility] walking home and has been constant since. Patient states he has an appointment with Dr. Saavedra today, and held off on coming to the hospital. However this afternoon he began to notice a fluttering sensation in his chest as if his ICD was triggering, and came to the hospital. En route patient received nitro x2, ASA 162mg. Patient states he took ASA 81mg in the morning. Currently the chest pain is 2/10 in intensity.      Of note, during his [facility] CCU stay, interrogation of pt's ICD showed 3 shocks for monomorphic ventricular tachycardia at 230 b/min and multiple episodes of NSVT. Flecainide and metoprolol were discontinued and patient started on sotalol. He remained stable with no further episodes and was discharged with follow with EP.          PAST MEDICAL & SURGICAL HISTORY:  Hypertension  AICD (automatic cardioverter/defibrillator) present  ASD (atrial septal defect)  AICD (automatic cardioverter/defibrillator) present  Status post cardiac surgery  AICD (automatic cardioverter/defibrillator) present  Spontaneous ASD closure      ROS:    General: Pt denies recent weight loss/fever/chills    Neurological: denies numbness or  sensation loss    Cardiovascular: denies chest pain/palpitations/leg edema    Respiratory and Thorax: denies SOB/cough/wheezing    Gastrointestinal: denies abdominal pain/diarrhea/constipation/bloody stool    Genitourinary: denies urinary frequency/urgency/ dysuria    Musculoskeletal: denies joint pain or swelling, denies restricted motion    Hematologic: denies abnormal bleeding  	    	  	    		        	    	            MEDICATIONS  (STANDING):  aspirin enteric coated 81 milliGRAM(s) Oral daily  enoxaparin Injectable 40 milliGRAM(s) SubCutaneous daily  sotalol 80 milliGRAM(s) Oral two times a day    MEDICATIONS  (PRN):      Allergies    No Known Allergies    Intolerances        SOCIAL HISTORY:    FAMILY HISTORY:  No pertinent family history in first degree relatives      Vital Signs Last 24 Hrs  T(C): 36.6 (26 Mar 2018 04:20), Max: 36.6 (26 Mar 2018 04:20)  T(F): 97.8 (26 Mar 2018 04:20), Max: 97.8 (26 Mar 2018 04:20)  HR: 61 (26 Mar 2018 04:20) (50 - 61)  BP: 143/87 (26 Mar 2018 04:20) (131/78 - 151/83)  BP(mean): 106 (25 Mar 2018 20:27) (106 - 109)  RR: 18 (26 Mar 2018 04:20) (16 - 18)  SpO2: 98% (26 Mar 2018 04:20) (95% - 98%)    Physical Exam:        LABS:                        15.2   5.6   )-----------( 168      ( 26 Mar 2018 04:11 )             43.3     03-26    142  |  105  |  10  ----------------------------<  106<H>  4.0   |  23  |  0.77    Ca    9.3      26 Mar 2018 04:11    TPro  6.7  /  Alb  4.2  /  TBili  0.4  /  DBili  x   /  AST  21  /  ALT  20  /  AlkPhos  52  03-25          RADIOLOGY & ADDITIONAL STUDIES:    TELE:  EKG: HPI:   Patient is a 52 year old male with past medical history of HTN, ASD repair in 2015 at [place] c/b cardiac arrest s/p AICD placement (medtronic single chamber), recently admitted to [facility] CCU three weeks ago for multiple ICD shocks. Of note, during his [facility] CCU stay, interrogation of pt's ICD showed 3 shocks for monomorphic ventricular tachycardia at 230 b/min and multiple episodes of NSVT. Flecainide and metoprolol were discontinued and patient started on sotalol. He remained stable with no further episodes and was discharged with follow with EP.  On 3/25/18, Presented to ED with constant left sided chest pain since with no radiation, fluttering sensation, worse with exertion. He also noticed associated dizziness with exertion after walking 2 blocks.  EP consult called for further management. At present, pt denies any lightheadedness, dizziness, CP, palpitations or SOB, nausea/ vomiting or abdominal pain.           PAST MEDICAL & SURGICAL HISTORY:  Hypertension  AICD (automatic cardioverter/defibrillator) present  ASD (atrial septal defect)  AICD (automatic cardioverter/defibrillator) present  Status post cardiac surgery  AICD (automatic cardioverter/defibrillator) present  Spontaneous ASD closure      ROS:  Neurological: denies numbness or  sensation loss  Cardiovascular: denies chest pain/palpitations/leg edema  Respiratory and Thorax: denies SOB/cough/wheezing  Gastrointestinal: denies abdominal pain/diarrhea/constipation/bloody stool  Musculoskeletal: denies joint pain or swelling, denies restricted motion  Hematologic: denies abnormal bleeding  	    	  MEDICATIONS  (STANDING):  aspirin enteric coated 81 milliGRAM(s) Oral daily  enoxaparin Injectable 40 milliGRAM(s) SubCutaneous daily  sotalol 80 milliGRAM(s) Oral two times a day    MEDICATIONS  (PRN):      Allergies    No Known Allergies    Intolerances        SOCIAL HISTORY: Single, former smoker( quit 6 months ago)    FAMILY HISTORY:  No pertinent family history in first degree relatives      Vital Signs Last 24 Hrs  T(C): 36.6 (26 Mar 2018 04:20), Max: 36.6 (26 Mar 2018 04:20)  T(F): 97.8 (26 Mar 2018 04:20), Max: 97.8 (26 Mar 2018 04:20)  HR: 61 (26 Mar 2018 04:20) (50 - 61)  BP: 143/87 (26 Mar 2018 04:20) (131/78 - 151/83)  BP(mean): 106 (25 Mar 2018 20:27) (106 - 109)  RR: 18 (26 Mar 2018 04:20) (16 - 18)  SpO2: 98% (26 Mar 2018 04:20) (95% - 98%)    Physical Exam:  Neuro: Alert and oriented x 3  Resp: Bilateral lung sounds clear to ausculation  Cardiac: S1, S2  Abdomen: Soft, non- distended, + BS x4   Extremities: No edema noted.      LABS:                        15.2   5.6   )-----------( 168      ( 26 Mar 2018 04:11 )             43.3     03-26    142  |  105  |  10  ----------------------------<  106<H>  4.0   |  23  |  0.77    Ca    9.3      26 Mar 2018 04:11    TPro  6.7  /  Alb  4.2  /  TBili  0.4  /  DBili  x   /  AST  21  /  ALT  20  /  AlkPhos  52  03-25          RADIOLOGY & ADDITIONAL STUDIES:    TELE:  EKG:

## 2018-03-26 NOTE — DISCHARGE NOTE ADULT - MEDICATION SUMMARY - MEDICATIONS TO TAKE
I will START or STAY ON the medications listed below when I get home from the hospital:    aspirin 81 mg oral delayed release tablet  -- 1 tab(s) by mouth once a day  -- Indication: For Chest pain    sotalol 80 mg oral tablet  -- 1 tab(s) by mouth 2 times a day  -- Indication: For VT (ventricular tachycardia)    Ativan 0.5 mg oral tablet  -- 0.5 tab(s) by mouth once a day, As Needed -for anxiety MDD:1  -- Caution federal law prohibits the transfer of this drug to any person other  than the person for whom it was prescribed.  Do not take this drug if you are pregnant.  May cause drowsiness.  Alcohol may intensify this effect.  Use care when operating dangerous machinery.    -- Indication: For Anxiety

## 2018-03-26 NOTE — PROCEDURE NOTE - ADDITIONAL PROCEDURE DETAILS
No events noted. No ICD discharges.
1. Battery longevity 9 years  2. Measured data WNL  3. Sensing and pacing thresholds adequate  4. Patient with previous episode of Monomorphic VT successfully terminated by shock therapy on 3/7/18 and was evaluated at [facility]  5. Patient currently on Sotalol 80mg bid  6. Not pacemaker dependent, Vpaced <1%  7. VT monitor zone added at 167 bpm, with detection interval of 80 bpm  8. Above change d/w Dr Pedro Saavedra, see EP Consult note for further recommendation

## 2018-03-26 NOTE — DISCHARGE NOTE ADULT - HOSPITAL COURSE
· HPI Objective Statement: 51 y/o male presented to the ED via EMS. pt stated he woke up this morning c/o chest pain that worsens with exertion. pt stated he has had this pain for the past several weeks but has been progressively worse. pt enroute to hospital received X2 Nitro, 162  mg Aspirin. pt had some relief with Nitro but is complaining of headache. pt has history. of HTN, had cath lab done last year. CABG 2015. pt on cardiac monitor is [place] 1st degree block. pt on assessment is A&Ox3. Neuro intact. PERRL. on cardiac monitor and EKG Performed. 2 Large Bore IV placed. S1S2. BS +, no pain on palpation to abdomen. skin intact. on room air with no signs of distress. Dr. Armijo next to bedside. awaiting MD orders. · HPI Objective Statement: 53 y/o male presented to the ED via EMS. pt stated he woke up this morning c/o chest pain that worsens with exertion. pt stated he has had this pain for the past several weeks but has been progressively worse. pt enroute to hospital received X2 Nitro, 162  mg Aspirin. pt had some relief with Nitro but is complaining of headache. pt has history. of HTN, had cath lab done last year. CABG 2015. pt on cardiac monitor is [place] 1st degree block. pt on assessment is A&Ox3. Neuro intact. PERRL. on cardiac monitor and EKG Performed. 2 Large Bore IV placed. S1S2. BS +, no pain on palpation to abdomen. skin intact. on room air with no signs of distress. Dr. Armijo next to bedside. awaiting MD orders.	  53yo gentleman w/PMHx hypertension, ASD repair at [facility] in 2015 s/b cardiac arrest and ICD placement recently hospitalized at [facility] for monomorphic VT w/appropriate ICD shocks 3/7-9 with medication adjustment started on sotalol now p/w L chest pain.    #Exertional chest pain without any other high risk features. No acute EKG changes. Recent cath 3/18 with minor luminal irregularities, no obstructive CAD. Initial trop negative.   - c/w ASA 81mg qd  - Trend cardiac enzymes  #Hx of VT s/p ICD placement  - Interrogated ICD--no episodes of VT or ICD discharges  - c/w sotalol 80mg BID  - Monitor EKG qd and monitor QTC · HPI Objective Statement: 51 y/o male presented to the ED via EMS. pt stated he woke up this morning c/o chest pain that worsens with exertion. pt stated he has had this pain for the past several weeks but has been progressively worse. pt enroute to hospital received X2 Nitro, 162  mg Aspirin. pt had some relief with Nitro but is complaining of headache. pt has history. of HTN, had cath lab done last year. CABG 2015. pt on cardiac monitor is [place] 1st degree block. pt on assessment is A&Ox3. Neuro intact. PERRL. on cardiac monitor and EKG Performed. 2 Large Bore IV placed. S1S2. BS +, no pain on palpation to abdomen. skin intact. on room air with no signs of distress. Dr. Armijo next to bedside. awaiting MD orders.	  Hx hypertension, ASD repair at [facility] in 2015 s/b cardiac arrest and ICD placement recently hospitalized at [facility] for monomorphic VT w/appropriate ICD shocks 3/7-9 with medication adjustment started on sotalol now p/w L chest pain.  #Exertional chest pain without any other high risk features. No acute EKG changes. Recent cath 3/18 with minor luminal irregularities, no obstructive CAD. Initial trop negative.   - c/w ASA 81mg qd  - Trend cardiac enzymes x3 negative   #Hx of VT s/p ICD placement  - Interrogated ICD--no episodes of VT or ICD discharges  - c/w sotalol 80mg BID  - Monitor EKG qd and monitor QTC Patient is a 52 year old male with past medical history  of HTN, ASD repair in 2015 at [place] c/b cardiac arrest s/p AICD placement (medtronic single chamber), recently admitted to [facility] CCU three weeks ago for multiple ICD shocks. Of note, during his [facility] CCU stay, interrogation of pt's ICD showed 3 shocks for monomorphic ventricular tachycardia at 230 b/min and multiple episodes of NSVT. Flecainide and metoprolol were discontinued and patient started on sotalol. He remained stable with no further episodes and was discharged with follow with EP. On 3/25/18, Presented to ED with constant left sided chest pain since with no radiation, fluttering sensation, worse with exertion. He also noticed associated dizziness with exertion after walking 2 blocks.  EP consult called for further management.  No further VT episodes since 3/7/18. AICD interrogated- VT monitor zone added at 167 bpm, with detection interval of 80 bpm as discussed with Dr. MAYA Saavedra. Pt was ruled out for ACS with cardiac enzymes neg x 3, no tele events, no ekg changes. After extensive discussion with pt it was detected he has significant anxiety and possible episodes of panic contributing to his symptoms. pt was given a short trial of ativan with instruction to take in emergency situations of anxiety. It was discussed with patient at length the importance of establishing care with a PMD and the information to [facility] medicine clinic was givenupon discharge. pt medically stable for d/c home- he will follow up with EP, Dr. Saavedra in 2 weeks.

## 2018-03-26 NOTE — DISCHARGE NOTE ADULT - ADDITIONAL INSTRUCTIONS
[facility] Medicine office clinic     Please call and make appt #[phone] Medicine Clinic after discharge from hospital [address] Medicine office clinic   Please call and make appt #[phone] Medicine Clinic after discharge from hospital  Please follow up with outpatient EP Dr Saavedra in 2 week call and make appt

## 2018-03-26 NOTE — DISCHARGE NOTE ADULT - SECONDARY DIAGNOSIS.
AICD (automatic cardioverter/defibrillator) present VT (ventricular tachycardia) Hypertension, unspecified type

## 2018-03-26 NOTE — PROGRESS NOTE ADULT - ASSESSMENT
52M hx of HTN, ASD repair in 2015 at [place] c/b cardiac arrest s/p AICD placement (medtronic single chamber), recently admitted to [facility] CCU three weeks ago for multiple ICD shocks, presenting today with constant left sided chest pain since this morning with no radiation, worse with exertion, associated with dizziness, found to be bradycardic on heart monitor.

## 2018-03-26 NOTE — CONSULT NOTE ADULT - ASSESSMENT
Patient is a 52 year old male with past medical history  of HTN, ASD repair in 2015 at [place] c/b cardiac arrest s/p AICD placement (medtronic single chamber), recently admitted to [facility] CCU three weeks ago for multiple ICD shocks. Of note, during his [facility] CCU stay, interrogation of pt's ICD showed 3 shocks for monomorphic ventricular tachycardia at 230 b/min and multiple episodes of NSVT. Flecainide and metoprolol were discontinued and patient started on sotalol. He remained stable with no further episodes and was discharged with follow with EP. On 3/25/18, Presented to ED with constant left sided chest pain since with no radiation, fluttering sensation, worse with exertion. He also noticed associated dizziness with exertion after walking 2 blocks.  EP consult called for further management.  No further VT episodes since 3/7/18.

## 2018-03-26 NOTE — DISCHARGE NOTE ADULT - PATIENT PORTAL LINK FT
You can access the [facility] Patient Portal, offered by [facility], by registering with the following website: [email or web address]

## 2018-03-26 NOTE — CHART NOTE - NSCHARTNOTEFT_GEN_A_CORE
Patient is a 52y old  Male who presents with a chief complaint of Chest pain (26 Mar 2018 13:42)      Vital Signs Last 24 Hrs  T(C): 36.4 (26 Mar 2018 12:30), Max: 36.6 (26 Mar 2018 04:20)  T(F): 97.5 (26 Mar 2018 12:30), Max: 97.8 (26 Mar 2018 04:20)  HR: 57 (26 Mar 2018 12:30) (50 - 61)  BP: 126/80 (26 Mar 2018 12:30) (126/80 - 151/83)  BP(mean): 106 (25 Mar 2018 20:27) (106 - 109)  RR: 18 (26 Mar 2018 12:30) (16 - 18)  SpO2: 96% (26 Mar 2018 12:30) (95% - 98%)      Labs:                          15.2   5.6   )-----------( 168      ( 26 Mar 2018 04:11 )             43.3     03-26    142  |  105  |  10  ----------------------------<  106<H>  4.0   |  23  |  0.77    Ca    9.3      26 Mar 2018 04:11    TPro  6.7  /  Alb  4.2  /  TBili  0.4  /  DBili  x   /  AST  21  /  ALT  20  /  AlkPhos  52  03-25    CARDIAC MARKERS ( 26 Mar 2018 12:32 )  x     / <0.01 ng/mL / 105 U/L / x     / 2.5 ng/mL  CARDIAC MARKERS ( 26 Mar 2018 04:11 )  x     / <0.01 ng/mL / 101 U/L / x     / 2.5 ng/mL  CARDIAC MARKERS ( 25 Mar 2018 20:26 )  x     / <0.01 ng/mL / 106 U/L / x     / 2.7 ng/mL          Radiology:    Physical Exam:  General: WN/WD NAD  Neurology: A&Ox3, nonfocal, BUTCHER x 4  Head:  Normocephalic, atraumatic  Respiratory: CTA B/L  CV: RRR, S1S2, no murmur  Abdominal: Soft, NT, ND no palpable mass  MSK: No edema, + peripheral pulses, FROM all 4 extremity    Discharge Note and Plan: Discussed with Dr Blanton patient medically stable for discharge   >Follow up with outpatient clinic information provided   >  >  >

## 2018-03-26 NOTE — DISCHARGE NOTE ADULT - PLAN OF CARE
resolved Low salt, low fat diet.   Weight management.   Take medications as prescribed.    No smoking.  Follow up appointments with your doctor(s)  as instructed. EP outpatient follow up   please Follow up with PMD at the Medicine clinic Patient with AICD  continue with Sotalol  80mg oral two daily   patient as medication Low salt diet  Activity as tolerated.  Take all medication as prescribed.  Follow up with your medical doctor for routine blood pressure monitoring at your next visit.  Notify your doctor if you have any of the following symptoms:   Dizziness, Lightheadedness, Blurry vision, Headache, Chest pain, Shortness of breath Patient with AICD  continue with Sotalol  80mg oral two daily   ICD interrogated no further VT episodes since 3/7/18 (Official report in computer)  VT monitor zone added at 167 bpm, with detection interval of 80 bpm as discussed with Dr. MAYA Saavedra  Continue with Sotalol 80mg BID as ordered. (Corrected QTC  460)  Agree with DC planning for today as per primary team however F/U with EP clinic in 2 weeks with Dr. Saavedra.  patient as medication

## 2018-03-26 NOTE — PROVIDER CONTACT NOTE (OTHER) - ASSESSMENT
A&O x4.  Patient was asleep and asymptomatic during time of event.  143/87, HR 61, temp 97.8, RR 18, 98% O2 saturation on room air.  Patient denies CP/discomfort

## 2018-03-26 NOTE — DISCHARGE NOTE ADULT - CARE PROVIDERS DIRECT ADDRESSES
,[email or web address]

## 2018-03-26 NOTE — PROGRESS NOTE ADULT - PROBLEM SELECTOR PLAN 1
Pt presenting with left sided chest pain associated with dizziness and bradycardia, which he states all started after he was discharged from [facility] CCU. The only new medication he was started on was sotalol. also admits to significant anxiety, episodes of panic  -EP consult appreciated. cont sotalol for now and outpatient follow up in 2 weeks  -trial of ativan po for anxiety, advised pt to establish care with PMD. INformation for our medicine clinic given  - cardiac enzymes neg x 3  - orthostatic VS neg

## 2018-03-26 NOTE — PROGRESS NOTE ADULT - PROBLEM SELECTOR PLAN 2
pts episodes of sob, cp may be related to anxiety/panic attacks  trial of ativan discussed with patient  outpatient follow up for PMD, potential pychiatry referral

## 2018-03-26 NOTE — CONSULT NOTE ADULT - PROBLEM SELECTOR RECOMMENDATION 9
Tele: Currently reveals SR 1 AV block wit heart rate 50- 60's. No events overnight  ICD interrogated no further VT episodes since 3/7/18 (Official report in computer)    Continue with Sotalol 80mg BID as ordered. (Corrected QTC  460) Tele: Currently reveals SR 1 AV block wit heart rate 50- 60's. No events overnight on telemetry.   ICD interrogated no further VT episodes since 3/7/18 (Official report in computer)  VT monitor zone added at 167 bpm, with detection interval of 80 bpm as discussed with Dr. MAYA Saavedra  Continue with Sotalol 80mg BID as ordered. (Corrected QTC  460)  Agree with DC planning for today as per primary team however F/U with EP clinic in 2 weeks with Dr. Saavedra.

## 2018-03-26 NOTE — PROGRESS NOTE ADULT - ATTENDING COMMENTS
medically stable for d/c today. Pt needs EP follow up in 2 weeks. Will give pt short supply of ativan to take in emergency situations of anxiety. Pt given 1 tablet of ativan today with good response. Strongly advised pt to establish care with  a PMD, and information for our medicine clinic given. Discharge planning discussed with medicine NP Brianne. Discharge time 50 mins.

## 2018-04-09 NOTE — DISCHARGE NOTE ADULT - PATIENT PORTAL LINK FT
65 yo F with PMH of HTN with complaints of hearing loss/ dizziness/ unsteady gait found to have  acoustic neuroma planned for retrosigmoid craniotomy trans petrosal approach and resection of acoustic neuroma, right retrosigmoid approach resection of vestibular schwannoma, decompression of internal auditory canal. Pt s/p Right craniotomy for resection of vestibular schwannoma  See below “You can access the [facility] Patient Portal, offered by [facility], by registering with the following website: [email or web address]”

## 2018-04-27 ENCOUNTER — APPOINTMENT (OUTPATIENT)
Dept: ELECTROPHYSIOLOGY | Facility: CLINIC | Age: 53
End: 2018-04-27
Payer: SELF-PAY

## 2018-04-27 ENCOUNTER — NON-APPOINTMENT (OUTPATIENT)
Age: 53
End: 2018-04-27

## 2018-04-27 VITALS
WEIGHT: 145 LBS | DIASTOLIC BLOOD PRESSURE: 80 MMHG | HEIGHT: 66 IN | SYSTOLIC BLOOD PRESSURE: 135 MMHG | BODY MASS INDEX: 23.3 KG/M2 | OXYGEN SATURATION: 96 % | HEART RATE: 62 BPM

## 2018-04-27 PROCEDURE — 99215 OFFICE O/P EST HI 40 MIN: CPT

## 2018-04-27 PROCEDURE — 93000 ELECTROCARDIOGRAM COMPLETE: CPT

## 2018-04-27 RX ORDER — METOPROLOL SUCCINATE 25 MG/1
25 TABLET, EXTENDED RELEASE ORAL DAILY
Qty: 30 | Refills: 3 | Status: DISCONTINUED | COMMUNITY
Start: 2018-01-29 | End: 2018-04-27

## 2018-04-27 RX ORDER — FLECAINIDE ACETATE 50 MG/1
50 TABLET ORAL TWICE DAILY
Qty: 60 | Refills: 2 | Status: DISCONTINUED | COMMUNITY
Start: 2018-01-29 | End: 2018-04-27

## 2018-06-02 ENCOUNTER — INPATIENT (INPATIENT)
Facility: HOSPITAL | Age: 53
LOS: 5 days | Discharge: HOME | DRG: 274 | End: 2018-06-08
Attending: HOSPITALIST | Admitting: HOSPITALIST
Payer: MEDICAID

## 2018-06-02 VITALS
DIASTOLIC BLOOD PRESSURE: 115 MMHG | RESPIRATION RATE: 20 BRPM | TEMPERATURE: 98 F | SYSTOLIC BLOOD PRESSURE: 170 MMHG | OXYGEN SATURATION: 98 % | HEART RATE: 103 BPM

## 2018-06-02 DIAGNOSIS — R19.7 DIARRHEA, UNSPECIFIED: ICD-10-CM

## 2018-06-02 DIAGNOSIS — Z45.02 ENCOUNTER FOR ADJUSTMENT AND MANAGEMENT OF AUTOMATIC IMPLANTABLE CARDIAC DEFIBRILLATOR: ICD-10-CM

## 2018-06-02 DIAGNOSIS — Z95.810 PRESENCE OF AUTOMATIC (IMPLANTABLE) CARDIAC DEFIBRILLATOR: Chronic | ICD-10-CM

## 2018-06-02 DIAGNOSIS — Z87.74 PERSONAL HISTORY OF (CORRECTED) CONGENITAL MALFORMATIONS OF HEART AND CIRCULATORY SYSTEM: Chronic | ICD-10-CM

## 2018-06-02 DIAGNOSIS — Z98.890 OTHER SPECIFIED POSTPROCEDURAL STATES: Chronic | ICD-10-CM

## 2018-06-02 LAB
ALBUMIN SERPL ELPH-MCNC: 4.3 G/DL — SIGNIFICANT CHANGE UP (ref 3.3–5)
ALBUMIN SERPL ELPH-MCNC: 5 G/DL — SIGNIFICANT CHANGE UP (ref 3.3–5)
ALP SERPL-CCNC: 50 U/L — SIGNIFICANT CHANGE UP (ref 40–120)
ALP SERPL-CCNC: 58 U/L — SIGNIFICANT CHANGE UP (ref 40–120)
ALT FLD-CCNC: 19 U/L — SIGNIFICANT CHANGE UP (ref 10–45)
ALT FLD-CCNC: 23 U/L — SIGNIFICANT CHANGE UP (ref 10–45)
ANION GAP SERPL CALC-SCNC: 15 MMOL/L — SIGNIFICANT CHANGE UP (ref 5–17)
ANION GAP SERPL CALC-SCNC: 17 MMOL/L — SIGNIFICANT CHANGE UP (ref 5–17)
APPEARANCE UR: CLEAR — SIGNIFICANT CHANGE UP
APTT BLD: 29.2 SEC — SIGNIFICANT CHANGE UP (ref 27.5–37.4)
APTT BLD: 29.9 SEC — SIGNIFICANT CHANGE UP (ref 27.5–37.4)
AST SERPL-CCNC: 26 U/L — SIGNIFICANT CHANGE UP (ref 10–40)
AST SERPL-CCNC: 27 U/L — SIGNIFICANT CHANGE UP (ref 10–40)
BACTERIA # UR AUTO: ABNORMAL /HPF
BASE EXCESS BLDV CALC-SCNC: -7.1 MMOL/L — LOW (ref -2–2)
BASOPHILS # BLD AUTO: 0 K/UL — SIGNIFICANT CHANGE UP (ref 0–0.2)
BASOPHILS # BLD AUTO: 0 K/UL — SIGNIFICANT CHANGE UP (ref 0–0.2)
BASOPHILS NFR BLD AUTO: 0.2 % — SIGNIFICANT CHANGE UP (ref 0–2)
BASOPHILS NFR BLD AUTO: 0.6 % — SIGNIFICANT CHANGE UP (ref 0–2)
BILIRUB SERPL-MCNC: 0.5 MG/DL — SIGNIFICANT CHANGE UP (ref 0.2–1.2)
BILIRUB SERPL-MCNC: 0.6 MG/DL — SIGNIFICANT CHANGE UP (ref 0.2–1.2)
BILIRUB UR-MCNC: NEGATIVE — SIGNIFICANT CHANGE UP
BLD GP AB SCN SERPL QL: NEGATIVE — SIGNIFICANT CHANGE UP
BUN SERPL-MCNC: 14 MG/DL — SIGNIFICANT CHANGE UP (ref 7–23)
BUN SERPL-MCNC: 16 MG/DL — SIGNIFICANT CHANGE UP (ref 7–23)
CA-I SERPL-SCNC: 1.27 MMOL/L — SIGNIFICANT CHANGE UP (ref 1.12–1.3)
CALCIUM SERPL-MCNC: 8.2 MG/DL — LOW (ref 8.4–10.5)
CALCIUM SERPL-MCNC: 9.2 MG/DL — SIGNIFICANT CHANGE UP (ref 8.4–10.5)
CHLORIDE BLDV-SCNC: 114 MMOL/L — HIGH (ref 96–108)
CHLORIDE SERPL-SCNC: 110 MMOL/L — HIGH (ref 96–108)
CHLORIDE SERPL-SCNC: 113 MMOL/L — HIGH (ref 96–108)
CK MB BLD-MCNC: 1.5 % — SIGNIFICANT CHANGE UP (ref 0–3.5)
CK MB CFR SERPL CALC: 3.1 NG/ML — SIGNIFICANT CHANGE UP (ref 0–6.7)
CK SERPL-CCNC: 209 U/L — HIGH (ref 30–200)
CO2 BLDV-SCNC: 20 MMOL/L — LOW (ref 22–30)
CO2 SERPL-SCNC: 14 MMOL/L — LOW (ref 22–31)
CO2 SERPL-SCNC: 17 MMOL/L — LOW (ref 22–31)
COLOR SPEC: YELLOW — SIGNIFICANT CHANGE UP
CREAT SERPL-MCNC: 0.91 MG/DL — SIGNIFICANT CHANGE UP (ref 0.5–1.3)
CREAT SERPL-MCNC: 1.06 MG/DL — SIGNIFICANT CHANGE UP (ref 0.5–1.3)
DIFF PNL FLD: NEGATIVE — SIGNIFICANT CHANGE UP
EOSINOPHIL # BLD AUTO: 0.1 K/UL — SIGNIFICANT CHANGE UP (ref 0–0.5)
EOSINOPHIL # BLD AUTO: 0.2 K/UL — SIGNIFICANT CHANGE UP (ref 0–0.5)
EOSINOPHIL NFR BLD AUTO: 1.8 % — SIGNIFICANT CHANGE UP (ref 0–6)
EOSINOPHIL NFR BLD AUTO: 2.7 % — SIGNIFICANT CHANGE UP (ref 0–6)
EPI CELLS # UR: SIGNIFICANT CHANGE UP /HPF
GAS PNL BLDV: 143 MMOL/L — SIGNIFICANT CHANGE UP (ref 136–145)
GAS PNL BLDV: SIGNIFICANT CHANGE UP
GAS PNL BLDV: SIGNIFICANT CHANGE UP
GLUCOSE BLDV-MCNC: 116 MG/DL — HIGH (ref 70–99)
GLUCOSE SERPL-MCNC: 113 MG/DL — HIGH (ref 70–99)
GLUCOSE SERPL-MCNC: 95 MG/DL — SIGNIFICANT CHANGE UP (ref 70–99)
GLUCOSE UR QL: NEGATIVE — SIGNIFICANT CHANGE UP
HBA1C BLD-MCNC: 5.5 % — SIGNIFICANT CHANGE UP (ref 4–5.6)
HCO3 BLDV-SCNC: 19 MMOL/L — LOW (ref 21–29)
HCT VFR BLD CALC: 45.6 % — SIGNIFICANT CHANGE UP (ref 39–50)
HCT VFR BLD CALC: 48.6 % — SIGNIFICANT CHANGE UP (ref 39–50)
HCT VFR BLDA CALC: 53 % — HIGH (ref 39–50)
HGB BLD CALC-MCNC: 17.2 G/DL — HIGH (ref 13–17)
HGB BLD-MCNC: 15.9 G/DL — SIGNIFICANT CHANGE UP (ref 13–17)
HGB BLD-MCNC: 17.1 G/DL — HIGH (ref 13–17)
INR BLD: 1 RATIO — SIGNIFICANT CHANGE UP (ref 0.88–1.16)
INR BLD: 1.01 RATIO — SIGNIFICANT CHANGE UP (ref 0.88–1.16)
KETONES UR-MCNC: NEGATIVE — SIGNIFICANT CHANGE UP
LACTATE BLDV-MCNC: 1.7 MMOL/L — SIGNIFICANT CHANGE UP (ref 0.7–2)
LACTATE SERPL-SCNC: 0.9 MMOL/L — SIGNIFICANT CHANGE UP (ref 0.7–2)
LEUKOCYTE ESTERASE UR-ACNC: NEGATIVE — SIGNIFICANT CHANGE UP
LYMPHOCYTES # BLD AUTO: 0.6 K/UL — LOW (ref 1–3.3)
LYMPHOCYTES # BLD AUTO: 0.8 K/UL — LOW (ref 1–3.3)
LYMPHOCYTES # BLD AUTO: 10.5 % — LOW (ref 13–44)
LYMPHOCYTES # BLD AUTO: 13.5 % — SIGNIFICANT CHANGE UP (ref 13–44)
MAGNESIUM SERPL-MCNC: 1.7 MG/DL — SIGNIFICANT CHANGE UP (ref 1.6–2.6)
MAGNESIUM SERPL-MCNC: 1.8 MG/DL — SIGNIFICANT CHANGE UP (ref 1.6–2.6)
MCHC RBC-ENTMCNC: 31 PG — SIGNIFICANT CHANGE UP (ref 27–34)
MCHC RBC-ENTMCNC: 31 PG — SIGNIFICANT CHANGE UP (ref 27–34)
MCHC RBC-ENTMCNC: 34.8 GM/DL — SIGNIFICANT CHANGE UP (ref 32–36)
MCHC RBC-ENTMCNC: 35.2 GM/DL — SIGNIFICANT CHANGE UP (ref 32–36)
MCV RBC AUTO: 88.2 FL — SIGNIFICANT CHANGE UP (ref 80–100)
MCV RBC AUTO: 88.9 FL — SIGNIFICANT CHANGE UP (ref 80–100)
MONOCYTES # BLD AUTO: 0.6 K/UL — SIGNIFICANT CHANGE UP (ref 0–0.9)
MONOCYTES # BLD AUTO: 0.7 K/UL — SIGNIFICANT CHANGE UP (ref 0–0.9)
MONOCYTES NFR BLD AUTO: 10.9 % — SIGNIFICANT CHANGE UP (ref 2–14)
MONOCYTES NFR BLD AUTO: 12 % — SIGNIFICANT CHANGE UP (ref 2–14)
NEUTROPHILS # BLD AUTO: 4.1 K/UL — SIGNIFICANT CHANGE UP (ref 1.8–7.4)
NEUTROPHILS # BLD AUTO: 4.5 K/UL — SIGNIFICANT CHANGE UP (ref 1.8–7.4)
NEUTROPHILS NFR BLD AUTO: 73.5 % — SIGNIFICANT CHANGE UP (ref 43–77)
NEUTROPHILS NFR BLD AUTO: 74.3 % — SIGNIFICANT CHANGE UP (ref 43–77)
NITRITE UR-MCNC: NEGATIVE — SIGNIFICANT CHANGE UP
NT-PROBNP SERPL-SCNC: 614 PG/ML — HIGH (ref 0–300)
PCO2 BLDV: 41 MMHG — SIGNIFICANT CHANGE UP (ref 35–50)
PH BLDV: 7.29 — LOW (ref 7.35–7.45)
PH UR: 5.5 — SIGNIFICANT CHANGE UP (ref 5–8)
PHOSPHATE SERPL-MCNC: 2.4 MG/DL — LOW (ref 2.5–4.5)
PHOSPHATE SERPL-MCNC: 3.4 MG/DL — SIGNIFICANT CHANGE UP (ref 2.5–4.5)
PLATELET # BLD AUTO: 130 K/UL — LOW (ref 150–400)
PLATELET # BLD AUTO: 156 K/UL — SIGNIFICANT CHANGE UP (ref 150–400)
PO2 BLDV: 40 MMHG — SIGNIFICANT CHANGE UP (ref 25–45)
POTASSIUM BLDV-SCNC: 3.5 MMOL/L — SIGNIFICANT CHANGE UP (ref 3.5–5)
POTASSIUM SERPL-MCNC: 3.7 MMOL/L — SIGNIFICANT CHANGE UP (ref 3.5–5.3)
POTASSIUM SERPL-MCNC: 3.7 MMOL/L — SIGNIFICANT CHANGE UP (ref 3.5–5.3)
POTASSIUM SERPL-SCNC: 3.7 MMOL/L — SIGNIFICANT CHANGE UP (ref 3.5–5.3)
POTASSIUM SERPL-SCNC: 3.7 MMOL/L — SIGNIFICANT CHANGE UP (ref 3.5–5.3)
PROCALCITONIN SERPL-MCNC: 0.14 NG/ML — HIGH (ref 0.02–0.1)
PROT SERPL-MCNC: 6.8 G/DL — SIGNIFICANT CHANGE UP (ref 6–8.3)
PROT SERPL-MCNC: 8 G/DL — SIGNIFICANT CHANGE UP (ref 6–8.3)
PROT UR-MCNC: 30 MG/DL
PROTHROM AB SERPL-ACNC: 10.9 SEC — SIGNIFICANT CHANGE UP (ref 9.8–12.7)
PROTHROM AB SERPL-ACNC: 11 SEC — SIGNIFICANT CHANGE UP (ref 9.8–12.7)
RAPID RVP RESULT: SIGNIFICANT CHANGE UP
RBC # BLD: 5.13 M/UL — SIGNIFICANT CHANGE UP (ref 4.2–5.8)
RBC # BLD: 5.51 M/UL — SIGNIFICANT CHANGE UP (ref 4.2–5.8)
RBC # FLD: 11.7 % — SIGNIFICANT CHANGE UP (ref 10.3–14.5)
RBC # FLD: 11.7 % — SIGNIFICANT CHANGE UP (ref 10.3–14.5)
RH IG SCN BLD-IMP: POSITIVE — SIGNIFICANT CHANGE UP
SAO2 % BLDV: 69 % — SIGNIFICANT CHANGE UP (ref 67–88)
SODIUM SERPL-SCNC: 142 MMOL/L — SIGNIFICANT CHANGE UP (ref 135–145)
SODIUM SERPL-SCNC: 144 MMOL/L — SIGNIFICANT CHANGE UP (ref 135–145)
SP GR SPEC: 1.03 — HIGH (ref 1.01–1.02)
TROPONIN T SERPL-MCNC: <0.01 NG/ML — SIGNIFICANT CHANGE UP (ref 0–0.06)
TROPONIN T SERPL-MCNC: <0.01 NG/ML — SIGNIFICANT CHANGE UP (ref 0–0.06)
UROBILINOGEN FLD QL: NEGATIVE — SIGNIFICANT CHANGE UP
WBC # BLD: 5.6 K/UL — SIGNIFICANT CHANGE UP (ref 3.8–10.5)
WBC # BLD: 6 K/UL — SIGNIFICANT CHANGE UP (ref 3.8–10.5)
WBC # FLD AUTO: 5.6 K/UL — SIGNIFICANT CHANGE UP (ref 3.8–10.5)
WBC # FLD AUTO: 6 K/UL — SIGNIFICANT CHANGE UP (ref 3.8–10.5)
WBC UR QL: ABNORMAL /HPF (ref 0–5)

## 2018-06-02 PROCEDURE — 93010 ELECTROCARDIOGRAM REPORT: CPT

## 2018-06-02 PROCEDURE — 99223 1ST HOSP IP/OBS HIGH 75: CPT | Mod: GC

## 2018-06-02 PROCEDURE — 99285 EMERGENCY DEPT VISIT HI MDM: CPT | Mod: 25

## 2018-06-02 PROCEDURE — 71045 X-RAY EXAM CHEST 1 VIEW: CPT | Mod: 26

## 2018-06-02 PROCEDURE — 99222 1ST HOSP IP/OBS MODERATE 55: CPT | Mod: GC

## 2018-06-02 RX ORDER — ASPIRIN/CALCIUM CARB/MAGNESIUM 324 MG
325 TABLET ORAL ONCE
Qty: 0 | Refills: 0 | Status: DISCONTINUED | OUTPATIENT
Start: 2018-06-02 | End: 2018-06-02

## 2018-06-02 RX ORDER — POTASSIUM CHLORIDE 20 MEQ
40 PACKET (EA) ORAL ONCE
Qty: 0 | Refills: 0 | Status: COMPLETED | OUTPATIENT
Start: 2018-06-02 | End: 2018-06-02

## 2018-06-02 RX ORDER — MAGNESIUM SULFATE 500 MG/ML
2 VIAL (ML) INJECTION ONCE
Qty: 0 | Refills: 0 | Status: COMPLETED | OUTPATIENT
Start: 2018-06-02 | End: 2018-06-02

## 2018-06-02 RX ORDER — METRONIDAZOLE 500 MG
500 TABLET ORAL EVERY 8 HOURS
Qty: 0 | Refills: 0 | Status: DISCONTINUED | OUTPATIENT
Start: 2018-06-03 | End: 2018-06-05

## 2018-06-02 RX ORDER — CEFTRIAXONE 500 MG/1
INJECTION, POWDER, FOR SOLUTION INTRAMUSCULAR; INTRAVENOUS
Qty: 0 | Refills: 0 | Status: DISCONTINUED | OUTPATIENT
Start: 2018-06-02 | End: 2018-06-02

## 2018-06-02 RX ORDER — SOTALOL HCL 120 MG
80 TABLET ORAL
Qty: 0 | Refills: 0 | Status: DISCONTINUED | OUTPATIENT
Start: 2018-06-02 | End: 2018-06-03

## 2018-06-02 RX ORDER — SODIUM CHLORIDE 9 MG/ML
2000 INJECTION INTRAMUSCULAR; INTRAVENOUS; SUBCUTANEOUS ONCE
Qty: 0 | Refills: 0 | Status: COMPLETED | OUTPATIENT
Start: 2018-06-02 | End: 2018-06-02

## 2018-06-02 RX ORDER — ASPIRIN/CALCIUM CARB/MAGNESIUM 324 MG
81 TABLET ORAL DAILY
Qty: 0 | Refills: 0 | Status: DISCONTINUED | OUTPATIENT
Start: 2018-06-02 | End: 2018-06-08

## 2018-06-02 RX ORDER — ASPIRIN/CALCIUM CARB/MAGNESIUM 324 MG
81 TABLET ORAL ONCE
Qty: 0 | Refills: 0 | Status: COMPLETED | OUTPATIENT
Start: 2018-06-02 | End: 2018-06-02

## 2018-06-02 RX ORDER — METRONIDAZOLE 500 MG
500 TABLET ORAL ONCE
Qty: 0 | Refills: 0 | Status: COMPLETED | OUTPATIENT
Start: 2018-06-02 | End: 2018-06-02

## 2018-06-02 RX ORDER — METRONIDAZOLE 500 MG
TABLET ORAL
Qty: 0 | Refills: 0 | Status: DISCONTINUED | OUTPATIENT
Start: 2018-06-02 | End: 2018-06-05

## 2018-06-02 RX ADMIN — Medication 40 MILLIEQUIVALENT(S): at 22:17

## 2018-06-02 RX ADMIN — Medication 80 MILLIGRAM(S): at 20:59

## 2018-06-02 RX ADMIN — Medication 81 MILLIGRAM(S): at 17:33

## 2018-06-02 RX ADMIN — Medication 100 MILLIGRAM(S): at 20:59

## 2018-06-02 RX ADMIN — SODIUM CHLORIDE 2000 MILLILITER(S): 9 INJECTION INTRAMUSCULAR; INTRAVENOUS; SUBCUTANEOUS at 16:39

## 2018-06-02 RX ADMIN — Medication 50 GRAM(S): at 22:17

## 2018-06-02 NOTE — ED PROVIDER NOTE - CARDIAC, MLM
Tachy 100, regular rhythm.  Heart sounds S1, S2.  No murmurs. R chest PPM. Large midline chest incision, well healed.

## 2018-06-02 NOTE — ED PROVIDER NOTE - CARE PLAN
Principal Discharge DX:	Defibrillator discharge  Secondary Diagnosis:	Chest pain, unspecified type  Secondary Diagnosis:	Diarrhea, unspecified type

## 2018-06-02 NOTE — H&P ADULT - PROBLEM SELECTOR PLAN 2
WGBC normal, no fever  Flagyl 500mg q8h  Blood Cultures, Stool Culture with OVA and Parasite, Urine Cult, RVP

## 2018-06-02 NOTE — H&P ADULT - NSHPPHYSICALEXAM_GEN_ALL_CORE
Neuro: alert, oriented and cooperative.     Skin:  Normal in appearance, texture, and temperature .    Neck:    second right inter-costal space which radiates to the neck.  A third heard sound is heard at the apex. No fourth heart  sound or rub are heard. Cystic changes are noted in the  breasts bilaterally but no masses or nipple discharge is  Seen.  Abdomen:  The abdomen is symmetrical without distention; bowel  sounds are normal in quality and intensity in all areas; a  bruit is heard in the right paraumbilical area. No masses or  splenomegaly are noted; liver span is 8 cm by percussion. More precise than saying “no hepatomegaly”  Extremities:  No cyanosis, clubbing, or edema are noted. Peripheral  pulses in the femoral, popliteal, anterior tibial, dorsalis pedis,  brachial, and radial areas are normal.  Nodes:  No palpable nodes in the cervical, supraclavicular, axillary  or inguinal areas.  Genital/Rectal:  Normal rectal sphincter tone; no rectal masses or Always include these exams, or comment specifically  tenderness. Stool is brown and guaiac negative. Pelvic why they were omitted   exmaination reveals normal external genitalia, and normal  vagina and cervix on speculum examination. Bimanual  examination reveals no palpable uterus, ovaries, or masses.  Neurological:  Cranial nerves II-XII are normal. Motor and sensory  examination of the upper and lower extremities is normal.  Gait and cerebellar function are also normal. Reflexes are  normal and symmetrical bilaterally in both extremities. Neuro: alert, oriented and cooperative.     Skin:  Normal in appearance, texture, and temperature.    HEENT:  Scalp normal. PERRLA. Oral pharynx is normal without erythema or exudate.  No abnormal adenopathy in the cervical or supraclavicular areas.     Pulm:  Lungs are clear to auscultation and percussion bilaterally.    Card:  S1S2 RRR. Mid sternal incision well healed. ICD scar RSC    Abdomen:  Pain with deep palpation. Hyperactive Bowel Sound in all 4 quads. No masses or organomegaly noted.    Extremities:  No cyanosis, clubbing, or edema are noted. Pedal pulses +2

## 2018-06-02 NOTE — H&P ADULT - HISTORY OF PRESENT ILLNESS
This is a 53 year old man with past medical history of Ventricular tachycardia/Cardiac Arrest s/p ICD (Medtronic) with recent admissions for VTach, Non Obstructive CAD, Congenital ASD Repair admitted for ICD shock X 3/VTach storm.  Patient says he developed abdominal cramping, diarrhea this am passing 20 watery stools associated with fevers and had 3 successive shocks from his ICD prompting him to come to [facility] ED. In ED, ICD interrogated confirming VTach and received replacement fluid.  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 Patient denies fever, chills, headache, SOB, Chest Pain prior to ICD Discharge.  Of note, patient had 3 watery stools in ED.

## 2018-06-02 NOTE — PROGRESS NOTE ADULT - SUBJECTIVE AND OBJECTIVE BOX
====================  CCU MIDNIGHT ROUNDS  ====================    MICHOACANO NEWELL  [id number]  Patient is a 53y old  Male who presents with a chief complaint of ICD Firing (02 Jun 2018 18:39)  ====================  SUMMARY:  ====================  53M Ventricular tachycardia/Cardiac Arrest s/p ICD (Medtronic) with recent admissions for VTach, Non Obstructive CAD, Congenital ASD Repair admitted for ICD shock X 3/VTach storm.  Patient says he developed abdominal cramping, diarrhea this am passing 20 watery stools associated with fevers and had 3 successive shocks from his ICD prompting him to come to [facility] ED. In ED, ICD interrogated confirming VTach   ====================  NEW EVENTS:  ====================    MEDICATIONS  (STANDING):  aspirin enteric coated 81 milliGRAM(s) Oral daily  magnesium sulfate  IVPB 2 Gram(s) IV Intermittent once  metroNIDAZOLE  IVPB      potassium chloride    Tablet ER 40 milliEquivalent(s) Oral once  sotalol 80 milliGRAM(s) Oral two times a day    MEDICATIONS  (PRN):  ====================  VITALS (Last 12 hrs):  ====================    T(C): 37.1 (06-02-18 @ 20:00), Max: 37.1 (06-02-18 @ 20:00)  T(F): 98.7 (06-02-18 @ 20:00), Max: 98.7 (06-02-18 @ 20:00)  HR: 68 (06-02-18 @ 22:00) (68 - 103)  BP: 122/75 (06-02-18 @ 22:00) (92/69 - 170/115)  BP(mean): 88 (06-02-18 @ 22:00) (77 - 98)  ABP: --  ABP(mean): --  RR: 19 (06-02-18 @ 22:00) (16 - 21)  SpO2: 95% (06-02-18 @ 22:00) (95% - 100%)  Wt(kg): --  CVP(mm Hg): --  CVP(cm H2O): --  CO: --  CI: --  PA: --  PA(mean): --  PCWP: --  SVR: --  PVR: --    I&O's Summary    02 Jun 2018 07:01  -  02 Jun 2018 22:17  --------------------------------------------------------  IN: 390 mL / OUT: 0 mL / NET: 390 mL  ====================  NEW LABS:  ====================  06-02    142  |  113<H>  |  14  ----------------------------<  95  3.7   |  14<L>  |  0.91    Ca    8.2<L>      02 Jun 2018 20:44  Phos  2.4     06-02  Mg     1.7     06-02    TPro  6.8  /  Alb  4.3  /  TBili  0.5  /  DBili  x   /  AST  26  /  ALT  19  /  AlkPhos  50  06-02    PT/INR - ( 02 Jun 2018 20:44 )   PT: 10.9 sec;   INR: 1.00 ratio    PTT - ( 02 Jun 2018 20:44 )  PTT:29.2 sec  Creatine Kinase, Serum: 209 U/L <H> (06-02-18 @ 20:44)  Troponin T, Serum: <0.01 ng/mL (06-02-18 @ 20:44)  Troponin T, Serum: <0.01 ng/mL (06-02-18 @ 16:09)  Creatine Kinase, Serum: 96 U/L (06-02-18 @ 16:09)  ====================  PLAN:  ====================  -       Trish NERIU NP  Beeper #[id number]  Spectra # [id number] ====================  CCU MIDNIGHT ROUNDS  ====================    MICHOACANO NEWELL  [id number]  Patient is a 53y old  Male who presents with a chief complaint of ICD Firing (02 Jun 2018 18:39)  ====================  SUMMARY:  ====================  53M Ventricular tachycardia/Cardiac Arrest s/p ICD (Medtronic) with recent admissions for VTach, Non Obstructive CAD, Congenital ASD Repair admitted for ICD shock X 3/VTach storm.  Patient says he developed abdominal cramping, diarrhea this am passing 20 watery stools associated with fevers and had 3 successive shocks from his ICD prompting him to come to [facility] ED. In ED, ICD interrogated confirming VTach   ====================  NEW EVENTS:  ====================    MEDICATIONS  (STANDING):  aspirin enteric coated 81 milliGRAM(s) Oral daily  magnesium sulfate  IVPB 2 Gram(s) IV Intermittent once  metroNIDAZOLE  IVPB      potassium chloride    Tablet ER 40 milliEquivalent(s) Oral once  sotalol 80 milliGRAM(s) Oral two times a day    MEDICATIONS  (PRN):  ====================  VITALS (Last 12 hrs):  ====================    T(C): 37.1 (06-02-18 @ 20:00), Max: 37.1 (06-02-18 @ 20:00)  T(F): 98.7 (06-02-18 @ 20:00), Max: 98.7 (06-02-18 @ 20:00)  HR: 68 (06-02-18 @ 22:00) (68 - 103)  BP: 122/75 (06-02-18 @ 22:00) (92/69 - 170/115)  BP(mean): 88 (06-02-18 @ 22:00) (77 - 98)  ABP: --  ABP(mean): --  RR: 19 (06-02-18 @ 22:00) (16 - 21)  SpO2: 95% (06-02-18 @ 22:00) (95% - 100%)  Wt(kg): --  CVP(mm Hg): --  CVP(cm H2O): --  CO: --  CI: --  PA: --  PA(mean): --  PCWP: --  SVR: --  PVR: --    I&O's Summary    02 Jun 2018 07:01  -  02 Jun 2018 22:17  --------------------------------------------------------  IN: 390 mL / OUT: 0 mL / NET: 390 mL  ====================  NEW LABS:  ====================  06-02    142  |  113<H>  |  14  ----------------------------<  95  3.7   |  14<L>  |  0.91    Ca    8.2<L>      02 Jun 2018 20:44  Phos  2.4     06-02  Mg     1.7     06-02    TPro  6.8  /  Alb  4.3  /  TBili  0.5  /  DBili  x   /  AST  26  /  ALT  19  /  AlkPhos  50  06-02    PT/INR - ( 02 Jun 2018 20:44 )   PT: 10.9 sec;   INR: 1.00 ratio    PTT - ( 02 Jun 2018 20:44 )  PTT:29.2 sec  Creatine Kinase, Serum: 209 U/L <H> (06-02-18 @ 20:44)  Troponin T, Serum: <0.01 ng/mL (06-02-18 @ 20:44)  Troponin T, Serum: <0.01 ng/mL (06-02-18 @ 16:09)  Creatine Kinase, Serum: 96 U/L (06-02-18 @ 16:09)  ====================  PLAN:  ====================  - 54 y/o M w/ Hx of VT w/ cardiac arrest s/p ICD, recent admission for VT s/p ablation, CAD, ASD s/p repair presented for VT storm requiring 3 shocks.    - Having diarrhea PTA and continues to have it; Monitor electrolytes and replete to keep K>4 and Mag>2  - Continue Sotalol 80 mg bid.  Monitor QTC's  - Stool is negative of CDiff.  Follow up results for O/P and culture  - Follow up blood cultures/HIV test    Trish Chavez CCU NP  Beeper #[id number]  Spectra # [phone] ====================  CCU MIDNIGHT ROUNDS  ====================    MICHOACANO NEWELL  [id number]  Patient is a 53y old  Male who presents with a chief complaint of ICD Firing (02 Jun 2018 18:39)  ====================  SUMMARY:  ====================  53M Ventricular tachycardia/Cardiac Arrest s/p ICD (Medtronic) with recent admissions for VTach, Non Obstructive CAD, Congenital ASD Repair admitted for ICD shock X 3/VTach storm.  Patient says he developed abdominal cramping, diarrhea this am passing 20 watery stools associated with fevers and had 3 successive shocks from his ICD prompting him to come to [facility] ED. In ED, ICD interrogated confirming VTach   ====================  NEW EVENTS:  ====================    MEDICATIONS  (STANDING):  aspirin enteric coated 81 milliGRAM(s) Oral daily  magnesium sulfate  IVPB 2 Gram(s) IV Intermittent once  metroNIDAZOLE  IVPB      potassium chloride    Tablet ER 40 milliEquivalent(s) Oral once  sotalol 80 milliGRAM(s) Oral two times a day    MEDICATIONS  (PRN):  ====================  VITALS (Last 12 hrs):  ====================    T(C): 37.1 (06-02-18 @ 20:00), Max: 37.1 (06-02-18 @ 20:00)  T(F): 98.7 (06-02-18 @ 20:00), Max: 98.7 (06-02-18 @ 20:00)  HR: 68 (06-02-18 @ 22:00) (68 - 103)  BP: 122/75 (06-02-18 @ 22:00) (92/69 - 170/115)  BP(mean): 88 (06-02-18 @ 22:00) (77 - 98)  ABP: --  ABP(mean): --  RR: 19 (06-02-18 @ 22:00) (16 - 21)  SpO2: 95% (06-02-18 @ 22:00) (95% - 100%)  Wt(kg): --  CVP(mm Hg): --  CVP(cm H2O): --  CO: --  CI: --  PA: --  PA(mean): --  PCWP: --  SVR: --  PVR: --    I&O's Summary    02 Jun 2018 07:01  -  02 Jun 2018 22:17  --------------------------------------------------------  IN: 390 mL / OUT: 0 mL / NET: 390 mL  ====================  NEW LABS:  ====================  06-02    142  |  113<H>  |  14  ----------------------------<  95  3.7   |  14<L>  |  0.91    Ca    8.2<L>      02 Jun 2018 20:44  Phos  2.4     06-02  Mg     1.7     06-02    TPro  6.8  /  Alb  4.3  /  TBili  0.5  /  DBili  x   /  AST  26  /  ALT  19  /  AlkPhos  50  06-02    PT/INR - ( 02 Jun 2018 20:44 )   PT: 10.9 sec;   INR: 1.00 ratio    PTT - ( 02 Jun 2018 20:44 )  PTT:29.2 sec  Creatine Kinase, Serum: 209 U/L <H> (06-02-18 @ 20:44)  Troponin T, Serum: <0.01 ng/mL (06-02-18 @ 20:44)  Troponin T, Serum: <0.01 ng/mL (06-02-18 @ 16:09)  Creatine Kinase, Serum: 96 U/L (06-02-18 @ 16:09)  ====================  PLAN:  ====================  - 52 y/o M w/ Hx of VT w/ cardiac arrest s/p ICD, recent admission for VT s/p ablation, CAD, ASD s/p repair presented for VT storm requiring 3 shocks.    - Continue Sotalol 80 mg bid.  Monitor QTC's  - Possible ablation next week  - Follow EP recs  - Having diarrhea PTA and continues to have it; Monitor electrolytes and replete to keep K>4 and Mag>2  - Stool is negative of CDiff.  Follow up results for O/P and culture  - Follow up blood cultures/HIV test    Trish Chavez CCU NP  Beeper #[id number]  Spectra # [phone]

## 2018-06-02 NOTE — ED PROVIDER NOTE - MEDICAL DECISION MAKING DETAILS
pradip silverman aicd with firing x4 - co dizziness and chest pressure ekg w lateral st dep - cards consulted , interogate aicd, ck ce,  iv fluids for dehydration needs admit to tele

## 2018-06-02 NOTE — CONSULT NOTE ADULT - SUBJECTIVE AND OBJECTIVE BOX
54 yo man w/ hx of Congenital ASD repart, Cardiac arrest/VT post procedure requiring single lead ICD placement, various episodes of VT s/p ablatin 2017 who presents for palpitations and sensation that "ICD shocked".  Patient has been endorsing palpitations for ~ 2 weeks.  in addition, he felt week/dizzy this morning w/ diarrhea as well as "felt as if his ICD shocked him".  CCU consulted for further evaluation.      PMH:   Hypertension  AICD (automatic cardioverter/defibrillator) present  ASD (atrial septal defect)  No pertinent past medical history      PSH:   AICD (automatic cardioverter/defibrillator) present  Status post cardiac surgery  AICD (automatic cardioverter/defibrillator) present  Spontaneous ASD closure      Medications:   aspirin enteric coated 81 milliGRAM(s) Oral daily  metroNIDAZOLE  IVPB 500 milliGRAM(s) IV Intermittent once  metroNIDAZOLE  IVPB      sotalol 80 milliGRAM(s) Oral two times a day      Allergies:  No Known Allergies      FAMILY HISTORY:  No pertinent family history in first degree relatives      Social History:  Smoking History:  Alcohol Use:  Drug Use:    Review of Systems:  REVIEW OF SYSTEMS:    CONSTITUTIONAL: No weakness, fevers or chills  EYES/ENT: No visual changes;  No dysphagia  NECK: No pain or stiffness  RESPIRATORY: No cough, wheezing, hemoptysis; No shortness of breath  CARDIOVASCULAR: No chest pain or palpitations; No lower extremity edema  GASTROINTESTINAL: No abdominal or epigastric pain. No nausea, vomiting, or hematemesis; No diarrhea or constipation. No melena or hematochezia.  BACK: No back pain  GENITOURINARY: No dysuria, frequency or hematuria  NEUROLOGICAL: No numbness or weakness  SKIN: No itching, burning, rashes, or lesions   All other review of systems is negative unless indicated above.    Physical Exam:  T(F): 98.7 (-), Max: 98.7 (-)  HR: 78 () (78 - 103)  BP: 115/70 (-) (92/69 - 170/115)  RR: 21 (-)  SpO2: 99% (-)    GENERAL: No acute distress, well-developed  HEAD:  Atraumatic, Normocephalic  ENT: EOMI, PERRLA, conjunctiva and sclera clear, Neck supple, No JVD, moist mucosa  CHEST/LUNG: Clear to auscultation bilaterally; No wheeze, equal breath sounds bilaterally   BACK: No spinal tenderness  HEART: Regular rate and rhythm; No murmurs, rubs, or gallops  ABDOMEN: Soft, Nontender, Nondistended; Bowel sounds present  EXTREMITIES:  No clubbing, cyanosis, or edema  PSYCH: Nl behavior, nl affect  NEUROLOGY: AAOx3, non-focal, cranial nerves intact  SKIN: Normal color, No rashes or lesions  LINES:    Cardiovascular Diagnostic Testing:    ECG: sinus rhythm, normal axis, 1st deg AVB, incomplete RBBB w PVCs, ST depressions which are chronic; no other acute ischemic sx's.      Echo:    < from: Transthoracic Echocardiogram (18 @ 13:12) >  1. Normal mitral valve. Minimal mitral regurgitation.  2. Moderate concentric left ventricular hypertrophy.  3. Normal left ventricular systolic function. No segmental  wall motion abnormalities.  4. Normal right ventricular size and function.  A device  wire is noted in the right heart.  5. Estimated right ventricular systolic pressure equals 43  mm Hg, assuming right atrial pressure equals 10 mm Hg,  consistent with mild pulmonary hypertension.  6. The coronary sinus is dilated.  A bubble study was  performed with the intravenous injection of agitated saline  contrast from a left upper extremity vein and confirmed the  diagnosis of a persistent left superior vena cava (LSVC).  *** No previous Echo exam.    < end of copied text >      Stress Testing:    Cath:    < from: Cardiac Cath Lab - Adult (18 @ 15:59) >  CORONARY VESSELS: The coronary circulation is right dominant.  LM:   --  LM: Normal.  LAD:   --  LAD: Angiography showed minor luminal irregularities with no  flow limiting lesions.  CX:   --  Circumflex: Angiography showed minor luminal irregularities with  no flow limiting lesions.  RCA:   --  RCA: Angiography showed minor luminal irregularities with no  flow limiting lesions.  COMPLICATIONS: There were no complications.  DIAGNOSTIC RECOMMENDATIONS: Medical management is recommended.  Prepared and signed by  Jimy Garcia M.D.  Signed 2018 17:43:33  HEMODYNAMIC TABLES  Pressures:  Baseline  Pressures: - HR: 63  Pressures:  - Rhythm:  Pressures:  -- Aortic Pressure (S/D/M): 177/84/120  Pressures:  -- Left Ventricle (s/edp): 170/11/--  Outputs:  Baseline  Outputs:  -- CALCULATIONS[age]  Outputs:  -- CALCULATIONS: Body Surface Area: 1.74  Outputs:  -- CALCULATIONS: Height in cm: 167.00  Outputs:  -- CALCULATIONS: Sex: Male  Outputs:  -- CALCULATIONS: Weight in k.00    < end of copied text >      Interpretation of Telemetry:    Imaging:    Labs: Personally reviewed                        17.1   6.0   )-----------( 156      ( 2018 16:09 )             48.6     06-02    144  |  110<H>  |  16  ----------------------------<  113<H>  3.7   |  17<L>  |  1.06    Ca    9.2      2018 16:09  Phos  3.4     06-02  Mg     1.8     06-02    TPro  8.0  /  Alb  5.0  /  TBili  0.6  /  DBili  x   /  AST  27  /  ALT  23  /  AlkPhos  58  06-02    PT/INR - ( 2018 16:09 )   PT: 11.0 sec;   INR: 1.01 ratio         PTT - ( 2018 16:09 )  PTT:29.9 sec  CARDIAC MARKERS ( 2018 16:09 )  x     / <0.01 ng/mL / 96 U/L / x     / x        Assessment     54 yo man w/ hx of Congenital ASD repart, Cardiac arrest/VT post procedure requiring single lead ICD placement, various episodes of VT s/p ablation 2017 who presents for palpitations and sensation that "ICD shocked". AICD interogated w/ ~ 17 episodes of NSVT and 3 shocks.  VT storm likely scar mediated.  has a single RV lead, and morpholy at times seems different; may be that he also has had runs of SVT w/ aberrancy.    Lactate normal; hemodynamically stable w/ no evidence of shock/heart failure. TTE 3/2016 normal.     RECS  - admit to CCU2  - monitor closely w/ ECG leads on  - cont sotalol 80 mg BID; monitor QTc closely   - keep K>4, Mg>2   - will need ablation next week    Case discussed w/ Dr. John Narayanan; attending EP on call and Dr. Quentin Ashley MD   [place] [facility]
54 yo man w/ hx of Congenital ASD repart, Cardiac arrest/VT post procedure requiring single lead ICD placement, various episodes of VT s/p ablatin 2017 who presents for palpitations and sensation that "ICD shocked".  Patient has been endorsing palpitations for ~ 2 weeks.  in addition, he felt week/dizzy this morning w/ diarrhea as well as "felt as if his ICD shocked him".  CCU consulted for further evaluation.      PMH:   Hypertension  AICD (automatic cardioverter/defibrillator) present  ASD (atrial septal defect)  No pertinent past medical history      PSH:   AICD (automatic cardioverter/defibrillator) present  Status post cardiac surgery  AICD (automatic cardioverter/defibrillator) present  Spontaneous ASD closure      Medications:   aspirin enteric coated 81 milliGRAM(s) Oral daily  metroNIDAZOLE  IVPB 500 milliGRAM(s) IV Intermittent once  metroNIDAZOLE  IVPB      sotalol 80 milliGRAM(s) Oral two times a day      Allergies:  No Known Allergies      FAMILY HISTORY:  No pertinent family history in first degree relatives      Social History:  Smoking History:  Alcohol Use:  Drug Use:    Review of Systems:  REVIEW OF SYSTEMS:    CONSTITUTIONAL: No weakness, fevers or chills  EYES/ENT: No visual changes;  No dysphagia  NECK: No pain or stiffness  RESPIRATORY: No cough, wheezing, hemoptysis; No shortness of breath  CARDIOVASCULAR: No chest pain or palpitations; No lower extremity edema  GASTROINTESTINAL: No abdominal or epigastric pain. No nausea, vomiting, or hematemesis; No diarrhea or constipation. No melena or hematochezia.  BACK: No back pain  GENITOURINARY: No dysuria, frequency or hematuria  NEUROLOGICAL: No numbness or weakness  SKIN: No itching, burning, rashes, or lesions   All other review of systems is negative unless indicated above.    Physical Exam:  T(F): 98.7 (-), Max: 98.7 (-)  HR: 78 () (78 - 103)  BP: 115/70 (-) (92/69 - 170/115)  RR: 21 (-)  SpO2: 99% (-)    GENERAL: No acute distress, well-developed  HEAD:  Atraumatic, Normocephalic  ENT: EOMI, PERRLA, conjunctiva and sclera clear, Neck supple, No JVD, moist mucosa  CHEST/LUNG: Clear to auscultation bilaterally; No wheeze, equal breath sounds bilaterally   BACK: No spinal tenderness  HEART: Regular rate and rhythm; No murmurs, rubs, or gallops  ABDOMEN: Soft, Nontender, Nondistended; Bowel sounds present  EXTREMITIES:  No clubbing, cyanosis, or edema  PSYCH: Nl behavior, nl affect  NEUROLOGY: AAOx3, non-focal, cranial nerves intact  SKIN: Normal color, No rashes or lesions  LINES:    Cardiovascular Diagnostic Testing:    ECG: sinus rhythm, normal axis, 1st deg AVB, incomplete RBBB w PVCs, ST depressions which are chronic; no other acute ischemic sx's.      Echo:    < from: Transthoracic Echocardiogram (18 @ 13:12) >  1. Normal mitral valve. Minimal mitral regurgitation.  2. Moderate concentric left ventricular hypertrophy.  3. Normal left ventricular systolic function. No segmental  wall motion abnormalities.  4. Normal right ventricular size and function.  A device  wire is noted in the right heart.  5. Estimated right ventricular systolic pressure equals 43  mm Hg, assuming right atrial pressure equals 10 mm Hg,  consistent with mild pulmonary hypertension.  6. The coronary sinus is dilated.  A bubble study was  performed with the intravenous injection of agitated saline  contrast from a left upper extremity vein and confirmed the  diagnosis of a persistent left superior vena cava (LSVC).  *** No previous Echo exam.    < end of copied text >      Stress Testing:    Cath:    < from: Cardiac Cath Lab - Adult (18 @ 15:59) >  CORONARY VESSELS: The coronary circulation is right dominant.  LM:   --  LM: Normal.  LAD:   --  LAD: Angiography showed minor luminal irregularities with no  flow limiting lesions.  CX:   --  Circumflex: Angiography showed minor luminal irregularities with  no flow limiting lesions.  RCA:   --  RCA: Angiography showed minor luminal irregularities with no  flow limiting lesions.  COMPLICATIONS: There were no complications.  DIAGNOSTIC RECOMMENDATIONS: Medical management is recommended.  Prepared and signed by  Jimy Garcia M.D.  Signed 2018 17:43:33  HEMODYNAMIC TABLES  Pressures:  Baseline  Pressures: - HR: 63  Pressures:  - Rhythm:  Pressures:  -- Aortic Pressure (S/D/M): 177/84/120  Pressures:  -- Left Ventricle (s/edp): 170/11/--  Outputs:  Baseline  Outputs:  -- CALCULATIONS[age]  Outputs:  -- CALCULATIONS: Body Surface Area: 1.74  Outputs:  -- CALCULATIONS: Height in cm: 167.00  Outputs:  -- CALCULATIONS: Sex: Male  Outputs:  -- CALCULATIONS: Weight in k.00    < end of copied text >      Interpretation of Telemetry:    Imaging:    Labs: Personally reviewed                        17.1   6.0   )-----------( 156      ( 2018 16:09 )             48.6     06-02    144  |  110<H>  |  16  ----------------------------<  113<H>  3.7   |  17<L>  |  1.06    Ca    9.2      2018 16:09  Phos  3.4     06-02  Mg     1.8     06-02    TPro  8.0  /  Alb  5.0  /  TBili  0.6  /  DBili  x   /  AST  27  /  ALT  23  /  AlkPhos  58  06-02    PT/INR - ( 2018 16:09 )   PT: 11.0 sec;   INR: 1.01 ratio         PTT - ( 2018 16:09 )  PTT:29.9 sec  CARDIAC MARKERS ( 2018 16:09 )  x     / <0.01 ng/mL / 96 U/L / x     / x        Assessment     54 yo man w/ hx of Congenital ASD repart, Cardiac arrest/VT post procedure requiring single lead ICD placement, various episodes of VT s/p ablation 2017 who presents for palpitations and sensation that "ICD shocked". AICD interogated w/ ~ 17 episodes of NSVT and 3 shocks.  VT storm likely scar mediated.  has a single RV lead, and morphology at times seems different; may be that he also has had runs of SVT w/ aberrancy.    Lactate normal; hemodynamically stable w/ no evidence of shock/heart failure. TTE 3/2016 normal.     RECS  - admit to CCU2  - monitor closely w/ ECG leads on  - cont sotalol 80 mg BID; monitor QTc closely   - keep K>4, Mg>2   - will need ablation next week    Case discussed w/ Dr. John Narayanan; attending EP on call and Dr. Quentin Ashley MD   [place] [facility]

## 2018-06-02 NOTE — ED ADULT NURSE NOTE - OBJECTIVE STATEMENT
54 y/o male presents to ED via EMS c/o AICD defibrillator firing 4 times today. Patient reports he has felt chest heaviness and pain since AICD fired. Patient reports he has had 20 episodes of diarrhea today with accompanying dizziness. Upon arrival, patient is A&Ox4, breathing spontaneously, airway patent, b/l clear lungs, abdomen nontender, +pulses, cap refill <2 seconds. EMS gave patient 162mg of Aspirin. Patient in sinus tachycardia on cardiac monitor. Patient denies SOB, n/v/d, fever/chills, recent travel/illness, falls/LOC. Patient states he has cardiac ablation scheduled for next month. Patient resting in bed, side rails up, placed on cardiac monitor. Plan of care explained.

## 2018-06-02 NOTE — H&P ADULT - NSHPREVIEWOFSYSTEMS_GEN_ALL_CORE
CONSTITUTIONAL: No weakness, fevers or chills    EYES/ENT: No visual changes; No vertigo or throat pain     NECK: No pain or stiffness    RESPIRATORY: No cough, wheezing, hemoptysis; No shortness of breath    CARDIOVASCULAR: ICD Firing x 3. Denies prior chest pain and fluttering sensation. No palpitations.    GASTROINTESTINAL: Frequent watery stools, abdominal cramping x 1 day. Denies Melena or hematochezia    GENITOURINARY: No dysuria, frequency or hematuria    SKIN: No itching, burning, rashes, or lesions

## 2018-06-02 NOTE — H&P ADULT - PROBLEM SELECTOR PLAN 1
2/2 to Electrolyte loss from Diarrhea  Q8h BMP Mag with electrolyte replenishment  Trend CE's to r/o ischemic etiology  Continue Sotalol and Aspirin  EP Consult  ICD Interrogation (Medtronic)  TTE in am

## 2018-06-02 NOTE — ED PROVIDER NOTE - OBJECTIVE STATEMENT
52 y/o male hx of VT w/ AICD p/w AICD firing. Brought in from home by EMS, got 243 of aspirin between himself and first responders. States had 20 episodes of diarrhea today w/ some sweats. Currently reports some heaviness on his chest. No fevers, N/V, abd pain, SOB, numbness weakness. Has a medtronic AICD, scheduled for an ablation in few weeks.

## 2018-06-03 LAB
ALBUMIN SERPL ELPH-MCNC: 4.1 G/DL — SIGNIFICANT CHANGE UP (ref 3.3–5)
ALP SERPL-CCNC: 48 U/L — SIGNIFICANT CHANGE UP (ref 40–120)
ALT FLD-CCNC: 17 U/L — SIGNIFICANT CHANGE UP (ref 10–45)
ANION GAP SERPL CALC-SCNC: 13 MMOL/L — SIGNIFICANT CHANGE UP (ref 5–17)
APTT BLD: 28 SEC — SIGNIFICANT CHANGE UP (ref 27.5–37.4)
AST SERPL-CCNC: 27 U/L — SIGNIFICANT CHANGE UP (ref 10–40)
BILIRUB SERPL-MCNC: 0.5 MG/DL — SIGNIFICANT CHANGE UP (ref 0.2–1.2)
BUN SERPL-MCNC: 11 MG/DL — SIGNIFICANT CHANGE UP (ref 7–23)
C DIFF GDH STL QL: NEGATIVE — SIGNIFICANT CHANGE UP
C DIFF GDH STL QL: SIGNIFICANT CHANGE UP
CALCIUM SERPL-MCNC: 8.2 MG/DL — LOW (ref 8.4–10.5)
CHLORIDE SERPL-SCNC: 113 MMOL/L — HIGH (ref 96–108)
CHOLEST SERPL-MCNC: 165 MG/DL — SIGNIFICANT CHANGE UP (ref 10–199)
CK MB BLD-MCNC: 1.3 % — SIGNIFICANT CHANGE UP (ref 0–3.5)
CK MB CFR SERPL CALC: 3.5 NG/ML — SIGNIFICANT CHANGE UP (ref 0–6.7)
CK SERPL-CCNC: 266 U/L — HIGH (ref 30–200)
CO2 SERPL-SCNC: 14 MMOL/L — LOW (ref 22–31)
CREAT SERPL-MCNC: 0.75 MG/DL — SIGNIFICANT CHANGE UP (ref 0.5–1.3)
CULTURE RESULTS: NO GROWTH — SIGNIFICANT CHANGE UP
GLUCOSE SERPL-MCNC: 95 MG/DL — SIGNIFICANT CHANGE UP (ref 70–99)
HCT VFR BLD CALC: 44 % — SIGNIFICANT CHANGE UP (ref 39–50)
HDLC SERPL-MCNC: 35 MG/DL — LOW (ref 40–125)
HGB BLD-MCNC: 15.3 G/DL — SIGNIFICANT CHANGE UP (ref 13–17)
HIV 1+2 AB+HIV1 P24 AG SERPL QL IA: SIGNIFICANT CHANGE UP
INR BLD: 1.04 RATIO — SIGNIFICANT CHANGE UP (ref 0.88–1.16)
LIPID PNL WITH DIRECT LDL SERPL: 93 MG/DL — SIGNIFICANT CHANGE UP
MAGNESIUM SERPL-MCNC: 2.8 MG/DL — HIGH (ref 1.6–2.6)
MCHC RBC-ENTMCNC: 31 PG — SIGNIFICANT CHANGE UP (ref 27–34)
MCHC RBC-ENTMCNC: 34.8 GM/DL — SIGNIFICANT CHANGE UP (ref 32–36)
MCV RBC AUTO: 89.1 FL — SIGNIFICANT CHANGE UP (ref 80–100)
PHOSPHATE SERPL-MCNC: 2.1 MG/DL — LOW (ref 2.5–4.5)
PLATELET # BLD AUTO: 135 K/UL — LOW (ref 150–400)
POTASSIUM SERPL-MCNC: 4.1 MMOL/L — SIGNIFICANT CHANGE UP (ref 3.5–5.3)
POTASSIUM SERPL-SCNC: 4.1 MMOL/L — SIGNIFICANT CHANGE UP (ref 3.5–5.3)
PROT SERPL-MCNC: 6.3 G/DL — SIGNIFICANT CHANGE UP (ref 6–8.3)
PROTHROM AB SERPL-ACNC: 11.4 SEC — SIGNIFICANT CHANGE UP (ref 9.8–12.7)
RBC # BLD: 4.94 M/UL — SIGNIFICANT CHANGE UP (ref 4.2–5.8)
RBC # FLD: 11.7 % — SIGNIFICANT CHANGE UP (ref 10.3–14.5)
SODIUM SERPL-SCNC: 140 MMOL/L — SIGNIFICANT CHANGE UP (ref 135–145)
SPECIMEN SOURCE: SIGNIFICANT CHANGE UP
TOTAL CHOLESTEROL/HDL RATIO MEASUREMENT: 4.7 RATIO — SIGNIFICANT CHANGE UP (ref 3.4–9.6)
TRIGL SERPL-MCNC: 187 MG/DL — HIGH (ref 10–149)
TROPONIN T SERPL-MCNC: <0.01 NG/ML — SIGNIFICANT CHANGE UP (ref 0–0.06)
TSH SERPL-MCNC: 1.27 UIU/ML — SIGNIFICANT CHANGE UP (ref 0.27–4.2)
WBC # BLD: 4.6 K/UL — SIGNIFICANT CHANGE UP (ref 3.8–10.5)
WBC # FLD AUTO: 4.6 K/UL — SIGNIFICANT CHANGE UP (ref 3.8–10.5)

## 2018-06-03 PROCEDURE — 93306 TTE W/DOPPLER COMPLETE: CPT | Mod: 26

## 2018-06-03 PROCEDURE — 99232 SBSQ HOSP IP/OBS MODERATE 35: CPT | Mod: GC

## 2018-06-03 PROCEDURE — 93010 ELECTROCARDIOGRAM REPORT: CPT

## 2018-06-03 RX ORDER — ALPRAZOLAM 0.25 MG
0.25 TABLET ORAL EVERY 8 HOURS
Qty: 0 | Refills: 0 | Status: DISCONTINUED | OUTPATIENT
Start: 2018-06-03 | End: 2018-06-08

## 2018-06-03 RX ORDER — SODIUM,POTASSIUM PHOSPHATES 278-250MG
1 POWDER IN PACKET (EA) ORAL
Qty: 0 | Refills: 0 | Status: COMPLETED | OUTPATIENT
Start: 2018-06-03 | End: 2018-06-03

## 2018-06-03 RX ORDER — HEPARIN SODIUM 5000 [USP'U]/ML
5000 INJECTION INTRAVENOUS; SUBCUTANEOUS EVERY 8 HOURS
Qty: 0 | Refills: 0 | Status: DISCONTINUED | OUTPATIENT
Start: 2018-06-03 | End: 2018-06-08

## 2018-06-03 RX ADMIN — Medication 1 TABLET(S): at 17:37

## 2018-06-03 RX ADMIN — HEPARIN SODIUM 5000 UNIT(S): 5000 INJECTION INTRAVENOUS; SUBCUTANEOUS at 21:19

## 2018-06-03 RX ADMIN — Medication 0.25 MILLIGRAM(S): at 17:37

## 2018-06-03 RX ADMIN — Medication 80 MILLIGRAM(S): at 09:59

## 2018-06-03 RX ADMIN — Medication 100 MILLIGRAM(S): at 05:39

## 2018-06-03 RX ADMIN — Medication 100 MILLIGRAM(S): at 21:19

## 2018-06-03 RX ADMIN — Medication 81 MILLIGRAM(S): at 13:06

## 2018-06-03 RX ADMIN — Medication 1 TABLET(S): at 13:06

## 2018-06-03 RX ADMIN — Medication 1 TABLET(S): at 05:40

## 2018-06-03 RX ADMIN — Medication 100 MILLIGRAM(S): at 14:04

## 2018-06-03 RX ADMIN — Medication 1 TABLET(S): at 21:19

## 2018-06-03 NOTE — CHART NOTE - NSCHARTNOTEFT_GEN_A_CORE
Patient complained of left upper chest pain, no radiation, not associated with any other signs and symptoms. 12 Lead EKG is baseline with no acute changes. Xanax 0.25mg ordered for anxiety, patient has been taking as needed prior to admission.

## 2018-06-03 NOTE — CHART NOTE - NSCHARTNOTEFT_GEN_A_CORE
ELECTROPHYSIOLOGY  Device Interrogation Performed                                  Date/Time:  :           Medtronic              Model:  VVI      40bpm             Ventricular Lead(s):  RV Lead: R wave amplitude:     16.3mv          Impedence:   304 Ohms      Threshold:   1 V@  0.4 ms     Battery Status:   Good                    Underlying Rhythm:   sinus rhythm, HR 60s     Events/Observation:    Impression/Plan:  Normal PPM / ICD function.   Normal sensing and pacing via iterative testing. Good battery status. Excellent threshold capture.  No reprogramming.   Various episodes of VT:  ~17.  However looks as if he also had SVT w/ aberrancy; difficult to tell in the setting of one lead ICD  Received 4 shocks     MAYA Ashley MD

## 2018-06-03 NOTE — PROGRESS NOTE ADULT - SUBJECTIVE AND OBJECTIVE BOX
CCU NP    Patient is a 53y old  Male who presents with a chief complaint of ICD Firing (2018 18:39)      HPI:  This is a 53 year old man with past medical history of Ventricular tachycardia/Cardiac Arrest s/p ICD (Medtronic) with recent admissions for VTach, Non Obstructive CAD, Congenital ASD Repair admitted for ICD shock X 3/VTach storm.  Patient says he developed abdominal cramping, diarrhea this am passing 20 watery stools associated with fevers and had 3 successive shocks from his ICD prompting him to come to [facility] ED. In ED, ICD interrogated confirming VTach and received replacement fluid.  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 Patient denies fever, chills, headache, SOB, Chest Pain prior to ICD Discharge.  Of note, patient had 3 watery stools in ED. (2018 18:39)      PAST MEDICAL & SURGICAL HISTORY:  Hypertension  AICD (automatic cardioverter/defibrillator) present  ASD (atrial septal defect)  No pertinent past medical history  AICD (automatic cardioverter/defibrillator) present  Status post cardiac surgery  AICD (automatic cardioverter/defibrillator) present  Spontaneous ASD closure      MEDICATIONS  (STANDING):  aspirin enteric coated 81 milliGRAM(s) Oral daily  metroNIDAZOLE  IVPB 500 milliGRAM(s) IV Intermittent every 8 hours  metroNIDAZOLE  IVPB      potassium acid phosphate/sodium acid phosphate tablet (K-PHOS No. 2) 1 Tablet(s) Oral four times a day with meals  sotalol 80 milliGRAM(s) Oral two times a day    MEDICATIONS  (PRN):      Allergies    No Known Allergies    Intolerances        REVIEW OF SYSTEMS:  Negative unless otherwise stated    Vital Signs Last 24 Hrs  T(C): 37.2 (2018 04:00), Max: 37.2 (2018 04:00)  T(F): 99 (2018 04:00), Max: 99 (2018 04:00)  HR: 61 (2018 08:00) (59 - 103)  BP: 109/78 (2018 08:00) (92/69 - 170/115)  BP(mean): 88 (2018 08:00) (77 - 98)  RR: 16 (2018 08:00) (16 - 21)  SpO2: 98% (2018 08:00) (95% - 100%)    PHYSICAL EXAM: feels well this morning  Neuro: A&Ox3  Lungs: CTA bilaterally  COR: S1S2 present, no murmurs/rubs/gallops noted; Mid sternal healed chest incision; Right upper chest ICD   Abdomen: SNTND, +BS, last episode of diarrhea yesterday  Extremities: BUTCHER; + pulses, no edema present  Pain: Patient denies pain    LABS:                        15.3   4.6   )-----------( 135      ( 2018 03:15 )             44.0     06-03    140  |  113<H>  |  11  ----------------------------<  95  4.1   |  14<L>  |  0.75    Ca    8.2<L>      2018 03:15  Phos  2.1     06-03  Mg     2.8     06-03    TPro  6.3  /  Alb  4.1  /  TBili  0.5  /  DBili  x   /  AST  27  /  ALT  17  /  AlkPhos  48  06-03    PT/INR - ( 2018 03:15 )   PT: 11.4 sec;   INR: 1.04 ratio         PTT - ( 2018 03:15 )  PTT:28.0 sec  Urinalysis Basic - ( 2018 22:46 )    Color: Yellow / Appearance: Clear / S.027 / pH: x  Gluc: x / Ketone: Negative  / Bili: Negative / Urobili: Negative   Blood: x / Protein: 30 mg/dL / Nitrite: Negative   Leuk Esterase: Negative / RBC: x / WBC 5-10 /HPF   Sq Epi: x / Non Sq Epi: Occasional /HPF / Bacteria: Few /HPF      CAPILLARY BLOOD GLUCOSE          RADIOLOGY & ADDITIONAL TESTS:

## 2018-06-04 LAB
ANION GAP SERPL CALC-SCNC: 15 MMOL/L — SIGNIFICANT CHANGE UP (ref 5–17)
BUN SERPL-MCNC: 9 MG/DL — SIGNIFICANT CHANGE UP (ref 7–23)
CALCIUM SERPL-MCNC: 8.4 MG/DL — SIGNIFICANT CHANGE UP (ref 8.4–10.5)
CHLORIDE SERPL-SCNC: 110 MMOL/L — HIGH (ref 96–108)
CK MB BLD-MCNC: 0.8 % — SIGNIFICANT CHANGE UP (ref 0–3.5)
CK MB CFR SERPL CALC: 2.4 NG/ML — SIGNIFICANT CHANGE UP (ref 0–6.7)
CK SERPL-CCNC: 288 U/L — HIGH (ref 30–200)
CO2 SERPL-SCNC: 15 MMOL/L — LOW (ref 22–31)
CREAT SERPL-MCNC: 0.77 MG/DL — SIGNIFICANT CHANGE UP (ref 0.5–1.3)
CULTURE RESULTS: SIGNIFICANT CHANGE UP
GLUCOSE SERPL-MCNC: 97 MG/DL — SIGNIFICANT CHANGE UP (ref 70–99)
HCT VFR BLD CALC: 44 % — SIGNIFICANT CHANGE UP (ref 39–50)
HGB BLD-MCNC: 15.3 G/DL — SIGNIFICANT CHANGE UP (ref 13–17)
MAGNESIUM SERPL-MCNC: 2 MG/DL — SIGNIFICANT CHANGE UP (ref 1.6–2.6)
MCHC RBC-ENTMCNC: 30.6 PG — SIGNIFICANT CHANGE UP (ref 27–34)
MCHC RBC-ENTMCNC: 34.7 GM/DL — SIGNIFICANT CHANGE UP (ref 32–36)
MCV RBC AUTO: 88.1 FL — SIGNIFICANT CHANGE UP (ref 80–100)
PHOSPHATE SERPL-MCNC: 3.4 MG/DL — SIGNIFICANT CHANGE UP (ref 2.5–4.5)
PLATELET # BLD AUTO: 132 K/UL — LOW (ref 150–400)
POTASSIUM SERPL-MCNC: 3.8 MMOL/L — SIGNIFICANT CHANGE UP (ref 3.5–5.3)
POTASSIUM SERPL-SCNC: 3.8 MMOL/L — SIGNIFICANT CHANGE UP (ref 3.5–5.3)
RBC # BLD: 5 M/UL — SIGNIFICANT CHANGE UP (ref 4.2–5.8)
RBC # FLD: 11.6 % — SIGNIFICANT CHANGE UP (ref 10.3–14.5)
SODIUM SERPL-SCNC: 140 MMOL/L — SIGNIFICANT CHANGE UP (ref 135–145)
SPECIMEN SOURCE: SIGNIFICANT CHANGE UP
TROPONIN T SERPL-MCNC: <0.01 NG/ML — SIGNIFICANT CHANGE UP (ref 0–0.06)
WBC # BLD: 3.7 K/UL — LOW (ref 3.8–10.5)
WBC # FLD AUTO: 3.7 K/UL — LOW (ref 3.8–10.5)

## 2018-06-04 PROCEDURE — 99233 SBSQ HOSP IP/OBS HIGH 50: CPT | Mod: GC

## 2018-06-04 PROCEDURE — 93010 ELECTROCARDIOGRAM REPORT: CPT

## 2018-06-04 RX ORDER — SOTALOL HCL 120 MG
80 TABLET ORAL EVERY 12 HOURS
Qty: 0 | Refills: 0 | Status: DISCONTINUED | OUTPATIENT
Start: 2018-06-04 | End: 2018-06-08

## 2018-06-04 RX ORDER — POTASSIUM CHLORIDE 20 MEQ
20 PACKET (EA) ORAL ONCE
Qty: 0 | Refills: 0 | Status: COMPLETED | OUTPATIENT
Start: 2018-06-04 | End: 2018-06-04

## 2018-06-04 RX ADMIN — Medication 100 MILLIGRAM(S): at 05:12

## 2018-06-04 RX ADMIN — Medication 0.25 MILLIGRAM(S): at 11:01

## 2018-06-04 RX ADMIN — Medication 100 MILLIGRAM(S): at 21:48

## 2018-06-04 RX ADMIN — Medication 20 MILLIEQUIVALENT(S): at 05:11

## 2018-06-04 RX ADMIN — HEPARIN SODIUM 5000 UNIT(S): 5000 INJECTION INTRAVENOUS; SUBCUTANEOUS at 21:48

## 2018-06-04 RX ADMIN — Medication 100 MILLIGRAM(S): at 15:23

## 2018-06-04 RX ADMIN — Medication 80 MILLIGRAM(S): at 17:30

## 2018-06-04 RX ADMIN — HEPARIN SODIUM 5000 UNIT(S): 5000 INJECTION INTRAVENOUS; SUBCUTANEOUS at 15:23

## 2018-06-04 RX ADMIN — HEPARIN SODIUM 5000 UNIT(S): 5000 INJECTION INTRAVENOUS; SUBCUTANEOUS at 05:12

## 2018-06-04 RX ADMIN — Medication 81 MILLIGRAM(S): at 11:45

## 2018-06-04 NOTE — CHART NOTE - NSCHARTNOTEFT_GEN_A_CORE
====================  CCU MIDNIGHT ROUNDS  ====================    MICHOACANO NEWELL  [id number]    ====================  SUMMARY:  ====================    54 yo M VT/cardiac arrest s/p MDT ICD w/ recent admit for VT s/p ablation, non obs CAD, congenital ASD s/p repair a/w watery diarrhea and fevers in setting of bad pork (c diff neg, cultures neg), s/p IVF in ED, @ home w/   ICD shock x 3/VT storm (device 17 ep VT (?SVT) w/ 3 shocks),      ====================  NEW EVENTS:  ====================        ====================  VITALS (Last 12 hrs):  ====================    T(C): 36.7 (06-03-18 @ 23:00), Max: 37 (06-03-18 @ 17:07)  HR: 82 (06-04-18 @ 00:00) (59 - 83)  BP: 119/76 (06-04-18 @ 00:00) (112/67 - 127/74)  BP(mean): 88 (06-04-18 @ 00:00) (82 - 96)  RR: 15 (06-04-18 @ 00:00) (14 - 18)  SpO2: 100% (06-03-18 @ 22:00) (99% - 100%)    TELEMETRY: sinus     I&O's Summary    02 Jun 2018 07:01  -  03 Jun 2018 07:00  --------------------------------------------------------  IN: 610 mL / OUT: 0 mL / NET: 610 mL    03 Jun 2018 07:01  -  04 Jun 2018 00:25  --------------------------------------------------------  IN: 980 mL / OUT: 0 mL / NET: 980 mL    ===================  PLAN:  ====================  - VT - monitor off sotalol, K>4, Mg >2, f/u with EP   - diarrhea - having formed stools, c/w flagyl - f/u stool cultures     Imani ROSEN/U  #[id number]

## 2018-06-04 NOTE — PROGRESS NOTE ADULT - ASSESSMENT
52 yo man w/ hx of Congenital ASD repair, Cardiac arrest/VT post procedure requiring single lead ICD (MDT) placement, various episodes of VT s/p ablation 12/2017 who presents for palpitations and sensation that "ICD shocked x 3". ICD interrogated revealed ICD shock for SVT vs dual tachycardia. Patient is scheduled for VT ablation as outpatient on 6/28/18. Patient also has diarrhea since Saturday (6/2/18) which is improving.     1) ICD shock  - ICD shock for SVT vs dual tachycardia in the setting of diarrhea  - Resume Sotalol 80 mg BID, QTc is stable   - Patient is scheduled for VT ablation as outpatient on 6/28/18  - keep K+>4, Mg++>2     2) Diarrhea  - Blood cultures NGTD  - Awaiting stool culture result  - Currently on Mariela Kaur, ANP  [phone]

## 2018-06-04 NOTE — PROGRESS NOTE ADULT - SUBJECTIVE AND OBJECTIVE BOX
Admission date:  CHIEF COMPLAINT:  HPI:  This is a 53 year old man with past medical history of Ventricular tachycardia/Cardiac Arrest s/p ICD (Medtronic) with recent admissions for VTach, Non Obstructive CAD, Congenital ASD Repair admitted for ICD shock X 3/VTach storm.  Patient says he developed abdominal cramping, diarrhea this am passing 20 watery stools associated with fevers and had 3 successive shocks from his ICD prompting him to come to [facility] ED. In ED, ICD interrogated confirming VTach and received replacement fluid.  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 Patient denies fever, chills, headache, SOB, Chest Pain prior to ICD Discharge.  Of note, patient had 3 watery stools in ED. (2018 18:39)    REVIEW OF SYSTEMS:    CONSTITUTIONAL: No weakness, fevers or chills  EYES/ENT: No visual changes;  No vertigo or throat pain   NECK: No pain or stiffness  RESPIRATORY: No cough, wheezing, hemoptysis; No shortness of breath  CARDIOVASCULAR: No chest pain or palpitations  GASTROINTESTINAL: No abdominal or epigastric pain. No nausea, vomiting, or hematemesis; No diarrhea or constipation. No melena or hematochezia.  GENITOURINARY: No dysuria, frequency or hematuria  NEUROLOGICAL: No numbness or weakness  SKIN: No itching, rashes      MEDICATIONS  (STANDING):  aspirin enteric coated 81 milliGRAM(s) Oral daily  heparin  Injectable 5000 Unit(s) SubCutaneous every 8 hours  metroNIDAZOLE  IVPB 500 milliGRAM(s) IV Intermittent every 8 hours  metroNIDAZOLE  IVPB        MEDICATIONS  (PRN):  ALPRAZolam 0.25 milliGRAM(s) Oral every 8 hours PRN anxiety      Objective:  ICU Vital Signs Last 24 Hrs  T(C): 36.7 (2018 04:00), Max: 37 (2018 12:00)  T(F): 98 (2018 04:00), Max: 98.6 (2018 12:00)  HR: 68 (2018 06:00) (58 - 83)  BP: 135/71 (2018 06:00) (107/67 - 140/73)  BP(mean): 90 (2018 06:00) (78 - 96)  ABP: --  ABP(mean): --  RR: 16 (2018 06:00) (14 - 18)  SpO2: 98% (2018 05:00) (98% - 100%)          - @ 07:01  -  06-04 @ 07:00  --------------------------------------------------------  IN: 1180 mL / OUT: 0 mL / NET: 1180 mL      Daily     Daily Weight in k.5 (2018 00:00)    PHYSICAL EXAM:    General: WN/WD NAD  Neurology: Awake, nonfocal, BUTCHER x 4  Eyes: Scleras clear, PERRLA/ EOMI, Gross vision intact  ENT:Gross hearing intact, grossly patent pharynx, no stridor  Neck: Neck supple, trachea midline, No JVD,   Respiratory: CTA B/L, No wheezing, rales, rhonchi  CV: RRR, S1S2, no murmurs, rubs or gallops  Abdominal: Soft, NT, ND +BS, last episode of diarrhea > 48 hrs  Extremities: No edema, + peripheral pulses  Skin: No Rashes, Hematoma, Ecchymosis  Lymphatic: No Neck, axilla, groin LAD  Psych: Oriented x 3, normal affect      TELEMETRY:     EKG:     IMAGING:    Labs:                          15.3   3.7   )-----------( 132      ( 2018 03:29 )             44.0     06-04    140  |  110<H>  |  9   ----------------------------<  97  3.8   |  15<L>  |  0.77    Ca    8.4      2018 03:29  Phos  3.4     06-04  Mg     2.0     06-04    TPro  6.3  /  Alb  4.1  /  TBili  0.5  /  DBili  x   /  AST  27  /  ALT  17  /  AlkPhos  48  06-03    LIVER FUNCTIONS - ( 2018 03:15 )  Alb: 4.1 g/dL / Pro: 6.3 g/dL / ALK PHOS: 48 U/L / ALT: 17 U/L / AST: 27 U/L / GGT: x           PT/INR - ( 2018 03:15 )   PT: 11.4 sec;   INR: 1.04 ratio         PTT - ( 2018 03:15 )  PTT:28.0 sec  CKMB Units: 2.4 ng/mL ( @ 03:29)  Creatine Kinase, Serum: 288 U/L ( @ 03:29)  Troponin T, Serum: <0.01 ng/mL ( @ 03:29)    Urinalysis Basic - ( 2018 22:46 )    Color: Yellow / Appearance: Clear / S.027 / pH: x  Gluc: x / Ketone: Negative  / Bili: Negative / Urobili: Negative   Blood: x / Protein: 30 mg/dL / Nitrite: Negative   Leuk Esterase: Negative / RBC: x / WBC 5-10 /HPF   Sq Epi: x / Non Sq Epi: Occasional /HPF / Bacteria: Few /HPF        HEALTH ISSUES - PROBLEM Dx:  Diarrhea: Diarrhea  Defibrillator discharge: Defibrillator discharge Admission date:  CHIEF COMPLAINT:  HPI:  This is a 53 year old man with past medical history of Ventricular tachycardia/Cardiac Arrest s/p ICD (Medtronic) with recent admissions for VTach, Non Obstructive CAD, Congenital ASD Repair admitted for ICD shock X 3/VTach storm.  Patient says he developed abdominal cramping, diarrhea this am passing 20 watery stools associated with fevers and had 3 successive shocks from his ICD prompting him to come to [facility] ED. In ED, ICD interrogated confirming VTach and received replacement fluid.  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 Patient denies fever, chills, headache, SOB, Chest Pain prior to ICD Discharge.  Of note, patient had 3 watery stools in ED. (2018 18:39)    REVIEW OF SYSTEMS:    CONSTITUTIONAL: No weakness, fevers or chills  EYES/ENT: No visual changes;  No vertigo or throat pain   NECK: No pain or stiffness  RESPIRATORY: No cough, wheezing, hemoptysis; No shortness of breath  CARDIOVASCULAR: No chest pain or palpitations  GASTROINTESTINAL: No abdominal or epigastric pain. No nausea, vomiting, or hematemesis; No diarrhea or constipation. No melena or hematochezia.  GENITOURINARY: No dysuria, frequency or hematuria  NEUROLOGICAL: No numbness or weakness  SKIN: No itching, rashes      MEDICATIONS  (STANDING):  aspirin enteric coated 81 milliGRAM(s) Oral daily  heparin  Injectable 5000 Unit(s) SubCutaneous every 8 hours  metroNIDAZOLE  IVPB 500 milliGRAM(s) IV Intermittent every 8 hours  metroNIDAZOLE  IVPB        MEDICATIONS  (PRN):  ALPRAZolam 0.25 milliGRAM(s) Oral every 8 hours PRN anxiety      Objective:  ICU Vital Signs Last 24 Hrs  T(C): 36.7 (2018 04:00), Max: 37 (2018 12:00)  T(F): 98 (2018 04:00), Max: 98.6 (2018 12:00)  HR: 68 (2018 06:00) (58 - 83)  BP: 135/71 (2018 06:00) (107/67 - 140/73)  BP(mean): 90 (2018 06:00) (78 - 96)  ABP: --  ABP(mean): --  RR: 16 (2018 06:00) (14 - 18)  SpO2: 98% (2018 05:00) (98% - 100%)          -03 @ 07:01  -  06- @ 07:00  --------------------------------------------------------  IN: 1180 mL / OUT: 0 mL / NET: 1180 mL      Daily     Daily Weight in k.5 (2018 00:00)    PHYSICAL EXAM:    General: WN/WD NAD  Neurology: Awake, nonfocal, BUTCHER x 4  Eyes: Scleras clear, PERRLA/ EOMI, Gross vision intact  ENT:Gross hearing intact, grossly patent pharynx, no stridor  Neck: Neck supple, trachea midline, No JVD,   Respiratory: CTA B/L, No wheezing, rales, rhonchi  CV: RRR, S1S2, no murmurs, rubs or gallops  Abdominal: Soft, NT, ND +BS, last episode of diarrhea > 48 hrs  Extremities: No edema, + peripheral pulses  Skin: No Rashes, Hematoma, Ecchymosis  Lymphatic: No Neck, axilla, groin LAD  Psych: Oriented x 3, normal affect      TELEMETRY: SR    EKG:   < from: 12 Lead ECG (18 @ 02:13) >  Ventricular Rate 69 BPM    Atrial Rate 69 BPM    P-R Interval 256 ms    QRS Duration 92 ms     ms    QTc 467 ms    P Axis 33 degrees    R Axis 62 degrees    T Axis 131 degrees    Diagnosis Line SINUS RHYTHM WITH 1ST DEGREE A-V BLOCK  INCOMPLETE RIGHT BUNDLE BRANCH BLOCK  T WAVE ABNORMALITY, CONSIDER LATERAL ISCHEMIA  PROLONGED QT  ABNORMAL ECG      IMAGING:  < from: Xray Chest 1 View- PORTABLE-Urgent (18 @ 20:51) >  IMPRESSION:     ICD lead is unchanged in position. The patient is status post median   sternotomy.    The cardiac silhouette is enlarged, similar to the prior study. There is   prominence of the central pulmonary arteries, similar to the prior study.     There is no focal pulmonary consolidation or pneumothorax. There is   discoid atelectasis at the left lung base. Previously seen opacity   overlying the left costophrenic angle is no longer visualized.    Labs:                          15.3   3.7   )-----------( 132      ( 2018 03:29 )             44.0     06-04    140  |  110<H>  |  9   ----------------------------<  97  3.8   |  15<L>  |  0.77    Ca    8.4      2018 03:29  Phos  3.4     06-  Mg     2.0     06-    TPro  6.3  /  Alb  4.1  /  TBili  0.5  /  DBili  x   /  AST  27  /  ALT  17  /  AlkPhos  48  06-03    LIVER FUNCTIONS - ( 2018 03:15 )  Alb: 4.1 g/dL / Pro: 6.3 g/dL / ALK PHOS: 48 U/L / ALT: 17 U/L / AST: 27 U/L / GGT: x           PT/INR - ( 2018 03:15 )   PT: 11.4 sec;   INR: 1.04 ratio         PTT - ( 2018 03:15 )  PTT:28.0 sec  CKMB Units: 2.4 ng/mL ( @ 03:29)  Creatine Kinase, Serum: 288 U/L ( @ 03:29)  Troponin T, Serum: <0.01 ng/mL ( @ 03:29)    Urinalysis Basic - ( 2018 22:46 )    Color: Yellow / Appearance: Clear / S.027 / pH: x  Gluc: x / Ketone: Negative  / Bili: Negative / Urobili: Negative   Blood: x / Protein: 30 mg/dL / Nitrite: Negative   Leuk Esterase: Negative / RBC: x / WBC 5-10 /HPF   Sq Epi: x / Non Sq Epi: Occasional /HPF / Bacteria: Few /HPF      HEALTH ISSUES - PROBLEM Dx:  Diarrhea: Diarrhea  Defibrillator discharge: Defibrillator discharge

## 2018-06-04 NOTE — PROVIDER CONTACT NOTE (OTHER) - ASSESSMENT
pt assisted back to bed x2 assist, pt denies respiratory distress,. pt denies pain, pt now laying flat in bed and states dizziness resolved, BP stable 142/756, HR 60s SR, no ectopy, pt remains free from s/s bleeding

## 2018-06-04 NOTE — PROGRESS NOTE ADULT - PROBLEM SELECTOR PLAN 1
2/2 to Electrolyte loss from Diarrhea  Q8h BMP Mag with electrolyte replenishment  Trend CE's to r/o ischemic etiology  Continue Sotalol and Aspirin  EP Consult  ICD Interrogation (Medtronic)  TTE in am 2/2 to Electrolyte loss from Diarrhea  Q8h BMP Mag with electrolyte replenishment  Trend CE's to r/o ischemic etiology  Continue Sotalol and Aspirin  EP- recommendations to restart sotalol 80mg bid and will follow up with EP MD 6/28/18 for VT ablation outpatient  ICD Interrogation (Medtronic)  6/ 4 TTE: EF 72%, mild MR, LVH,

## 2018-06-04 NOTE — CHART NOTE - NSCHARTNOTEFT_GEN_A_CORE
====================  CCU MIDNIGHT ROUNDS  ====================    MICHOACANO NEWELL  [id number]    ====================  SUMMARY: HPI:  This is a 53 year old man with past medical history of Ventricular tachycardia/Cardiac Arrest s/p ICD (Medtronic) with recent admissions for VTach, Non Obstructive CAD, Congenital ASD Repair admitted for ICD shock X 3/VTach storm.  Patient says he developed abdominal cramping, diarrhea this am passing 20 watery stools associated with fevers and had 3 successive shocks from his ICD prompting him to come to [facility] ED. In ED, ICD interrogated confirming VTach and received replacement fluid.  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 Patient denies fever, chills, headache, SOB, Chest Pain prior to ICD Discharge.  Of note, patient had 3 watery stools in ED. (02 Jun 2018 18:39)    ====================        ====================  NEW EVENTS: Sotalol resumed. Stools becoming formed.   ====================        ====================  VITALS (Last 12 hrs):  ====================    T(C): 36.6 (06-04-18 @ 23:00), Max: 36.7 (06-04-18 @ 13:00)  HR: 55 (06-04-18 @ 23:00) (55 - 84)  BP: 109/64 (06-04-18 @ 23:00) (108/73 - 134/92)  BP(mean): 78 (06-04-18 @ 23:00) (78 - 104)  RR: 15 (06-04-18 @ 23:00) (14 - 17)  SpO2: 98% (06-04-18 @ 22:00) (98% - 98%)        I&O's Summary    03 Jun 2018 07:01  -  04 Jun 2018 07:00  --------------------------------------------------------  IN: 1180 mL / OUT: 0 mL / NET: 1180 mL    04 Jun 2018 07:01  -  04 Jun 2018 23:51  --------------------------------------------------------  IN: 800 mL / OUT: 0 mL / NET: 800 mL        ====================  PLAN:  -VT: sotalol resumed per EP. scheduled for VT ablation as outpatient later in june. monitor lytes.  -diarrhea: monitor lytes. stools are becoming more formed. Blood/urine cx negative. pending final stool cx results. c/w roseanna for now.  ====================    HEALTH ISSUES - PROBLEM Dx:  Diarrhea: Diarrhea  Defibrillator discharge: Defibrillator discharge      Alayna Guerra ODILON PA #[id number]

## 2018-06-04 NOTE — PROGRESS NOTE ADULT - SUBJECTIVE AND OBJECTIVE BOX
24H hour events: No acute events overnight, diarrhea is improving     MEDICATIONS:  aspirin enteric coated 81 milliGRAM(s) Oral daily  heparin  Injectable 5000 Unit(s) SubCutaneous every 8 hours    metroNIDAZOLE  IVPB 500 milliGRAM(s) IV Intermittent every 8 hours      ALPRAZolam 0.25 milliGRAM(s) Oral every 8 hours PRN      REVIEW OF SYSTEMS:  Complete 10point ROS negative.    PHYSICAL EXAM:  T(C): 36.7 (06-04-18 @ 08:00), Max: 37 (06-03-18 @ 12:00)  HR: 61 (06-04-18 @ 09:00) (58 - 83)  BP: 115/75 (06-04-18 @ 09:00) (100/63 - 140/73)  RR: 14 (06-04-18 @ 08:00) (14 - 18)  SpO2: 98% (06-04-18 @ 08:00) (98% - 100%)  I&O's Summary    03 Jun 2018 07:01  -  04 Jun 2018 07:00  --------------------------------------------------------  IN: 1180 mL / OUT: 0 mL / NET: 1180 mL    04 Jun 2018 07:01  -  04 Jun 2018 10:06  --------------------------------------------------------  IN: 120 mL / OUT: 0 mL / NET: 120 mL    Appearance: Normal	  HEENT:   Normal oral mucosa, PERRL, EOMI	  Lymphatic: No lymphadenopathy  Cardiovascular: Normal S1 S2, No JVD, No murmurs, No edema  Respiratory: Lungs clear to auscultation	  Psychiatry: A & O x 3, Mood & affect appropriate  Gastrointestinal:  Soft, Non-tender, + BS	  Skin: No rashes, No ecchymoses, No cyanosis	  Neurologic: Non-focal  Extremities: Normal range of motion, No clubbing, cyanosis or edema  Vascular: Peripheral pulses palpable 2+ bilaterally      LABS:	 	    CBC Full  -  ( 04 Jun 2018 03:29 )  WBC Count : 3.7 K/uL  Hemoglobin : 15.3 g/dL  Hematocrit : 44.0 %  Platelet Count - Automated : 132 K/uL  Mean Cell Volume : 88.1 fl  Mean Cell Hemoglobin : 30.6 pg  Mean Cell Hemoglobin Concentration : 34.7 gm/dL      06-04    140  |  110<H>  |  9   ----------------------------<  97  3.8   |  15<L>  |  0.77  06-03    140  |  113<H>  |  11  ----------------------------<  95  4.1   |  14<L>  |  0.75    Ca    8.4      04 Jun 2018 03:29  Ca    8.2<L>      03 Jun 2018 03:15  Phos  3.4     06-04  Phos  2.1     06-03  Mg     2.0     06-04  Mg     2.8     06-03    TPro  6.3  /  Alb  4.1  /  TBili  0.5  /  DBili  x   /  AST  27  /  ALT  17  /  AlkPhos  48  06-03  TPro  6.8  /  Alb  4.3  /  TBili  0.5  /  DBili  x   /  AST  26  /  ALT  19  /  AlkPhos  50  06-02    proBNP: Serum Pro-Brain Natriuretic Peptide: 614 pg/mL (06-02 @ 20:44)    CARDIAC MARKERS:  Troponin T, Serum: <0.01 ng/mL (06-04-18 @ 03:29)  Creatine Kinase, Serum: 288 U/L (06-04-18 @ 03:29)    TELEMETRY: Sinus rhythm at 60's 	      ECG: Sinus rhythm 1st degree AVB, QT: 446 ms, QTc: 460 ms    	  < from: Transthoracic Echocardiogram (06.03.18 @ 08:58) >  PROCEDURE: Transthoracic echocardiogram with 2-D, M-Mode  and complete spectral and color flow Doppler.  INDICATION: Chest pain, unspecified (R07.9)  ------------------------------------------------------------------------  Dimensions:    Normal Values:  LA:     4.3    2.0 - 4.0 cm  Ao:     3.5    2.0 - 3.8 cm  SEPTUM: 1.2    0.6 - 1.2 cm  PWT:    1.2    0.6 - 1.1 cm  LVIDd:  5.7    3.0 - 5.6 cm  LVIDs:  3.3    1.8 - 4.0 cm  Derived variables:  LVMI: 163 g/m2  RWT: 0.42  Fractional short: 42 %  EF (Teicholtz): 72 %  ------------------------------------------------------------------------  Observations:  Mitral Valve: Mitral annular calcification, otherwise  normal mitral valve. Mild mitral regurgitation.  Aortic Valve/Aorta: Normal trileaflet aortic valve.  Normal aortic root size. (Ao: 3.5 cm at the sinuses of  Valsalva).  Left Atrium: Mildly dilated left atrium.  LA volume index =  37 cc/m2.  Left Ventricle: Normal left ventricular systolic function.  No segmental wall motion abnormalities. Moderate concentric  left ventricular hypertrophy.  Right Heart: Normal right atrium. Normal right ventricular  sizeand function. A device wire is noted in the right  heart. Dilated coronary sinus. Normal tricuspid valve. Mild  tricuspid regurgitation. Normal pulmonic valve. Mild  pulmonic regurgitation.  Pericardium/Pleura: Normal pericardium with no pericardial  effusion.  Hemodynamic: Estimated right atrial pressure is 8 mm Hg.  Estimated right ventricular systolic pressure equals 43 mm  Hg, assuming right atrial pressure equals 8 mm Hg,  consistent with mild pulmonary hypertension. History of ASD  repair. Color flow doppler interrogation reveals no  residual interatrial shunt.  ------------------------------------------------------------------------  Conclusions:  1. Mitral annular calcification, otherwise normal mitral  valve. Mild mitral regurgitation.  2. Normal trileaflet aortic valve.  3. Moderate concentric left ventricular hypertrophy.  4. Normal left ventricular systolic function. No segmental  wall motion abnormalities.  5. Normal right ventricular size and function. A device  wire is noted in the right heart. Dilated coronary sinus.  6. History of ASD repair. Color flow doppler interrogation  reveals no residual interatrial shunt.  *** Compared with echocardiogram of 2/2/2018, no  significant changes noted.

## 2018-06-05 ENCOUNTER — TRANSCRIPTION ENCOUNTER (OUTPATIENT)
Age: 53
End: 2018-06-05

## 2018-06-05 DIAGNOSIS — I25.10 ATHEROSCLEROTIC HEART DISEASE OF NATIVE CORONARY ARTERY WITHOUT ANGINA PECTORIS: ICD-10-CM

## 2018-06-05 DIAGNOSIS — Z29.9 ENCOUNTER FOR PROPHYLACTIC MEASURES, UNSPECIFIED: ICD-10-CM

## 2018-06-05 LAB
ANION GAP SERPL CALC-SCNC: 12 MMOL/L — SIGNIFICANT CHANGE UP (ref 5–17)
BASOPHILS # BLD AUTO: 0 K/UL — SIGNIFICANT CHANGE UP (ref 0–0.2)
BASOPHILS NFR BLD AUTO: 1.1 % — SIGNIFICANT CHANGE UP (ref 0–2)
BUN SERPL-MCNC: 12 MG/DL — SIGNIFICANT CHANGE UP (ref 7–23)
CALCIUM SERPL-MCNC: 8.8 MG/DL — SIGNIFICANT CHANGE UP (ref 8.4–10.5)
CHLORIDE SERPL-SCNC: 105 MMOL/L — SIGNIFICANT CHANGE UP (ref 96–108)
CK MB BLD-MCNC: 1 % — SIGNIFICANT CHANGE UP (ref 0–3.5)
CK MB CFR SERPL CALC: 2.2 NG/ML — SIGNIFICANT CHANGE UP (ref 0–6.7)
CK SERPL-CCNC: 211 U/L — HIGH (ref 30–200)
CO2 SERPL-SCNC: 21 MMOL/L — LOW (ref 22–31)
CREAT SERPL-MCNC: 0.8 MG/DL — SIGNIFICANT CHANGE UP (ref 0.5–1.3)
CULTURE RESULTS: SIGNIFICANT CHANGE UP
EOSINOPHIL # BLD AUTO: 0.2 K/UL — SIGNIFICANT CHANGE UP (ref 0–0.5)
EOSINOPHIL NFR BLD AUTO: 6.7 % — HIGH (ref 0–6)
GLUCOSE SERPL-MCNC: 90 MG/DL — SIGNIFICANT CHANGE UP (ref 70–99)
HCT VFR BLD CALC: 44.6 % — SIGNIFICANT CHANGE UP (ref 39–50)
HGB BLD-MCNC: 15.8 G/DL — SIGNIFICANT CHANGE UP (ref 13–17)
LYMPHOCYTES # BLD AUTO: 1.3 K/UL — SIGNIFICANT CHANGE UP (ref 1–3.3)
LYMPHOCYTES # BLD AUTO: 45.1 % — HIGH (ref 13–44)
MAGNESIUM SERPL-MCNC: 2.1 MG/DL — SIGNIFICANT CHANGE UP (ref 1.6–2.6)
MCHC RBC-ENTMCNC: 30.7 PG — SIGNIFICANT CHANGE UP (ref 27–34)
MCHC RBC-ENTMCNC: 35.4 GM/DL — SIGNIFICANT CHANGE UP (ref 32–36)
MCV RBC AUTO: 86.6 FL — SIGNIFICANT CHANGE UP (ref 80–100)
MONOCYTES # BLD AUTO: 0.3 K/UL — SIGNIFICANT CHANGE UP (ref 0–0.9)
MONOCYTES NFR BLD AUTO: 10.5 % — SIGNIFICANT CHANGE UP (ref 2–14)
NEUTROPHILS # BLD AUTO: 1 K/UL — LOW (ref 1.8–7.4)
NEUTROPHILS NFR BLD AUTO: 36.6 % — LOW (ref 43–77)
PHOSPHATE SERPL-MCNC: 3.1 MG/DL — SIGNIFICANT CHANGE UP (ref 2.5–4.5)
PLATELET # BLD AUTO: 135 K/UL — LOW (ref 150–400)
POTASSIUM SERPL-MCNC: 3.8 MMOL/L — SIGNIFICANT CHANGE UP (ref 3.5–5.3)
POTASSIUM SERPL-SCNC: 3.8 MMOL/L — SIGNIFICANT CHANGE UP (ref 3.5–5.3)
RBC # BLD: 5.15 M/UL — SIGNIFICANT CHANGE UP (ref 4.2–5.8)
RBC # FLD: 11.6 % — SIGNIFICANT CHANGE UP (ref 10.3–14.5)
SODIUM SERPL-SCNC: 138 MMOL/L — SIGNIFICANT CHANGE UP (ref 135–145)
SPECIMEN SOURCE: SIGNIFICANT CHANGE UP
TROPONIN T SERPL-MCNC: <0.01 NG/ML — SIGNIFICANT CHANGE UP (ref 0–0.06)
WBC # BLD: 2.8 K/UL — LOW (ref 3.8–10.5)
WBC # FLD AUTO: 2.8 K/UL — LOW (ref 3.8–10.5)

## 2018-06-05 PROCEDURE — 99233 SBSQ HOSP IP/OBS HIGH 50: CPT

## 2018-06-05 PROCEDURE — 93010 ELECTROCARDIOGRAM REPORT: CPT | Mod: 77

## 2018-06-05 PROCEDURE — 93010 ELECTROCARDIOGRAM REPORT: CPT

## 2018-06-05 RX ORDER — MORPHINE SULFATE 50 MG/1
2 CAPSULE, EXTENDED RELEASE ORAL ONCE
Qty: 0 | Refills: 0 | Status: DISCONTINUED | OUTPATIENT
Start: 2018-06-05 | End: 2018-06-05

## 2018-06-05 RX ORDER — POTASSIUM CHLORIDE 20 MEQ
40 PACKET (EA) ORAL ONCE
Qty: 0 | Refills: 0 | Status: COMPLETED | OUTPATIENT
Start: 2018-06-05 | End: 2018-06-05

## 2018-06-05 RX ADMIN — Medication 80 MILLIGRAM(S): at 17:13

## 2018-06-05 RX ADMIN — HEPARIN SODIUM 5000 UNIT(S): 5000 INJECTION INTRAVENOUS; SUBCUTANEOUS at 12:08

## 2018-06-05 RX ADMIN — Medication 81 MILLIGRAM(S): at 12:08

## 2018-06-05 RX ADMIN — Medication 100 MILLIGRAM(S): at 05:46

## 2018-06-05 RX ADMIN — HEPARIN SODIUM 5000 UNIT(S): 5000 INJECTION INTRAVENOUS; SUBCUTANEOUS at 05:46

## 2018-06-05 RX ADMIN — HEPARIN SODIUM 5000 UNIT(S): 5000 INJECTION INTRAVENOUS; SUBCUTANEOUS at 22:12

## 2018-06-05 RX ADMIN — Medication 80 MILLIGRAM(S): at 05:46

## 2018-06-05 RX ADMIN — Medication 40 MILLIEQUIVALENT(S): at 12:04

## 2018-06-05 NOTE — DISCHARGE NOTE ADULT - HOSPITAL COURSE
52yo M past medical history of Ventricular tachycardia/Cardiac Arrest s/p ICD (Medtronic) with recent admissions for VTach, Non Obstructive CAD, Congenital ASD Repair admitted for ICD shock X 3/VTach storm in setting of diarrhea improved on sotalol.    For his Defibrillator discharge, he was admitted to CCU and seen by EPS.  ICD interrogation revealed ICD shock for SVT vs dual tachycardia. in the setting of diarrhea, which is now resolving. TTE had normal LV function.  He improved on Sotalol 80 mg BID, QTc is stable. Patient is scheduled for VT ablation as outpatient on 6/28/18.     For his Diarrhea, WBC normal, no fever, stool cultures and c diff were negative, he was initially given flagyl which was discontinued with resolution of diarrhea.    He was cleared by EPS for discharge.    < from: Transthoracic Echocardiogram (06.03.18 @ 08:58) >  Conclusions:  1. Mitral annular calcification, otherwise normal mitral  valve. Mild mitral regurgitation.  2. Normal trileaflet aortic valve.  3. Moderate concentric left ventricular hypertrophy.  4. Normal left ventricular systolic function. No segmental  wall motion abnormalities.  5. Normal right ventricular size and function. A device  wire is noted in the right heart. Dilated coronary sinus.  6. History of ASD repair. Color flow doppler interrogation  reveals no residual interatrial shunt.    < end of copied text > 52yo M past medical history of Ventricular tachycardia/Cardiac Arrest s/p ICD (Medtronic) with recent admissions for VTach, Non Obstructive CAD, Congenital ASD Repair admitted for ICD shock X 3/VTach storm in setting of diarrhea improved on sotalol.    For his Defibrillator discharge, he was admitted to CCU and seen by EPS.  ICD interrogation revealed ICD shock for SVT vs dual tachycardia. in the setting of diarrhea, which is now resolving. TTE had normal LV function.  He improved on Sotalol 80 mg BID, QTc is stable. After ALANA and cardiac CT, EPS performed AT ablation and deactivated his ICD on 6/7.  He was discharged off sotalol.    For his Diarrhea, WBC normal, no fever, stool cultures and c diff were negative, he was initially given flagyl which was discontinued with resolution of diarrhea.    He was cleared by EPS for discharge.    < from: Transthoracic Echocardiogram (06.03.18 @ 08:58) >  Conclusions:  1. Mitral annular calcification, otherwise normal mitral  valve. Mild mitral regurgitation.  2. Normal trileaflet aortic valve.  3. Moderate concentric left ventricular hypertrophy.  4. Normal left ventricular systolic function. No segmental  wall motion abnormalities.  5. Normal right ventricular size and function. A device  wire is noted in the right heart. Dilated coronary sinus.  6. History of ASD repair. Color flow doppler interrogation  reveals no residual interatrial shunt.    < end of copied text >

## 2018-06-05 NOTE — PROGRESS NOTE ADULT - PROBLEM SELECTOR PLAN 1
ICD interrogation revealed ICD shock for SVT vs dual tachycardia. in the setting of diarrhea, which is now resolving  - on Sotalol 80 mg BID, QTc is stable   - Patient is scheduled for VT ablation as outpatient on 6/28/18

## 2018-06-05 NOTE — PROVIDER CONTACT NOTE (CHANGE IN STATUS NOTIFICATION) - ASSESSMENT
Pt. fell asleep after eating and woke up with pain left chest. Pain resolved entirely after 10 minutes

## 2018-06-05 NOTE — DISCHARGE NOTE ADULT - CARE PROVIDER_API CALL
Pedro Saavedra), Cardiac Electrophysiology; Cardiovascular Disease  [address], [place] [id number]  Phone: [phone]  Fax: [phone] Pedro Saavedra), Cardiac Electrophysiology; Cardiovascular Disease  [address] [place], [place] [id number]  Phone: [phone]  Fax: [phone]    Rianna Barrera  [facility]  [address], [address]  [place] [address]  Phone: [phone]  Fax: (   )    - Pedro Saavedra), Cardiac Electrophysiology; Cardiovascular Disease  [address] [place], [place] [id number]  Phone: [phone]  Fax: [phone]    Felicity Barrera  [facility]  [address], [address]  [place] [address]  Phone: [phone]  Fax: (   )    -

## 2018-06-05 NOTE — CHART NOTE - NSCHARTNOTEFT_GEN_A_CORE
Medicine PA    SUBJECTIVE: Notified by RN - pt c/o chest pain 10/10    T(C): 36.4 (06-05-18 @ 16:25), Max: 36.6 (06-04-18 @ 23:00)  HR: 105 (06-05-18 @ 16:25) (53 - 105)  BP: 162/80 (06-05-18 @ 16:25) (103/74 - 162/80)  RR: 16 (06-05-18 @ 16:25) (14 - 18)  SpO2: 98% (06-05-18 @ 16:25) (97% - 99%)    PHYSICAL EXAM:  GENERAL: A&Ox3, in NAD  HEAD:  Atraumatic, Normocephalic  EYES: EOMI, PERRLA, conjunctiva and sclera clear  NECK: Supple, No JVD  CHEST/LUNG: Clear to auscultation bilaterally; No wheeze/rales/rhonchi  HEART: S1, S2. Regular rate and rhythm; No murmurs, rubs, or gallops  ABDOMEN: Nondistended, Normoactive Bowel sounds x 4, Soft, Nontender  EXTREMITIES:  2+ Peripheral Pulses, No clubbing, cyanosis, or edema  NEUROLOGY: non-focal  SKIN: No rashes or lesions      RADIOLOGY & ADDITIONAL TESTS:    ASSESSMENT/PLAN:   HPI:  This is a 53 year old man with past medical history of Ventricular tachycardia/Cardiac Arrest s/p ICD (Medtronic) with recent admissions for VTach, Non Obstructive CAD, Congenital ASD Repair admitted for ICD shock X 3/VTach storm.  Patient says he developed abdominal cramping, diarrhea this am passing 20 watery stools associated with fevers and had 3 successive shocks from his ICD prompting him to come to [facility] ED. In ED, ICD interrogated confirming VTach and received replacement fluid.  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 Patient denies fever, chills, headache, SOB, Chest Pain prior to ICD Discharge.  Of note, patient had 3 watery stools in ED. (02 Jun 2018 18:39)      1) Left sided chest pain - resolved on own  -EKG - unchanged, no new ST-T wave changes  -Stat cardiac enzymes  -Morphine for pain  -Pt seen with attending Dr. Meredith at the time - House cardiology consult to see patient  -Will continue to monitor    Darrel Jc PA-C  Department of Medicine  #[id number] Medicine PA    SUBJECTIVE: Notified by RN - pt c/o left sided chest pain 10/10    T(C): 36.4 (06-05-18 @ 16:25), Max: 36.6 (06-04-18 @ 23:00)  HR: 105 (06-05-18 @ 16:25) (53 - 105)  BP: 162/80 (06-05-18 @ 16:25) (103/74 - 162/80)  RR: 16 (06-05-18 @ 16:25) (14 - 18)  SpO2: 98% (06-05-18 @ 16:25) (97% - 99%)    PHYSICAL EXAM:  GENERAL: A&Ox3, in NAD  HEAD:  Atraumatic, Normocephalic  EYES: EOMI, PERRLA, conjunctiva and sclera clear  NECK: Supple, No JVD  CHEST/LUNG: Clear to auscultation bilaterally; No wheeze/rales/rhonchi  HEART: S1, S2. Regular rate and rhythm; No murmurs, rubs, or gallops  ABDOMEN: Nondistended, Normoactive Bowel sounds x 4, Soft, Nontender  EXTREMITIES:  2+ Peripheral Pulses, No clubbing, cyanosis, or edema  NEUROLOGY: non-focal  SKIN: No rashes or lesions      RADIOLOGY & ADDITIONAL TESTS:    ASSESSMENT/PLAN:   HPI:  This is a 53 year old man with past medical history of Ventricular tachycardia/Cardiac Arrest s/p ICD (Medtronic) with recent admissions for VTach, Non Obstructive CAD, Congenital ASD Repair admitted for ICD shock X 3/VTach storm.  Patient says he developed abdominal cramping, diarrhea this am passing 20 watery stools associated with fevers and had 3 successive shocks from his ICD prompting him to come to [facility] ED. In ED, ICD interrogated confirming VTach and received replacement fluid.  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 Patient denies fever, chills, headache, SOB, Chest Pain prior to ICD Discharge.  Of note, patient had 3 watery stools in ED. (02 Jun 2018 18:39)      1) Left sided chest pain - resolved on own  -EKG - SR w/1st degree AV block, unchanged, no new ST-T wave changes  -No events on tele at the time  -Stat cardiac enzymes - negative  -Morphine for pain  -Pt seen with attending Dr. Meredith at the time  -House Cardiology was called - pt had recent cath, EKG/CE negative - no cardiology follow up needed at this time per fellow  -Will continue to monitor for reoccurrence    Darrel Jc PA-C  Department of Medicine  #[id number] Medicine PA    SUBJECTIVE: Notified by RN - pt c/o left sided chest pain 10/10. EKG done. Patient seen and examined at bedside. Patient reports chest pressure resolved at time of exam. Patient reports he had heavy chest pressure on left side without radiation that started on its own for a few minutes and resolved on its own. Patient reports having similar episodes in the past that goes away on its own. Patient denies chest pain, SOB, nausea, vomiting, arm pain, back pain, abd pain.    T(C): 36.4 (06-05-18 @ 16:25), Max: 36.6 (06-04-18 @ 23:00)  HR: 105 (06-05-18 @ 16:25) (53 - 105)  BP: 162/80 (06-05-18 @ 16:25) (103/74 - 162/80)  RR: 16 (06-05-18 @ 16:25) (14 - 18)  SpO2: 98% (06-05-18 @ 16:25) (97% - 99%)    PHYSICAL EXAM:  GENERAL: A&Ox3, in NAD  CHEST/LUNG: Clear to auscultation bilaterally; No wheeze/rales/rhonchi  HEART: S1, S2. Regular rate and rhythm  ABDOMEN: Nondistended, Normoactive Bowel sounds x 4, Soft, Nontender  EXTREMITIES:  2+ Peripheral Pulses, No clubbing, cyanosis, or edema  SKIN: No rashes or lesions    RADIOLOGY & ADDITIONAL TESTS:    ASSESSMENT/PLAN:   52yo M past medical history of Ventricular tachycardia/Cardiac Arrest s/p ICD (Medtronic) with recent admissions for VTach, Non Obstructive CAD, Congenital ASD Repair admitted for ICD shock X 3/VTach storm improved on sotalol.    1) Left sided chest pain - resolved on own  -EKG - SR w/1st degree AV block, unchanged, no new ST-T wave changes  -No events on tele at the time  -Stat cardiac enzymes - negative  -Morphine for pain  -Pt seen with attending Dr. Meredith at the time  -House Cardiology was called - pt had recent cath, EKG/CE negative - no cardiology follow up needed at this time per fellow  -Will continue to monitor for reoccurrence    Darrel Jc PA-C  Department of Medicine  #[id number] Medicine PA    SUBJECTIVE: Notified by RN - pt c/o left sided chest pain 10/10. EKG done. Patient seen and examined at bedside. Patient reports chest pressure resolved at time of exam. Patient reports he had heavy chest pressure on left side without radiation that started on its own for a few minutes and resolved on its own. Patient reports having similar episodes in the past that goes away on its own. Patient denies chest pain, SOB, nausea, vomiting, arm pain, back pain, abd pain.    T(C): 36.4 (06-05-18 @ 16:25), Max: 36.6 (06-04-18 @ 23:00)  HR: 105 (06-05-18 @ 16:25) (53 - 105)  BP: 162/80 (06-05-18 @ 16:25) (103/74 - 162/80)  RR: 16 (06-05-18 @ 16:25) (14 - 18)  SpO2: 98% (06-05-18 @ 16:25) (97% - 99%)    PHYSICAL EXAM:  GENERAL: A&Ox3, in NAD  CHEST/LUNG: Clear to auscultation bilaterally; No wheeze/rales/rhonchi  HEART: S1, S2. Regular rate and rhythm  ABDOMEN: Nondistended, Normoactive Bowel sounds x 4, Soft, Nontender  EXTREMITIES:  2+ Peripheral Pulses, No clubbing, cyanosis, or edema  SKIN: No rashes or lesions    RADIOLOGY & ADDITIONAL TESTS:    ASSESSMENT/PLAN:   52yo M past medical history of Ventricular tachycardia/Cardiac Arrest s/p ICD (Medtronic) with recent admissions for VTach, Non Obstructive CAD, Congenital ASD Repair admitted for ICD shock X 3/VTach storm improved on sotalol.    1) Left sided chest pain - resolved on own - r/o ACS, possibly anxiety related  -EKG - SR w/1st degree AV block, unchanged, no new ST-T wave changes  -No events on tele at the time  -Stat cardiac enzymes - negative  -Morphine for pain  -Pt seen with attending Dr. Meredith at the time  -House Cardiology was called - pt had recent cath, EKG/CE negative - no cardiology follow up needed at this time per fellow  -Will continue to monitor for reoccurrence    Darrel Jc PA-C  Department of Medicine  #[id number] Medicine PA    SUBJECTIVE: Notified by RN - pt c/o left sided chest pain 10/10. EKG done. Patient seen and examined at bedside. Patient reports chest pressure resolved at time of exam. Patient reports he had heavy chest pressure on left side without radiation that started on its own for a few minutes and resolved on its own. Patient reports having similar episodes in the past that goes away on its own. Patient denies chest pain, SOB, nausea, vomiting, arm pain, back pain, abd pain.    T(C): 36.4 (06-05-18 @ 16:25), Max: 36.6 (06-04-18 @ 23:00)  HR: 105 (06-05-18 @ 16:25) (53 - 105)  BP: 162/80 (06-05-18 @ 16:25) (103/74 - 162/80)  RR: 16 (06-05-18 @ 16:25) (14 - 18)  SpO2: 98% (06-05-18 @ 16:25) (97% - 99%)    PHYSICAL EXAM:  GENERAL: A&Ox3, in NAD  CHEST/LUNG: Clear to auscultation bilaterally; No wheeze/rales/rhonchi  HEART: S1, S2. Regular rate and rhythm  ABDOMEN: Nondistended, Normoactive Bowel sounds x 4, Soft, Nontender  EXTREMITIES:  2+ Peripheral Pulses, No clubbing, cyanosis, or edema  SKIN: No rashes or lesions    RADIOLOGY & ADDITIONAL TESTS:    ASSESSMENT/PLAN:   54yo M past medical history of Ventricular tachycardia/Cardiac Arrest s/p ICD (Medtronic) with recent admissions for VTach, Non Obstructive CAD, Congenital ASD Repair admitted for ICD shock X 3/VTach storm improved on sotalol.    1) Recurrent Left sided chest pain - resolved on own - r/o ACS, possibly anxiety related  -EKG - SR w/1st degree AV block, unchanged, no new ST-T wave changes  -No events on tele at the time  -Stat cardiac enzymes - negative  -Morphine for pain  -Pt seen with attending Dr. Meredith at the time  -House Cardiology was called - pt had recent cath, EKG/CE negative - no cardiology follow up needed at this time per fellow  -Will continue to monitor for reoccurrence    Darrel Jc PA-C  Department of Medicine  #[id number]

## 2018-06-05 NOTE — DISCHARGE NOTE ADULT - COMMUNITY RESOURCES
[facility] ([address], [address], [facility]) [phone]: Appointment Friday June 22, 2018 3:30pm with Dr. Oral Barrera. Please bring ID/passport, proof of address (mail), and income documentation for sliding scale services.

## 2018-06-05 NOTE — DISCHARGE NOTE ADULT - MEDICATION SUMMARY - MEDICATIONS TO STOP TAKING
I will STOP taking the medications listed below when I get home from the hospital:    sotalol 80 mg oral tablet  -- 1 tab(s) by mouth 2 times a day

## 2018-06-05 NOTE — PROGRESS NOTE ADULT - SUBJECTIVE AND OBJECTIVE BOX
Patient is a 53y old  Male who presents with a chief complaint of ICD Firing (05 Jun 2018 10:38)      SUBJECTIVE / OVERNIGHT EVENTS:  No acute events overnight.  sinus 42-60 overnight on tele  no cp/sob/n/v  reports diarrhea improved, and stool was formed x 1 today, not watery    MEDICATIONS  (STANDING):  aspirin enteric coated 81 milliGRAM(s) Oral daily  heparin  Injectable 5000 Unit(s) SubCutaneous every 8 hours  potassium chloride    Tablet ER 40 milliEquivalent(s) Oral once  sotalol 80 milliGRAM(s) Oral every 12 hours    MEDICATIONS  (PRN):  ALPRAZolam 0.25 milliGRAM(s) Oral every 8 hours PRN anxiety      Vital Signs Last 24 Hrs  T(C): 36.3 (05 Jun 2018 04:58), Max: 36.7 (04 Jun 2018 13:00)  T(F): 97.3 (05 Jun 2018 04:58), Max: 98 (04 Jun 2018 13:00)  HR: 57 (05 Jun 2018 04:58) (53 - 84)  BP: 124/67 (05 Jun 2018 04:58) (103/74 - 134/92)  BP(mean): 84 (05 Jun 2018 01:00) (78 - 104)  RR: 17 (05 Jun 2018 04:58) (14 - 17)  SpO2: 98% (05 Jun 2018 04:58) (97% - 99%)  CAPILLARY BLOOD GLUCOSE        I&O's Summary    04 Jun 2018 07:01  -  05 Jun 2018 07:00  --------------------------------------------------------  IN: 900 mL / OUT: 0 mL / NET: 900 mL    05 Jun 2018 07:01  -  05 Jun 2018 12:07  --------------------------------------------------------  IN: 240 mL / OUT: 0 mL / NET: 240 mL        PHYSICAL EXAM:  GENERAL: NAD, well-developed  HEAD:  Atraumatic, Normocephalic  EYES: EOMI, conjunctiva and sclera clear  NECK: Supple  CHEST/LUNG: Clear to auscultation bilaterally; No wheeze  HEART: Regular rate and rhythm; No murmurs, rubs, or gallops  ABDOMEN: Soft, Nontender, Nondistended; Bowel sounds present  EXTREMITIES:  2+ Peripheral Pulses, No clubbing, cyanosis, or edema  PSYCH: AAOx3    LABS:                        15.8   2.8   )-----------( 135      ( 05 Jun 2018 06:39 )             44.6     06-05    138  |  105  |  12  ----------------------------<  90  3.8   |  21<L>  |  0.80    Ca    8.8      05 Jun 2018 06:39  Phos  3.1     06-05  Mg     2.1     06-05        CARDIAC MARKERS ( 04 Jun 2018 03:29 )  x     / <0.01 ng/mL / 288 U/L / x     / 2.4 ng/mL          RADIOLOGY & ADDITIONAL TESTS:    Imaging Personally Reviewed:    Consultant(s) Notes Reviewed:  EPS    Care Discussed with Consultants/Other Providers: JEANNETTE Quick (EPS)

## 2018-06-05 NOTE — DISCHARGE NOTE ADULT - PATIENT PORTAL LINK FT
You can access the [facility] Patient Portal, offered by [facility], by registering with the following website: [email or web address]

## 2018-06-05 NOTE — CHART NOTE - NSCHARTNOTEFT_GEN_A_CORE
53 year old man with past medical history of Ventricular tachycardia/Cardiac Arrest s/p ICD (Medtronic) with recent admissions for VTach, Non Obstructive CAD, Congenital ASD Repair admitted for ICD shock X 3/VTach storm. This is likely in the setting of dehydration due to severe diarrhea after eating "bad pork" prior to admission. Started on sotalol and admitted to PCU for monitoring, no further episodes of VT or shock since admission.    ASSESSMENT & PLAN:   53 M hx of Cardiac arrest/VT s/p AICD presenting for ICD firing x3. Pt also reported several diarrhea episodes, now on flagyl pending stool cx.    1) ICD shock  - ICD shock for SVT vs dual tachycardia in the setting of diarrhea  - C/w Sotalol 80 mg BID, QTc is stable   - Patient is scheduled for VT ablation as outpatient on 6/28/18  - keep K>4, Mg>2   - CE are negative    2) Diarrhea  - Blood/urine cultures NGTD  - Cdiff negative  - Awaiting stool culture result, c/w Flagyl    - stools are now more formed    3) PPX  - heparin SQ  - ambulating as tolerated     Kandi Botello MD PGY3  [facility] [id number]

## 2018-06-05 NOTE — DISCHARGE NOTE ADULT - ADDITIONAL INSTRUCTIONS
follow up with Dr Saavedra Follow up with EP Dr. Saavedra on 7/20/18 at 9:45 AM  Follow up with PCP Dr. Bruce lr 6/22 at 3:30 PM

## 2018-06-05 NOTE — DISCHARGE NOTE ADULT - CARE PROVIDERS DIRECT ADDRESSES
[email or web address]

## 2018-06-05 NOTE — DISCHARGE NOTE ADULT - PLAN OF CARE
resolution follow up with Dr Saavedra resolved continue aspirin Resolved You had an ablation with EP during this admission and ICD was deactivated.  Follow up with EP Dr. Saavedra on 7/20/18 at 9:45 AM  Notify healthcare provider if develop chest pain, palpitations, dizziness, lightheadedness Monitor for any further episodes of diarrhea Stable Call your doctor if you have any new pain, pressure, or discomfort in the center of your chest, pain, tingling or discomfort in arms, back, neck, jaw, or stomach, shortness of breath, nausea, vomiting, burping or heartburn, sweating, cold and clammy skin, racing or abnormal heartbeat for more than 10 minutes or if they keep coming & going.  Call 911 and do not tr to get to hospital by care  You can help yourself with lifestyle changes (quitting smoking if you smoke), eat lots of fruits & vegetables & low fat dairy products, not a lot of meat & fatty foods, walk or some form of physical activity most days of the week, lose weight if you are overweight  Take your cardiac medication as prescribed  Make sure to keep appointments with doctor for cardiac follow up care

## 2018-06-05 NOTE — CHART NOTE - NSCHARTNOTEFT_GEN_A_CORE
CCU Transfer Note    Transfer from: CCU    Transfer to: Telemetry     Accepting Physician:  Signout given to:     CCU COURSE:  53 year old man with past medical history of Ventricular tachycardia/Cardiac Arrest s/p ICD (Medtronic) with recent admissions for VTach, Non Obstructive CAD, Congenital ASD Repair admitted for ICD shock X 3/VTach storm.  Patient says he developed abdominal cramping, diarrhea this am passing 20 watery stools associated with fevers and had 3 successive shocks from his ICD prompting him to come to [facility] ED. In ED, ICD interrogated confirming VTach and received replacement fluid.  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 C diff is negative, blood/urine cultures are negative. Stools are becoming more formed. No further VT since admission.     PAST MEDICAL & SURGICAL HISTORY:  Hypertension  AICD (automatic cardioverter/defibrillator) present  ASD (atrial septal defect)  AICD (automatic cardioverter/defibrillator) present  Status post cardiac surgery  AICD (automatic cardioverter/defibrillator) present  Spontaneous ASD closure      Vital Signs Last 24 Hrs  T(C): 36.6 (04 Jun 2018 23:00), Max: 36.7 (04 Jun 2018 04:00)  T(F): 97.9 (04 Jun 2018 23:00), Max: 98.1 (04 Jun 2018 08:00)  HR: 55 (04 Jun 2018 23:00) (55 - 84)  BP: 109/64 (04 Jun 2018 23:00) (100/63 - 145/97)  BP(mean): 78 (04 Jun 2018 23:00) (75 - 110)  RR: 15 (04 Jun 2018 23:00) (14 - 17)  SpO2: 98% (04 Jun 2018 22:00) (98% - 99%)  I&O's Summary    03 Jun 2018 07:01  -  04 Jun 2018 07:00  --------------------------------------------------------  IN: 1180 mL / OUT: 0 mL / NET: 1180 mL    04 Jun 2018 07:01  -  05 Jun 2018 00:16  --------------------------------------------------------  IN: 800 mL / OUT: 0 mL / NET: 800 mL        MEDICATIONS  (STANDING):  aspirin enteric coated 81 milliGRAM(s) Oral daily  heparin  Injectable 5000 Unit(s) SubCutaneous every 8 hours  metroNIDAZOLE  IVPB 500 milliGRAM(s) IV Intermittent every 8 hours  metroNIDAZOLE  IVPB      sotalol 80 milliGRAM(s) Oral every 12 hours    MEDICATIONS  (PRN):  ALPRAZolam 0.25 milliGRAM(s) Oral every 8 hours PRN anxiety      Telemetry: sinus rhythm   Echo 6/3 Conclusions:  1. Mitral annular calcification, otherwise normal mitral  valve. Mild mitral regurgitation.  2. Normal trileaflet aortic valve.  3. Moderate concentric left ventricular hypertrophy.  4. Normal left ventricular systolic function. No segmental  wall motion abnormalities.  5. Normal right ventricular size and function. A device  wire is noted in the right heart. Dilated coronary sinus.  6. History of ASD repair. Color flow doppler interrogation  reveals no residual interatrial shunt.      ASSESSMENT & PLAN:   53 M hx of Cardiac arrest/VT s/p AICD presenting for ICD firing x3. Pt also reported several diarrhea episodes, now on flaygl pending stool cx.    1) ICD shock  - ICD shock for SVT vs dual tachycardia in the setting of diarrhea  - C/w Sotalol 80 mg BID, QTc is stable   - Patient is scheduled for VT ablation as outpatient on 6/28/18  - keep K>4, Mg>2   - CE are negative    2) Diarrhea  - Blood/urine cultures NGTD  - Cdiff negative  - Awaiting stool culture result, c/w Flagyl    - stools are now more formed    3) PPX  - heparin SQ  - ambulating as tolerated       FOR FOLLOW UP:  [] official stool cx results  [] EP      LEBRON Sheets CCU Transfer Note    Transfer from: CCU    Transfer to: Telemetry     Accepting Physician: Dr. Camargo  Signout given to: Dr. Camargo and MAR #[id number]    CCU COURSE:  53 year old man with past medical history of Ventricular tachycardia/Cardiac Arrest s/p ICD (Medtronic) with recent admissions for VTach, Non Obstructive CAD, Congenital ASD Repair admitted for ICD shock X 3/VTach storm.  Patient says he developed abdominal cramping, diarrhea this am passing 20 watery stools associated with fevers and had 3 successive shocks from his ICD prompting him to come to [facility] ED. In ED, ICD interrogated confirming VTach and received replacement fluid.  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 C diff is negative, blood/urine cultures are negative. Stools are becoming more formed. No further VT since admission.     PAST MEDICAL & SURGICAL HISTORY:  Hypertension  AICD (automatic cardioverter/defibrillator) present  ASD (atrial septal defect)  AICD (automatic cardioverter/defibrillator) present  Status post cardiac surgery  AICD (automatic cardioverter/defibrillator) present  Spontaneous ASD closure      Vital Signs Last 24 Hrs  T(C): 36.6 (04 Jun 2018 23:00), Max: 36.7 (04 Jun 2018 04:00)  T(F): 97.9 (04 Jun 2018 23:00), Max: 98.1 (04 Jun 2018 08:00)  HR: 55 (04 Jun 2018 23:00) (55 - 84)  BP: 109/64 (04 Jun 2018 23:00) (100/63 - 145/97)  BP(mean): 78 (04 Jun 2018 23:00) (75 - 110)  RR: 15 (04 Jun 2018 23:00) (14 - 17)  SpO2: 98% (04 Jun 2018 22:00) (98% - 99%)  I&O's Summary    03 Jun 2018 07:01  -  04 Jun 2018 07:00  --------------------------------------------------------  IN: 1180 mL / OUT: 0 mL / NET: 1180 mL    04 Jun 2018 07:01  -  05 Jun 2018 00:16  --------------------------------------------------------  IN: 800 mL / OUT: 0 mL / NET: 800 mL        MEDICATIONS  (STANDING):  aspirin enteric coated 81 milliGRAM(s) Oral daily  heparin  Injectable 5000 Unit(s) SubCutaneous every 8 hours  metroNIDAZOLE  IVPB 500 milliGRAM(s) IV Intermittent every 8 hours  metroNIDAZOLE  IVPB      sotalol 80 milliGRAM(s) Oral every 12 hours    MEDICATIONS  (PRN):  ALPRAZolam 0.25 milliGRAM(s) Oral every 8 hours PRN anxiety      Telemetry: sinus rhythm   Echo 6/3 Conclusions:  1. Mitral annular calcification, otherwise normal mitral  valve. Mild mitral regurgitation.  2. Normal trileaflet aortic valve.  3. Moderate concentric left ventricular hypertrophy.  4. Normal left ventricular systolic function. No segmental  wall motion abnormalities.  5. Normal right ventricular size and function. A device  wire is noted in the right heart. Dilated coronary sinus.  6. History of ASD repair. Color flow doppler interrogation  reveals no residual interatrial shunt.      ASSESSMENT & PLAN:   53 M hx of Cardiac arrest/VT s/p AICD presenting for ICD firing x3. Pt also reported several diarrhea episodes, now on flaygl pending stool cx.    1) ICD shock  - ICD shock for SVT vs dual tachycardia in the setting of diarrhea  - C/w Sotalol 80 mg BID, QTc is stable   - Patient is scheduled for VT ablation as outpatient on 6/28/18  - keep K>4, Mg>2   - CE are negative    2) Diarrhea  - Blood/urine cultures NGTD  - Cdiff negative  - Awaiting stool culture result, c/w Flagyl    - stools are now more formed    3) PPX  - heparin SQ  - ambulating as tolerated       FOR FOLLOW UP:  [] official stool cx results  [] EP      Alayna Guerra, CCU PA

## 2018-06-05 NOTE — DISCHARGE NOTE ADULT - CARE PLAN
Principal Discharge DX:	Defibrillator discharge  Secondary Diagnosis:	Diarrhea, unspecified type  Secondary Diagnosis:	Essential hypertension  Secondary Diagnosis:	CAD (coronary artery disease) Principal Discharge DX:	Defibrillator discharge  Goal:	resolution  Assessment and plan of treatment:	follow up with Dr Saavedra  Secondary Diagnosis:	Diarrhea, unspecified type  Goal:	resolved  Secondary Diagnosis:	CAD (coronary artery disease)  Assessment and plan of treatment:	continue aspirin Principal Discharge DX:	Defibrillator discharge  Goal:	Resolved  Assessment and plan of treatment:	You had an ablation with EP during this admission and ICD was deactivated.  Follow up with EP Dr. Saavedra on 7/20/18 at 9:45 AM  Notify healthcare provider if develop chest pain, palpitations, dizziness, lightheadedness  Secondary Diagnosis:	Diarrhea, unspecified type  Goal:	resolved  Assessment and plan of treatment:	Monitor for any further episodes of diarrhea  Secondary Diagnosis:	CAD (coronary artery disease)  Goal:	Stable  Assessment and plan of treatment:	Call your doctor if you have any new pain, pressure, or discomfort in the center of your chest, pain, tingling or discomfort in arms, back, neck, jaw, or stomach, shortness of breath, nausea, vomiting, burping or heartburn, sweating, cold and clammy skin, racing or abnormal heartbeat for more than 10 minutes or if they keep coming & going.  Call 911 and do not tr to get to hospital by care  You can help yourself with lifestyle changes (quitting smoking if you smoke), eat lots of fruits & vegetables & low fat dairy products, not a lot of meat & fatty foods, walk or some form of physical activity most days of the week, lose weight if you are overweight  Take your cardiac medication as prescribed  Make sure to keep appointments with doctor for cardiac follow up care

## 2018-06-05 NOTE — PROGRESS NOTE ADULT - ASSESSMENT
52 yo man w/ hx of Congenital ASD repair, Cardiac arrest/VT post procedure requiring single lead ICD (MDT) placement, various episodes of VT s/p ablation 12/2017 who presents for palpitations and sensation that "ICD shocked x 3". ICD interrogated revealed ICD shock for SVT vs dual tachycardia. Patient is scheduled for VT ablation as outpatient on 6/28/18. Patient also has diarrhea since Saturday (6/2/18) which is improving.     1) ICD shock  - ICD shock for SVT vs dual tachycardia in the setting of diarrhea, which is no resolving  - Resume Sotalol 80 mg BID, QTc is stable   - Patient is scheduled for VT ablation as outpatient on 6/28/18  - keep K+>4, Mg++>2     2) Diarrhea  - Currently off Flagyl, repeated stool cultures neg 52 yo man w/ hx of Congenital ASD repair, Cardiac arrest/VT post procedure requiring single lead ICD (MDT) placement, various episodes of VT s/p ablation 12/2017 who presents for palpitations and sensation that "ICD shocked x 3". ICD interrogated revealed ICD shock for SVT vs dual tachycardia. Patient is scheduled for VT ablation as outpatient on 6/28/18. Patient also has diarrhea since Saturday (6/2/18) which is improving.     1) ICD shock  - ICD shock for SVT vs dual tachycardia in the setting of diarrhea, which is no resolving  - Resume Sotalol 80 mg BID, QTc is stable   - Patient is scheduled for VT ablation as outpatient on 6/28/18  - keep K+>4, Mg++>2   - Plan discussed with Dr. Saavedra and medicine team, needs cardiac CT and ALANA prior to discharge.     2) Diarrhea  - Currently off Flagyl, repeated stool cultures neg

## 2018-06-05 NOTE — PROGRESS NOTE ADULT - SUBJECTIVE AND OBJECTIVE BOX
INTERVAL HPI/OVERNIGHT EVENTS: Resting comfortably in bed, no complaints voiced. TELE; SR 50- 60's, down to 42 bpm     MEDICATIONS  (STANDING):  aspirin enteric coated 81 milliGRAM(s) Oral daily  heparin  Injectable 5000 Unit(s) SubCutaneous every 8 hours  potassium chloride    Tablet ER 40 milliEquivalent(s) Oral once  sotalol 80 milliGRAM(s) Oral every 12 hours    MEDICATIONS  (PRN):  ALPRAZolam 0.25 milliGRAM(s) Oral every 8 hours PRN anxiety      Allergies    No Known Allergies    Intolerances      ROS:  General: Pt denies fever/chills  Cardiovascular: denies chest pain/palpitations/leg edema  Respiratory and Thorax: denies SOB/cough/wheezing  Gastrointestinal: denies abdominal pain/diarrhea/constipation/bloody stool  Skin: denies rashes/sores  Hematologic: denies abnormal bleeding      Vital Signs Last 24 Hrs  T(C): 36.3 (05 Jun 2018 04:58), Max: 36.7 (04 Jun 2018 13:00)  T(F): 97.3 (05 Jun 2018 04:58), Max: 98 (04 Jun 2018 13:00)  HR: 57 (05 Jun 2018 04:58) (53 - 84)  BP: 124/67 (05 Jun 2018 04:58) (103/74 - 145/97)  BP(mean): 84 (05 Jun 2018 01:00) (78 - 110)  RR: 17 (05 Jun 2018 04:58) (14 - 17)  SpO2: 98% (05 Jun 2018 04:58) (97% - 99%)    Physical Exam:  Neurological: Alert & Oriented x 3  Respiratory: CTA B/L, No wheezing/crackles/rhonchi  Cardiovascular: (+) S1 & S2, RRR  Gastrointestinal: soft, NT, nondistended, (+) BS  Extremities: No pedal edema, No clubbing, No cyanosis  Skin:  normal skin color and pigmentation, no skin lesions          LABS:                        15.8   2.8   )-----------( 135      ( 05 Jun 2018 06:39 )             44.6     06-05    138  |  105  |  12  ----------------------------<  90  3.8   |  21<L>  |  0.80    Ca    8.8      05 Jun 2018 06:39  Phos  3.1     06-05  Mg     2.1     06-05

## 2018-06-05 NOTE — DISCHARGE NOTE ADULT - MEDICATION SUMMARY - MEDICATIONS TO TAKE
I will START or STAY ON the medications listed below when I get home from the hospital:    aspirin 81 mg oral delayed release tablet  -- 1 tab(s) by mouth once a day  -- Indication: For CAD (coronary artery disease)    Ativan 0.5 mg oral tablet  -- 0.5 tab(s) by mouth once a day, As Needed -for anxiety MDD:1  -- Caution federal law prohibits the transfer of this drug to any person other  than the person for whom it was prescribed.  Do not take this drug if you are pregnant.  May cause drowsiness.  Alcohol may intensify this effect.  Use care when operating dangerous machinery.    -- Indication: For Anxiety

## 2018-06-05 NOTE — DISCHARGE NOTE ADULT - PROVIDER TOKENS
TOKEN:'[id number]' TOKEN:'[id number]',FREE:[LAST:[Bruce],FIRST:[Rianna],PHONE:[phone]],FAX:[(   )    -],ADDRESS:[[facility] [address], [address] [place] [address]]] TOKEN:'[id number]',FREE:[LAST:[Bruce],FIRST:[Felicity],PHONE:[phone]:[(   )    -],ADDRESS:[[facility] [address], [address] [place] [address]]]

## 2018-06-05 NOTE — PROGRESS NOTE ADULT - ASSESSMENT
52yo M past medical history of Ventricular tachycardia/Cardiac Arrest s/p ICD (Medtronic) with recent admissions for VTach, Non Obstructive CAD, Congenital ASD Repair admitted for ICD shock X 3/VTach storm improved on sotalol.

## 2018-06-06 LAB
ANION GAP SERPL CALC-SCNC: 13 MMOL/L — SIGNIFICANT CHANGE UP (ref 5–17)
BUN SERPL-MCNC: 13 MG/DL — SIGNIFICANT CHANGE UP (ref 7–23)
CALCIUM SERPL-MCNC: 9 MG/DL — SIGNIFICANT CHANGE UP (ref 8.4–10.5)
CHLORIDE SERPL-SCNC: 104 MMOL/L — SIGNIFICANT CHANGE UP (ref 96–108)
CO2 SERPL-SCNC: 22 MMOL/L — SIGNIFICANT CHANGE UP (ref 22–31)
CREAT SERPL-MCNC: 0.79 MG/DL — SIGNIFICANT CHANGE UP (ref 0.5–1.3)
GLUCOSE SERPL-MCNC: 92 MG/DL — SIGNIFICANT CHANGE UP (ref 70–99)
MAGNESIUM SERPL-MCNC: 2.1 MG/DL — SIGNIFICANT CHANGE UP (ref 1.6–2.6)
POTASSIUM SERPL-MCNC: 4.1 MMOL/L — SIGNIFICANT CHANGE UP (ref 3.5–5.3)
POTASSIUM SERPL-SCNC: 4.1 MMOL/L — SIGNIFICANT CHANGE UP (ref 3.5–5.3)
SODIUM SERPL-SCNC: 139 MMOL/L — SIGNIFICANT CHANGE UP (ref 135–145)

## 2018-06-06 PROCEDURE — 75572 CT HRT W/3D IMAGE: CPT | Mod: 26

## 2018-06-06 PROCEDURE — 93325 DOPPLER ECHO COLOR FLOW MAPG: CPT | Mod: 26

## 2018-06-06 PROCEDURE — 93010 ELECTROCARDIOGRAM REPORT: CPT

## 2018-06-06 PROCEDURE — 93320 DOPPLER ECHO COMPLETE: CPT | Mod: 26

## 2018-06-06 PROCEDURE — 99233 SBSQ HOSP IP/OBS HIGH 50: CPT

## 2018-06-06 PROCEDURE — 93312 ECHO TRANSESOPHAGEAL: CPT | Mod: 26

## 2018-06-06 RX ADMIN — HEPARIN SODIUM 5000 UNIT(S): 5000 INJECTION INTRAVENOUS; SUBCUTANEOUS at 13:01

## 2018-06-06 RX ADMIN — HEPARIN SODIUM 5000 UNIT(S): 5000 INJECTION INTRAVENOUS; SUBCUTANEOUS at 06:24

## 2018-06-06 RX ADMIN — Medication 80 MILLIGRAM(S): at 06:25

## 2018-06-06 RX ADMIN — Medication 81 MILLIGRAM(S): at 13:02

## 2018-06-06 RX ADMIN — Medication 80 MILLIGRAM(S): at 18:38

## 2018-06-06 RX ADMIN — HEPARIN SODIUM 5000 UNIT(S): 5000 INJECTION INTRAVENOUS; SUBCUTANEOUS at 21:21

## 2018-06-06 NOTE — PROGRESS NOTE ADULT - ASSESSMENT
52 yo man w/ hx of Congenital ASD repair, Cardiac arrest/VT post procedure requiring single lead ICD (MDT) placement, various episodes of VT s/p ablation 12/2017 who presents for palpitations and sensation that "ICD shocked x 3". ICD interrogated revealed ICD shock for SVT vs dual tachycardia. Patient is scheduled for VT ablation as outpatient on 6/28/18. Patient also has diarrhea since Saturday (6/2/18) which is improving.

## 2018-06-06 NOTE — PROGRESS NOTE ADULT - PROBLEM SELECTOR PLAN 1
Tele: SR, HR 50- 60's with occasional PVCs. No further VT noted.  Continue with sotalol 80 mg bid, QTC remains stable  S/P ALANA and awaiting cardiac CT prior to discharge home today.

## 2018-06-06 NOTE — PROGRESS NOTE ADULT - SUBJECTIVE AND OBJECTIVE BOX
INTERVAL HPI/OVERNIGHT EVENTS: Resting comfortably in bed S/P ALANA today. Tele: SB/SR HR 50- 60's with PVCs    MEDICATIONS  (STANDING):  aspirin enteric coated 81 milliGRAM(s) Oral daily  heparin  Injectable 5000 Unit(s) SubCutaneous every 8 hours  sotalol 80 milliGRAM(s) Oral every 12 hours    MEDICATIONS  (PRN):  ALPRAZolam 0.25 milliGRAM(s) Oral every 8 hours PRN anxiety      Allergies    No Known Allergies    Intolerances      ROS:  General: Pt denies fever/chills  Cardiovascular: denies chest pain/palpitations/leg edema  Respiratory and Thorax: denies SOB/cough/wheezing  Gastrointestinal: denies abdominal pain/diarrhea/constipation/bloody stool  Skin: denies rashes/sores  Hematologic: denies abnormal bleeding    Vital Signs Last 24 Hrs  T(C): 36.3 (06 Jun 2018 12:03), Max: 36.7 (05 Jun 2018 21:10)  T(F): 97.3 (06 Jun 2018 12:03), Max: 98 (05 Jun 2018 21:10)  HR: 50 (06 Jun 2018 12:03) (50 - 105)  BP: 124/82 (06 Jun 2018 12:03) (121/75 - 162/80)  BP(mean): --  RR: 18 (06 Jun 2018 12:03) (16 - 18)  SpO2: 97% (06 Jun 2018 12:03) (97% - 98%)    Physical Exam:  Neurological: Alert & Oriented x 3  Cardiovascular: (+) S1 & S2, RRR  Resp: Bilateral lungs sounds cleat  Gastrointestinal: soft, NT, nondistended, (+) BS  Extremities: No pedal edema, No clubbing, No cyanosis  Skin:  normal skin color and pigmentation, no skin lesions        LABS:                        15.8   2.8   )-----------( 135      ( 05 Jun 2018 06:39 )             44.6     06-06    139  |  104  |  13  ----------------------------<  92  4.1   |  22  |  0.79    Ca    9.0      06 Jun 2018 06:40  Phos  3.1     06-05  Mg     2.1     06-06

## 2018-06-06 NOTE — PROGRESS NOTE ADULT - ASSESSMENT
52yo M past medical history of Ventricular tachycardia/Cardiac Arrest s/p ICD (Medtronic) with recent admissions for VTach, Non Obstructive CAD, Congenital ASD Repair admitted for ICD shock X 3/VTach storm improved on sotalol awaiting ALANA and CT coronaries per EPS prior to d/c.

## 2018-06-06 NOTE — PROGRESS NOTE ADULT - SUBJECTIVE AND OBJECTIVE BOX
Patient is a 53y old  Male who presents with a chief complaint of ICD Firing (05 Jun 2018 10:38)      SUBJECTIVE / OVERNIGHT EVENTS:  had CP yesterday evening that resolved on own.  No ekg changes and no events on tele.  Overnight tele was 50-60 sinus with PVCs.  Patient reports no further CP/SOB/n/v.  No further diarrhea.    MEDICATIONS  (STANDING):  aspirin enteric coated 81 milliGRAM(s) Oral daily  heparin  Injectable 5000 Unit(s) SubCutaneous every 8 hours  sotalol 80 milliGRAM(s) Oral every 12 hours    MEDICATIONS  (PRN):  ALPRAZolam 0.25 milliGRAM(s) Oral every 8 hours PRN anxiety      Vital Signs Last 24 Hrs  T(C): 36.3 (06 Jun 2018 12:03), Max: 36.7 (05 Jun 2018 21:10)  T(F): 97.3 (06 Jun 2018 12:03), Max: 98 (05 Jun 2018 21:10)  HR: 50 (06 Jun 2018 12:03) (50 - 105)  BP: 124/82 (06 Jun 2018 12:03) (121/75 - 162/80)  BP(mean): --  RR: 18 (06 Jun 2018 12:03) (16 - 18)  SpO2: 97% (06 Jun 2018 12:03) (97% - 98%)  CAPILLARY BLOOD GLUCOSE        I&O's Summary    05 Jun 2018 07:01  -  06 Jun 2018 07:00  --------------------------------------------------------  IN: 920 mL / OUT: 0 mL / NET: 920 mL    06 Jun 2018 07:01  -  06 Jun 2018 14:34  --------------------------------------------------------  IN: 420 mL / OUT: 450 mL / NET: -30 mL        PHYSICAL EXAM:  GENERAL: NAD, well-developed  HEAD:  Atraumatic, Normocephalic  EYES: EOMI, conjunctiva and sclera clear  NECK: Supple  CHEST/LUNG: Clear to auscultation bilaterally; No wheeze  HEART: Regular rate and rhythm; No murmurs, rubs, or gallops  ABDOMEN: Soft, Nontender, Nondistended; Bowel sounds present  EXTREMITIES:  2+ Peripheral Pulses, No clubbing, cyanosis, or edema  PSYCH: AAOx3    LABS:                        15.8   2.8   )-----------( 135      ( 05 Jun 2018 06:39 )             44.6     06-06    139  |  104  |  13  ----------------------------<  92  4.1   |  22  |  0.79    Ca    9.0      06 Jun 2018 06:40  Phos  3.1     06-05  Mg     2.1     06-06        CARDIAC MARKERS ( 05 Jun 2018 16:47 )  x     / <0.01 ng/mL / 211 U/L / x     / 2.2 ng/mL          RADIOLOGY & ADDITIONAL TESTS:    Imaging Personally Reviewed:    Consultant(s) Notes Reviewed:  EPS    Care Discussed with Consultants/Other Providers: Ynse EPS NP

## 2018-06-06 NOTE — PROVIDER CONTACT NOTE (OTHER) - ACTION/TREATMENT ORDERED:
EKG done on pt. Cardiology attending Resident notified. Will continue to monitor
NP notified. EP to evaluate pt. Will continue to monitor
monitor

## 2018-06-07 LAB
ANION GAP SERPL CALC-SCNC: 13 MMOL/L — SIGNIFICANT CHANGE UP (ref 5–17)
APTT BLD: 30.8 SEC — SIGNIFICANT CHANGE UP (ref 27.5–37.4)
BLD GP AB SCN SERPL QL: NEGATIVE — SIGNIFICANT CHANGE UP
BUN SERPL-MCNC: 17 MG/DL — SIGNIFICANT CHANGE UP (ref 7–23)
CALCIUM SERPL-MCNC: 8.8 MG/DL — SIGNIFICANT CHANGE UP (ref 8.4–10.5)
CHLORIDE SERPL-SCNC: 105 MMOL/L — SIGNIFICANT CHANGE UP (ref 96–108)
CO2 SERPL-SCNC: 23 MMOL/L — SIGNIFICANT CHANGE UP (ref 22–31)
CREAT SERPL-MCNC: 0.8 MG/DL — SIGNIFICANT CHANGE UP (ref 0.5–1.3)
CULTURE RESULTS: SIGNIFICANT CHANGE UP
CULTURE RESULTS: SIGNIFICANT CHANGE UP
GLUCOSE SERPL-MCNC: 89 MG/DL — SIGNIFICANT CHANGE UP (ref 70–99)
HCT VFR BLD CALC: 43.8 % — SIGNIFICANT CHANGE UP (ref 39–50)
HGB BLD-MCNC: 15.7 G/DL — SIGNIFICANT CHANGE UP (ref 13–17)
INR BLD: 1.02 RATIO — SIGNIFICANT CHANGE UP (ref 0.88–1.16)
MCHC RBC-ENTMCNC: 31.1 PG — SIGNIFICANT CHANGE UP (ref 27–34)
MCHC RBC-ENTMCNC: 35.9 GM/DL — SIGNIFICANT CHANGE UP (ref 32–36)
MCV RBC AUTO: 86.5 FL — SIGNIFICANT CHANGE UP (ref 80–100)
PLATELET # BLD AUTO: 168 K/UL — SIGNIFICANT CHANGE UP (ref 150–400)
POTASSIUM SERPL-MCNC: 3.9 MMOL/L — SIGNIFICANT CHANGE UP (ref 3.5–5.3)
POTASSIUM SERPL-SCNC: 3.9 MMOL/L — SIGNIFICANT CHANGE UP (ref 3.5–5.3)
PROTHROM AB SERPL-ACNC: 11.1 SEC — SIGNIFICANT CHANGE UP (ref 9.8–12.7)
RBC # BLD: 5.06 M/UL — SIGNIFICANT CHANGE UP (ref 4.2–5.8)
RBC # FLD: 11.5 % — SIGNIFICANT CHANGE UP (ref 10.3–14.5)
RH IG SCN BLD-IMP: POSITIVE — SIGNIFICANT CHANGE UP
SODIUM SERPL-SCNC: 141 MMOL/L — SIGNIFICANT CHANGE UP (ref 135–145)
SPECIMEN SOURCE: SIGNIFICANT CHANGE UP
SPECIMEN SOURCE: SIGNIFICANT CHANGE UP
WBC # BLD: 4.4 K/UL — SIGNIFICANT CHANGE UP (ref 3.8–10.5)
WBC # FLD AUTO: 4.4 K/UL — SIGNIFICANT CHANGE UP (ref 3.8–10.5)

## 2018-06-07 PROCEDURE — 93656 COMPRE EP EVAL ABLTJ ATR FIB: CPT

## 2018-06-07 PROCEDURE — 93655 ICAR CATH ABLTJ DSCRT ARRHYT: CPT

## 2018-06-07 PROCEDURE — 93010 ELECTROCARDIOGRAM REPORT: CPT | Mod: 76

## 2018-06-07 PROCEDURE — 99233 SBSQ HOSP IP/OBS HIGH 50: CPT

## 2018-06-07 PROCEDURE — 93613 INTRACARDIAC EPHYS 3D MAPG: CPT

## 2018-06-07 PROCEDURE — 93623 PRGRMD STIMJ&PACG IV RX NFS: CPT | Mod: 26

## 2018-06-07 RX ADMIN — Medication 80 MILLIGRAM(S): at 05:05

## 2018-06-07 RX ADMIN — Medication 80 MILLIGRAM(S): at 20:33

## 2018-06-07 RX ADMIN — Medication 81 MILLIGRAM(S): at 12:37

## 2018-06-07 RX ADMIN — HEPARIN SODIUM 5000 UNIT(S): 5000 INJECTION INTRAVENOUS; SUBCUTANEOUS at 05:05

## 2018-06-07 NOTE — PROGRESS NOTE ADULT - PROBLEM SELECTOR PLAN 1
ICD interrogation revealed ICD shock for SVT vs dual tachycardia. in the setting of diarrhea, which is now resolving  - on Sotalol 80 mg BID,   -for VT ablation today by EPS

## 2018-06-07 NOTE — PROGRESS NOTE ADULT - SUBJECTIVE AND OBJECTIVE BOX
24H hour events: No acute events overnight     MEDICATIONS:  aspirin enteric coated 81 milliGRAM(s) Oral daily  heparin  Injectable 5000 Unit(s) SubCutaneous every 8 hours  sotalol 80 milliGRAM(s) Oral every 12 hours  ALPRAZolam 0.25 milliGRAM(s) Oral every 8 hours PRN    REVIEW OF SYSTEMS:  Complete 10point ROS negative.    PHYSICAL EXAM:  T(C): 36.7 (06-07-18 @ 04:52), Max: 36.7 (06-07-18 @ 04:52)  HR: 53 (06-07-18 @ 04:52) (50 - 72)  BP: 110/71 (06-07-18 @ 04:52) (110/71 - 150/72)  RR: 17 (06-07-18 @ 04:52) (17 - 18)  SpO2: 98% (06-07-18 @ 04:52) (97% - 98%)  I&O's Summary    06 Jun 2018 07:01  -  07 Jun 2018 07:00  --------------------------------------------------------  IN: 1320 mL / OUT: 450 mL / NET: 870 mL    07 Jun 2018 07:01  -  07 Jun 2018 10:53  --------------------------------------------------------  IN: 0 mL / OUT: 200 mL / NET: -200 mL    Appearance: Normal	  HEENT:   Normal oral mucosa, PERRL, EOMI	  Lymphatic: No lymphadenopathy  Cardiovascular: Normal S1 S2, No JVD, No murmurs, No edema  Respiratory: Lungs clear to auscultation	  Psychiatry: A & O x 3, Mood & affect appropriate  Gastrointestinal:  Soft, Non-tender, + BS	  Skin: No rashes, No ecchymoses, No cyanosis	  Neurologic: Non-focal  Extremities: Normal range of motion, No clubbing, cyanosis or edema  Vascular: Peripheral pulses palpable 2+ bilaterally      LABS:	 	    CBC Full  -  ( 07 Jun 2018 06:25 )  WBC Count : 4.4 K/uL  Hemoglobin : 15.7 g/dL  Hematocrit : 43.8 %  Platelet Count - Automated : 168 K/uL  Mean Cell Volume : 86.5 fl  Mean Cell Hemoglobin : 31.1 pg  Mean Cell Hemoglobin Concentration : 35.9 gm/dL      06-07    141  |  105  |  17  ----------------------------<  89  3.9   |  23  |  0.80  06-06    139  |  104  |  13  ----------------------------<  92  4.1   |  22  |  0.79    Ca    8.8      07 Jun 2018 06:25  Ca    9.0      06 Jun 2018 06:40  Mg     2.1     06-06    proBNP: Serum Pro-Brain Natriuretic Peptide: 614 pg/mL (06-02 @ 20:44)      TELEMETRY: Sinus ti and sinus rhythm at 50-70's, PVCs	      < from: Transesophageal Echocardiogram w/o TTE (06.06.18 @ 12:55) >  PROCEDURE: Transesophageal (ALANA) was performed.  Informed  consent was first obtained for ALANA. The patient was sedated  - see anesthesia record.  The procedure was monitored with  automatic blood pressure monitoring, ECG tracings and pulse  oximetry. The transesophageal probe was placed in the  esophagus posterior to the heart without complications.  INDICATION: Atrial septal defect (Q21.1)  ------------------------------------------------------------------------  Observations:  Mitral Valve: Normal mitral valve. Mild mitral  regurgitation.  Aortic Valve/Aorta: Normal trileaflet aortic valve. Peak  left ventricular outflow tract gradient equals 2 mm Hg,  mean gradient is equal to 1 mm Hg, LVOT velocity time  integral equals 14 cm.  Normal aortic root, aortic arch and descending thoracic  aorta.  Left Atrium: Normal left atrial appendage function  (velocity= 0.5 m/s).   No left atrial thrombus seen.  Left Ventricle: Normal left ventricular systolic function.  Septal motion consistent with right ventricular overload.  Concentric left ventricular hypertrophy.  Right Heart: Severe right atrial enlargement.A device  wire is noted in the right heart. Right ventricular  enlargement with normal right ventricular systolic  function. Normal tricuspid valve. Normal pulmonic valve.  Pericardium/Pleura: Normal pericardium with no pericardial  effusion.  Hemodynamic: Estimated right atrial pressure is 8 mm Hg.  S/p surgical repair of secundum atrial septal defect.  No  evidence of shunting.   Agitated saline injection and color  flow Doppler demonstrates no evidence of persistant shunt.  ------------------------------------------------------------------------  Conclusions:  1. Normal left atrial appendage function (velocity= 0.5  m/s).   No left atrial thrombus seen.  2. Concentric left ventricular hypertrophy.  3. Normal left ventricular systolic function. Septal motion  consistent with right ventricular overload.  4. Severe right atrial enlargement.   A device wire is  noted in the right heart.  5. Right ventricular enlargement with normal right  ventricular systolic function.  6.  S/p surgical repair of secundum atrial septal defect.  No evidence of shunting.   Agitated saline injection and  color flow Doppler demonstrates no evidence of persistant  shunt.      < from: CT Heart without Coronaries w/ IV Cont (06.06.18 @ 14:43) >  INTERPRETATION:  EXAMINATION:CT HEART WITHOUT CORONARIES    CLINICAL INDICATION:Eval ASD repair    COMPARISON: None.     TECHNIQUE: Informed consent was obtained from the patient and there were   no complications during the procedure.  CT angiogram of the heart was   performed before and after  the administration of 70 ml Omnipaque 350, 30   ml was discarded.  A [id number] scanner was usedwith prospective   EKG gating. Resting heart rate at time of the examination was 60   beats/min.  Multiplanar and 3D reformats were performed on a separate   workstation by the radiologist.    FINDINGS:     HEART AND VESSELS: The heart is normal in size. There are no   atherosclerotic calcifications of the aorta.  There is no pericardial   effusion.  No coronary or aortic atherosclerosis. No ASD or ASD closure   device noted. Tiny muscular VSD.. 7 mm outpouching of the left   ventricular tract just inferior to the right coronary cusp. No definite   shunt.    VISUALIZED AIRWAYS AND LUNGS: The bronchial tree is patent.  The lungs   are clear. Right-sided defibrillator with single right ventricular lead.   Pulmonary artery enlarged to 4 cm.    VISUALIZED MEDIASTINUM AND PLEURA: There are no enlarged mediastinal or   hilar lymph nodes.     VISUALIZED UPPER ABDOMEN: Images of the upper abdomen demonstrate no   abnormality.     BONES AND SOFT TISSUES: Sternotomy wires.  The soft tissues are   unremarkable.    IMPRESSION:     No ASD or ASD closure device noted.  Tiny muscular VSD.  Pulmonary artery   enlarged suggesting pulmonary artery hypertension.    7 mm outpouching of the left ventricular tract just inferior to the right   coronary cusp. 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 While there is no   definite shunt, echo recommended for complete evaluation.

## 2018-06-07 NOTE — PROGRESS NOTE ADULT - ASSESSMENT
52yo M past medical history of Ventricular tachycardia/Cardiac Arrest s/p ICD (Medtronic) with recent admissions for VTach, Non Obstructive CAD, Congenital ASD Repair admitted for ICD shock X 3/VTach storm improved on sotalol plan for ablation today by EPS.

## 2018-06-07 NOTE — PROGRESS NOTE ADULT - SUBJECTIVE AND OBJECTIVE BOX
Patient is a 53y old  Male who presents with a chief complaint of ICD Firing (05 Jun 2018 10:38)      SUBJECTIVE / OVERNIGHT EVENTS:  no acute events overnight  tele reviewed: sinus 50-70 with pvcs, 1st degree block  denies cp/sob/n/v.  No diarrhea    MEDICATIONS  (STANDING):  aspirin enteric coated 81 milliGRAM(s) Oral daily  heparin  Injectable 5000 Unit(s) SubCutaneous every 8 hours  sotalol 80 milliGRAM(s) Oral every 12 hours    MEDICATIONS  (PRN):  ALPRAZolam 0.25 milliGRAM(s) Oral every 8 hours PRN anxiety      Vital Signs Last 24 Hrs  T(C): 36.4 (07 Jun 2018 12:05), Max: 36.7 (07 Jun 2018 04:52)  T(F): 97.5 (07 Jun 2018 12:05), Max: 98 (07 Jun 2018 04:52)  HR: 50 (07 Jun 2018 12:05) (50 - 72)  BP: 112/71 (07 Jun 2018 12:05) (110/71 - 150/72)  BP(mean): --  RR: 18 (07 Jun 2018 12:05) (17 - 18)  SpO2: 99% (07 Jun 2018 12:05) (98% - 99%)  CAPILLARY BLOOD GLUCOSE        I&O's Summary    06 Jun 2018 07:01  -  07 Jun 2018 07:00  --------------------------------------------------------  IN: 1320 mL / OUT: 450 mL / NET: 870 mL    07 Jun 2018 07:01  -  07 Jun 2018 13:05  --------------------------------------------------------  IN: 0 mL / OUT: 200 mL / NET: -200 mL        PHYSICAL EXAM:  GENERAL: NAD, well-developed  HEAD:  Atraumatic, Normocephalic  EYES: EOMI, conjunctiva and sclera clear  NECK: Supple  CHEST/LUNG: Clear to auscultation bilaterally; No wheeze  HEART: Regular rate and rhythm; No murmurs, rubs, or gallops  ABDOMEN: Soft, Nontender, Nondistended; Bowel sounds present  EXTREMITIES:  2+ Peripheral Pulses, No clubbing, cyanosis, or edema  PSYCH: AAOx3    LABS:                        15.7   4.4   )-----------( 168      ( 07 Jun 2018 06:25 )             43.8     06-07    141  |  105  |  17  ----------------------------<  89  3.9   |  23  |  0.80    Ca    8.8      07 Jun 2018 06:25  Mg     2.1     06-06      PT/INR - ( 07 Jun 2018 06:25 )   PT: 11.1 sec;   INR: 1.02 ratio         PTT - ( 07 Jun 2018 06:25 )  PTT:30.8 sec  CARDIAC MARKERS ( 05 Jun 2018 16:47 )  x     / <0.01 ng/mL / 211 U/L / x     / 2.2 ng/mL          RADIOLOGY & ADDITIONAL TESTS:    Imaging Personally Reviewed:  < from: Transesophageal Echocardiogram w/o TTE (06.06.18 @ 12:55) >  1. Normal left atrial appendage function (velocity= 0.5  m/s).   No left atrial thrombus seen.  2. Concentric left ventricular hypertrophy.  3. Normal left ventricular systolic function. Septal motion  consistent with right ventricular overload.  4. Severe right atrial enlargement.   A device wire is  noted in the right heart.  5. Right ventricular enlargement with normal right  ventricular systolic function.  6.  S/p surgical repair of secundum atrial septal defect.  No evidence of shunting.   Agitated saline injection and  color flow Doppler demonstrates no evidence of persistant  shunt.  ------------------------------------------------------------------------    < end of copied text >    < from: CT Heart without Coronaries w/ IV Cont (06.06.18 @ 14:43) >  No ASD or ASD closure device noted.  Tiny muscular VSD.  Pulmonary artery   enlarged suggesting pulmonary artery hypertension.    7 mm outpouching of the left ventricular tract just inferior to the right   coronary cusp. 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 While there is no   definite shunt, echo recommended for complete evaluation.                    < end of copied text >      Consultant(s) Notes Reviewed:      Care Discussed with Consultants/Other Providers:

## 2018-06-07 NOTE — PROGRESS NOTE ADULT - ASSESSMENT
52 yo man w/ hx of Congenital ASD repair, Cardiac arrest/VT post procedure requiring single lead ICD (MDT) placement, various episodes of VT s/p ablation 12/2017 who presents for palpitations and sensation that "ICD shocked x 3". ICD interrogated revealed ICD shock for SVT vs dual tachycardia. Patient also has diarrhea since Saturday (6/2/18) which is resolved.     -NPO for ATach ablation today  -Continue with sotalol 80 mg bid     S. AYAN Kaur  [phone]

## 2018-06-08 VITALS
TEMPERATURE: 98 F | HEART RATE: 68 BPM | SYSTOLIC BLOOD PRESSURE: 144 MMHG | OXYGEN SATURATION: 98 % | RESPIRATION RATE: 18 BRPM | DIASTOLIC BLOOD PRESSURE: 93 MMHG

## 2018-06-08 LAB
ANION GAP SERPL CALC-SCNC: 12 MMOL/L — SIGNIFICANT CHANGE UP (ref 5–17)
BUN SERPL-MCNC: 13 MG/DL — SIGNIFICANT CHANGE UP (ref 7–23)
CALCIUM SERPL-MCNC: 8.5 MG/DL — SIGNIFICANT CHANGE UP (ref 8.4–10.5)
CHLORIDE SERPL-SCNC: 104 MMOL/L — SIGNIFICANT CHANGE UP (ref 96–108)
CO2 SERPL-SCNC: 24 MMOL/L — SIGNIFICANT CHANGE UP (ref 22–31)
CREAT SERPL-MCNC: 0.91 MG/DL — SIGNIFICANT CHANGE UP (ref 0.5–1.3)
GLUCOSE SERPL-MCNC: 91 MG/DL — SIGNIFICANT CHANGE UP (ref 70–99)
HCT VFR BLD CALC: 45.6 % — SIGNIFICANT CHANGE UP (ref 39–50)
HGB BLD-MCNC: 15.4 G/DL — SIGNIFICANT CHANGE UP (ref 13–17)
MCHC RBC-ENTMCNC: 29.2 PG — SIGNIFICANT CHANGE UP (ref 27–34)
MCHC RBC-ENTMCNC: 33.7 GM/DL — SIGNIFICANT CHANGE UP (ref 32–36)
MCV RBC AUTO: 86.7 FL — SIGNIFICANT CHANGE UP (ref 80–100)
PLATELET # BLD AUTO: 156 K/UL — SIGNIFICANT CHANGE UP (ref 150–400)
POTASSIUM SERPL-MCNC: 4.1 MMOL/L — SIGNIFICANT CHANGE UP (ref 3.5–5.3)
POTASSIUM SERPL-SCNC: 4.1 MMOL/L — SIGNIFICANT CHANGE UP (ref 3.5–5.3)
RBC # BLD: 5.26 M/UL — SIGNIFICANT CHANGE UP (ref 4.2–5.8)
RBC # FLD: 11.5 % — SIGNIFICANT CHANGE UP (ref 10.3–14.5)
SODIUM SERPL-SCNC: 140 MMOL/L — SIGNIFICANT CHANGE UP (ref 135–145)
WBC # BLD: 4.5 K/UL — SIGNIFICANT CHANGE UP (ref 3.8–10.5)
WBC # FLD AUTO: 4.5 K/UL — SIGNIFICANT CHANGE UP (ref 3.8–10.5)

## 2018-06-08 PROCEDURE — C1732: CPT

## 2018-06-08 PROCEDURE — 85014 HEMATOCRIT: CPT

## 2018-06-08 PROCEDURE — 87045 FECES CULTURE AEROBIC BACT: CPT

## 2018-06-08 PROCEDURE — 83605 ASSAY OF LACTIC ACID: CPT

## 2018-06-08 PROCEDURE — 99285 EMERGENCY DEPT VISIT HI MDM: CPT | Mod: 25

## 2018-06-08 PROCEDURE — 84145 PROCALCITONIN (PCT): CPT

## 2018-06-08 PROCEDURE — 82803 BLOOD GASES ANY COMBINATION: CPT

## 2018-06-08 PROCEDURE — 93306 TTE W/DOPPLER COMPLETE: CPT

## 2018-06-08 PROCEDURE — 93655 ICAR CATH ABLTJ DSCRT ARRHYT: CPT

## 2018-06-08 PROCEDURE — 93613 INTRACARDIAC EPHYS 3D MAPG: CPT

## 2018-06-08 PROCEDURE — 84100 ASSAY OF PHOSPHORUS: CPT

## 2018-06-08 PROCEDURE — 81001 URINALYSIS AUTO W/SCOPE: CPT

## 2018-06-08 PROCEDURE — C1766: CPT

## 2018-06-08 PROCEDURE — 82435 ASSAY OF BLOOD CHLORIDE: CPT

## 2018-06-08 PROCEDURE — 87486 CHLMYD PNEUM DNA AMP PROBE: CPT

## 2018-06-08 PROCEDURE — 86900 BLOOD TYPING SEROLOGIC ABO: CPT

## 2018-06-08 PROCEDURE — 93010 ELECTROCARDIOGRAM REPORT: CPT

## 2018-06-08 PROCEDURE — 87449 NOS EACH ORGANISM AG IA: CPT

## 2018-06-08 PROCEDURE — C1730: CPT

## 2018-06-08 PROCEDURE — C1894: CPT

## 2018-06-08 PROCEDURE — 83735 ASSAY OF MAGNESIUM: CPT

## 2018-06-08 PROCEDURE — 80053 COMPREHEN METABOLIC PANEL: CPT

## 2018-06-08 PROCEDURE — 75572 CT HRT W/3D IMAGE: CPT

## 2018-06-08 PROCEDURE — 85730 THROMBOPLASTIN TIME PARTIAL: CPT

## 2018-06-08 PROCEDURE — 93306 TTE W/DOPPLER COMPLETE: CPT | Mod: 26

## 2018-06-08 PROCEDURE — 82550 ASSAY OF CK (CPK): CPT

## 2018-06-08 PROCEDURE — 87324 CLOSTRIDIUM AG IA: CPT

## 2018-06-08 PROCEDURE — 93325 DOPPLER ECHO COLOR FLOW MAPG: CPT

## 2018-06-08 PROCEDURE — 84295 ASSAY OF SERUM SODIUM: CPT

## 2018-06-08 PROCEDURE — 93653 COMPRE EP EVAL TX SVT: CPT

## 2018-06-08 PROCEDURE — 87177 OVA AND PARASITES SMEARS: CPT

## 2018-06-08 PROCEDURE — 86901 BLOOD TYPING SEROLOGIC RH(D): CPT

## 2018-06-08 PROCEDURE — 93621 COMP EP EVL L PAC&REC C SINS: CPT

## 2018-06-08 PROCEDURE — 71045 X-RAY EXAM CHEST 1 VIEW: CPT

## 2018-06-08 PROCEDURE — 84484 ASSAY OF TROPONIN QUANT: CPT

## 2018-06-08 PROCEDURE — 83036 HEMOGLOBIN GLYCOSYLATED A1C: CPT

## 2018-06-08 PROCEDURE — 82947 ASSAY GLUCOSE BLOOD QUANT: CPT

## 2018-06-08 PROCEDURE — 87798 DETECT AGENT NOS DNA AMP: CPT

## 2018-06-08 PROCEDURE — 93005 ELECTROCARDIOGRAM TRACING: CPT

## 2018-06-08 PROCEDURE — 93320 DOPPLER ECHO COMPLETE: CPT

## 2018-06-08 PROCEDURE — 82553 CREATINE MB FRACTION: CPT

## 2018-06-08 PROCEDURE — 87581 M.PNEUMON DNA AMP PROBE: CPT

## 2018-06-08 PROCEDURE — 87086 URINE CULTURE/COLONY COUNT: CPT

## 2018-06-08 PROCEDURE — 84443 ASSAY THYROID STIM HORMONE: CPT

## 2018-06-08 PROCEDURE — 85027 COMPLETE CBC AUTOMATED: CPT

## 2018-06-08 PROCEDURE — 83880 ASSAY OF NATRIURETIC PEPTIDE: CPT

## 2018-06-08 PROCEDURE — 86850 RBC ANTIBODY SCREEN: CPT

## 2018-06-08 PROCEDURE — 84132 ASSAY OF SERUM POTASSIUM: CPT

## 2018-06-08 PROCEDURE — 87633 RESP VIRUS 12-25 TARGETS: CPT

## 2018-06-08 PROCEDURE — 93312 ECHO TRANSESOPHAGEAL: CPT

## 2018-06-08 PROCEDURE — 85610 PROTHROMBIN TIME: CPT

## 2018-06-08 PROCEDURE — 87046 STOOL CULTR AEROBIC BACT EA: CPT

## 2018-06-08 PROCEDURE — 99239 HOSP IP/OBS DSCHRG MGMT >30: CPT

## 2018-06-08 PROCEDURE — 87040 BLOOD CULTURE FOR BACTERIA: CPT

## 2018-06-08 PROCEDURE — 93623 PRGRMD STIMJ&PACG IV RX NFS: CPT

## 2018-06-08 PROCEDURE — 82330 ASSAY OF CALCIUM: CPT

## 2018-06-08 PROCEDURE — 87389 HIV-1 AG W/HIV-1&-2 AB AG IA: CPT

## 2018-06-08 PROCEDURE — 80048 BASIC METABOLIC PNL TOTAL CA: CPT

## 2018-06-08 PROCEDURE — 80061 LIPID PANEL: CPT

## 2018-06-08 RX ADMIN — Medication 81 MILLIGRAM(S): at 11:23

## 2018-06-08 RX ADMIN — HEPARIN SODIUM 5000 UNIT(S): 5000 INJECTION INTRAVENOUS; SUBCUTANEOUS at 05:29

## 2018-06-08 NOTE — PROGRESS NOTE ADULT - ASSESSMENT
54yo M past medical history of Ventricular tachycardia/Cardiac Arrest s/p ICD (Medtronic) with recent admissions for VTach, Non Obstructive CAD, Congenital ASD Repair admitted for ICD shock X 3/VTach storm improved on sotalol s/p AT ablation 6/7 awaiting TTE.

## 2018-06-08 NOTE — PROGRESS NOTE ADULT - PROBLEM SELECTOR PLAN 3
continue aspirin.  Per d/w Dr Alvarenga (cards yesterday), patient had recent cath 2/18 that showed non obstructive CAD, making it unlikely patient was having cardiac source of CP yesterday and did not require intervention.
continue aspirin
continue aspirin.  Patient had recent cath 2/18 that showed non obstructive CAD
continue aspirin.  Per d/w Dr Alvarenga (cards yesterday), patient had recent cath 2/18 that showed non obstructive CAD, making it unlikely patient was having cardiac source of CP yesterday and did not require intervention.

## 2018-06-08 NOTE — PROGRESS NOTE ADULT - ATTENDING COMMENTS
Patient is seen and examined with fellow, NP and the CCU house-staff. I agree with the history, physical and the assessment and plan.  sotalol on hold for possible ablation - will need to f/u with EPS  no ectopy  will f/u with ID regarding further abx for the diarrhea (?food poisoning)
Beeper: [phone]    discharge time: 40 min
Patient is seen and examined with fellow, NP and the CCU house-staff. I agree with the history, physical and the assessment and plan.  tolerating sotalol   on IV flagyl  f/u with cultures  negative for cdiff
Beeper: [phone]    discharge time: 40min, pending EPS clearance
Beeper: [phone]    discharge time: 40 min, pending TTE results per EPS
Beeper: [phone]

## 2018-06-08 NOTE — PROGRESS NOTE ADULT - ASSESSMENT
54 yo man w/ hx of Congenital ASD repair, Cardiac arrest/VT post procedure requiring single lead ICD (MDT) placement, various episodes of VT (PVC) s/p ablation 12/2017 who presents for palpitations and sensation that "ICD shocked x 3". ICD interrogated revealed ICD shock for SVT vs dual tachycardia. Patient also has diarrhea since Saturday (6/2/18) which is resolved. Now s/p right sided ATach ablation yesterday 6/7/48; post-op EKGs with T wave inversion in leads II, III, aVF, V2, V3, V4 and V5.      1) ICD alarming  -ICD was beeping secondary to Tachy-therapy was deactivated by EP attending yesterday after ablation, I have turned off that feature today.     2) Atach  -Sotalol was discontinued post ablation  -s/p right sided ATach ablation yesterday with post-op T wave inversion noted on EKG  -Patient asymptomatic  -Will obtain Echo today r/o wall motion abnormality   -Discharge home today if Echo is normal  -f/u Dr. Saavedra in EP clinic as scheduled on 7/20/18 at 9:45am  -Plan discussed with Dr. Masoud Royal-Marianne, ANP  [phone]

## 2018-06-08 NOTE — PROGRESS NOTE ADULT - PROVIDER SPECIALTY LIST ADULT
CCU
CCU
Cardiology
Electrophysiology
Hospitalist

## 2018-06-08 NOTE — PROGRESS NOTE ADULT - SUBJECTIVE AND OBJECTIVE BOX
Patient is a 53y old  Male who presents with a chief complaint of ICD Firing (05 Jun 2018 10:38)      SUBJECTIVE / OVERNIGHT EVENTS:  no acute events overnight  feels well  no cp/sob/n/v/diarrhea    MEDICATIONS  (STANDING):  aspirin enteric coated 81 milliGRAM(s) Oral daily  heparin  Injectable 5000 Unit(s) SubCutaneous every 8 hours    MEDICATIONS  (PRN):      Vital Signs Last 24 Hrs  T(C): 36.6 (08 Jun 2018 04:57), Max: 36.7 (08 Jun 2018 00:38)  T(F): 97.9 (08 Jun 2018 04:57), Max: 98.1 (08 Jun 2018 00:38)  HR: 61 (08 Jun 2018 04:57) (61 - 82)  BP: 117/77 (08 Jun 2018 04:57) (116/75 - 146/87)  BP(mean): 99 (07 Jun 2018 19:00) (99 - 99)  RR: 17 (08 Jun 2018 04:57) (15 - 18)  SpO2: 99% (08 Jun 2018 04:57) (96% - 99%)  CAPILLARY BLOOD GLUCOSE        I&O's Summary    07 Jun 2018 07:01  -  08 Jun 2018 07:00  --------------------------------------------------------  IN: 180 mL / OUT: 400 mL / NET: -220 mL    08 Jun 2018 07:01  -  08 Jun 2018 13:40  --------------------------------------------------------  IN: 240 mL / OUT: 0 mL / NET: 240 mL        PHYSICAL EXAM:  GENERAL: NAD, well-developed  HEAD:  Atraumatic, Normocephalic  EYES: EOMI, conjunctiva and sclera clear  NECK: Supple  CHEST/LUNG: Clear to auscultation bilaterally; No wheeze  HEART: Regular rate and rhythm; No murmurs, rubs, or gallops  ABDOMEN: Soft, Nontender, Nondistended; Bowel sounds present  EXTREMITIES:  2+ Peripheral Pulses, No clubbing, cyanosis, or edema  PSYCH: AAOx3    LABS:                        15.4   4.5   )-----------( 156      ( 08 Jun 2018 06:46 )             45.6     06-08    140  |  104  |  13  ----------------------------<  91  4.1   |  24  |  0.91    Ca    8.5      08 Jun 2018 06:44      PT/INR - ( 07 Jun 2018 06:25 )   PT: 11.1 sec;   INR: 1.02 ratio         PTT - ( 07 Jun 2018 06:25 )  PTT:30.8 sec          RADIOLOGY & ADDITIONAL TESTS:    Imaging Personally Reviewed:    Consultant(s) Notes Reviewed:  EPS    Care Discussed with Consultants/Other Providers: EPS NP Nannette

## 2018-06-08 NOTE — PROCEDURE NOTE - ADDITIONAL PROCEDURE DETAILS
-Indication for interrogation: patient c/o ICD beeping/alarming   -Tele: SB/SR at 50-60's, PVCs  -Measured data WNL, NL device function, Pt is not PM dependent  -Stored data revealed no events for review.  -Tachy-therapy was deactivated by EP attending yesterday (6/7/18) after right sided ATach ablation  -Device was beeping due to feature for "therapies" was not turned off when tachy-therapy was deactivated yesterday  -Changes made: Alarm for "therapies is now turned off to avoid future beeping for such.     TONI Kaur, ANP  [phone]

## 2018-06-08 NOTE — PROGRESS NOTE ADULT - PROBLEM SELECTOR PLAN 1
ICD interrogation revealed ICD shock for SVT vs dual tachycardia. in the setting of diarrhea, which is now resolved.  S/p AT ablation by EPS on 6/7.  -per EPS, d/c Sotalol    -TWI on EKG-repeat TTE, if no sig findings, d/c home per EPS ICD interrogation revealed ICD shock for SVT vs dual tachycardia. in the setting of diarrhea, which is now resolved.  S/p AT ablation and ICD deactivation by EPS on 6/7.  -per EPS, d/c Sotalol    -TWI on EKG-repeat TTE, if no sig findings, d/c home per EPS

## 2018-06-08 NOTE — PROGRESS NOTE ADULT - PROBLEM SELECTOR PROBLEM 1
Defibrillator discharge

## 2018-06-08 NOTE — CHART NOTE - NSCHARTNOTEFT_GEN_A_CORE
Request from Dr. Meredith  to facilitate patient discharge. Medication reconciliation reviewed, revised, and resolved with Dr. Meredith who had medically cleared patient for discharge with follow-up as advised. Please refer to discharge note for detailed hospital course. Patient is currently stable for discharge to home at this time.    Darrel Jc PA-C  Department of Medicine  #[phone]

## 2018-06-08 NOTE — PROGRESS NOTE ADULT - PROBLEM SELECTOR PLAN 2
WBC normal, no fever.  Resolving.  stool cultures negative, c diff negative, off flagyl
WBC normal, no fever.  Resolved  stool cultures negative, c diff negative, off flagyl
WBC normal, no fever.  Resolved  stool cultures negative, c diff negative, off flagyl
WBC normal, no fever.  Resolving.  stool cultures negative, c diff negative, d/c Flagyl
WGBC normal, no fever  Flagyl 500mg q8h  Blood Cultures, Stool Culture with OVA and Parasite, Urine Cult, RVP
WGBC normal, no fever  Flagyl 500mg q8h  Blood Cultures, Stool Culture with OVA and Parasite, Urine Cult, RVP

## 2018-06-08 NOTE — PROGRESS NOTE ADULT - SUBJECTIVE AND OBJECTIVE BOX
24H hour events: No acute events overnight, pt c/o "ICD alarming" otherwise denies cp/sob/palpitations/dizziness/N/V    MEDICATIONS:  aspirin enteric coated 81 milliGRAM(s) Oral daily  heparin  Injectable 5000 Unit(s) SubCutaneous every 8 hours  ALPRAZolam 0.25 milliGRAM(s) Oral every 8 hours PRN    REVIEW OF SYSTEMS:  Complete 10point ROS negative.    PHYSICAL EXAM:  T(C): 36.6 (06-08-18 @ 04:57), Max: 36.7 (06-08-18 @ 00:38)  HR: 61 (06-08-18 @ 04:57) (50 - 82)  BP: 117/77 (06-08-18 @ 04:57) (112/71 - 146/87)  RR: 17 (06-08-18 @ 04:57) (15 - 18)  SpO2: 99% (06-08-18 @ 04:57) (96% - 99%)  I&O's Summary    07 Jun 2018 07:01  -  08 Jun 2018 07:00  --------------------------------------------------------  IN: 180 mL / OUT: 400 mL / NET: -220 mL    Appearance: NAD, OOB ambulating without difficulties 	  HEENT:   Normal oral mucosa, PERRL, EOMI	  Lymphatic: No lymphadenopathy  Cardiovascular: Normal S1 S2, No JVD, No murmurs  Respiratory: Lungs clear to auscultation	  Psychiatry: A & O x 3, Mood & affect appropriate  Gastrointestinal:  Soft, Non-tender, + BS	  Skin: No rashes, No ecchymoses, No cyanosis	  Neurologic: Non-focal  Extremities: Normal range of motion, No clubbing, cyanosis or edema. Right groin surgical incision site C/D/I without hematoma  Vascular: Peripheral pulses palpable 2+ bilaterally      LABS:	 	    CBC Full  -  ( 08 Jun 2018 06:46 )  WBC Count : 4.5 K/uL  Hemoglobin : 15.4 g/dL  Hematocrit : 45.6 %  Platelet Count - Automated : 156 K/uL  Mean Cell Volume : 86.7 fl  Mean Cell Hemoglobin : 29.2 pg  Mean Cell Hemoglobin Concentration : 33.7 gm/dL      06-08    140  |  104  |  13  ----------------------------<  91  4.1   |  24  |  0.91  06-07    141  |  105  |  17  ----------------------------<  89  3.9   |  23  |  0.80    Ca    8.5      08 Jun 2018 06:44  Ca    8.8      07 Jun 2018 06:25      TELEMETRY: Sinus ti and sinus rhythm at 50-60's, occasional PVCs   	    ECG: Sinus rhythm at 60's, QT/QTc: 506/517ms, T wave inversion in leads II, III, aVF, V2, V3, V4, V5

## 2018-06-22 ENCOUNTER — APPOINTMENT (OUTPATIENT)
Dept: INTERNAL MEDICINE | Facility: CLINIC | Age: 53
End: 2018-06-22
Payer: COMMERCIAL

## 2018-06-22 ENCOUNTER — OUTPATIENT (OUTPATIENT)
Dept: OUTPATIENT SERVICES | Facility: HOSPITAL | Age: 53
LOS: 1 days | End: 2018-06-22
Payer: SELF-PAY

## 2018-06-22 VITALS — SYSTOLIC BLOOD PRESSURE: 144 MMHG | HEART RATE: 51 BPM | DIASTOLIC BLOOD PRESSURE: 90 MMHG

## 2018-06-22 DIAGNOSIS — Z87.74 PERSONAL HISTORY OF (CORRECTED) CONGENITAL MALFORMATIONS OF HEART AND CIRCULATORY SYSTEM: ICD-10-CM

## 2018-06-22 DIAGNOSIS — Q21.1 ATRIAL SEPTAL DEFECT: ICD-10-CM

## 2018-06-22 DIAGNOSIS — Z00.00 ENCOUNTER FOR GENERAL ADULT MEDICAL EXAMINATION W/OUT ABNORMAL FINDINGS: ICD-10-CM

## 2018-06-22 DIAGNOSIS — Z87.74 PERSONAL HISTORY OF (CORRECTED) CONGENITAL MALFORMATIONS OF HEART AND CIRCULATORY SYSTEM: Chronic | ICD-10-CM

## 2018-06-22 DIAGNOSIS — Z95.810 PRESENCE OF AUTOMATIC (IMPLANTABLE) CARDIAC DEFIBRILLATOR: Chronic | ICD-10-CM

## 2018-06-22 DIAGNOSIS — Z82.49 FAMILY HISTORY OF ISCHEMIC HEART DISEASE AND OTHER DISEASES OF THE CIRCULATORY SYSTEM: ICD-10-CM

## 2018-06-22 DIAGNOSIS — Z87.891 PERSONAL HISTORY OF NICOTINE DEPENDENCE: ICD-10-CM

## 2018-06-22 DIAGNOSIS — Z98.890 OTHER SPECIFIED POSTPROCEDURAL STATES: Chronic | ICD-10-CM

## 2018-06-22 DIAGNOSIS — F43.10 POST-TRAUMATIC STRESS DISORDER, UNSPECIFIED: ICD-10-CM

## 2018-06-22 DIAGNOSIS — I10 ESSENTIAL (PRIMARY) HYPERTENSION: ICD-10-CM

## 2018-06-22 PROCEDURE — 99204 OFFICE O/P NEW MOD 45 MIN: CPT | Mod: GC

## 2018-06-22 PROCEDURE — G0463: CPT

## 2018-06-22 RX ORDER — SOTALOL HYDROCHLORIDE 80 MG/1
80 TABLET ORAL
Qty: 60 | Refills: 2 | Status: DISCONTINUED | COMMUNITY
Start: 2018-04-27 | End: 2018-06-22

## 2018-06-22 RX ORDER — LORAZEPAM 0.5 MG/1
0.5 TABLET ORAL
Qty: 30 | Refills: 0 | Status: ACTIVE | COMMUNITY
Start: 2018-06-22 | End: 1900-01-01

## 2018-07-20 ENCOUNTER — APPOINTMENT (OUTPATIENT)
Dept: ELECTROPHYSIOLOGY | Facility: CLINIC | Age: 53
End: 2018-07-20
Payer: MEDICAID

## 2018-07-20 ENCOUNTER — NON-APPOINTMENT (OUTPATIENT)
Age: 53
End: 2018-07-20

## 2018-07-20 VITALS
HEIGHT: 66 IN | DIASTOLIC BLOOD PRESSURE: 86 MMHG | OXYGEN SATURATION: 97 % | HEART RATE: 68 BPM | BODY MASS INDEX: 24.75 KG/M2 | WEIGHT: 154 LBS | SYSTOLIC BLOOD PRESSURE: 137 MMHG

## 2018-07-20 PROCEDURE — 99215 OFFICE O/P EST HI 40 MIN: CPT

## 2018-07-20 PROCEDURE — 93000 ELECTROCARDIOGRAM COMPLETE: CPT

## 2018-07-30 NOTE — END OF VISIT
[] : Resident [FreeTextEntry3] : Case discussed with pt's Cardiologist EP Dr Saavedra and he did not have an objection to starting SSRI if necessary. RE-eval patient next visit and if he continues to have anxiety/PTSD symptoms would consider starting SSRI such as Lexapro.

## 2018-07-30 NOTE — ASSESSMENT
[FreeTextEntry1] : 53M with PMH of congenital ASD c/b cardiac arrest requiring ICD placement and defect repair presenting to [facility] care. \par \par Congenital ASD c/b ventricular arrhythmia: \par ECG today wnl. Continue aspirin 81mg day. Follow-up appointment with EP Dr. Saavedra 7/20. \par \par Anxiety/PTSD:\par Appears to have significant anxiety related to recent cardiac events and fear of ICD shock. Was discharged from [facility] earlier this month with a short supply of lorazepam because of concern that much of his symptoms are exacerbated, if not caused, by extreme anxiety. He never picked up the prescription. Will prescribe short course of lorazepam (30 pills) 0.5mg to be taken as needed until next visit. We will plan to start him on an SSRI (first-line tx for PTSD) at next visit in 5 weeks. \par \par Health care maintenance: \par - Tdap today\par - Dental referral for routine cleaning\par - Colonoscopy referral\par \par Seen and discussed with Dr. Becerra.

## 2018-07-30 NOTE — HEALTH RISK ASSESSMENT
[HIV Test offered] : HIV Test offered [Reports changes in vision] : Reports changes in vision [0] : 2) Feeling down, depressed, or hopeless: Not at all (0) [Alone] : lives alone [Employed] : employed [Single] : single [Fully functional (bathing, dressing, toileting, transferring, walking, feeding)] : Fully functional (bathing, dressing, toileting, transferring, walking, feeding) [Fully functional (using the telephone, shopping, preparing meals, housekeeping, doing laundry, using] : Fully functional and needs no help or supervision to perform IADLs (using the telephone, shopping, preparing meals, housekeeping, doing laundry, using transportation, managing medications and managing finances) [] : No [de-identified] : former 30-pack-year smoker, quit 1 year ago [[id number]] : 0 [Sexually Active] : not sexually active [Reports changes in hearing] : Reports no changes in hearing [Reports changes in dental health] : Reports no changes in dental health [de-identified] : lives in two-family home with roommate downstairs [de-identified] : w [FreeTextEntry2] : 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

## 2018-07-30 NOTE — REVIEW OF SYSTEMS
[Anxiety] : anxiety [Fever] : no fever [Chills] : no chills [Fatigue] : no fatigue [Vision Problems] : no vision problems [Nasal Discharge] : no nasal discharge [Sore Throat] : no sore throat [Shortness Of Breath] : no shortness of breath [Wheezing] : no wheezing [Cough] : no cough [Dyspnea on Exertion] : not dyspnea on exertion [Abdominal Pain] : no abdominal pain [Nausea] : no nausea [Constipation] : no constipation [Diarrhea] : no diarrhea [Vomiting] : no vomiting [Melena] : no melena [Dysuria] : no dysuria [Hematuria] : no hematuria [Joint Pain] : no joint pain [Joint Swelling] : no joint swelling [Headache] : no headache [Fainting] : no fainting [Depression] : no depression [FreeTextEntry5] : see HPI

## 2018-07-30 NOTE — PHYSICAL EXAM
[No Acute Distress] : no acute distress [Well-Appearing] : well-appearing [Normal Sclera/Conjunctiva] : normal sclera/conjunctiva [PERRL] : pupils equal round and reactive to light [EOMI] : extraocular movements intact [Normal Outer Ear/Nose] : the outer ears and nose were normal in appearance [Normal Oropharynx] : the oropharynx was normal [No JVD] : no jugular venous distention [Supple] : supple [Thyroid Normal, No Nodules] : the thyroid was normal and there were no nodules present [Clear to Auscultation] : lungs were clear to auscultation bilaterally [Normal Rate] : normal rate  [Regular Rhythm] : with a regular rhythm [Normal S1, S2] : normal S1 and S2 [No Murmur] : no murmur heard [Pedal Pulses Present] : the pedal pulses are present [No Edema] : there was no peripheral edema [Soft] : abdomen soft [Non Tender] : non-tender [Non-distended] : non-distended [Normal Supraclavicular Nodes] : no supraclavicular lymphadenopathy [Normal Posterior Cervical Nodes] : no posterior cervical lymphadenopathy [Normal Anterior Cervical Nodes] : no anterior cervical lymphadenopathy [No Spinal Tenderness] : no spinal tenderness [No Joint Swelling] : no joint swelling [Grossly Normal Strength/Tone] : grossly normal strength/tone [Anxious] : anxious [de-identified] : well-healed longitudinal surgical scar overlying sternum

## 2018-07-30 NOTE — HISTORY OF PRESENT ILLNESS
[FreeTextEntry1] : 53M with PMH of congenital ASD c/b cardiac arrest requiring ICD placement and defect repair presenting to [facility] care.  [de-identified] : Patient is being seen for an initial visit after numerous hospitalizations in the past year for cardiac issues. He had a cardiac arrest approx. one year ago while straining at work, found to be 2/2 VT and has since had several recurrences of VT. He had an ICD placed as well as a catheter ablation procedure that was unsuccessful as evidenced by firing of ICD shocks multiple times. He was recently admitted for recurrent ICD shocks that were shown to be due to Aflutter and he underwent another ablation with no recurrence in VT. He is followed by Dr. Pedro Saavedra. \par \par Patient reports overall feeling well. Denies chest pain but reports constant anxiety and palpitations. Constantly worries he will have another episode of VT and will get shocked. As a result, has difficulty focusing, has been extremely sedentary, and sometimes cannot sleep. Has been taking his medications as prescribed. Denies other complaints.

## 2018-08-04 ENCOUNTER — EMERGENCY (EMERGENCY)
Facility: HOSPITAL | Age: 53
LOS: 1 days | Discharge: HOME | End: 2018-08-04
Attending: EMERGENCY MEDICINE
Payer: MEDICAID

## 2018-08-04 VITALS
WEIGHT: 149.91 LBS | TEMPERATURE: 98 F | HEIGHT: 66 IN | HEART RATE: 66 BPM | SYSTOLIC BLOOD PRESSURE: 139 MMHG | RESPIRATION RATE: 16 BRPM | DIASTOLIC BLOOD PRESSURE: 82 MMHG | OXYGEN SATURATION: 97 %

## 2018-08-04 DIAGNOSIS — Z98.890 OTHER SPECIFIED POSTPROCEDURAL STATES: Chronic | ICD-10-CM

## 2018-08-04 DIAGNOSIS — Z95.810 PRESENCE OF AUTOMATIC (IMPLANTABLE) CARDIAC DEFIBRILLATOR: Chronic | ICD-10-CM

## 2018-08-04 DIAGNOSIS — Z87.74 PERSONAL HISTORY OF (CORRECTED) CONGENITAL MALFORMATIONS OF HEART AND CIRCULATORY SYSTEM: Chronic | ICD-10-CM

## 2018-08-04 LAB
ALBUMIN SERPL ELPH-MCNC: 4.5 G/DL — SIGNIFICANT CHANGE UP (ref 3.3–5)
ALP SERPL-CCNC: 52 U/L — SIGNIFICANT CHANGE UP (ref 40–120)
ALT FLD-CCNC: 16 U/L — SIGNIFICANT CHANGE UP (ref 10–45)
ANION GAP SERPL CALC-SCNC: 11 MMOL/L — SIGNIFICANT CHANGE UP (ref 5–17)
APTT BLD: 27.9 SEC — SIGNIFICANT CHANGE UP (ref 27.5–37.4)
AST SERPL-CCNC: 27 U/L — SIGNIFICANT CHANGE UP (ref 10–40)
BASOPHILS # BLD AUTO: 0.1 K/UL — SIGNIFICANT CHANGE UP (ref 0–0.2)
BASOPHILS NFR BLD AUTO: 1.2 % — SIGNIFICANT CHANGE UP (ref 0–2)
BILIRUB SERPL-MCNC: 0.5 MG/DL — SIGNIFICANT CHANGE UP (ref 0.2–1.2)
BUN SERPL-MCNC: 13 MG/DL — SIGNIFICANT CHANGE UP (ref 7–23)
CALCIUM SERPL-MCNC: 8.9 MG/DL — SIGNIFICANT CHANGE UP (ref 8.4–10.5)
CHLORIDE SERPL-SCNC: 104 MMOL/L — SIGNIFICANT CHANGE UP (ref 96–108)
CK SERPL-CCNC: 173 U/L — SIGNIFICANT CHANGE UP (ref 30–200)
CO2 SERPL-SCNC: 19 MMOL/L — LOW (ref 22–31)
CREAT SERPL-MCNC: 0.9 MG/DL — SIGNIFICANT CHANGE UP (ref 0.5–1.3)
EOSINOPHIL # BLD AUTO: 0.2 K/UL — SIGNIFICANT CHANGE UP (ref 0–0.5)
EOSINOPHIL NFR BLD AUTO: 3.6 % — SIGNIFICANT CHANGE UP (ref 0–6)
GLUCOSE SERPL-MCNC: 110 MG/DL — HIGH (ref 70–99)
HCT VFR BLD CALC: 43.1 % — SIGNIFICANT CHANGE UP (ref 39–50)
HCT VFR BLD CALC: 44.8 % — SIGNIFICANT CHANGE UP (ref 39–50)
HGB BLD-MCNC: 15.2 G/DL — SIGNIFICANT CHANGE UP (ref 13–17)
HGB BLD-MCNC: 15.6 G/DL — SIGNIFICANT CHANGE UP (ref 13–17)
INR BLD: 0.99 RATIO — SIGNIFICANT CHANGE UP (ref 0.88–1.16)
LYMPHOCYTES # BLD AUTO: 1.4 K/UL — SIGNIFICANT CHANGE UP (ref 1–3.3)
LYMPHOCYTES # BLD AUTO: 29 % — SIGNIFICANT CHANGE UP (ref 13–44)
MAGNESIUM SERPL-MCNC: 2 MG/DL — SIGNIFICANT CHANGE UP (ref 1.6–2.6)
MCHC RBC-ENTMCNC: 29.9 PG — SIGNIFICANT CHANGE UP (ref 27–34)
MCHC RBC-ENTMCNC: 30.1 PG — SIGNIFICANT CHANGE UP (ref 27–34)
MCHC RBC-ENTMCNC: 34.8 GM/DL — SIGNIFICANT CHANGE UP (ref 32–36)
MCHC RBC-ENTMCNC: 35.2 GM/DL — SIGNIFICANT CHANGE UP (ref 32–36)
MCV RBC AUTO: 85.5 FL — SIGNIFICANT CHANGE UP (ref 80–100)
MCV RBC AUTO: 85.9 FL — SIGNIFICANT CHANGE UP (ref 80–100)
MONOCYTES # BLD AUTO: 0.3 K/UL — SIGNIFICANT CHANGE UP (ref 0–0.9)
MONOCYTES NFR BLD AUTO: 7.2 % — SIGNIFICANT CHANGE UP (ref 2–14)
NEUTROPHILS # BLD AUTO: 2.8 K/UL — SIGNIFICANT CHANGE UP (ref 1.8–7.4)
NEUTROPHILS NFR BLD AUTO: 59.1 % — SIGNIFICANT CHANGE UP (ref 43–77)
PLATELET # BLD AUTO: 169 K/UL — SIGNIFICANT CHANGE UP (ref 150–400)
PLATELET # BLD AUTO: 178 K/UL — SIGNIFICANT CHANGE UP (ref 150–400)
POTASSIUM SERPL-MCNC: 4.3 MMOL/L — SIGNIFICANT CHANGE UP (ref 3.5–5.3)
POTASSIUM SERPL-SCNC: 4.3 MMOL/L — SIGNIFICANT CHANGE UP (ref 3.5–5.3)
PROT SERPL-MCNC: 6.8 G/DL — SIGNIFICANT CHANGE UP (ref 6–8.3)
PROTHROM AB SERPL-ACNC: 10.8 SEC — SIGNIFICANT CHANGE UP (ref 9.8–12.7)
RBC # BLD: 5.04 M/UL — SIGNIFICANT CHANGE UP (ref 4.2–5.8)
RBC # BLD: 5.22 M/UL — SIGNIFICANT CHANGE UP (ref 4.2–5.8)
RBC # FLD: 11.6 % — SIGNIFICANT CHANGE UP (ref 10.3–14.5)
RBC # FLD: 11.7 % — SIGNIFICANT CHANGE UP (ref 10.3–14.5)
SODIUM SERPL-SCNC: 134 MMOL/L — LOW (ref 135–145)
TROPONIN T, HIGH SENSITIVITY RESULT: 11 NG/L — SIGNIFICANT CHANGE UP (ref 0–51)
WBC # BLD: 4.7 K/UL — SIGNIFICANT CHANGE UP (ref 3.8–10.5)
WBC # BLD: 5 K/UL — SIGNIFICANT CHANGE UP (ref 3.8–10.5)
WBC # FLD AUTO: 4.7 K/UL — SIGNIFICANT CHANGE UP (ref 3.8–10.5)
WBC # FLD AUTO: 5 K/UL — SIGNIFICANT CHANGE UP (ref 3.8–10.5)

## 2018-08-04 PROCEDURE — 93308 TTE F-UP OR LMTD: CPT | Mod: 26

## 2018-08-04 PROCEDURE — 99285 EMERGENCY DEPT VISIT HI MDM: CPT | Mod: GC

## 2018-08-04 PROCEDURE — 71046 X-RAY EXAM CHEST 2 VIEWS: CPT | Mod: 26

## 2018-08-04 PROCEDURE — 99218: CPT

## 2018-08-04 RX ORDER — ASPIRIN/CALCIUM CARB/MAGNESIUM 324 MG
81 TABLET ORAL DAILY
Qty: 0 | Refills: 0 | Status: DISCONTINUED | OUTPATIENT
Start: 2018-08-04 | End: 2018-08-08

## 2018-08-04 RX ORDER — SODIUM CHLORIDE 9 MG/ML
3 INJECTION INTRAMUSCULAR; INTRAVENOUS; SUBCUTANEOUS EVERY 12 HOURS
Qty: 0 | Refills: 0 | Status: DISCONTINUED | OUTPATIENT
Start: 2018-08-04 | End: 2018-08-08

## 2018-08-04 RX ADMIN — Medication 81 MILLIGRAM(S): at 19:40

## 2018-08-04 RX ADMIN — SODIUM CHLORIDE 3 MILLILITER(S): 9 INJECTION INTRAMUSCULAR; INTRAVENOUS; SUBCUTANEOUS at 19:37

## 2018-08-04 NOTE — ED PROVIDER NOTE - RESPIRATORY NEGATIVE STATEMENT, MLM
no chest pain, no cough, and no shortness of breath now, has been experiencing SOB on exertion for the past week.

## 2018-08-04 NOTE — ED ADULT NURSE REASSESSMENT NOTE - NS ED NURSE REASSESS COMMENT FT1
Received pt from RYAN Bartlett, received pt alert and responsive, oriented x4, denies any respiratory distress, SOB, or difficulty breathing. Pt transferred to CDU for Chest pain and dizziness, pt is currently asymptomatic with no complaints at this time. Pt is Sinus bradycardia on monitor hr: 50's. Pt is pending stress test in AM and is aware not to consume caffeine prior to test, pt verbalized understanding. IV in place, patent and free of signs of infiltration,  pt denies SOB, diaphoresis, dizziness, N/V, lightheadedness chest pain, palpitations, V/S stable, pt afebrile, pt denies pain at this time. Pt educated on unit and unit rules, instructed patient to notify RN of any needed assistance, Pt verbalizes understanding, Call bell placed within reach. Safety maintained. Will continue to monitor. Received pt from RYAN Bartlett, received pt alert and responsive, oriented x4, denies any respiratory distress, SOB, or difficulty breathing. Pt transferred to CDU for Chest pain and dizziness, pt is currently asymptomatic with no complaints at this time. Pt is Sinus bradycardia with occasional PVC's on monitor hr: 50's. Pt is pending stress test in AM and is aware not to consume caffeine prior to test, pt verbalized understanding. IV in place, patent and free of signs of infiltration,  pt denies SOB, diaphoresis, dizziness, N/V, lightheadedness chest pain, palpitations, V/S stable, pt afebrile, pt denies pain at this time. Pt educated on unit and unit rules, instructed patient to notify RN of any needed assistance, Pt verbalizes understanding, Call bell placed within reach. Safety maintained. Will continue to monitor.

## 2018-08-04 NOTE — ED PROVIDER NOTE - OBJECTIVE STATEMENT
53y male with PMH of ASD repaired in 2015, AICD implated in 2015 presenting with chest pain and dizziness.  He was shopping in [employer] when he started having dizziness, pressure like non-radiating chest pain, headache and diaphoresis.  He collapsed to the ground with no LOC and then walked out of the store.  Employees called EMS and he was found a block from the store still having chest pain and diaphoresis.  He was given 162 of aspirin by EMS.  Patient states that he is no longer having chest pain.  Denies nausea, vomiting, recent illness, SOB.  He states that he has been having SOB, dizziness and headache with exertion for the past week. His reports that his AICD did not shock him today or in the past week. 53y male with PMH of ASD repaired in 2015, AICD implated in 2015 presenting with chest pain and dizziness.  He was shopping in [employer] when he started having dizziness, pressure like non-radiating chest pain, headache and diaphoresis.  He collapsed to the ground with no LOC and then walked out of the store.  Employees called EMS and he was found a block from the store still having chest pain and diaphoresis.  He was given 162 of aspirin by EMS.  Patient states that he is no longer having chest pain.  Denies nausea, vomiting, recent illness, SOB.  He states that he has been having SOB, dizziness and headache with exertion and palpitations for the past week. His reports that his AICD did not shock him today or in the past week. 53y male with PMH of ASD repaired in 2015, AICD implated in 2015 s/p vtach cardiac arrest presenting with chest pain and dizziness.  He was shopping in [employer] when he started having dizziness, pressure like non-radiating chest pain, headache and diaphoresis.  He collapsed to the ground with no LOC and then walked out of the store.  Employees called EMS and he was found a block from the store still having chest pain and diaphoresis.  He was given 162 of aspirin by EMS.  Patient states that he is no longer having chest pain.  Denies nausea, vomiting, recent illness, SOB.  He states that he has been having SOB, dizziness and headache with exertion and palpitations for the past week. His reports that his AICD did not shock him today or in the past week.

## 2018-08-04 NOTE — ED CDU PROVIDER INITIAL DAY NOTE - ST/T WAVE
No ST elevation. No ST elevation. T waves in V4-6 upright changed from previous ekg showing inversions

## 2018-08-04 NOTE — CONSULT NOTE ADULT - SUBJECTIVE AND OBJECTIVE BOX
Patient seen and evaluated at bedside    Chief Complaint: chest pressure     HPI:  This is a 52 yo M hx of ASD closure in 2015 at [facility], during ASD closure developed wide complex tachycardia, for which pt underwent MDT AICD, then was shocked multiple times in the past, and under EP interrogation noted to actually be in 1:1 A flutter which was ablated by Dr. Saavedra, with plans to explant AICD as his WCT was explained by A flutter. He now presents with 1-2 weeks of dull intermittent chest pressure lasting minutes with no radiation or diaphoresis. He now presents to the ER with these symptoms.     In the ER he is HD stable. His MDT device interrogation does not reveal any further arrhythmias.       PMH: as above       PSH:   as above     Medications:   Home Medications:  none listed       Allergies:  No Known Allergies      FAMILY HISTORY:      Social History:  Smoking History: none     Review of Systems:  REVIEW OF SYSTEMS:  CONSTITUTIONAL: No weakness, fevers or chills  EYES/ENT: No visual changes;  No dysphagia  NECK: No pain or stiffness  RESPIRATORY: No cough, wheezing, hemoptysis; No shortness of breath  CARDIOVASCULAR: see HPI   GASTROINTESTINAL: No abdominal or epigastric pain. No nausea, vomiting, or hematemesis; No diarrhea or constipation. No melena or hematochezia.  BACK: No back pain  GENITOURINARY: No dysuria, frequency or hematuria  NEUROLOGICAL: No numbness or weakness  SKIN: No itching, burning, rashes, or lesions   All other review of systems is negative unless indicated above.    Physical Exam:    T(F): 97.9 (08-04), Max: 97.9 (08-04)  HR: 68 (08-04) (66 - 68)  BP: 153/86 (08-04) (139/82 - 161/77)  RR: 16 (08-04)  SpO2: 98% (08-04)    GENERAL: No acute distress, well-developed  HEAD:  Atraumatic, Normocephalic  ENT: EOMI, PERRLA, conjunctiva and sclera clear, Neck supple, No JVD, moist mucosa  CHEST/LUNG: Clear to auscultation bilaterally; No wheeze, equal breath sounds bilaterally , R sided device lead noted   BACK: No spinal tenderness  HEART: Regular rate and rhythm; No murmurs, rubs, or gallops  ABDOMEN: Soft, Nontender, Nondistended; Bowel sounds present  EXTREMITIES:  No clubbing, cyanosis, or edema  PSYCH: Nl behavior, nl affect  NEUROLOGY: AAOx3, non-focal, cranial nerves intact  SKIN: Normal color, No rashes or lesions  LINES:    Cardiovascular Diagnostic Testing:    ECG: Personally reviewed  SR with 1st deg AV block with PVC     Echo:  none   Cath:  normal coronaries     Imaging:  R sided AICD clear lungs     Labs: Personally reviewed                        15.6   4.7   )-----------( 178      ( 04 Aug 2018 10:34 )             44.8     08-04    134<L>  |  104  |  13  ----------------------------<  110<H>  4.3   |  19<L>  |  0.90    Ca    8.9      04 Aug 2018 10:34  Mg     2.0     08-04    TPro  6.8  /  Alb  4.5  /  TBili  0.5  /  DBili  x   /  AST  27  /  ALT  16  /  AlkPhos  52  08-04    PT/INR - ( 04 Aug 2018 10:34 )   PT: 10.8 sec;   INR: 0.99 ratio         PTT - ( 04 Aug 2018 10:34 )  PTT:27.9 sec  CARDIAC MARKERS ( 04 Aug 2018 10:34 )  11 ng/L / x     / x     / 173 U/L / x     / x          Serum Pro-Brain Natriuretic Peptide: 280 pg/mL (08-04 @ 10:34) Patient seen and evaluated at bedside    Chief Complaint: chest pressure     HPI:  This is a 54 yo M hx of ASD closure in 2015 at [facility], during ASD closure developed wide complex tachycardia, for which pt underwent MDT AICD, then was shocked multiple times in the past, and under EP interrogation noted to actually be in 1:1 A flutter which was ablated by Dr. Saavedra, with plans to explant AICD as his WCT was explained by A flutter. He now presents with 1-2 weeks of dull intermittent chest pressure lasting minutes with no radiation or diaphoresis. He now presents to the ER with these symptoms.     In the ER he is HD stable. His MDT device interrogation does not reveal any further arrhythmias.       PMH: as above       PSH:   as above     Medications:   Home Medications:  none listed       Allergies:  No Known Allergies      FAMILY HISTORY:  Father - HTN  Mother - HTN    Social History:  Smoking History: none   no illicit drug use    Review of Systems:  REVIEW OF SYSTEMS:  CONSTITUTIONAL: No weakness, fevers or chills  EYES/ENT: No visual changes;  No dysphagia  NECK: No pain or stiffness  RESPIRATORY: No cough, wheezing, hemoptysis; No shortness of breath  CARDIOVASCULAR: +cp +lightheadedness  GASTROINTESTINAL: No abdominal or epigastric pain. No nausea, vomiting, or hematemesis; No diarrhea or constipation. No melena or hematochezia.  BACK: No back pain  GENITOURINARY: No dysuria, frequency or hematuria  NEUROLOGICAL: No numbness or weakness  SKIN: No itching, burning, rashes, or lesions   All other review of systems is negative unless indicated above.    Physical Exam:    T(F): 97.9 (08-04), Max: 97.9 (08-04)  HR: 68 (08-04) (66 - 68)  BP: 153/86 (08-04) (139/82 - 161/77)  RR: 16 (08-04)  SpO2: 98% (08-04)    GENERAL: No acute distress, well-developed  HEAD:  Atraumatic, Normocephalic  ENT: EOMI, PERRLA, conjunctiva and sclera clear, Neck supple, No JVD, moist mucosa  CHEST/LUNG: Clear to auscultation bilaterally; No wheeze, equal breath sounds bilaterally , R sided device lead noted   BACK: No spinal tenderness  HEART: Regular rate and rhythm; No murmurs, rubs, or gallops  ABDOMEN: Soft, Nontender, Nondistended; Bowel sounds present  EXTREMITIES:  No clubbing, cyanosis, or edema  PSYCH: Nl behavior, nl affect  NEUROLOGY: AAOx3, non-focal, cranial nerves intact  SKIN: Normal color, No rashes or lesions  LINES:    Cardiovascular Diagnostic Testing:    ECG: Personally reviewed  SR with 1st deg AV block with PVC     Echo:  none   Cath:  normal coronaries     Imaging:  R sided AICD clear lungs     Labs: Personally reviewed                        15.6   4.7   )-----------( 178      ( 04 Aug 2018 10:34 )             44.8     08-04    134<L>  |  104  |  13  ----------------------------<  110<H>  4.3   |  19<L>  |  0.90    Ca    8.9      04 Aug 2018 10:34  Mg     2.0     08-04    TPro  6.8  /  Alb  4.5  /  TBili  0.5  /  DBili  x   /  AST  27  /  ALT  16  /  AlkPhos  52  08-04    PT/INR - ( 04 Aug 2018 10:34 )   PT: 10.8 sec;   INR: 0.99 ratio         PTT - ( 04 Aug 2018 10:34 )  PTT:27.9 sec  CARDIAC MARKERS ( 04 Aug 2018 10:34 )  11 ng/L / x     / x     / 173 U/L / x     / x          Serum Pro-Brain Natriuretic Peptide: 280 pg/mL (08-04 @ 10:34)

## 2018-08-04 NOTE — CONSULT NOTE ADULT - ASSESSMENT
A/P:   54 yo M hx of ASD closure in 2015 at [facility], during ASD closure developed wide complex tachycardia, for which pt underwent MDT AICD, then was shocked multiple times in the past, and under EP interrogation noted to actually be in 1:1 A flutter which was ablated by Dr. Saavedra, with plans to explant AICD and implant a ILR as his WCT was likely explained by A flutter. He now presents with 1-2 weeks of dull intermittent chest pressure, with no EKG changes, with initial hs-T negative.     Plan:  - would obtain serial hs-T to rule out ACS, if negative plan for exercise-nuclear stress test.   - EP device interrogation did not reveal any arrhythmias   - will notify EP attending Dr. Saavedra, pt is admitted to hospital.   - consider CDU admission if appropriate.  - repeat EKG if complaining of chest pain to see if any dynamic changes are present.       Maxx Mujica MD   Cardiology Fellow  x [phone]

## 2018-08-04 NOTE — ED CDU PROVIDER INITIAL DAY NOTE - DETAILS
Chest pain  -Continuous telemetry monitoring  -Serial Cardiac Enzymes with EKG  -Nuclear Stress Test  -Frequent re-evaluations

## 2018-08-04 NOTE — ED ADULT NURSE NOTE - ED STAT RN HANDOFF DETAILS
No formal report given to RYAN Bartlett in CDU.  JAXON Carl requested we send patient over and he would speak with Dhruv

## 2018-08-04 NOTE — ED ADULT NURSE REASSESSMENT NOTE - NS ED NURSE REASSESS COMMENT FT1
Repeat EKG completed and given to JAXON Anderson pt is currently asymptomatic. Monitoring continues.

## 2018-08-04 NOTE — ED ADULT NURSE REASSESSMENT NOTE - NS ED NURSE REASSESS COMMENT FT1
14.00 Hrs Received the pt from  main ED. Pt is oriented to the unit . Pt denies CP N/V/D fever chills  SOB  Now Comfort care & safety measures initiated .  Meals provided   Pt is Sinus [place]  55/mt   continue to monitor

## 2018-08-04 NOTE — ED ADULT NURSE NOTE - OBJECTIVE STATEMENT
53M with PMH ASD repair and AICD placement on ASA comes to ED after episode of near syncope. States he was at the store, developed dizziness and chest pain and lowered himself to the floor. States he did not pass out but felt "very dizzy". Patient states he has had intermittent dizziness and chest pain x4 days. States his pain and dizziness come with exertion. States SOB on exertion as well. ASA given by EMS. EKG was performed immediately, and patient was placed on cardiac monitor. He appears to be diaphoretic. Denies N/V/D/fever/chills/abdominal pain/back pain/urinary symptoms. Denies AICD firing. Patient educated that due to dizziness he is a fall risk. Educated to press call bell for assistance. Patient verbalized understanding. Remains on cardiac monitor. Will continue to monitor.

## 2018-08-04 NOTE — PROCEDURE NOTE - ADDITIONAL PROCEDURE DETAILS
normal lead impedence, and sensing. threshold testing not done as patient symptomatic when done.   No AT/AF or VT/ VF events noted  All tachytherapies are currently off   He is V-sensed 99.8% of time and V paced only 0.2% of time.

## 2018-08-04 NOTE — ED CDU PROVIDER INITIAL DAY NOTE - PROGRESS NOTE DETAILS
Patient resting in bed comfortably. No distress, no complaints. Vital Signs Stable. No events on telemetry monitor. -Yassine Pastrana PA-C Patient seen at bedside in NAD.  VSS.  Patient resting comfortably without complaints. Currently sinus rhythm on tele. Patient appears well. Denies any current chest pain, shortness of breath, weakness, dizziness, fatigue, n/v. Has been ambulating around the unit w/o symptoms. Appears well. Will continue to monitor. - Justin East PA-C Received call from CDU RN at ~2125 informing that pt c/o palpitations w/o chest pain. No events on tele at that time per RN. Repeat EKG done at 2128 still with sinus ti w/ 1st degree AV block however now with upright Twaves in V4-6 which were inverted on prior. Showed FT ED Dr. Lopez who advised to repeat EKG in ~10 minutes. Patient evaluated at that time and stated symptoms had resolved and denied current palpitations, cp, sob, dizziness. Repeat EKG done at 2146 showed PVC no ST elevation/findings concerning with ischemic changes. EKGs, including one from earlier in the day were shown to nocturnist cardiologist Dr. Villa who happened to be down in CDU at the time and he advised nothing further to do at this time and to continue monitoring. FT attending Dr. Lopez shown second repeat EKG as well and informed of conversation with Dr. Villa and agreed nothing further to be done at this time since isolated episode palpitations and EKG with no concerning ischemic changes. Will continue to monitor closely. - Justin East PA-C

## 2018-08-04 NOTE — ED CDU PROVIDER INITIAL DAY NOTE - MEDICAL DECISION MAKING DETAILS
Attending Shanita Meredith: 52 y/o male with sig cardiac history presenting with chest pain. ekg nondiagnostic while in the ED. pt seen by irene fellow with ICD interrogation that was unremarkable. serial EKGs unchanged. d/w irene fellow, placed in the cdu for tele and stress testing.

## 2018-08-04 NOTE — ED PROVIDER NOTE - MEDICAL DECISION MAKING DETAILS
53y male with 53y male with PMH of vtach arrest with ICD implanted 2015, ablation 53y male with PMH of ASD repaired in 2015, vtach arrest with ICD implanted 2015, ablation for Vtach in 2017 and ablation for Atach in 2018 presenting with chest pain, diaphoresis, dizziness and headache. Rule out ACS, possible arrythmia, consult cardiology to interrogate ICD. 53y male with PMH of ASD repaired in 2015, vtach arrest with ICD implanted 2015, ablation for Vtach in 2017 and ablation for Atach in 2018 presenting with chest pain, diaphoresis, dizziness and headache. Rule out ACS, possible arrythmia, consult cardiology to interrogate ICD.  Attending Shanita Meredith: 54 y/o male h/o ASD, VSD repair during which reported cardiac arrest s/p AICD placement who then was reportedly diagnosed with afib and told he needs a loop recorder and not an aicd presenting with chest pain with associated dizziness and lightheadedness. ekg shows no acute changes. no black or bloody stools. no current chest pain or pleuritic pain to suggest pe. sob and chest pain with exertion. pt asymptomatic currently. will have ICD interrogated, last cath showed no acute ischemia. consider unstable angina. will d/w cards.

## 2018-08-04 NOTE — ED ADULT NURSE REASSESSMENT NOTE - NS ED NURSE REASSESS COMMENT FT1
2125: pt c/o heart palpitations x 10 minutes prior to alerting medical staff, JAXON Anderson made aware STAT EKG completed and given to PA. PT denied chest pain only palpitations, no other cardiac symptoms  at the time. Close monitoring continues. Repeat EKG to be completed 10 min post 1st EKG. Pt SB hr: 50's on the monitor with occasional PVC'S noted.

## 2018-08-04 NOTE — ED ADULT NURSE REASSESSMENT NOTE - ED CARDIAC RHYTHM
Sinus Bradycardia/regular regular/Sinus Bradycardia with occasional PVC's Sinus bradycardia with occasional  PVC's

## 2018-08-04 NOTE — ED PROVIDER NOTE - NEURO NEGATIVE STATEMENT, MLM
no loss of consciousness, no gait abnormality, no sensory deficits, and no weakness.  Positive for headaches

## 2018-08-04 NOTE — ED PROVIDER NOTE - PROGRESS NOTE DETAILS
Attending Shanita Meredith: pt currently denies any chest pain. d/w cards fellow will come to intterogate First hsT 11, will redrawn the 3 hour hsT at 1pm. First hsT 11, will redrawn the 3 hour hsT at 1pm.  Pierce Simon, PGY-1 EM Cardiology saw the patient, interrogated his ICD, there were no abnormalities.  Pierce Simon, PGY-1 EM Attending Shanita Meredith: d/w cards fellow recommends CDU for stress testing. pt chest pain free currently

## 2018-08-04 NOTE — ED ADULT NURSE NOTE - NSIMPLEMENTINTERV_GEN_ALL_ED
Implemented All Fall Risk Interventions:  [place] to call system. Call bell, personal items and telephone within reach. Instruct patient to call for assistance. Room bathroom lighting operational. Non-slip footwear when patient is off stretcher. Physically safe environment: no spills, clutter or unnecessary equipment. Stretcher in lowest position, wheels locked, appropriate side rails in place. Provide visual cue, wrist band, yellow gown, etc. Monitor gait and stability. Monitor for mental status changes and reorient to person, place, and time. Review medications for side effects contributing to fall risk. Reinforce activity limits and safety measures with patient and family.

## 2018-08-04 NOTE — ED PROVIDER NOTE - PHYSICAL EXAMINATION
Attending Shanita Meredith: Gen: NAD, heent: atrauamtic, eomi, perrla, mmm, op pink, uvula midline, neck; nttp, no nuchal rigidity, chest: nttp, no crepitus, cv: rrr, +murmur, lungs: ctab, abd: soft, nontender, nondistended, no peritoneal signs, +BS, no guarding, ext: wwp, neg homans, skin: no rash, neuro: awake and alert, following commands, speech clear, sensation and strength intact, no focal deficits

## 2018-08-04 NOTE — ED CDU PROVIDER INITIAL DAY NOTE - OBJECTIVE STATEMENT
53y male with PMH of ASD repaired in 2015, AICD implated in 2015 s/p vtach cardiac arrest presenting with chest pain and dizziness.  He was shopping in [employer] when he started having dizziness, pressure like non-radiating chest pain, headache and diaphoresis.  He collapsed to the ground with no LOC and then walked out of the store.  Employees called EMS and he was found a block from the store still having chest pain and diaphoresis.  He was given 162 of aspirin by EMS.  Patient states that he is no longer having chest pain.  Denies nausea, vomiting, recent illness, SOB.  He states that he has been having SOB, dizziness and headache with exertion and palpitations for the past week. His reports that his AICD did not shock him today or in the past week.

## 2018-08-05 VITALS
OXYGEN SATURATION: 97 % | TEMPERATURE: 98 F | RESPIRATION RATE: 18 BRPM | SYSTOLIC BLOOD PRESSURE: 129 MMHG | DIASTOLIC BLOOD PRESSURE: 83 MMHG | HEART RATE: 56 BPM

## 2018-08-05 PROCEDURE — 80053 COMPREHEN METABOLIC PANEL: CPT

## 2018-08-05 PROCEDURE — 71046 X-RAY EXAM CHEST 2 VIEWS: CPT

## 2018-08-05 PROCEDURE — 83735 ASSAY OF MAGNESIUM: CPT

## 2018-08-05 PROCEDURE — 84484 ASSAY OF TROPONIN QUANT: CPT

## 2018-08-05 PROCEDURE — 93016 CV STRESS TEST SUPVJ ONLY: CPT

## 2018-08-05 PROCEDURE — 99285 EMERGENCY DEPT VISIT HI MDM: CPT

## 2018-08-05 PROCEDURE — G0378: CPT

## 2018-08-05 PROCEDURE — 85730 THROMBOPLASTIN TIME PARTIAL: CPT

## 2018-08-05 PROCEDURE — 99217: CPT

## 2018-08-05 PROCEDURE — 82550 ASSAY OF CK (CPK): CPT

## 2018-08-05 PROCEDURE — 83036 HEMOGLOBIN GLYCOSYLATED A1C: CPT

## 2018-08-05 PROCEDURE — A9500: CPT

## 2018-08-05 PROCEDURE — 85027 COMPLETE CBC AUTOMATED: CPT

## 2018-08-05 PROCEDURE — 80061 LIPID PANEL: CPT

## 2018-08-05 PROCEDURE — 78452 HT MUSCLE IMAGE SPECT MULT: CPT | Mod: 26

## 2018-08-05 PROCEDURE — 78452 HT MUSCLE IMAGE SPECT MULT: CPT

## 2018-08-05 PROCEDURE — 93005 ELECTROCARDIOGRAM TRACING: CPT

## 2018-08-05 PROCEDURE — 93308 TTE F-UP OR LMTD: CPT

## 2018-08-05 PROCEDURE — 85610 PROTHROMBIN TIME: CPT

## 2018-08-05 PROCEDURE — 83880 ASSAY OF NATRIURETIC PEPTIDE: CPT

## 2018-08-05 PROCEDURE — 93017 CV STRESS TEST TRACING ONLY: CPT

## 2018-08-05 PROCEDURE — 93018 CV STRESS TEST I&R ONLY: CPT

## 2018-08-05 PROCEDURE — 99284 EMERGENCY DEPT VISIT MOD MDM: CPT | Mod: 25

## 2018-08-05 RX ADMIN — SODIUM CHLORIDE 3 MILLILITER(S): 9 INJECTION INTRAMUSCULAR; INTRAVENOUS; SUBCUTANEOUS at 06:25

## 2018-08-05 NOTE — ED CDU PROVIDER DISPOSITION NOTE - ATTENDING CONTRIBUTION TO CARE
Attending MD Lyons:   I personally have seen and examined this patient.  Physician assistant note reviewed and agree on plan of care and except where noted.  See below for details.     53M with PMH ASD repair, AICD s/p VTach and arrest presents to the ED with chest pain and dizziness.  Reports feeling asymptomatic since arrival in CDU.  Seen by cards, sent to CDU for monitoring, enzymes, stress.  Interrogation did not reveal any other arrythmias, stress showed small mild to mod fixed defect in [place] and cards recommended close follow up.  Patient eager to go home.  Denies chest pain, shortness of breath, palpitations. Denies abdominal pain, nausea, vomiting, diarrhea, blood in stools. Denies dizziness. Denies change in vision, double vision, sudden loss of vision. Denies numbness/weakness/tingling in extremities.  On exam, NAD, head NCAT, PERRL, FROM at neck, no tenderness to palpation or stepoffs along length of spine, lungs CTAB with good inspiratory effort, +S1S2, + murmur no r/g, well healed midline chest scar, device in chest, abdomen soft with +BS, NT, ND, no CVAT, moving all extremities with 5/5 strength bilateral upper and lower extremities, good and equal  strength bilaterally; A/P: 53M with dizziness and chest pain on presentation.  Patient re-evaluated and feeling improved.  No acute issues at  this time.  Lab and radiology tests reviewed with patient and family.  Patient stable for discharge. Follow up instructions given, importance of follow up emphasized, return to ED parameters reviewed and patient verbalized understanding.  All questions answered, all concerns addressed.

## 2018-08-05 NOTE — ED CDU PROVIDER SUBSEQUENT DAY NOTE - PROGRESS NOTE DETAILS
Patient sleeping. NAD. No complaints. VSS. Currently sinus on tele. Occasional PVC. Will continue to monitor. - Justin East PA-C Patient resting in bed comfortably. No distress, no complaints. Vital Signs Stable. No events on telemetry monitor. -Yassine Pastrana PA-C Stress test reveals infarct, Cardiology Dr Al Pickering) cleared for d/c for outpatient cardiology follow up . - Yassine Pastrana PA-C

## 2018-08-05 NOTE — ED CDU PROVIDER SUBSEQUENT DAY NOTE - HISTORY
Patient seen at bedside in NAD.  VSS.  Patient resting comfortably without complaints. Occasional PVC on tele but otherwise sinus ti currently. Pt states he feels well and has not had recurrence of palpitations since last documented episode. No current cp, sob, dizziness, palpitations, weakness, n/v, diaphoresis, back pain. Appears well and has been ambulating around the unit without difficulty. Plan for stress test in AM. Will continue to monitor closely. - Justin East PA-C

## 2018-08-05 NOTE — ED CDU PROVIDER SUBSEQUENT DAY NOTE - CARDIAC, MLM
Normal rate, regular rhythm.  Heart sounds S1, S2.  No murmurs, rubs or gallops. No lifts, heaves, or thrills.

## 2018-08-05 NOTE — ED CDU PROVIDER SUBSEQUENT DAY NOTE - ATTENDING CONTRIBUTION TO CARE
Attending MD Lyons:   I personally have seen and examined this patient.  Physician assistant note reviewed and agree on plan of care and except where noted.  See below for details.     53M with PMH ASD repair, AICD s/p VTach and arrest presents to the ED with chest pain and dizziness.  Reports feeling asymptomatic since arrival in CDU.  Seen by cards, sent to CDU for monitoring, enzymes, stress.  Interrogation did not reveal any other arrythmias, stress showed small mild to mod fixed defect in [place] and cards recommended close follow up.  Patient eager to go home.  Denies chest pain, shortness of breath, palpitations. Denies abdominal pain, nausea, vomiting, diarrhea, blood in stools. Denies dizziness. Denies change in vision, double vision, sudden loss of vision. Denies numbness/weakness/tingling in extremities.  On exam, NAD, head NCAT, PERRL, FROM at neck, no tenderness to palpation or stepoffs along length of spine, lungs CTAB with good inspiratory effort, +S1S2, + murmur no r/g, well healed midline chest scar, device in chest, abdomen soft with +BS, NT, ND, no CVAT, moving all extremities with 5/5 strength bilateral upper and lower extremities, good and equal  strength bilaterally; A/P: 53M with dizziness and chest pain on presentation.  Patient re-evaluated and feeling improved.  No acute issues at  this time.  Lab and radiology tests reviewed with patient and family.  Patient stable for discharge. Follow up instructions given, importance of follow up emphasized, return to ED parameters reviewed and patient verbalized understanding.  All questions answered, all concerns addressed.

## 2018-08-05 NOTE — PROGRESS NOTE ADULT - SUBJECTIVE AND OBJECTIVE BOX
Consult:  · Requested by Name:	Masoud Diehl	  		  · Date/Time:	05-Aug-2018 	  · Reason for Referral/Consultation:	chest pressure	      · Subjective and Objective: 	  Patient seen and evaluated at bedside    Chief Complaint: chest pressure     HPI:  This is a 52 yo M hx of ASD closure in 2015 at [facility], during ASD closure developed wide complex tachycardia, for which pt underwent MDT AICD, then was shocked multiple times in the past, and under EP interrogation noted to actually be in 1:1 A flutter which was ablated by Dr. Saavedra, with plans to explant AICD as his WCT was explained by A flutter. He now presents with 1-2 weeks of dull intermittent chest pressure lasting minutes with no radiation or diaphoresis. He now presents to the ER with these symptoms.     In the ER he is HD stable. His MDT device interrogation does not reveal any further arrhythmias.     ================  24 hr Events  - Small mild to moderate fixed defect in apex  - AICD interrogation unrevealing       =================      PMH: as above       PSH:   as above     Medications:   Home Medications:  none listed       Allergies:  No Known Allergies      FAMILY HISTORY:  Father - HTN  Mother - HTN    Social History:  Smoking History: none   no illicit drug use    Review of Systems:  REVIEW OF SYSTEMS:  CONSTITUTIONAL: No weakness, fevers or chills  EYES/ENT: No visual changes;  No dysphagia  NECK: No pain or stiffness  RESPIRATORY: No cough, wheezing, hemoptysis; No shortness of breath  CARDIOVASCULAR: cp +lightheadedness  GASTROINTESTINAL: No abdominal or epigastric pain. No nausea, vomiting, or hematemesis; No diarrhea or constipation. No melena or hematochezia.  BACK: No back pain  GENITOURINARY: No dysuria, frequency or hematuria  NEUROLOGICAL: No numbness or weakness  SKIN: No itching, burning, rashes, or lesions   All other review of systems is negative unless indicated above.    Physical Exam:    T(F): 97.9 (08-04), Max: 97.9 (08-04)  HR: 68 (08-04) (66 - 68)  BP: 153/86 (08-04) (139/82 - 161/77)  RR: 16 (08-04)  SpO2: 98% (08-04)    GENERAL: No acute distress, well-developed  HEAD:  Atraumatic, Normocephalic  ENT: EOMI, PERRLA, conjunctiva and sclera clear, Neck supple, No JVD, moist mucosa  CHEST/LUNG: Clear to auscultation bilaterally; No wheeze, equal breath sounds bilaterally , R sided device lead noted   BACK: No spinal tenderness  HEART: Regular rate and rhythm; No murmurs, rubs, or gallops  ABDOMEN: Soft, Nontender, Nondistended; Bowel sounds present  EXTREMITIES:  No clubbing, cyanosis, or edema  PSYCH: Nl behavior, nl affect  NEUROLOGY: AAOx3, non-focal, cranial nerves intact  SKIN: Normal color, No rashes or lesions  LINES:    Cardiovascular Diagnostic Testing:    ECG: Personally reviewed  SR with 1st deg AV block with PVC     Echo:  none   Cath:  normal coronaries     Imaging:  R sided AICD clear lungs     Labs: Personally reviewed                        15.6   4.7   )-----------( 178      ( 04 Aug 2018 10:34 )             44.8     08-04    134<L>  |  104  |  13  ----------------------------<  110<H>  4.3   |  19<L>  |  0.90    Ca    8.9      04 Aug 2018 10:34  Mg     2.0     08-04    TPro  6.8  /  Alb  4.5  /  TBili  0.5  /  DBili  x   /  AST  27  /  ALT  16  /  AlkPhos  52  08-04    PT/INR - ( 04 Aug 2018 10:34 )   PT: 10.8 sec;   INR: 0.99 ratio         PTT - ( 04 Aug 2018 10:34 )  PTT:27.9 sec  CARDIAC MARKERS ( 04 Aug 2018 10:34 )  11 ng/L / x     / x     / 173 U/L / x     / x          Serum Pro-Brain Natriuretic Peptide: 280 pg/mL (08-04 @ 10:34)              Assessment and Recommendation:   · Assessment		  A/P:   52 yo M hx of ASD closure in 2015 at [facility], during ASD closure developed wide complex tachycardia, for which pt underwent MDT AICD, then was shocked multiple times in the past, and under EP interrogation noted to actually be in 1:1 A flutter which was ablated by Dr. Saavedra, with plans to explant AICD and implant a ILR as his WCT was likely explained by A flutter. He now presents with 1-2 weeks of dull intermittent chest pressure, with no EKG changes, with initial hs-T negative.     Plan:  - mild fixed defect  - unremarkable AICD interrogation   - OK to D/C from cardiac standpoint with close f/u

## 2018-08-05 NOTE — ED CDU PROVIDER DISPOSITION NOTE - PLAN OF CARE
1. Follow up with your PMD within the next 1-2 days. Additionally please follow up with your cardiologist and your electrophysiologist Dr. Saavedra in the next 2-3 days. You may also follow up with our cardiology clinic if you cannot get in to see either of the above ([phone]).  2. Show copies of your reports given to you. Recommend Aspirin 81mg over the counter daily until further evaluation.  Take all of your other medications as previously prescribed.   3. If you develop any worsening or continued chest pain, shortness of breath, weakness, dizziness, palpitations, nausea/vomiting, abdominal pain, or any other concerning symptom please return to the ED immediately.

## 2018-08-06 PROBLEM — Q21.1 ATRIAL SEPTAL DEFECT: Chronic | Status: ACTIVE | Noted: 2017-10-23

## 2018-08-13 ENCOUNTER — APPOINTMENT (OUTPATIENT)
Dept: ELECTROPHYSIOLOGY | Facility: CLINIC | Age: 53
End: 2018-08-13

## 2018-10-02 ENCOUNTER — INPATIENT (INPATIENT)
Facility: HOSPITAL | Age: 53
LOS: 1 days | Discharge: HOME | DRG: 310 | End: 2018-10-04
Attending: INTERNAL MEDICINE | Admitting: HOSPITALIST
Payer: MEDICAID

## 2018-10-02 VITALS
HEART RATE: 62 BPM | TEMPERATURE: 98 F | OXYGEN SATURATION: 97 % | HEIGHT: 66 IN | DIASTOLIC BLOOD PRESSURE: 91 MMHG | RESPIRATION RATE: 16 BRPM | SYSTOLIC BLOOD PRESSURE: 145 MMHG | WEIGHT: 145.06 LBS

## 2018-10-02 DIAGNOSIS — Z95.810 PRESENCE OF AUTOMATIC (IMPLANTABLE) CARDIAC DEFIBRILLATOR: Chronic | ICD-10-CM

## 2018-10-02 DIAGNOSIS — Z98.890 OTHER SPECIFIED POSTPROCEDURAL STATES: Chronic | ICD-10-CM

## 2018-10-02 DIAGNOSIS — Z87.74 PERSONAL HISTORY OF (CORRECTED) CONGENITAL MALFORMATIONS OF HEART AND CIRCULATORY SYSTEM: Chronic | ICD-10-CM

## 2018-10-02 PROBLEM — I48.92 UNSPECIFIED ATRIAL FLUTTER: Chronic | Status: ACTIVE | Noted: 2018-08-04

## 2018-10-02 LAB
ALBUMIN SERPL ELPH-MCNC: 4.1 G/DL — SIGNIFICANT CHANGE UP (ref 3.3–5)
ALP SERPL-CCNC: 68 U/L — SIGNIFICANT CHANGE UP (ref 40–120)
ALT FLD-CCNC: 9 U/L — LOW (ref 10–45)
ANION GAP SERPL CALC-SCNC: 10 MMOL/L — SIGNIFICANT CHANGE UP (ref 5–17)
AST SERPL-CCNC: 14 U/L — SIGNIFICANT CHANGE UP (ref 10–40)
BILIRUB SERPL-MCNC: 0.5 MG/DL — SIGNIFICANT CHANGE UP (ref 0.2–1.2)
BUN SERPL-MCNC: 10 MG/DL — SIGNIFICANT CHANGE UP (ref 7–23)
CALCIUM SERPL-MCNC: 9.5 MG/DL — SIGNIFICANT CHANGE UP (ref 8.4–10.5)
CHLORIDE SERPL-SCNC: 104 MMOL/L — SIGNIFICANT CHANGE UP (ref 96–108)
CO2 SERPL-SCNC: 26 MMOL/L — SIGNIFICANT CHANGE UP (ref 22–31)
CREAT SERPL-MCNC: 0.81 MG/DL — SIGNIFICANT CHANGE UP (ref 0.5–1.3)
GLUCOSE SERPL-MCNC: 96 MG/DL — SIGNIFICANT CHANGE UP (ref 70–99)
HCT VFR BLD CALC: 44.8 % — SIGNIFICANT CHANGE UP (ref 39–50)
HGB BLD-MCNC: 15.6 G/DL — SIGNIFICANT CHANGE UP (ref 13–17)
MCHC RBC-ENTMCNC: 29.5 PG — SIGNIFICANT CHANGE UP (ref 27–34)
MCHC RBC-ENTMCNC: 34.8 GM/DL — SIGNIFICANT CHANGE UP (ref 32–36)
MCV RBC AUTO: 84.8 FL — SIGNIFICANT CHANGE UP (ref 80–100)
PLATELET # BLD AUTO: 219 K/UL — SIGNIFICANT CHANGE UP (ref 150–400)
POTASSIUM SERPL-MCNC: 4.4 MMOL/L — SIGNIFICANT CHANGE UP (ref 3.5–5.3)
POTASSIUM SERPL-SCNC: 4.4 MMOL/L — SIGNIFICANT CHANGE UP (ref 3.5–5.3)
PROT SERPL-MCNC: 7.3 G/DL — SIGNIFICANT CHANGE UP (ref 6–8.3)
RBC # BLD: 5.28 M/UL — SIGNIFICANT CHANGE UP (ref 4.2–5.8)
RBC # FLD: 11.2 % — SIGNIFICANT CHANGE UP (ref 10.3–14.5)
SODIUM SERPL-SCNC: 140 MMOL/L — SIGNIFICANT CHANGE UP (ref 135–145)
TROPONIN T, HIGH SENSITIVITY RESULT: 10 NG/L — SIGNIFICANT CHANGE UP (ref 0–51)
TROPONIN T, HIGH SENSITIVITY RESULT: 9 NG/L — SIGNIFICANT CHANGE UP (ref 0–51)
WBC # BLD: 10 K/UL — SIGNIFICANT CHANGE UP (ref 3.8–10.5)
WBC # FLD AUTO: 10 K/UL — SIGNIFICANT CHANGE UP (ref 3.8–10.5)

## 2018-10-02 PROCEDURE — 99218: CPT

## 2018-10-02 PROCEDURE — 71046 X-RAY EXAM CHEST 2 VIEWS: CPT | Mod: 26

## 2018-10-02 RX ORDER — METOPROLOL TARTRATE 50 MG
75 TABLET ORAL DAILY
Qty: 0 | Refills: 0 | Status: DISCONTINUED | OUTPATIENT
Start: 2018-10-02 | End: 2018-10-03

## 2018-10-02 RX ORDER — ASPIRIN/CALCIUM CARB/MAGNESIUM 324 MG
81 TABLET ORAL DAILY
Qty: 0 | Refills: 0 | Status: DISCONTINUED | OUTPATIENT
Start: 2018-10-02 | End: 2018-10-04

## 2018-10-02 RX ORDER — ASPIRIN/CALCIUM CARB/MAGNESIUM 324 MG
81 TABLET ORAL ONCE
Qty: 0 | Refills: 0 | Status: COMPLETED | OUTPATIENT
Start: 2018-10-02 | End: 2018-10-02

## 2018-10-02 RX ADMIN — Medication 81 MILLIGRAM(S): at 11:40

## 2018-10-02 NOTE — ED CDU PROVIDER INITIAL DAY NOTE - OBJECTIVE STATEMENT
52 yo male PMHx ASD repair 2015 with subsequent vtach/cardiac arrest during repair with subsequent AICD placement presented to the ED c/o chest pain. Pt reports he was getting ready for work this AM when he began to experience burning, left sided CP, associated with palpitation, nausea and dizziness. He called an ambulance immediately. His symptoms resolved over the next 10 minutes and have not returned. Was given  and nitro in ambulance.   pt now feels well without complaint.   No f/c, no SOB, no vomiting. Pt was at [facility] last week where has started on ASA, felcainide, and metoprolol.

## 2018-10-02 NOTE — ED ADULT NURSE NOTE - OBJECTIVE STATEMENT
54 y/o male biba s/p left cp that initiated while traveling to work.  per ems, pt has hx of ASD, vfib and aflutter with defibrillator in place.  pt recvd 162 mg ASA and 1 nitro tab SL in field.  upon arrival to ED, pt is awake, alert and responsive to all stimuli.  no sob or respiratory distress noted.  no edema noted to extremities.  pt is normal in color for race with caps refill WNL.  vss.  pt denies any pain at this time but states that when the s/s intiiated he felt dizziness associated with burning L cp and nausea, all of which has since subsided.  pt also states that he had a cardiac cath and MSH on 9/15/2018.  pt was placed on cardiac monitor and is in aflutter with some EKG changes noted.  Md Mcdermott aware.  pt was worked up and is awaiting reeval.  will continue to monitor.

## 2018-10-02 NOTE — ED PROVIDER NOTE - PMH
AICD (automatic cardioverter/defibrillator) present    ASD (atrial septal defect)    ASD (atrial septal defect)    Atrial flutter    Hypertension

## 2018-10-02 NOTE — ED PROVIDER NOTE - PHYSICAL EXAMINATION
General: Alert and Orientated x 3. No apparent distress.  HEENT: Normocephalic and atraumatic, EOMI, Trachea midline.   Cardiac: Normal S1 and S2 w/ RRR. No murmurs appreciated.   Pulmonary: Vesicular breath sounds bilaterally. No increased WOB. No wheezes or crackles.  Abdominal: Soft, non-tender. (+) bowel sounds appreciated in all 4 quadrants. No hepatosplenomegaly.   Neurologic: No focal sensory or motor deficits.  Musculoskeletal: No limited ROM.  Vascular: Distal pulses 2+ in bilateral lower extremity   Skin: Color appropriate for race. Intact, warm, and well-perfused.  Psychiatric: Appropriate mood and affect. No apparent risk to self or others.  Minesh Cuenca, PGY-1

## 2018-10-02 NOTE — CONSULT NOTE ADULT - ASSESSMENT
A: 52 yo male w h/o ASD repair (2015) c/b VT arrest s/p AICD who reports from home with chest pain.    1. Chest pain     - Unlikely to be due to cardiac ischemia. Pt hemodynamically stable, cardiac enzymes negative, no EKG changes.     - Pt has had extensive cardiac w/u in recent past with normal coronary angiogram this past year     - Chest pain currently resolved. No need for additional workup.    1. h/o VT arrest s/p AICD     - AICD interrogated, no events     - Case discussed w EP. Will stop Flecainide as pt reports dizziness since starting. Will admit to CDU and monitor overnight.     - Check EKG in AM to monitor QTc      Pratik Chaudhry MD  Cardiology Fellow  p: [phone]

## 2018-10-02 NOTE — ED CDU PROVIDER INITIAL DAY NOTE - ATTENDING CONTRIBUTION TO CARE
Attending MD Garza:   I personally have seen and examined this patient.  Physician assistant note reviewed and agree on plan of care and except where noted.  See HPI, PE, and MDM for details.

## 2018-10-02 NOTE — CONSULT NOTE ADULT - SUBJECTIVE AND OBJECTIVE BOX
CHIEF COMPLAINT: long QT    HISTORY OF PRESENT ILLNESS:  54y/o male h/o congenital ASD repair, developed wide complex tachycardia at that time in which a Medtronic single chamber ICD was implanted, VPC's of likely outflow tract etiology s/p VCP ablation but procedure limited secondary to paucity of VPC's (12/2017), ICD shocks for 1:1 AFL s/p  ablation of dual loop AFL in RA (6/2018) p/w CP. Patient was recently admitted to [facility] with recurrent AFL about 1-1.5 weeks ago and placed on flecainide & metoprolol. He reports his ICD was reprogrammed.   ICD interrogated and shows no arrhythmias since last interrogation. He is set to 1 therapy zone (VF at 250bpm) but also has a VT monitor zone  EKG shows long QT    Allergies  No Known Allergies    	    MEDICATIONS:  Flecainide 150mg BID  Metoprolol  ASA      PAST MEDICAL & SURGICAL HISTORY:  Atrial flutter  ASD (atrial septal defect)  Hypertension  AICD (automatic cardioverter/defibrillator) present  ASD (atrial septal defect)  AICD (automatic cardioverter/defibrillator) present  Status post cardiac surgery  AICD (automatic cardioverter/defibrillator) present  Spontaneous ASD closure      FAMILY HISTORY:  No pertinent family history in first degree relatives      SOCIAL HISTORY:    No toxic habits      REVIEW OF SYSTEMS:  See HPI. Otherwise, 10 point ROS done and otherwise negative.    PHYSICAL EXAM:  T(C): 36.4 (10-02-18 @ 10:45), Max: 36.8 (10-02-18 @ 10:26)  HR: 83 (10-02-18 @ 15:00) (62 - 86)  BP: 128/81 (10-02-18 @ 15:00) (122/74 - 145/91)  RR: 16 (10-02-18 @ 10:50) (16 - 18)  SpO2: 100% (10-02-18 @ 15:00) (97% - 100%)  Wt(kg): --  I&O's Summary      Appearance: Alert. NAD	  HEENT:   NC/AT	  Cardiovascular: +S1S2 RRR no m/g/r  Respiratory: CTA B/L	  Psychiatry: A & O x 3, Mood & affect appropriate  Neurologic: Non-focal  Extremities: No edema BLE  Vascular: Peripheral pulses palpable 2+ bilaterally      LABS:	 	    CBC Full  -  ( 02 Oct 2018 11:44 )  WBC Count : 10.0 K/uL  Hemoglobin : 15.6 g/dL  Hematocrit : 44.8 %  Platelet Count - Automated : 219 K/uL  Mean Cell Volume : 84.8 fl  Mean Cell Hemoglobin : 29.5 pg  Mean Cell Hemoglobin Concentration : 34.8 gm/dL    10-02    140  |  104  |  10  ----------------------------<  96  4.4   |  26  |  0.81    Ca    9.5      02 Oct 2018 11:44    TPro  7.3  /  Alb  4.1  /  TBili  0.5  /  DBili  x   /  AST  14  /  ALT  9<L>  /  AlkPhos  68  10-02     ECG:  SR; long QT      PREVIOUS DIAGNOSTIC TESTING:    Echocardiogram(6/8/18):  Conclusions:  1. Increased relative wall thickness with normal left  ventricular mass index, consistent with concentric left  ventricular remodeling.  2. Overall preserved left ventricular EF. Endocardium not  well visualized The basal inferior wall is hypokinetic.  3. Right ventricular enlargement with normal right  ventricular systolic function.  4. No pericardial effusion.      Catheterization(2/2018):  CORONARY VESSELS: The coronary circulation is right dominant.  LM:   --  LM: Normal.  LAD:   --  LAD: Angiography showed minor luminal irregularities with no  flow limiting lesions.  CX:   --  Circumflex: Angiography showed minor luminal irregularities with  no flow limiting lesions.  RCA:   --  RCA: Angiography showed minor luminal irregularities with no  flow limiting lesions.      Stress Test(8/5/18):   Conclusion:  The left ventricle was normal in size. There is a small,  mild to moderate fixed defect in the apex consistent with  infarct/scar.  	  	  ASSESSMENT/PLAN: 	  54y/o male h/o congenital ASD repair, EF 50%, developed wide complex tachycardia at that time in which a Medtronic single chamber ICD was implanted, VPC's of likely outflow tract etiology s/p VCP ablation but procedure limited secondary to paucity of VPC's (12/2017), ICD shocks for 1:1 AFL s/p  ablation of dual loop AFL in RA (6/2018), recently admitted at [facility] with recurrent AFL & placed on flecainide p/w CP  --D/C flecainide  --Check EKG tomorrow am  --See ICD interrogation report      April Metzger PA-C  [id number]  --Discussed with Dr. Henning
CHIEF COMPLAINT: Chest pain    HISTORY OF PRESENT ILLNESS: Pt is a 54 yo male w h/o ASD repair (2015) c/b VT arrest s/p AICD who reports from home with chest pain. Pt reports yesterday morning and again this morning at rest developed chest discomfort which lasted a few minutes and then resolved. He felt dizzy at the time. He reports he was recently admitted at [facility] for 5 days for similar symptoms and was discharged on ASA 81, Flecainide 150 Q12 and Metoprolol 75 BID. At this time his chest pain has resolved. He denies any chest pain or dyspnea w exertion.      Allergies    No Known Allergies    Intolerances    	    MEDICATIONS:                  PAST MEDICAL & SURGICAL HISTORY:  Atrial flutter  ASD (atrial septal defect)  Hypertension  AICD (automatic cardioverter/defibrillator) present  ASD (atrial septal defect)  AICD (automatic cardioverter/defibrillator) present  Status post cardiac surgery  AICD (automatic cardioverter/defibrillator) present  Spontaneous ASD closure      FAMILY HISTORY:  No pertinent family history in first degree relatives      SOCIAL HISTORY:    Non-smoker      REVIEW OF SYSTEMS:  General: no fatigue/malaise, weight loss/gain.  Skin: no rashes.  Ophthalmologic: no blurred vision, no loss of vision. 	  ENT: no sore throat, rhinorrhea, sinus congestion.  Respiratory: no SOB, cough or wheeze.  Gastrointestinal:  no N/V/D, no melena/hematemesis/hematochezia.  Genitourinary: no dysuria/hesitancy or hematuria.  Musculoskeletal: no myalgias or arthralgias.  Neurological: no changes in vision or hearing, no syncope/near syncope	  Psychiatric: no unusual stress/anxiety.   Hematology/Lymphatics: no unusual bleeding, bruising and no lymphadenopathy.  Endocrine: no unusual thirst.   All others negative except as stated above and in HPI.    PHYSICAL EXAM:  T(C): 36.4 (10-02-18 @ 10:45), Max: 36.8 (10-02-18 @ 10:26)  HR: 83 (10-02-18 @ 15:00) (62 - 86)  BP: 128/81 (10-02-18 @ 15:00) (122/74 - 145/91)  RR: 16 (10-02-18 @ 10:50) (16 - 18)  SpO2: 100% (10-02-18 @ 15:00) (97% - 100%)  Wt(kg): --  I&O's Summary      Appearance: Normal	  HEENT:   Normal oral mucosa, PERRL, EOMI	  Lymphatic: No lymphadenopathy  Cardiovascular: Normal S1 S2, No JVD, No murmurs, No edema  Respiratory: Lungs clear to auscultation	  Psychiatry: A & O x 3, Mood & affect appropriate  Gastrointestinal:  Soft, Non-tender, + BS	  Skin: No rashes, No ecchymoses, No cyanosis	  Neurologic: Non-focal  Extremities: Normal range of motion, No clubbing, cyanosis or edema  Vascular: Peripheral pulses palpable 2+ bilaterally        LABS:	 	    CBC Full  -  ( 02 Oct 2018 11:44 )  WBC Count : 10.0 K/uL  Hemoglobin : 15.6 g/dL  Hematocrit : 44.8 %  Platelet Count - Automated : 219 K/uL  Mean Cell Volume : 84.8 fl  Mean Cell Hemoglobin : 29.5 pg  Mean Cell Hemoglobin Concentration : 34.8 gm/dL  Auto Neutrophil # : x  Auto Lymphocyte # : x  Auto Monocyte # : x  Auto Eosinophil # : x  Auto Basophil # : x  Auto Neutrophil % : x  Auto Lymphocyte % : x  Auto Monocyte % : x  Auto Eosinophil % : x  Auto Basophil % : x    10-02    140  |  104  |  10  ----------------------------<  96  4.4   |  26  |  0.81    Ca    9.5      02 Oct 2018 11:44    TPro  7.3  /  Alb  4.1  /  TBili  0.5  /  DBili  x   /  AST  14  /  ALT  9<L>  /  AlkPhos  68  10-02      proBNP:   Lipid Profile:   HgA1c:   TSH:       CARDIAC MARKERS:            TELEMETRY: 	    ECG:  	Sinus rhythm HR 59 1st degree AV block TWI I aVL (similar to prior)  RADIOLOGY:  OTHER: 	    PREVIOUS DIAGNOSTIC TESTING:    [ ] Echocardiogram: < from: Transesophageal Echocardiogram w/o TTE (06.06.18 @ 12:55) >  Mitral Valve: Normal mitral valve. Mild mitral  regurgitation.  Aortic Valve/Aorta: Normal trileaflet aortic valve. Peak  left ventricular outflow tract gradient equals 2 mm Hg,  mean gradient is equal to 1 mm Hg, LVOT velocity time  integral equals 14 cm.  Normal aortic root, aortic arch and descending thoracic  aorta.  Left Atrium: Normal left atrial appendage function  (velocity= 0.5 m/s).   No left atrial thrombus seen.  Left Ventricle: Normal left ventricular systolic function.  Septal motion consistent with right ventricular overload.  Concentric left ventricular hypertrophy.  Right Heart: Severe right atrial enlargement.A device  wire is noted in the right heart. Right ventricular  enlargement with normal right ventricular systolic  function. Normal tricuspid valve. Normal pulmonic valve.  Pericardium/Pleura: Normal pericardium with no pericardial  effusion.  Hemodynamic: Estimated right atrial pressure is 8 mm Hg.  S/p surgical repair of secundum atrial septal defect.  No  evidence of shunting.   Agitated saline injection and color  flow Doppler demonstrates no evidence of persistant shunt.  ------------------------------------------------------------------------  Conclusions:  1. Normal left atrial appendage function (velocity= 0.5  m/s).   No left atrial thrombus seen.  2. Concentric left ventricular hypertrophy.  3. Normal left ventricular systolic function. Septal motion  consistent with right ventricular overload.  4. Severe right atrial enlargement.   A device wire is  noted in the right heart.  5. Right ventricular enlargement with normal right  ventricular systolic function.  6.  S/p surgical repair of secundum atrial septal defect.  No evidence of shunting.   Agitated saline injection and  color flow Doppler demonstrates no evidence of persistant  shunt.    < end of copied text >    [ ]  Catheterization: < from: Cardiac Cath Lab - Adult (02.06.18 @ 15:59) >  CORONARY VESSELS: The coronary circulation is right dominant.  LM:   --  LM: Normal.  LAD:   --  LAD: Angiography showed minor luminal irregularities with no  flow limiting lesions.  CX:   --  Circumflex: Angiography showed minor luminal irregularities with  no flow limiting lesions.  RCA:   --  RCA: Angiography showed minor luminal irregularities with no  flow limiting lesions.    < end of copied text >    [ ] Stress Test:  	 < from: Nuclear Stress Test-Pharmacologic (08.05.18 @ 09:53) >  IMPRESSIONS:Mildly Abnormal Study  * ChestPain: No chest pain with administration of  Regadenoson.  * Symptom: Shortness of breath, abdominal cramping.  * HR Response: Appropriate.  * BP Response: Appropriate.  * Heart Rhythm: Sinus Rhythm - 69 BPM.  * Conduction defects: 1 degree AV block.  * Baseline ECG: early repolarization.  * ECG Changes: ST Depression: 0.5 mm down sloping in leads  II , III, aVF, V6 started at 01:50 min of post infusion at  HR of 90 and persisted 10:50 min into post infusion.  * Arrhythmia: Occasional VPDs occurred during stress and  recovery. Frequency of VPDs increased with stress.  * The left ventricle was normal in size. There is a small,  mild to moderate fixed defect in the apex consistent with  infarct/scar.  * Post-stress gated wall motion analysis wasperformed  (LVEF = 48 %;LVEDV = 50 ml.), revealing mild hypokinesis  in apical septal wall(s).  Conclusion:  The left ventricle was normal in size. There is a small,  mild to moderate fixed defect in the apex consistent with  infarct/scar.    < end of copied text >

## 2018-10-02 NOTE — CONSULT NOTE ADULT - ATTENDING COMMENTS
Presenting with recurrent chest discomfort, and in large part exacerbated by anxiety. Extensive ischemic workup performed this year that was overall clinically insignificant. No objective evidence of ischemia. He was recently started on flecanide and incidentally found to have prolonged QTc although he has had much longer QTc on review of older ECG. ICD without new arrythmia. Discussed with EP and will discontinue flecainide, repeat ECG in the AM.  and see PCP for management of anxiety.

## 2018-10-02 NOTE — ED CDU PROVIDER INITIAL DAY NOTE - PROGRESS NOTE DETAILS
CDU PROGRESS NOTE JAXON HARE: Received pt at 1900 sign-out. Pt resting in stretcher in NAD. Pt without complaints. Case/plan reviewed. As per Cardiology and EP, AICD interrogated, no events. Plan to stop Flecainide as pt reports dizziness since starting. Will Check EKG in AM to monitor QTc. Will continue to monitor overnight. CDU PROGRESS NOTE PA PAYAM: Pt resting comfortably, NAD, VSS. No events on telemetry.

## 2018-10-02 NOTE — ED PROVIDER NOTE - PROGRESS NOTE DETAILS
Attending MD Garza: seen by cards attending, discussed with EP Dr. Henning, recommends holding flecainide with QT prolongation and monitoring on tele overnight for QT monitoring.

## 2018-10-02 NOTE — ED PROVIDER NOTE - ATTENDING CONTRIBUTION TO CARE
Attending MD Mcdermott:  I personally have seen and examined this patient.  Resident note reviewed and agree on plan of care and except where noted.  See MDM for details.

## 2018-10-02 NOTE — ED PROVIDER NOTE - MEDICAL DECISION MAKING DETAILS
52 yo male h/o cardiac arrythmia brought in with CP. EKG unchanged from previous. symptoms resolved currently. Labs, trops, serial ekgs, asa 81, EP consult for device interrogation 54 yo male h/o cardiac arrythmia brought in with CP. EKG unchanged from previous. symptoms resolved currently. Labs, trops, serial ekgs, asa 81, EP consult for device interrogation    Attending MD Mcdermott: 54 yo male with PMH for, AICD, atrial septal defect repair 2015 complicated with cardiac arrest.  Today presents with left sided chest pain, associated with nausea, palpitations and dizziness.  Given ASA and SLNG by EMS, no chest pain now.  Attending MD Mcdermott: A & O x 3, NAD, HEENT WNL and no facial asymmetry; lungs CTAB, heart with reg rhythm without murmur; abdomen soft NTND; extremities with no edema; skin with no rashes, neuro exam non focal with no motor or sensory deficits. Plan: Cardiac Monitor, EKG, Labs/cardiac enzymes, O2 2L NC, ASA additional 162mg, CXR and cardiology consult.

## 2018-10-02 NOTE — ED ADULT NURSE REASSESSMENT NOTE - NS ED NURSE REASSESS COMMENT FT1
no change in baseline mental status.  no sob or respiratory distress noted.  pt denies any pain or dizziness at this time.  pt is currently being evaluated by EP team.  cardiac monitor in place.  will continue to monitor.
Pt received from [facility] . Pt oriented to CDU & plan of care was discussed. Pt A&O x 3. Pt c/o dizziness, PA Gary aware, Vital Signs stable. Pt given something to eat. Pt on cardiac monitor in NSR HR in 70's. Pt denies any chest pain. Safety & comfort measures maintained. Call bell in reach. Will continue to monitor.

## 2018-10-02 NOTE — ED PROVIDER NOTE - OBJECTIVE STATEMENT
52 yo male PMHx ASD repair 2015 with subsequent vtach/cardiac arrest during repair with subsequent AICD placement presented to the ED c/o chest pain. Pt reports he was getting ready for work this AM when he began to experience burning, left sided CP, associated with palpitation, nausea and dizziness. He called an ambulance immediately. His symptoms resolved over the next 10 minutes and have not returned. Was given  and nitro in ambulance. No f/c, no SOB, no vomiting. Pt was at [facility] last week where has started on ASA, felcainide, and metoprolol.

## 2018-10-02 NOTE — PROCEDURE NOTE - ADDITIONAL PROCEDURE DETAILS
1. Normal ICD function with pacing/sensing thresholds and impedances WNL  2. Battery longevity 7.1 years  3. Patient is not pacemaker dependent  4. No VT/VF episodes. No ICD therapies delivered. No events noted.  5. See consult for details

## 2018-10-03 DIAGNOSIS — R94.31 ABNORMAL ELECTROCARDIOGRAM [ECG] [EKG]: ICD-10-CM

## 2018-10-03 DIAGNOSIS — Z29.9 ENCOUNTER FOR PROPHYLACTIC MEASURES, UNSPECIFIED: ICD-10-CM

## 2018-10-03 DIAGNOSIS — R42 DIZZINESS AND GIDDINESS: ICD-10-CM

## 2018-10-03 DIAGNOSIS — R07.9 CHEST PAIN, UNSPECIFIED: ICD-10-CM

## 2018-10-03 LAB
MAGNESIUM SERPL-MCNC: 2.1 MG/DL — SIGNIFICANT CHANGE UP (ref 1.6–2.6)
PHOSPHATE SERPL-MCNC: 3.1 MG/DL — SIGNIFICANT CHANGE UP (ref 2.5–4.5)

## 2018-10-03 PROCEDURE — 99222 1ST HOSP IP/OBS MODERATE 55: CPT

## 2018-10-03 PROCEDURE — 99217: CPT

## 2018-10-03 PROCEDURE — 99223 1ST HOSP IP/OBS HIGH 75: CPT | Mod: GC

## 2018-10-03 PROCEDURE — 70450 CT HEAD/BRAIN W/O DYE: CPT | Mod: 26

## 2018-10-03 RX ORDER — FLECAINIDE ACETATE 50 MG
0 TABLET ORAL
Qty: 0 | Refills: 0 | COMMUNITY

## 2018-10-03 RX ORDER — ENOXAPARIN SODIUM 100 MG/ML
40 INJECTION SUBCUTANEOUS EVERY 24 HOURS
Qty: 0 | Refills: 0 | Status: DISCONTINUED | OUTPATIENT
Start: 2018-10-03 | End: 2018-10-04

## 2018-10-03 RX ORDER — METOPROLOL TARTRATE 50 MG
0 TABLET ORAL
Qty: 0 | Refills: 0 | COMMUNITY

## 2018-10-03 RX ORDER — NADOLOL 80 MG/1
80 TABLET ORAL DAILY
Qty: 0 | Refills: 0 | Status: DISCONTINUED | OUTPATIENT
Start: 2018-10-03 | End: 2018-10-04

## 2018-10-03 RX ADMIN — Medication 81 MILLIGRAM(S): at 16:34

## 2018-10-03 RX ADMIN — ENOXAPARIN SODIUM 40 MILLIGRAM(S): 100 INJECTION SUBCUTANEOUS at 16:34

## 2018-10-03 RX ADMIN — Medication 0.5 MILLIGRAM(S): at 23:26

## 2018-10-03 RX ADMIN — NADOLOL 80 MILLIGRAM(S): 80 TABLET ORAL at 23:25

## 2018-10-03 NOTE — H&P ADULT - PROBLEM SELECTOR PLAN 3
QTc currently >600    - Monitor on telemetry  - Avoid antipsychotics, antihistamines, and any other QT prolonging agents

## 2018-10-03 NOTE — H&P ADULT - HISTORY OF PRESENT ILLNESS
53 M with PMH of congenital ASD repair 2015 with subsequent Vtach/cardiac arrest during repair with subsequent AICD placement (EF 50%), recent admission to [facility] for Aflutter w/ HR 240s presented to the ED with dizziness and burning chest pain.     Pt was admitted to [facility] last week (~Monday) for complaint of dizziness and palpitations and found to have atrial flutter with HR in 240s; per patient his ICD was turned off temporarily and later restarted; he was discharged with metoprolol and flecainide which he had been taking daily.     However after discharge, his dizziness persisted. The dizziness is present at rest and worsens with exertion; patient says he is unable to exercise like before (40-60 minute sessions of cardio) without becoming dizzy and nauseous. Yesterday on Tuesday he called Dr. Pedro Saavedra who instructed him to stop taking the metoprolol and flecainide (last dose Tues morning). Patient states he feels dizzy and off-balance. Denies trauma or LOC.     The chest pain is burning in nature, 3/10 severity, non-radiating, intermittent in nature and occurs at the L chest area.

## 2018-10-03 NOTE — H&P ADULT - NSHPLABSRESULTS_GEN_ALL_CORE
LABS:                         15.6   10.0  )-----------( 219      ( 02 Oct 2018 11:44 )             44.8     10-02    140  |  104  |  10  ----------------------------<  96  4.4   |  26  |  0.81    Ca    9.5      02 Oct 2018 11:44  Phos  3.1     10-03  Mg     2.1     10-03    TPro  7.3  /  Alb  4.1  /  TBili  0.5  /  DBili  x   /  AST  14  /  ALT  9<L>  /  AlkPhos  68  10-02          RADIOLOGY, EKG & ADDITIONAL TESTS: Reviewed.

## 2018-10-03 NOTE — ED CDU PROVIDER DISPOSITION NOTE - PLAN OF CARE
Stop taking Flecainide  Follow up with your Doctor in 1-2 days.  Follow up with cardiology in 1-2 days.  Return to the ER for any persistent/worsening or new symptoms, chest pain, shortness of breath, palpitations, dizziness or any concerning symptoms.

## 2018-10-03 NOTE — PROGRESS NOTE ADULT - SUBJECTIVE AND OBJECTIVE BOX
Patient seen and examined at bedside.    Overnight Events:   No events overnight. Nothing on telemetry.  After initial visit having some chest pain this AM, again, nothing on telemetry, Repeat EKG with possible T-wave abnormality in lateral precordial leads, although QTc improved.    Review Of Systems: No chest pain, shortness of breath, or palpitations            Current Meds:  aspirin enteric coated 81 milliGRAM(s) Oral daily  metoprolol tartrate 75 milliGRAM(s) Oral daily      Vitals:  T(F): 98.6 (10-03), Max: 99.3 (10-03)  HR: 69 (10-03) (62 - 86)  BP: 120/76 (10-03) (111/75 - 145/91)  RR: 17 (10-03)  SpO2: 95% (10-03)  I&O's Summary      Physical Exam:  Appearance: No acute distress; well appearing  Eyes: PERRL, EOMI, pink conjunctiva  HENT: Normal oral mucosa  Cardiovascular: RRR, S1, S2, no murmurs, rubs, or gallops; no edema; no JVD  Respiratory: Clear to auscultation bilaterally  Gastrointestinal: soft, non-tender, non-distended with normal bowel sounds  Musculoskeletal: No clubbing; no joint deformity   Neurologic: Non-focal  Lymphatic: No lymphadenopathy  Psychiatry: AAOx3, mood & affect appropriate  Skin: No rashes, ecchymoses, or cyanosis                          15.6   10.0  )-----------( 219      ( 02 Oct 2018 11:44 )             44.8     10-02    140  |  104  |  10  ----------------------------<  96  4.4   |  26  |  0.81    Ca    9.5      02 Oct 2018 11:44    TPro  7.3  /  Alb  4.1  /  TBili  0.5  /  DBili  x   /  AST  14  /  ALT  9<L>  /  AlkPhos  68  10-02      CARDIAC MARKERS ( 02 Oct 2018 14:52 )  10 ng/L / x     / x     / x     / x     / x      CARDIAC MARKERS ( 02 Oct 2018 11:44 )  9 ng/L / x     / x     / x     / x     / x                  New ECG(s): Personally reviewed    Echo:  Echocardiogram(6/8/18):  Conclusions:  1. Increased relative wall thickness with normal left  ventricular mass index, consistent with concentric left  ventricular remodeling.  2. Overall preserved left ventricular EF. Endocardium not  well visualized The basal inferior wall is hypokinetic.  3. Right ventricular enlargement with normal right  ventricular systolic function.  4. No pericardial effusion.      Stress Testing:     Cath:    Imaging:    Interpretation of Telemetry:  No events on tele Patient seen and examined at bedside.    Overnight Events:   No events overnight. Nothing on telemetry.  After initial visit having some chest pain this AM, again, nothing on telemetry, Repeat EKG with possible T-wave abnormality in lateral precordial leads, although QTc improved.    Review Of Systems: No chest pain, shortness of breath, or palpitations            Current Meds:  aspirin enteric coated 81 milliGRAM(s) Oral daily  metoprolol tartrate 75 milliGRAM(s) Oral daily      Vitals:  T(F): 98.6 (10-03), Max: 99.3 (10-03)  HR: 69 (10-03) (62 - 86)  BP: 120/76 (10-03) (111/75 - 145/91)  RR: 17 (10-03)  SpO2: 95% (10-03)  I&O's Summary      Physical Exam:  Appearance: No acute distress; well appearing  Eyes: PERRL, EOMI, pink conjunctiva  HENT: Normal oral mucosa  Cardiovascular: RRR, S1, S2, no murmurs, rubs, or gallops; no edema; no JVD  Respiratory: Clear to auscultation bilaterally  Gastrointestinal: soft, non-tender, non-distended with normal bowel sounds  Musculoskeletal: No clubbing; no joint deformity   Neurologic: Non-focal  Lymphatic: No lymphadenopathy  Psychiatry: AAOx3, mood & affect appropriate  Skin: No rashes, ecchymoses, or cyanosis                          15.6   10.0  )-----------( 219      ( 02 Oct 2018 11:44 )             44.8     10-02    140  |  104  |  10  ----------------------------<  96  4.4   |  26  |  0.81    Ca    9.5      02 Oct 2018 11:44    TPro  7.3  /  Alb  4.1  /  TBili  0.5  /  DBili  x   /  AST  14  /  ALT  9<L>  /  AlkPhos  68  10-02      CARDIAC MARKERS ( 02 Oct 2018 14:52 )  10 ng/L / x     / x     / x     / x     / x      CARDIAC MARKERS ( 02 Oct 2018 11:44 )  9 ng/L / x     / x     / x     / x     / x            New ECG(s): Personally reviewed  QTc improved slightly. V5 now biphasic (whereas only V6 was biphasic earlier) possible lead placement abnormality.    Echo:  Echocardiogram(6/8/18):  Conclusions:  1. Increased relative wall thickness with normal left  ventricular mass index, consistent with concentric left  ventricular remodeling.  2. Overall preserved left ventricular EF. Endocardium not  well visualized The basal inferior wall is hypokinetic.  3. Right ventricular enlargement with normal right  ventricular systolic function.  4. No pericardial effusion.      Interpretation of Telemetry:  No events on tele

## 2018-10-03 NOTE — H&P ADULT - ASSESSMENT
53 M with PMH of congenital ASD repair 2015 with subsequent Vtach/cardiac arrest during repair with subsequent AICD placement (EF 50%), recent admission to [facility] for Aflutter w/ HR 240s presented to the ED with dizziness and burning chest pain likely 2/2 decreased perfusion from cardiac arrhythmia vs neurologic cause. 53 M with PMH of congenital ASD repair 2015 with subsequent Vtach/cardiac arrest during repair with subsequent AICD placement (EF 50%), recent admission to [facility] for Aflutter w/ HR 240s presented to the ED with dizziness and burning chest pain likely 2/2 decreased perfusion from cardiac arrhythmia vs neurologic cause. CT Head negative.

## 2018-10-03 NOTE — ED CDU PROVIDER SUBSEQUENT DAY NOTE - ATTENDING CONTRIBUTION TO CARE
Please refer to CDU Disposition note from same day.  --BMM Please refer to CDU Disposition note from same day.  Episode concerning for VT reviewed by cardiology, who believe it to be artifactual rather than true ectopy.  Patient had brief episode of CP while lying supine in bed after cards evaluation.  Pain was at L side of chest, squeezing, nonradiating, a/w dizziness, sweating.  No SOB.  Resolved spontaneously.  VSS throughout event.  Cardiac monitor appeared unchanged from prior rhythm during event -- no ectopy or AV block noted.  Resolved <2 min spontaneously.  EKG performed towards end of event demonstrated no acute changes relative to prior.  Reviewed prior ED provider notes from this and last admission; this event seems the same as that pt experienced in the past.  EP/Cards made aware of event, given no acute EKG changes and recent cath that demonstrated no significant Coronary Artery Disease they do not believe deviation from plan already in place is required.  Will rpt trop in CDU and c/t monitor.  --GERBER Please refer to CDU Disposition note from same day.  Episode concerning for VT reviewed by cardiology, who believe it to be artifactual rather than true ectopy.  Patient had brief episode of CP while lying supine in bed after cards evaluation.  Pain was at L side of chest, squeezing, nonradiating, a/w dizziness, sweating.  No SOB.  Resolved spontaneously.  VSS throughout event.  Cardiac monitor appeared unchanged from prior rhythm during event -- no ectopy or AV block noted.  Resolved <2 min spontaneously.  EKG performed towards end of event demonstrated no acute changes relative to prior aside from prolongation of qtc (500s-617), which had been present several time this AM prior to my arrival.  Reviewed prior ED provider notes from this and last admission; this event seems the same as that pt experienced in the past.  EP/Cards made aware of event, given no acute EKG changes and recent cath that demonstrated no significant Coronary Artery Disease they do not believe deviation from plan already in place is required.  Will rpt trop, check mag/phos in CDU and c/t monitor.  --BMM

## 2018-10-03 NOTE — PROGRESS NOTE ADULT - ASSESSMENT
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[facility] with recurrent AFL & placed on flecainide p/w CP  --D/C flecainide  --Check EKG tomorrow am  --See ICD interrogation report      Kranthi Saha MD  [id number] 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  --D/C flecainide  --D/C metoprolol  --Start Nadalol 80 mg Daily (start tonight 10/3 late evening)  --Check EKG tomorrow am  --ICD interrogation with no events.    Kranthi Saha MD  [id number]

## 2018-10-03 NOTE — ED CDU PROVIDER DISPOSITION NOTE - CLINICAL COURSE
Pt is a 52 yo male w h/o ASD repair (2015) c/b VT arrest s/p AICD who reports from home with chest pain. Pt reports yesterday morning and again this morning at rest developed chest discomfort which lasted a few minutes and then resolved. He felt dizzy at the time. He reports he was recently admitted at [facility] for 5 days for similar symptoms and was discharged on ASA 81, Flecainide 150 Q12 and Metoprolol 75 BID. In ED, patient reported chest pain had resolved. He denies any chest pain or dyspnea w exertion. Patient had EKG showing no acute ischemic changes, Troponin 9-10, chest x ray negative for acute pathology and was evaluated by Cardiology and EP. for As per Cardiology and EP, AICD interrogated, no events. Plan to stop Flecainide as pt reports dizziness since starting and Check EKG in AM to monitor QTc. EKG showed.... Pt is a 54 yo male w h/o ASD repair (2015) c/b VT arrest s/p AICD who reports from home with chest pain. Pt reports yesterday morning and again this morning at rest developed chest discomfort which lasted a few minutes and then resolved. He felt dizzy at the time. He reports he was recently admitted at [facility] for 5 days for similar symptoms and was discharged on ASA 81, Flecainide 150 Q12 and Metoprolol 75 BID. In ED, patient reported chest pain had resolved. He denies any chest pain or dyspnea w exertion. Patient had EKG showing no acute ischemic changes, Troponin 9-10, chest x ray negative for acute pathology and was evaluated by Cardiology and EP. for As per Cardiology and EP, AICD interrogated, no events. Plan to stop Flecainide as pt reports dizziness since starting and Check EKG in AM to monitor QTc. EKG showed persistently prolonged QTc. Patient with no events on telemetry overnight, however c/o persistent intermittent CP and dizziness in AM. Repeat EKGs and troponin unremarkable. Patient re-evaluated by cardiology and EP team, recommending to d/c flecainide and metoprolol, and start Nadalol 80 mg daily (start at 9pm). Pt to be admitted for additional day of monitoring.

## 2018-10-03 NOTE — H&P ADULT - PROBLEM SELECTOR PLAN 1
Dizziness since 9/23, worse with exertion; exists intermittently at rest. No neurologic deficits or trauma. Likely 2/2 cardiac arrythmia vs neurological    - f/u CT Head  - continue to monitor on telemetry  - c/w Nadolol 80mg qD (10/3-- )  - f/u EKG 10/4 AM Dizziness since 9/23, worse with exertion; exists intermittently at rest. No neurologic deficits or trauma. CT Head negative. Likely 2/2 cardiac arrythmia vs neurologic cause.     - continue to monitor on telemetry  - c/w Nadolol 80mg qD (10/3-- )  - f/u EKG 10/4 AM

## 2018-10-03 NOTE — H&P ADULT - PROBLEM SELECTOR PLAN 2
Burning left sided chest pain, non-radiating. Not associated with exertion. Not palpable on exam.   #In past 24 hours on telemetry, no major events aside from a few PVCs  #ICD interrogated by EP on 10/2,     - Continue to monitor on telemetry   - c/w Nadolol 80mg qD (10/3-- )  - f/u EKG 10/4 AM  - EP following, will reassess tomorrow  - If heaviness/pressure chest pain, obtain EKG/Trops  - Pain control PRN   - Consider PPI as possibly GI-related Burning left sided chest pain, non-radiating. Not associated with exertion. Not palpable on exam. Stopped metoprolol/flecainamide yesterday; will continue to hold.   #In past 24 hours on telemetry, no major events aside from a few PVCs  #ICD interrogated by EP on 10/2,     - Continue to monitor on telemetry   - c/w Nadolol 80mg qD (10/3-- )  - f/u EKG 10/4 AM  - EP following, will reassess tomorrow  - If heaviness/pressure chest pain, obtain EKG/Trops  - Pain control PRN   - Consider PPI as possibly GI-related

## 2018-10-03 NOTE — ED CDU PROVIDER SUBSEQUENT DAY NOTE - PROGRESS NOTE DETAILS
Patient noted with V tachy in the 150's lasting < 5 seconds while sleeping. Pt asymptomatic, without complaint, will continue to monitor. Patient resting comfortably, feeling well without complaint. NAD, VSS. Will continue to monitor. Patient noted w/ episodes of V tach in the 140s lasting < 5 sec while sleeping. Pt asymptomatic, without complaint, will continue to monitor. EKG shows Prolonged QT, pending Cardiology re-evaluation. Patient resting in bed comfortably in NAD. No complaints. Denies any CP, palpitations, SOB. Most recent VSS. Per report from overnight CDU PA, pt with multiple episodes of Vtach while sleeping. Reviewed telemetry with no events noted. Also spoke with tech in [facility] Room who confirmed no events of Vtach overnight, likely artifact seen, and no tele alarms noted. Pt pending cardiology re-evaluation. Pt with episode of CP with associated nausea and lightheadedness. Dr. Leary at bedside. EKG repeated. No events on tele noted. Cards fellow aware, reports EP will be by to see patient today. Pt with episode of CP with associated nausea and lightheadedness. Dr. Leary at bedside. EKG repeated. No events on tele noted. Cards fellow aware, reports EP will be by to see patient today. Will repeat trop. Spoke with EP fellow, will be back to re-evaluate patient and review EKGs. Patient resting in bed comfortably in NAD. No new episodes of CP or dizziness. Most recent VSS. No events on telemetry monitor. Called by EP fellow, final recs to d/c flecainide and metoprolol, and start Nadalol 80 mg daily (start at 9pm). Pt to be admitted for additional day of monitoring. Called by EP fellow, final recs to d/c flecainide and metoprolol, and start Nadalol 80 mg daily (start at 9pm). Pt to be admitted for additional day of monitoring. D/w Dr. Leary.

## 2018-10-03 NOTE — ED CDU PROVIDER SUBSEQUENT DAY NOTE - HISTORY
CDU PROGRESS NOTE JAXON HARE: Patient noted with V tachy in the 150's lasting < 5 seconds while sleeping approx 12:59am. Patient resting comfortably, feeling well without complaint. NAD, VSS. Will continue to monitor.

## 2018-10-03 NOTE — H&P ADULT - NSHPREVIEWOFSYSTEMS_GEN_ALL_CORE
REVIEW OF SYSTEMS:  CONSTITUTIONAL: No weakness, fevers or chills  EYES/ENT: No visual changes;  No vertigo or throat pain   NECK: No pain or stiffness  RESPIRATORY: No cough, wheezing, hemoptysis; No shortness of breath  CARDIOVASCULAR: No palpitations  GASTROINTESTINAL: No abdominal pain. No hematemesis; No diarrhea or constipation. No melena or hematochezia.  MUSCULOSKELETAL: No joint pain, stiffness, or swelling, Normal ROM  GENITOURINARY: No dysuria, frequency or hematuria  NEUROLOGICAL: No numbness or weakness  SKIN: No itching, burning, rashes, or lesions   All other review of systems is negative unless indicated above.

## 2018-10-03 NOTE — H&P ADULT - ATTENDING COMMENTS
Pt with recent admission to [facility] for A flutter, started on Flecainide aw recurrent episodes of dizziness and palpitations.     Flecainide now held for prolonged QTc.     No events on tele so far except occasional PVCs. Message left for Cardiologist at [facility] Dr Marlo Peters to discuss recent hospitalization.     Continue to Monitor.

## 2018-10-03 NOTE — H&P ADULT - NSHPPHYSICALEXAM_GEN_ALL_CORE
GENERAL: No acute distress, well-developed  HEAD:  Atraumatic, Normocephalic  ENT: EOMI, PERRLA, conjunctiva and sclera clear, Neck supple, No JVD, moist mucosa  CHEST/LUNG: Clear to auscultation bilaterally; No wheeze, equal breath sounds bilaterally   HEART: mild irregularity in rhythm on auscultation; holosystolic murmur at LSB  ABDOMEN: Soft, Nontender, Nondistended; Bowel sounds present  BACK: No spinal tenderness  EXTREMITIES:  No clubbing, cyanosis, or edema  PSYCH: Nl behavior, nl affect  NEUROLOGY: AAOx3, non-focal, cranial nerves intact; UE and LE strength 5/5, Romberg negative  SKIN: Normal color, No rashes or lesions

## 2018-10-04 ENCOUNTER — TRANSCRIPTION ENCOUNTER (OUTPATIENT)
Age: 53
End: 2018-10-04

## 2018-10-04 VITALS — TEMPERATURE: 98 F | OXYGEN SATURATION: 98 % | RESPIRATION RATE: 18 BRPM

## 2018-10-04 DIAGNOSIS — F43.0 ACUTE STRESS REACTION: ICD-10-CM

## 2018-10-04 LAB
ANION GAP SERPL CALC-SCNC: 9 MMOL/L — SIGNIFICANT CHANGE UP (ref 5–17)
BUN SERPL-MCNC: 14 MG/DL — SIGNIFICANT CHANGE UP (ref 7–23)
CALCIUM SERPL-MCNC: 9.4 MG/DL — SIGNIFICANT CHANGE UP (ref 8.4–10.5)
CHLORIDE SERPL-SCNC: 103 MMOL/L — SIGNIFICANT CHANGE UP (ref 96–108)
CO2 SERPL-SCNC: 27 MMOL/L — SIGNIFICANT CHANGE UP (ref 22–31)
CREAT SERPL-MCNC: 0.88 MG/DL — SIGNIFICANT CHANGE UP (ref 0.5–1.3)
GLUCOSE SERPL-MCNC: 115 MG/DL — HIGH (ref 70–99)
MAGNESIUM SERPL-MCNC: 2.3 MG/DL — SIGNIFICANT CHANGE UP (ref 1.6–2.6)
PHOSPHATE SERPL-MCNC: 3.6 MG/DL — SIGNIFICANT CHANGE UP (ref 2.5–4.5)
POTASSIUM SERPL-MCNC: 4.2 MMOL/L — SIGNIFICANT CHANGE UP (ref 3.5–5.3)
POTASSIUM SERPL-SCNC: 4.2 MMOL/L — SIGNIFICANT CHANGE UP (ref 3.5–5.3)
SODIUM SERPL-SCNC: 139 MMOL/L — SIGNIFICANT CHANGE UP (ref 135–145)

## 2018-10-04 PROCEDURE — 80053 COMPREHEN METABOLIC PANEL: CPT

## 2018-10-04 PROCEDURE — 99239 HOSP IP/OBS DSCHRG MGMT >30: CPT

## 2018-10-04 PROCEDURE — G0378: CPT

## 2018-10-04 PROCEDURE — 84484 ASSAY OF TROPONIN QUANT: CPT

## 2018-10-04 PROCEDURE — 93005 ELECTROCARDIOGRAM TRACING: CPT | Mod: XU

## 2018-10-04 PROCEDURE — 80048 BASIC METABOLIC PNL TOTAL CA: CPT

## 2018-10-04 PROCEDURE — 85027 COMPLETE CBC AUTOMATED: CPT

## 2018-10-04 PROCEDURE — 70450 CT HEAD/BRAIN W/O DYE: CPT

## 2018-10-04 PROCEDURE — 84100 ASSAY OF PHOSPHORUS: CPT

## 2018-10-04 PROCEDURE — 83735 ASSAY OF MAGNESIUM: CPT

## 2018-10-04 PROCEDURE — 99254 IP/OBS CNSLTJ NEW/EST MOD 60: CPT | Mod: GC

## 2018-10-04 PROCEDURE — 99285 EMERGENCY DEPT VISIT HI MDM: CPT | Mod: 25

## 2018-10-04 PROCEDURE — 99232 SBSQ HOSP IP/OBS MODERATE 35: CPT

## 2018-10-04 PROCEDURE — 93010 ELECTROCARDIOGRAM REPORT: CPT

## 2018-10-04 PROCEDURE — 71046 X-RAY EXAM CHEST 2 VIEWS: CPT

## 2018-10-04 RX ORDER — ALPRAZOLAM 0.25 MG
0.25 TABLET ORAL
Qty: 10 | Refills: 0 | OUTPATIENT
Start: 2018-10-04 | End: 2018-10-13

## 2018-10-04 RX ORDER — NADOLOL 80 MG/1
1 TABLET ORAL
Qty: 30 | Refills: 0 | OUTPATIENT
Start: 2018-10-04 | End: 2018-11-02

## 2018-10-04 RX ADMIN — Medication 81 MILLIGRAM(S): at 11:51

## 2018-10-04 RX ADMIN — ENOXAPARIN SODIUM 40 MILLIGRAM(S): 100 INJECTION SUBCUTANEOUS at 17:17

## 2018-10-04 NOTE — PROGRESS NOTE ADULT - PROBLEM SELECTOR PLAN 2
Burning left sided chest pain, non-radiating. Not associated with exertion. Not palpable on exam. Stopped metoprolol/flecainamide yesterday; will continue to hold.   #In past 36 hours on telemetry, no major events aside from spontaneous PVCs  #ICD interrogated by EP on 10/2,     - Continue to monitor on telemetry   - c/w Nadolol 80mg qD (10/3-- )  - EP following, will follow recs  - If heaviness/pressure chest pain, obtain EKG/Trops  - Pain control PRN   - Consider PPI as possibly GI-related

## 2018-10-04 NOTE — PROGRESS NOTE ADULT - SUBJECTIVE AND OBJECTIVE BOX
***************************************************************  Ephraim Baez MD  Internal Medicine, PGY-1  Pager: [phone]/ [phone]  ***************************************************************    MICHOACANO NEWELL  53y Male  MRN-[id number]     HPI:  53 M with PMH of congenital ASD repair 2015 with subsequent Vtach/cardiac arrest during repair with subsequent AICD placement (EF 50%), recent admission to [facility] for Aflutter w/ HR 240s presented to the ED with dizziness and burning chest pain.     Pt was admitted to [facility] last week (~Monday) for complaint of dizziness and palpitations and found to have atrial flutter with HR in 240s; per patient his ICD was turned off temporarily and later restarted; he was discharged with metoprolol and flecainide which he had been taking daily.     However after discharge, his dizziness persisted. The dizziness is present at rest and worsens with exertion; patient says he is unable to exercise like before (40-60 minute sessions of cardio) without becoming dizzy and nauseous. Yesterday on Tuesday he called Dr. Pedro Saavedra who instructed him to stop taking the metoprolol and flecainide (last dose Tues morning). Patient states he feels dizzy and off-balance. Denies trauma or LOC.     The chest pain is burning in nature, 3/10 severity, non-radiating, intermittent in nature and occurs at the L chest area. (03 Oct 2018 15:54)      Subjective:     YANELIS. Feels much better, dizziness and chest pain resolved. Symptoms resolved after Ativan 0.5mg.     Denies fever, HA, CP, SOB, abn pain, dysuria.      REVIEW OF SYSTEMS:  CONSTITUTIONAL: No weakness, fevers or chills  EYES/ENT: No visual changes;  No vertigo or throat pain   NECK: No pain or stiffness  RESPIRATORY: No cough, wheezing, hemoptysis; No shortness of breath  CARDIOVASCULAR: No chest pain or palpitations  GASTROINTESTINAL: No abdominal or epigastric pain. No nausea, vomiting, or hematemesis; No diarrhea or constipation. No melena or hematochezia.  MUSCULOSKELETAL: No joint pain, stiffness, or swelling, Normal ROM  GENITOURINARY: No dysuria, frequency or hematuria  NEUROLOGICAL: No numbness or weakness  SKIN: No itching, burning, rashes, or lesions   All other review of systems is negative unless indicated above.      MEDICATIONS  (STANDING):  aspirin enteric coated 81 milliGRAM(s) Oral daily  enoxaparin Injectable 40 milliGRAM(s) SubCutaneous every 24 hours  LORazepam     Tablet 0.5 milliGRAM(s) Oral daily  nadolol 80 milliGRAM(s) Oral daily        Objective:    Vital Signs (24 Hrs):  T(C): 37.2 (10-04-18 @ 05:02), Max: 37.7 (10-03-18 @ 20:08)  HR: 60 (10-04-18 @ 05:02) (60 - 86)  BP: 121/76 (10-04-18 @ 05:02) (120/77 - 137/85)  RR: 18 (10-04-18 @ 05:02) (16 - 18)  SpO2: 97% (10-04-18 @ 05:02) (97% - 97%)  Wt(kg): --  Daily     Daily     I&O's Summary    03 Oct 2018 07:01  -  04 Oct 2018 07:00  --------------------------------------------------------  IN: 240 mL / OUT: 0 mL / NET: 240 mL    04 Oct 2018 07:01  -  04 Oct 2018 12:43  --------------------------------------------------------  IN: 500 mL / OUT: 0 mL / NET: 500 mL        GENERAL: No acute distress, well-developed  HEAD:  Atraumatic, Normocephalic  ENT: EOMI, PERRLA, conjunctiva and sclera clear, Neck supple, No JVD, moist mucosa  CHEST/LUNG: Clear to auscultation bilaterally; No wheeze, equal breath sounds bilaterally   HEART: Regular rate and rhythm; No murmurs, rubs, or gallops  ABDOMEN: Soft, Nontender, Nondistended; Bowel sounds present  BACK: No spinal tenderness  EXTREMITIES:  No clubbing, cyanosis, or edema  PSYCH: Nl behavior, nl affect  NEUROLOGY: AAOx3, non-focal, cranial nerves intact  SKIN: Normal color, No rashes or lesions                       LABS:     10-04    139  |  103  |  14  ----------------------------<  115<H>  4.2   |  27  |  0.88    Ca    9.4      04 Oct 2018 05:42  Phos  3.6     10-04  Mg     2.3     10-04      RADIOLOGY, EKG & ADDITIONAL TESTS: Reviewed.

## 2018-10-04 NOTE — PROGRESS NOTE ADULT - ATTENDING COMMENTS
Symptoms have improved. Occasional PVCs and couplets on tele.     Spoke with pt's EP attending at [facility] Dr Peters who spoke with the EP team here.     Pt can be discharge on Nadolol. Has f/u with Dr Peters on 10/30.    D/c time 50 mins. Symptoms have improved. Occasional PVCs and couplets on tele.     Spoke with pt's EP attending at [facility], Dr Marlo Peters who spoke with the EP team here.     Pt can be discharge on Nadolol. Has f/u with Dr Peters on 10/30.    D/c time 50 mins.

## 2018-10-04 NOTE — PROGRESS NOTE ADULT - SUBJECTIVE AND OBJECTIVE BOX
Patient seen and examined at bedside.    Overnight Events:   Started on nadalol 80 mg daily  No events on tele. bradycardic with sleep to high 40s, asymptomatic  QT continues to be elongated.    Review Of Systems: No chest pain, shortness of breath, or palpitations            Current Meds:  aspirin enteric coated 81 milliGRAM(s) Oral daily  enoxaparin Injectable 40 milliGRAM(s) SubCutaneous every 24 hours  LORazepam     Tablet 0.5 milliGRAM(s) Oral daily  nadolol 80 milliGRAM(s) Oral daily      Vitals:  T(F): 97.9 (10-04), Max: 99.9 (10-03)  HR: 60 (10-04) (60 - 86)  BP: 121/76 (10-04) (120/77 - 137/85)  RR: 18 (10-04)  SpO2: 98% (10-04)  I&O's Summary    03 Oct 2018 07:01  -  04 Oct 2018 07:00  --------------------------------------------------------  IN: 240 mL / OUT: 0 mL / NET: 240 mL    04 Oct 2018 07:01  -  04 Oct 2018 16:01  --------------------------------------------------------  IN: 500 mL / OUT: 0 mL / NET: 500 mL        Physical Exam:  Appearance: No acute distress; well appearing  Eyes: PERRL, EOMI, pink conjunctiva  HENT: Normal oral mucosa  Cardiovascular: RRR, S1, S2, no murmurs, rubs, or gallops; no edema; no JVD  Respiratory: Clear to auscultation bilaterally  Gastrointestinal: soft, non-tender, non-distended with normal bowel sounds  Musculoskeletal: No clubbing; no joint deformity   Neurologic: Non-focal  Lymphatic: No lymphadenopathy  Psychiatry: AAOx3, mood & affect appropriate  Skin: No rashes, ecchymoses, or cyanosis      10-04    139  |  103  |  14  ----------------------------<  115<H>  4.2   |  27  |  0.88    Ca    9.4      04 Oct 2018 05:42  Phos  3.6     10-04  Mg     2.3     10-04        CARDIAC MARKERS ( 03 Oct 2018 10:17 )  12 ng/L / x     / x     / x     / x     / x      CARDIAC MARKERS ( 02 Oct 2018 14:52 )  10 ng/L / x     / x     / x     / x     / x      CARDIAC MARKERS ( 02 Oct 2018 11:44 )  9 ng/L / x     / x     / x     / x     / x                  New ECG(s): Personally reviewed    Echo:    Stress Testing:     Cath:    Imaging:    Interpretation of Telemetry:

## 2018-10-04 NOTE — DISCHARGE NOTE ADULT - PLAN OF CARE
To resolve your symptoms You came in with dizziness at rest and with exertion. Your symptoms resolved after medication changes. Please follow up with your cardiologist within 1 week. Please continue your medications. If you feel anxious, please use the Xanax. Please do not begin vigorous exercise until you see your cardiologist. You came in with burning chest pain which resolved after medication changes. Please follow up with your cardiologist within 1 week. Please continue your medications. If you feel anxious, please use the Xanax. Please do not begin vigorous exercise until you see your cardiologist. Your symptoms of dizziness and chest pain may have been worsened or initiated by anxiety. You were prescribed Xanax; please use as needed. Follow up with [facility] to discuss medications further. You may also seek a [place]ian-speaking psychiatrist for further help. Please avoid QT prolonging medications unless directed by a cardiologist. Your heart is sensitive to these medications. You came in with dizziness at rest and with exertion. Your symptoms resolved after medication changes. Please follow up with your cardiologist within 1 week. Please continue your medications. If you feel anxious, please use the Xanax. Please do not begin vigorous exercise until you see your cardiologist. You were also noted to have a slow heart beat at times. If you develops sweating, worsening dizziness, lightheadedness or shortness of breath, please check your pulse. If is lower than 60, seek medical attention. Please avoid QT prolonging medications unless directed by a cardiologist. Your heart is sensitive to these medications. For now continue with nadolol.

## 2018-10-04 NOTE — DISCHARGE NOTE ADULT - PATIENT PORTAL LINK FT
You can access the [facility] Patient Portal, offered by [facility], by registering with the following website: [email or web address]

## 2018-10-04 NOTE — PROGRESS NOTE ADULT - ASSESSMENT
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    EKG with long QT, although very dependent on which lead you are measuring. Given this, started nadalol for possible benefit in some long QT syndromes. It should be noted bradycardia that occurred during sleep, which patient should be educated on signs/symptoms of bradycardia. Otherwise patient plans to follow with his own EP doctor.    --D/C flecainide  --D/C metoprolol  --Continue Nadalol 80 mg Daily  --ICD interrogation with no events.  --EP with no further interventions, will sign off.    Kranthi Saha MD  [id number]

## 2018-10-04 NOTE — BEHAVIORAL HEALTH ASSESSMENT NOTE - NSBHCHARTREVIEWVS_PSY_A_CORE FT
Vital Signs Last 24 Hrs  T(C): 37.2 (04 Oct 2018 05:02), Max: 37.7 (03 Oct 2018 20:08)  T(F): 99 (04 Oct 2018 05:02), Max: 99.9 (03 Oct 2018 20:08)  HR: 60 (04 Oct 2018 05:02) (60 - 86)  BP: 121/76 (04 Oct 2018 05:02) (120/77 - 137/85)  BP(mean): --  RR: 18 (04 Oct 2018 05:02) (16 - 18)  SpO2: 97% (04 Oct 2018 05:02) (97% - 97%)

## 2018-10-04 NOTE — BEHAVIORAL HEALTH ASSESSMENT NOTE - CASE SUMMARY
This is a 53-year-old AM pt, domiciled, single, employed with no formal psychiatric history and PMH significant for congenital ASD s/p repair 2015 with subsequent Vtach/cardiac arrest during repair with subsequent AICD placement (EF 50%), recent admission to [facility] for Aflutter w/ HR 240s who presented yesterday with dizziness and burning chest pain.   Patient given ativan with relief of anxiety. Psych CL consulted for anxiety evaluation.    I have seen and evaluated this patient myself. Chart, labs, meds reviewed. I agree with fellow's assessment and plan.

## 2018-10-04 NOTE — PROGRESS NOTE ADULT - ASSESSMENT
53 M with PMH of congenital ASD repair 2015 with subsequent Vtach/cardiac arrest during repair with subsequent AICD placement (EF 50%), recent admission to [facility] for Aflutter w/ HR 240s presented to the ED with dizziness and burning chest pain likely 2/2 decreased perfusion from cardiac arrhythmia vs neurologic cause. CT Head negative.

## 2018-10-04 NOTE — DISCHARGE NOTE ADULT - MEDICATION SUMMARY - MEDICATIONS TO STOP TAKING
I will STOP taking the medications listed below when I get home from the hospital:    Ativan 0.5 mg oral tablet  -- 0.5 tab(s) by mouth once a day, As Needed -for anxiety MDD:1  -- Caution federal law prohibits the transfer of this drug to any person other  than the person for whom it was prescribed.  Do not take this drug if you are pregnant.  May cause drowsiness.  Alcohol may intensify this effect.  Use care when operating dangerous machinery.    metoprolol    flecainide

## 2018-10-04 NOTE — DISCHARGE NOTE ADULT - ADDITIONAL INSTRUCTIONS
Please follow up with your electrophysiologist on October 30th. Please also follow up with psychiatry, [facility] numbers [phone] which is open M-F from 9A-7P.

## 2018-10-04 NOTE — BEHAVIORAL HEALTH ASSESSMENT NOTE - HPI (INCLUDE ILLNESS QUALITY, SEVERITY, DURATION, TIMING, CONTEXT, MODIFYING FACTORS, ASSOCIATED SIGNS AND SYMPTOMS)
53 year old male, domiciled, single, employed with no formal psychiatric history and PMH significant for congenital ASD s/p repair 2015 with subsequent Vtach/cardiac arrest during repair with subsequent AICD placement (EF 50%), recent admission to [facility] for Aflutter w/ HR 240s who presented yesterday with dizziness and burning chest pain.   Patient given ativan with relief of anxiety. Psych CL consulted for anxiety evaluation.    Patient seen this AM. He states that he is feeling better now. Admits that he feels anxious about his heart condition. Admits that he feels anxious about his AICD firing. States that he has had 2 of these anxiety episodes in past couple of weeks. Denies overt symptoms of depression. Denies SI/HI. States that his anxiety about his heart can at times keep him up at night and he at times can wake up in a panic.    He is open to trying medication as needed for anxiety.

## 2018-10-04 NOTE — BEHAVIORAL HEALTH ASSESSMENT NOTE - SUMMARY
53 year old male, domiciled, single, employed with no formal psychiatric history and PMH significant for congenital ASD s/p repair 2015 with subsequent Vtach/cardiac arrest during repair with subsequent AICD placement (EF 50%), recent admission to [facility] for Aflutter w/ HR 240s who presented yesterday with dizziness and burning chest pain.   Patient given ativan with relief of anxiety. Psych CL consulted for anxiety evaluation.    Patient meets criteria for acute stress disorder- r/o PTSD vs panic disorder in setting of being anxious of AICD triggering, and overall heart condition.  Given his qtc, would not start antidepressant without firm outpatient psych follow up and someone that can collaborate with his cardiology team.  For now, can use xanax 0.25mg sublingual PRN daily for anxiety. Can provide 10 doses to patient upon discharge.  Please provide patient with [facility] numbers [phone] which is open M-F from 9A-7P.

## 2018-10-04 NOTE — DISCHARGE NOTE ADULT - CARE PLAN
Principal Discharge DX:	Dizziness  Goal:	To resolve your symptoms  Assessment and plan of treatment:	You came in with dizziness at rest and with exertion. Your symptoms resolved after medication changes. Please follow up with your cardiologist within 1 week. Please continue your medications. If you feel anxious, please use the Xanax. Please do not begin vigorous exercise until you see your cardiologist.  Secondary Diagnosis:	Chest pain  Assessment and plan of treatment:	You came in with burning chest pain which resolved after medication changes. Please follow up with your cardiologist within 1 week. Please continue your medications. If you feel anxious, please use the Xanax. Please do not begin vigorous exercise until you see your cardiologist.  Secondary Diagnosis:	Anxiety  Assessment and plan of treatment:	Your symptoms of dizziness and chest pain may have been worsened or initiated by anxiety. You were prescribed Xanax; please use as needed. Follow up with [facility] to discuss medications further. You may also seek a Welsh-speaking psychiatrist for further help.  Secondary Diagnosis:	QT prolongation  Assessment and plan of treatment:	Please avoid QT prolonging medications unless directed by a cardiologist. Your heart is sensitive to these medications. Principal Discharge DX:	Dizziness  Goal:	To resolve your symptoms  Assessment and plan of treatment:	You came in with dizziness at rest and with exertion. Your symptoms resolved after medication changes. Please follow up with your cardiologist within 1 week. Please continue your medications. If you feel anxious, please use the Xanax. Please do not begin vigorous exercise until you see your cardiologist. You were also noted to have a slow heart beat at times. If you develops sweating, worsening dizziness, lightheadedness or shortness of breath, please check your pulse. If is lower than 60, seek medical attention.  Secondary Diagnosis:	Chest pain  Assessment and plan of treatment:	You came in with burning chest pain which resolved after medication changes. Please follow up with your cardiologist within 1 week. Please continue your medications. If you feel anxious, please use the Xanax. Please do not begin vigorous exercise until you see your cardiologist.  Secondary Diagnosis:	Anxiety  Assessment and plan of treatment:	Your symptoms of dizziness and chest pain may have been worsened or initiated by anxiety. You were prescribed Xanax; please use as needed. Follow up with [facility] clinic to discuss medications further. You may also seek a Irish-speaking psychiatrist for further help.  Secondary Diagnosis:	QT prolongation  Assessment and plan of treatment:	Please avoid QT prolonging medications unless directed by a cardiologist. Your heart is sensitive to these medications. For now continue with nadolol.

## 2018-10-04 NOTE — PROGRESS NOTE ADULT - PROBLEM SELECTOR PLAN 3
Hx of QTc > 600, Flecainide stopped Tuesday.   #Currently EKG shows QTc <500    - Monitor on telemetry  - Avoid antipsychotics, antihistamines, and any other QT prolonging agents

## 2018-10-04 NOTE — DISCHARGE NOTE ADULT - PROVIDER TOKENS
TOKEN:'[id number]',FREE:[LAST:[[facility]],PHONE:[phone]],FAX:[(   )    -]]

## 2018-10-04 NOTE — PROGRESS NOTE ADULT - PROBLEM SELECTOR PLAN 1
Dizziness since 9/23, worse with exertion; exists intermittently at rest. No neurologic deficits or trauma. CT Head negative. Likely 2/2 cardiac arrythmia vs neurologic cause. EKG unremarkable. Ativan 0.5mg relieved symptoms.    - continue to monitor on telemetry  - c/w Nadolol 80mg qD (10/3-- )  - Psych consulted to start Ativan 0.5 outpatient; will need to follow up with them outpatient to maintain therapy

## 2018-10-04 NOTE — DISCHARGE NOTE ADULT - CARE PROVIDER_API CALL
Pedro Saavedra), Cardiac Electrophysiology; Cardiovascular Disease  [address], [place] [id number]  Phone: [phone]  Fax: [phone]    [facility],   Phone: [phone]  Fax: (   )    -

## 2018-10-04 NOTE — DISCHARGE NOTE ADULT - HOSPITAL COURSE
HPI:  53 M with PMH of congenital ASD repair 2015 with subsequent Vtach/cardiac arrest during repair with subsequent AICD placement (EF 50%), recent admission to [facility] for Aflutter w/ HR 240s presented to the ED with dizziness and burning chest pain.   Pt was admitted to [facility] last week (~Monday) for complaint of dizziness and palpitations and found to have atrial flutter with HR in 240s; per patient his ICD was turned off temporarily and later restarted; he was discharged with metoprolol and flecainide which he had been taking daily. However after discharge, his dizziness persisted. The dizziness is present at rest and worsens with exertion; patient says he is unable to exercise like before (40-60 minute sessions of cardio) without becoming dizzy and nauseous. Yesterday on Tuesday he called Dr. Pedro Saavedra who instructed him to stop taking the metoprolol and flecainide (last dose Tues morning). Patient states he feels dizzy and off-balance. Denies trauma or LOC.   The chest pain is burning in nature, 3/10 severity, non-radiating, intermittent in nature and occurs at the L chest area.     Hospital Course:  Patient was admitted and seen by cardiology and electrophysiology. His chest pain was evaluated and found to be non-cardiac in nature. Patient was monitored on telemetry. His previous medications of metoprolol and flecainide were not continued. He was instead started on Nadolol and a PRN Ativan 0.5 for anxiety. Overnight he had no significant events and within 1 day his dizziness and chest pain was completely resolved. His repeat EKG showed no significant changes with an improved QTc (<500 as apposed to >600 before). Patient is likely very sensitive to QT prolonging agents. HPI:  53 M with PMH of congenital ASD repair 2015 with subsequent Vtach/cardiac arrest during repair with subsequent AICD placement (EF 50%), recent admission to [facility] for Aflutter w/ HR 240s presented to the ED with dizziness and burning chest pain.   Pt was admitted to [facility] last week (~Monday) for complaint of dizziness and palpitations and found to have atrial flutter with HR in 240s; per patient his ICD was turned off temporarily and later restarted; he was discharged with metoprolol and flecainide which he had been taking daily. However after discharge, his dizziness persisted. The dizziness is present at rest and worsens with exertion; patient says he is unable to exercise like before (40-60 minute sessions of cardio) without becoming dizzy and nauseous. Yesterday on Tuesday he called Dr. Pedro Saavedra who instructed him to stop taking the metoprolol and flecainide (last dose Tues morning). Patient states he feels dizzy and off-balance. Denies trauma or LOC.   The chest pain is burning in nature, 3/10 severity, non-radiating, intermittent in nature and occurs at the L chest area.     Hospital Course:  Patient was admitted and seen by cardiology and electrophysiology. His chest pain was evaluated and found to be non-cardiac in nature. Patient was monitored on telemetry. His previous medications of metoprolol and flecainide were not continued. He was instead started on Nadolol and a PRN Ativan 0.5 for anxiety. Overnight he had no significant events and within 1 day his dizziness and chest pain was completely resolved. His repeat EKG showed no significant changes with an improved QTc (<500 as apposed to >600 before). Patient is likely very sensitive to QT prolonging agents.  He will be discharged on nadolol with EP follow up as well as xanax as needed for anxiety. HPI:  53 M with PMH of congenital ASD repair 2015 with subsequent Vtach/cardiac arrest during repair with subsequent AICD placement (EF 50%), recent admission to [facility] for Aflutter w/ HR 240s presented to the ED with dizziness and burning chest pain.   Pt was admitted to [facility] last week (~Monday) for complaint of dizziness and palpitations and found to have atrial flutter with HR in 240s; per patient his ICD was turned off temporarily and later restarted; he was discharged with metoprolol and flecainide which he had been taking daily. However after discharge, his dizziness persisted. The dizziness is present at rest and worsens with exertion; patient says he is unable to exercise like before (40-60 minute sessions of cardio) without becoming dizzy and nauseous. Yesterday on Tuesday he called Dr. Pedro Saavedra who instructed him to stop taking the metoprolol and flecainide (last dose Tues morning). Patient states he feels dizzy and off-balance. Denies trauma or LOC.     Hospital Course:    Found to have a prolonged Qtc of 625 ms. Patient was admitted and seen by cardiology and electrophysiology. His previous medications of metoprolol and flecainide were not continued. He was instead started on Nadolol and a PRN Ativan 0.5 for anxiety.     His chest pain was evaluated and found to be non-cardiac in nature. Patient was monitored on telemetry- had occasional PVCs and couplets.     Repeat EKG the next day showed improvement in Qtc to 456 ms.    We spoke with pt's EP at [facility] Dr Marlo Peters. He agreed with continuing Nadolol. Has a follow up appointment to see pt on 10/30. Patient is likely very sensitive to QT prolonging agents.      He will be discharged on nadolol with EP follow up as well as xanax as needed for anxiety.    Diagnoses: Prolonged Qtc; Chest pain

## 2018-10-04 NOTE — BEHAVIORAL HEALTH ASSESSMENT NOTE - IMPULSE CONTROL
The patient has been examined and the H&P has been reviewed:    I concur with the findings and no changes have occurred since H&P was written.    Anesthesia/Surgery risks, benefits and alternative options discussed and understood by patient/family.          Active Hospital Problems    Diagnosis  POA    Incisional hernia [K43.2]  Yes      Resolved Hospital Problems   No resolved problems to display.      Normal

## 2018-10-04 NOTE — DISCHARGE NOTE ADULT - MEDICATION SUMMARY - MEDICATIONS TO TAKE
I will START or STAY ON the medications listed below when I get home from the hospital:    aspirin 81 mg oral delayed release tablet  -- 1 tab(s) by mouth once a day  -- Indication: For prophylactic measure    Xanax 0.25 mg oral tablet  -- 0.25 milligram(s) by mouth once a day, As Needed -for anxiety MDD:0.25 mg daily  -- Avoid grapefruit and grapefruit juice while taking this medication.  Caution federal law prohibits the transfer of this drug to any person other  than the person for whom it was prescribed.  Do not take this drug if you are pregnant.  May cause drowsiness.  Alcohol may intensify this effect.  Use care when operating dangerous machinery.    -- Indication: For Anxiety    nadolol 80 mg oral tablet  -- 1 tab(s) by mouth once a day  -- Indication: For Abnormal electrocardiography

## 2018-10-04 NOTE — BEHAVIORAL HEALTH ASSESSMENT NOTE - NSBHCHARTREVIEWLAB_PSY_A_CORE FT
10-04    139  |  103  |  14  ----------------------------<  115<H>  4.2   |  27  |  0.88    Ca    9.4      04 Oct 2018 05:42  Phos  3.6     10-04  Mg     2.3     10-04

## 2018-10-05 ENCOUNTER — INBOUND DOCUMENT (OUTPATIENT)
Age: 53
End: 2018-10-05

## 2018-10-09 ENCOUNTER — RESULT CHARGE (OUTPATIENT)
Age: 53
End: 2018-10-09

## 2018-10-10 ENCOUNTER — OUTPATIENT (OUTPATIENT)
Dept: OUTPATIENT SERVICES | Facility: HOSPITAL | Age: 53
LOS: 1 days | End: 2018-10-10
Payer: SELF-PAY

## 2018-10-10 ENCOUNTER — INPATIENT (INPATIENT)
Facility: HOSPITAL | Age: 53
LOS: 4 days | Discharge: HOME | DRG: 392 | End: 2018-10-15
Attending: SURGERY | Admitting: SURGERY
Payer: MEDICAID

## 2018-10-10 ENCOUNTER — APPOINTMENT (OUTPATIENT)
Dept: INTERNAL MEDICINE | Facility: CLINIC | Age: 53
End: 2018-10-10

## 2018-10-10 VITALS
OXYGEN SATURATION: 99 % | RESPIRATION RATE: 20 BRPM | HEIGHT: 66 IN | HEART RATE: 98 BPM | TEMPERATURE: 98 F | WEIGHT: 149.91 LBS | SYSTOLIC BLOOD PRESSURE: 105 MMHG | DIASTOLIC BLOOD PRESSURE: 71 MMHG

## 2018-10-10 DIAGNOSIS — Z98.890 OTHER SPECIFIED POSTPROCEDURAL STATES: Chronic | ICD-10-CM

## 2018-10-10 DIAGNOSIS — R00.2 PALPITATIONS: ICD-10-CM

## 2018-10-10 DIAGNOSIS — Z95.810 PRESENCE OF AUTOMATIC (IMPLANTABLE) CARDIAC DEFIBRILLATOR: Chronic | ICD-10-CM

## 2018-10-10 DIAGNOSIS — Z87.74 PERSONAL HISTORY OF (CORRECTED) CONGENITAL MALFORMATIONS OF HEART AND CIRCULATORY SYSTEM: Chronic | ICD-10-CM

## 2018-10-10 DIAGNOSIS — R10.9 UNSPECIFIED ABDOMINAL PAIN: ICD-10-CM

## 2018-10-10 DIAGNOSIS — I10 ESSENTIAL (PRIMARY) HYPERTENSION: ICD-10-CM

## 2018-10-10 LAB
ALBUMIN SERPL ELPH-MCNC: 3.5 G/DL — SIGNIFICANT CHANGE UP (ref 3.3–5)
ALP SERPL-CCNC: 70 U/L — SIGNIFICANT CHANGE UP (ref 40–120)
ALT FLD-CCNC: 9 U/L — LOW (ref 10–45)
ANION GAP SERPL CALC-SCNC: 15 MMOL/L — SIGNIFICANT CHANGE UP (ref 5–17)
APPEARANCE UR: CLEAR — SIGNIFICANT CHANGE UP
APTT BLD: 26 SEC — LOW (ref 27.5–37.4)
AST SERPL-CCNC: 21 U/L — SIGNIFICANT CHANGE UP (ref 10–40)
BACTERIA # UR AUTO: NEGATIVE — SIGNIFICANT CHANGE UP
BASE EXCESS BLDV CALC-SCNC: 0.9 MMOL/L — SIGNIFICANT CHANGE UP (ref -2–2)
BASOPHILS # BLD AUTO: 0.1 K/UL — SIGNIFICANT CHANGE UP (ref 0–0.2)
BASOPHILS NFR BLD AUTO: 0.6 % — SIGNIFICANT CHANGE UP (ref 0–2)
BILIRUB SERPL-MCNC: 0.5 MG/DL — SIGNIFICANT CHANGE UP (ref 0.2–1.2)
BILIRUB UR-MCNC: NEGATIVE — SIGNIFICANT CHANGE UP
BUN SERPL-MCNC: 14 MG/DL — SIGNIFICANT CHANGE UP (ref 7–23)
CA-I SERPL-SCNC: 1.15 MMOL/L — SIGNIFICANT CHANGE UP (ref 1.12–1.3)
CALCIUM SERPL-MCNC: 8.7 MG/DL — SIGNIFICANT CHANGE UP (ref 8.4–10.5)
CHLORIDE BLDV-SCNC: 105 MMOL/L — SIGNIFICANT CHANGE UP (ref 96–108)
CHLORIDE SERPL-SCNC: 99 MMOL/L — SIGNIFICANT CHANGE UP (ref 96–108)
CO2 BLDV-SCNC: 26 MMOL/L — SIGNIFICANT CHANGE UP (ref 22–30)
CO2 SERPL-SCNC: 20 MMOL/L — LOW (ref 22–31)
COLOR SPEC: YELLOW — SIGNIFICANT CHANGE UP
CREAT SERPL-MCNC: 0.81 MG/DL — SIGNIFICANT CHANGE UP (ref 0.5–1.3)
DIFF PNL FLD: NEGATIVE — SIGNIFICANT CHANGE UP
EOSINOPHIL # BLD AUTO: 0.1 K/UL — SIGNIFICANT CHANGE UP (ref 0–0.5)
EOSINOPHIL NFR BLD AUTO: 0.8 % — SIGNIFICANT CHANGE UP (ref 0–6)
EPI CELLS # UR: 0 /HPF — SIGNIFICANT CHANGE UP
GAS PNL BLDV: 133 MMOL/L — LOW (ref 136–145)
GAS PNL BLDV: SIGNIFICANT CHANGE UP
GLUCOSE BLDV-MCNC: 107 MG/DL — HIGH (ref 70–99)
GLUCOSE SERPL-MCNC: 118 MG/DL — HIGH (ref 70–99)
GLUCOSE UR QL: NEGATIVE — SIGNIFICANT CHANGE UP
HCO3 BLDV-SCNC: 25 MMOL/L — SIGNIFICANT CHANGE UP (ref 21–29)
HCT VFR BLD CALC: 38.7 % — LOW (ref 39–50)
HCT VFR BLDA CALC: 40 % — SIGNIFICANT CHANGE UP (ref 39–50)
HGB BLD CALC-MCNC: 13 G/DL — SIGNIFICANT CHANGE UP (ref 13–17)
HGB BLD-MCNC: 13.2 G/DL — SIGNIFICANT CHANGE UP (ref 13–17)
HYALINE CASTS # UR AUTO: 1 /LPF — SIGNIFICANT CHANGE UP (ref 0–2)
INR BLD: 1.28 RATIO — HIGH (ref 0.88–1.16)
KETONES UR-MCNC: NEGATIVE — SIGNIFICANT CHANGE UP
LACTATE BLDV-MCNC: 1.4 MMOL/L — SIGNIFICANT CHANGE UP (ref 0.7–2)
LEUKOCYTE ESTERASE UR-ACNC: NEGATIVE — SIGNIFICANT CHANGE UP
LYMPHOCYTES # BLD AUTO: 1.5 K/UL — SIGNIFICANT CHANGE UP (ref 1–3.3)
LYMPHOCYTES # BLD AUTO: 16.1 % — SIGNIFICANT CHANGE UP (ref 13–44)
MAGNESIUM SERPL-MCNC: 2.2 MG/DL — SIGNIFICANT CHANGE UP (ref 1.6–2.6)
MCHC RBC-ENTMCNC: 28.7 PG — SIGNIFICANT CHANGE UP (ref 27–34)
MCHC RBC-ENTMCNC: 34 GM/DL — SIGNIFICANT CHANGE UP (ref 32–36)
MCV RBC AUTO: 84.5 FL — SIGNIFICANT CHANGE UP (ref 80–100)
MONOCYTES # BLD AUTO: 0.8 K/UL — SIGNIFICANT CHANGE UP (ref 0–0.9)
MONOCYTES NFR BLD AUTO: 8.1 % — SIGNIFICANT CHANGE UP (ref 2–14)
NEUTROPHILS # BLD AUTO: 6.9 K/UL — SIGNIFICANT CHANGE UP (ref 1.8–7.4)
NEUTROPHILS NFR BLD AUTO: 74.4 % — SIGNIFICANT CHANGE UP (ref 43–77)
NITRITE UR-MCNC: NEGATIVE — SIGNIFICANT CHANGE UP
OTHER CELLS CSF MANUAL: 14 ML/DL — LOW (ref 18–22)
PCO2 BLDV: 40 MMHG — SIGNIFICANT CHANGE UP (ref 35–50)
PH BLDV: 7.41 — SIGNIFICANT CHANGE UP (ref 7.35–7.45)
PH UR: 6 — SIGNIFICANT CHANGE UP (ref 5–8)
PHOSPHATE SERPL-MCNC: 3.8 MG/DL — SIGNIFICANT CHANGE UP (ref 2.5–4.5)
PLATELET # BLD AUTO: 273 K/UL — SIGNIFICANT CHANGE UP (ref 150–400)
PO2 BLDV: 45 MMHG — SIGNIFICANT CHANGE UP (ref 25–45)
POTASSIUM BLDV-SCNC: 4.6 MMOL/L — SIGNIFICANT CHANGE UP (ref 3.5–5.3)
POTASSIUM SERPL-MCNC: 4.2 MMOL/L — SIGNIFICANT CHANGE UP (ref 3.5–5.3)
POTASSIUM SERPL-SCNC: 4.2 MMOL/L — SIGNIFICANT CHANGE UP (ref 3.5–5.3)
PROT SERPL-MCNC: 7 G/DL — SIGNIFICANT CHANGE UP (ref 6–8.3)
PROT UR-MCNC: ABNORMAL
PROTHROM AB SERPL-ACNC: 14 SEC — HIGH (ref 9.8–12.7)
RBC # BLD: 4.58 M/UL — SIGNIFICANT CHANGE UP (ref 4.2–5.8)
RBC # FLD: 11.1 % — SIGNIFICANT CHANGE UP (ref 10.3–14.5)
RBC CASTS # UR COMP ASSIST: 0 /HPF — SIGNIFICANT CHANGE UP (ref 0–4)
SAO2 % BLDV: 79 % — SIGNIFICANT CHANGE UP (ref 67–88)
SODIUM SERPL-SCNC: 134 MMOL/L — LOW (ref 135–145)
SP GR SPEC: 1.03 — HIGH (ref 1.01–1.02)
UROBILINOGEN FLD QL: NEGATIVE — SIGNIFICANT CHANGE UP
WBC # BLD: 9.3 K/UL — SIGNIFICANT CHANGE UP (ref 3.8–10.5)
WBC # FLD AUTO: 9.3 K/UL — SIGNIFICANT CHANGE UP (ref 3.8–10.5)
WBC UR QL: 3 /HPF — SIGNIFICANT CHANGE UP (ref 0–5)

## 2018-10-10 PROCEDURE — G0463: CPT

## 2018-10-10 PROCEDURE — 93010 ELECTROCARDIOGRAM REPORT: CPT

## 2018-10-10 PROCEDURE — 71045 X-RAY EXAM CHEST 1 VIEW: CPT | Mod: 26

## 2018-10-10 PROCEDURE — 99285 EMERGENCY DEPT VISIT HI MDM: CPT | Mod: 25

## 2018-10-10 RX ORDER — MORPHINE SULFATE 50 MG/1
4 CAPSULE, EXTENDED RELEASE ORAL ONCE
Qty: 0 | Refills: 0 | Status: DISCONTINUED | OUTPATIENT
Start: 2018-10-10 | End: 2018-10-10

## 2018-10-10 RX ORDER — SODIUM CHLORIDE 9 MG/ML
1000 INJECTION INTRAMUSCULAR; INTRAVENOUS; SUBCUTANEOUS ONCE
Qty: 0 | Refills: 0 | Status: COMPLETED | OUTPATIENT
Start: 2018-10-10 | End: 2018-10-10

## 2018-10-10 RX ADMIN — SODIUM CHLORIDE 500 MILLILITER(S): 9 INJECTION INTRAMUSCULAR; INTRAVENOUS; SUBCUTANEOUS at 23:16

## 2018-10-10 RX ADMIN — MORPHINE SULFATE 4 MILLIGRAM(S): 50 CAPSULE, EXTENDED RELEASE ORAL at 23:23

## 2018-10-10 NOTE — ED ADULT NURSE NOTE - PMH
AICD (automatic cardioverter/defibrillator) present    ASD (atrial septal defect)    Atrial flutter    Hypertension

## 2018-10-10 NOTE — ED ADULT NURSE NOTE - INTERVENTIONS DEFINITIONS
Non-slip footwear when patient is off stretcher/Physically safe environment: no spills, clutter or unnecessary equipment/[place] to call system/Instruct patient to call for assistance/Call bell, personal items and telephone within reach

## 2018-10-10 NOTE — ED PROVIDER NOTE - ATTENDING CONTRIBUTION TO CARE
54 y/o male with the above documented history and HPI who on exam appears well and comfortable. VSs noted, sclerae anicteric, MM's moist, neck supple, lungs CTA, cardiac sounds s/ audible m/r/g, abdomen soft, ND c/ ttp in R groin and ? inguinal hernia c/ straining but also in his L groin/LLQ which is more tender the R c/ voluntary guarding s/ rebound, extremities s/ asymmetry, skin s/ rash or jaundice c/ light ecchymosis across his abdominal wall (injecting AC) and neurologically intact. Full investigation initiated, the results of which will likely dictate his ultimate treatment and disposition. Analgesia and IVF provided. 54 y/o male with the above documented history and HPI who on exam appears well and comfortable. VSs noted, sclerae anicteric, MM's moist, neck supple, lungs CTA, cardiac sounds s/ audible m/r/g, abdomen soft, ND c/ ttp in R groin and ? inguinal hernia c/ straining but also in his L groin/LLQ which is more tender the R c/ voluntary guarding s/ rebound, extremities s/ asymmetry, skin s/ rash or jaundice c/ light ecchymosis across his abdominal wall and neurologically intact. Full investigation initiated, the results of which will likely dictate his ultimate treatment and disposition. Analgesia and IVF provided.

## 2018-10-10 NOTE — ED PROVIDER NOTE - OBJECTIVE STATEMENT
53 M with PMH of congenital ASD repair 2015 with subsequent Vtach/cardiac arrest during repair with subsequent AICD placement (EF 50%), recent admission to [facility] for Aflutter w/ HR 240s presenting for    On last admission, for which she was recently discharged on October 4th:  Pt was admitted to [facility] last week (~Monday) for complaint of dizziness and palpitations and found to have atrial flutter with HR in 240s; per patient his ICD was turned off temporarily and later restarted; he was discharged with metoprolol and flecainide which he had been taking daily. However after discharge, his dizziness persisted. The dizziness was present at rest and worsens with exertion; patient says he is unable to exercise like before (40-60 minute sessions of cardio) without becoming dizzy and nauseous.   Pt had spoken w/ Dr. Pedro Saavedra who instructed him to stop taking the metoprolol and flecainide (last dose Tues morning). Patient states he feels dizzy and off-balance.  Pt found to have a prolonged Qtc of 625 ms. Patient was admitted and seen by cardiology and electrophysiology. His previous medications of metoprolol and flecainide were not continued. He was instead started on Nadolol and a PRN Ativan 0.5 for anxiety.   Chest pain was evaluated and found to be non-cardiac in nature. Patient was monitored on telemetry- had occasional PVCs and couplets. Repeat EKG the next day showed improvement in Qtc to 456 ms. 53 M with PMH of congenital ASD repair 2015 with subsequent Vtach/cardiac arrest during repair with subsequent AICD placement (EF 50%), recent admission to [facility] for Aflutter w/ HR 240s now presenting from [address] [place] [address] here for Rt g roin pain likely 2/2 R inguinal hernia which is non-reducible w/ TTP; PCP is Dr. Hemanth Perez.  Pt has had 1 week of right g roin pain, worse w/ movement; per pt, yesterday evening, pain was 6-7/10 radiating into his RLQ; also endorsed a subject fever last night; pain is currently 2-3/10 and has no subjective fever and is afebrile on vitals.      Pt states that he has had diarrhea also over last week; no recent antibiotics; denies blood in diarrhea.  No recent dietary changes/indiscretions.    Pt denies any cardiac-related sx; no CP/SOB/palpitations/LE edema; ROS otherwise negative.  No changes in medication and is medication adherent.  Denies sick contacts and recent travel; works as a  and denies any heavy lifting; former smoker w/o any toxic habits.      On last admission, for which she was recently discharged on October 4th:  Pt was admitted to [facility] last week (~Monday) for complaint of dizziness and palpitations and found to have atrial flutter with HR in 240s; per patient his ICD was turned off temporarily and later restarted; he was discharged with metoprolol and flecainide which he had been taking daily. However after discharge, his dizziness persisted. The dizziness was present at rest and worsens with exertion; patient says he is unable to exercise like before (40-60 minute sessions of cardio) without becoming dizzy and nauseous.   Pt had spoken w/ Dr. Pedro Saavedra who instructed him to stop taking the metoprolol and flecainide (last dose Tues morning). Patient states he feels dizzy and off-balance.  Pt found to have a prolonged Qtc of 625 ms. Patient was admitted and seen by cardiology and electrophysiology. His previous medications of metoprolol and flecainide were not continued. He was instead started on Nadolol and a PRN Ativan 0.5 for anxiety.   Chest pain was evaluated and found to be non-cardiac in nature. Patient was monitored on telemetry- had occasional PVCs and couplets. Repeat EKG the next day showed improvement in Qtc to 456 ms.

## 2018-10-10 NOTE — ED PROVIDER NOTE - PROGRESS NOTE DETAILS
Pt found to have perforated diverticulitis, surgery consulted, abx ordered, pt reassessed and informed of findings. DAVEYUJMARGA Pt began co itching, hives noted to face, pt denies any difficulty breathing, airway patent. Pt received zosyn it was completed. Benadryl w improvement in symptoms, cont to monitor, surgery made aware.

## 2018-10-10 NOTE — ED ADULT NURSE NOTE - OBJECTIVE STATEMENT
52 y/o male Pt. A&Ox4, PM AICD, presents to ED with c/o stomach ache x1 week. Pt states he stayed in hospital last week for chest pain, discharged on Thursday. Pt states he received "blood thinner injections" in belly which caused him to become nauseous, reports multiple episodes of vomiting. Denies feeling a bulge in area. Pt states he was seen by PMD today who referred him to ED for further assessment. Upon further assessment, airway patent and intact, denies chest pain/SOB/fever/chills. ABD soft nontender, c/o diarrhea. Denies blood in urine and/or stool. Skin intact. Peripheral pulses strong and normal baseline sensation present x4. Safety and comfort measures maintained. 54 y/o male Pt. A&Ox4, PM congenital ASD repair 2015 with subsequent Vtach/cardiac arrest during repair with subsequent AICD placement, presents to ED with c/o stomach ache x1 week. Pt states he was admitted last week for chest pain, discharged on Thursday. Pt states he received "blood thinner injections" in belly which he states caused him to become dizzy and nauseous, reports multiple episodes of vomiting. Denies feeling a bulge in area. Pt states he was seen by PMD today who referred him to ED for further assessment. Upon further assessment, airway patent and intact, denies chest pain/SOB/fever/chills. ABD soft nontender, c/o diarrhea. Denies blood in urine and/or stool. Skin intact. Peripheral pulses strong and normal baseline sensation present x4. Safety and comfort measures maintained.

## 2018-10-10 NOTE — ED PROVIDER NOTE - SHIFT CHANGE DETAILS
Awaiting CT, the results of which will likely determine his ultimate treatment and disposition. If negative for emergent pathology, he should be suitable for DC.

## 2018-10-11 DIAGNOSIS — K57.20 DIVERTICULITIS OF LARGE INTESTINE WITH PERFORATION AND ABSCESS WITHOUT BLEEDING: ICD-10-CM

## 2018-10-11 LAB
ANION GAP SERPL CALC-SCNC: 11 MMOL/L — SIGNIFICANT CHANGE UP (ref 5–17)
BUN SERPL-MCNC: 11 MG/DL — SIGNIFICANT CHANGE UP (ref 7–23)
CALCIUM SERPL-MCNC: 8.5 MG/DL — SIGNIFICANT CHANGE UP (ref 8.4–10.5)
CHLORIDE SERPL-SCNC: 107 MMOL/L — SIGNIFICANT CHANGE UP (ref 96–108)
CO2 SERPL-SCNC: 20 MMOL/L — LOW (ref 22–31)
CREAT SERPL-MCNC: 0.84 MG/DL — SIGNIFICANT CHANGE UP (ref 0.5–1.3)
GLUCOSE SERPL-MCNC: 101 MG/DL — HIGH (ref 70–99)
HCT VFR BLD CALC: 37.3 % — LOW (ref 39–50)
HGB BLD-MCNC: 12.3 G/DL — LOW (ref 13–17)
MAGNESIUM SERPL-MCNC: 2.3 MG/DL — SIGNIFICANT CHANGE UP (ref 1.6–2.6)
MCHC RBC-ENTMCNC: 28.3 PG — SIGNIFICANT CHANGE UP (ref 27–34)
MCHC RBC-ENTMCNC: 33 GM/DL — SIGNIFICANT CHANGE UP (ref 32–36)
MCV RBC AUTO: 85.7 FL — SIGNIFICANT CHANGE UP (ref 80–100)
PHOSPHATE SERPL-MCNC: 3.3 MG/DL — SIGNIFICANT CHANGE UP (ref 2.5–4.5)
PLATELET # BLD AUTO: 265 K/UL — SIGNIFICANT CHANGE UP (ref 150–400)
POTASSIUM SERPL-MCNC: 4.1 MMOL/L — SIGNIFICANT CHANGE UP (ref 3.5–5.3)
POTASSIUM SERPL-SCNC: 4.1 MMOL/L — SIGNIFICANT CHANGE UP (ref 3.5–5.3)
RBC # BLD: 4.35 M/UL — SIGNIFICANT CHANGE UP (ref 4.2–5.8)
RBC # FLD: 11.8 % — SIGNIFICANT CHANGE UP (ref 10.3–14.5)
SODIUM SERPL-SCNC: 138 MMOL/L — SIGNIFICANT CHANGE UP (ref 135–145)
WBC # BLD: 6.61 K/UL — SIGNIFICANT CHANGE UP (ref 3.8–10.5)
WBC # FLD AUTO: 6.61 K/UL — SIGNIFICANT CHANGE UP (ref 3.8–10.5)

## 2018-10-11 PROCEDURE — 74177 CT ABD & PELVIS W/CONTRAST: CPT | Mod: 26

## 2018-10-11 RX ORDER — ACETAMINOPHEN 500 MG
1000 TABLET ORAL ONCE
Qty: 0 | Refills: 0 | Status: COMPLETED | OUTPATIENT
Start: 2018-10-11 | End: 2018-10-11

## 2018-10-11 RX ORDER — DIPHENHYDRAMINE HCL 50 MG
50 CAPSULE ORAL ONCE
Qty: 0 | Refills: 0 | Status: DISCONTINUED | OUTPATIENT
Start: 2018-10-11 | End: 2018-10-11

## 2018-10-11 RX ORDER — SODIUM CHLORIDE 9 MG/ML
1000 INJECTION, SOLUTION INTRAVENOUS
Qty: 0 | Refills: 0 | Status: DISCONTINUED | OUTPATIENT
Start: 2018-10-11 | End: 2018-10-12

## 2018-10-11 RX ORDER — ASPIRIN/CALCIUM CARB/MAGNESIUM 324 MG
81 TABLET ORAL DAILY
Qty: 0 | Refills: 0 | Status: DISCONTINUED | OUTPATIENT
Start: 2018-10-11 | End: 2018-10-15

## 2018-10-11 RX ORDER — ENOXAPARIN SODIUM 100 MG/ML
40 INJECTION SUBCUTANEOUS DAILY
Qty: 0 | Refills: 0 | Status: DISCONTINUED | OUTPATIENT
Start: 2018-10-11 | End: 2018-10-15

## 2018-10-11 RX ORDER — NADOLOL 80 MG/1
80 TABLET ORAL DAILY
Qty: 0 | Refills: 0 | Status: DISCONTINUED | OUTPATIENT
Start: 2018-10-11 | End: 2018-10-15

## 2018-10-11 RX ORDER — ALPRAZOLAM 0.25 MG
0.25 TABLET ORAL DAILY
Qty: 0 | Refills: 0 | Status: DISCONTINUED | OUTPATIENT
Start: 2018-10-11 | End: 2018-10-15

## 2018-10-11 RX ORDER — CIPROFLOXACIN LACTATE 400MG/40ML
400 VIAL (ML) INTRAVENOUS EVERY 12 HOURS
Qty: 0 | Refills: 0 | Status: DISCONTINUED | OUTPATIENT
Start: 2018-10-11 | End: 2018-10-15

## 2018-10-11 RX ORDER — INFLUENZA VIRUS VACCINE 15; 15; 15; 15 UG/.5ML; UG/.5ML; UG/.5ML; UG/.5ML
0.5 SUSPENSION INTRAMUSCULAR ONCE
Qty: 0 | Refills: 0 | Status: DISCONTINUED | OUTPATIENT
Start: 2018-10-11 | End: 2018-10-15

## 2018-10-11 RX ORDER — PIPERACILLIN AND TAZOBACTAM 4; .5 G/20ML; G/20ML
3.38 INJECTION, POWDER, LYOPHILIZED, FOR SOLUTION INTRAVENOUS ONCE
Qty: 0 | Refills: 0 | Status: COMPLETED | OUTPATIENT
Start: 2018-10-11 | End: 2018-10-11

## 2018-10-11 RX ORDER — DIPHENHYDRAMINE HCL 50 MG
25 CAPSULE ORAL ONCE
Qty: 0 | Refills: 0 | Status: COMPLETED | OUTPATIENT
Start: 2018-10-11 | End: 2018-10-11

## 2018-10-11 RX ORDER — METRONIDAZOLE 500 MG
500 TABLET ORAL EVERY 8 HOURS
Qty: 0 | Refills: 0 | Status: DISCONTINUED | OUTPATIENT
Start: 2018-10-11 | End: 2018-10-15

## 2018-10-11 RX ADMIN — Medication 25 MILLIGRAM(S): at 03:13

## 2018-10-11 RX ADMIN — PIPERACILLIN AND TAZOBACTAM 200 GRAM(S): 4; .5 INJECTION, POWDER, LYOPHILIZED, FOR SOLUTION INTRAVENOUS at 02:45

## 2018-10-11 RX ADMIN — Medication 100 MILLIGRAM(S): at 11:11

## 2018-10-11 RX ADMIN — Medication 200 MILLIGRAM(S): at 08:34

## 2018-10-11 RX ADMIN — Medication 1000 MILLIGRAM(S): at 23:00

## 2018-10-11 RX ADMIN — Medication 1000 MILLIGRAM(S): at 16:42

## 2018-10-11 RX ADMIN — Medication 200 MILLIGRAM(S): at 21:35

## 2018-10-11 RX ADMIN — Medication 400 MILLIGRAM(S): at 15:58

## 2018-10-11 RX ADMIN — Medication 400 MILLIGRAM(S): at 22:35

## 2018-10-11 RX ADMIN — Medication 100 MILLIGRAM(S): at 19:25

## 2018-10-11 RX ADMIN — MORPHINE SULFATE 4 MILLIGRAM(S): 50 CAPSULE, EXTENDED RELEASE ORAL at 00:00

## 2018-10-11 RX ADMIN — SODIUM CHLORIDE 75 MILLILITER(S): 9 INJECTION, SOLUTION INTRAVENOUS at 05:27

## 2018-10-11 RX ADMIN — Medication 81 MILLIGRAM(S): at 12:19

## 2018-10-11 NOTE — H&P ADULT - NSHPPHYSICALEXAM_GEN_ALL_CORE
Vital Signs Last 24 Hrs  T(C): 36.6 (10 Oct 2018 23:15), Max: 36.7 (10 Oct 2018 20:19)  T(F): 97.8 (10 Oct 2018 23:15), Max: 98 (10 Oct 2018 20:19)  HR: 51 (10 Oct 2018 23:15) (51 - 98)  BP: 108/58 (10 Oct 2018 23:15) (105/71 - 112/70)  BP(mean): --  RR: 15 (10 Oct 2018 23:15) (14 - 20)  SpO2: 97% (10 Oct 2018 23:15) (97% - 99%)    PHYSICAL EXAM:  Constitutional: resting in bed with no acute distress  Respiratory:  unlabored breathing on room air   Gastrointestinal: Abdomen soft, non distended, tender to palpation over right lower quadrant with mild guarding   Extremities:  No edema, no calf tenderness

## 2018-10-11 NOTE — PROGRESS NOTE ADULT - ASSESSMENT
perforated diverticulitis with pelvic collection    - cont npo, ivf, iv abx  - f/u labs in am  - possible repeat CT Sun to reassess for possible IR drainage  - if no improvement or decompensates may require laparotomy and hartmans procedure this admission  - d/w pt at bedside in detail

## 2018-10-11 NOTE — H&P ADULT - ASSESSMENT
52 yo male with history of congenital ASD s/p repair (2015) presented with 2 weeks of abdominal pain, found to have acute diverticulitis (1st episode) with an abscess 3.6x3.6 cm (Hinchey I/II). Patient is afebrile with stable vital sign. There is no leukocytosis on the initial lab or left shift.     Plan:   Will admit patient to red surgery under Dr. Vidal  Medical management of the diveticulitis with NPO/IVF and antibiotic (of note, patient developed hives after getting 1 dose of zosyn in the ER), likely cipro/flagyl   Will consult IR in the am to assess whether the abscess is amenable to drainage   Discussed with Dr. Vidal

## 2018-10-11 NOTE — H&P ADULT - NSHPLABSRESULTS_GEN_ALL_CORE
CBC (10-10 @ 21:47)                          13.2                     9.3     )--------------(  273        74.4  % Neuts, 16.1  % Lymphs, ANC: 6.9                             38.7<L>    BMP (10-10 @ 21:)       134<L>  |  99      |  14    			Ca++ --      Ca 8.7          ---------------------------------( 118<H>		Mg 2.2          4.2     |  20<L>   |  0.81  			Ph 3.8       LFTs (10-10 @ 21:47)      TPro 7.0 / Alb 3.5 / TBili 0.5 / DBili -- / AST 21 / ALT 9<L> / AlkPhos 70    Coags (10-10 @ 21:47)  aPTT 26.0<L> / INR 1.28<H> / PT 14.0<H>    VBG (10-10 @ 21:47)     7.41 / 40 / 45 / 25 / 0.9 / 79%      Lactate: 1.4    Urinalysis (10-10 @ 21:47):     Color: Yellow / Appearance: Clear / S.030<H> / pH: 6.0 / Gluc: Negative / Ketones: Negative / Bili: Negative / Urobili: Negative / Protein :30 mg/dL<!> / Nitrites: Negative / Leuk.Est: Negative / RBC: 0 / WBC: 3 / Sq Epi:  / Non Sq Epi: 0 / Bacteria Negative       EXAM:  CT ABDOMEN AND PELVIS OC IC                        PROCEDURE DATE:  10/11/2018      FINDINGS:  LOWER CHEST: Unchanged cardiomegaly. Pacemaker lead in the right   ventricle. Enlarged pulmonary vasculature, possibly pulmonary arteries on   the basis of pulmonary arterial hypertension.  LIVER: Within normal limits.  BILE DUCTS: Normal caliber.  GALLBLADDER: Within normal limits.  SPLEEN: Within normal limits.  PANCREAS: Within normal limits.  ADRENALS: Within normal limits.  KIDNEYS/URETERS: Within normal limits.  BLADDER: Within normal limits.  REPRODUCTIVE ORGANS: The prostate is enlarged.  BOWEL: No bowel obstruction. Perforated sigmoid diverticulitis with gas   liquid loculated pocket that measures 3.6 x 3.6 cm. Mild thickening of an   adjacent loop of small bowel. Appendix is normal.  PERITONEUM: No ascites.  VESSELS:  Within normal limits.  RETROPERITONEUM: No lymphadenopathy.    ABDOMINAL WALL: Small bilateral fat-containing inguinal hernias.  BONES: Within normal limits.    IMPRESSION: Perforated sigmoid diverticulitis with an adjacent loculated   pocket of gas and liquid measuring 3.6 cm..

## 2018-10-11 NOTE — H&P ADULT - HISTORY OF PRESENT ILLNESS
52 yo male with history of congenital ASD s/p repair in 2015 complicated by cardiac arrest requiring AICD placement presented with abdominal pain for 2 weeks. Patient was seen earlier today in the cardiac clinic for a routine work up and was sent in to the ER for further work up of the abdominal pain. Per patient, he has never had similar pain before. He feels that the pain is getting better and he is eating regular diet at home without worsening pain. Patient denies fever, chills, nausea or vomiting. Patient states that he has also been having diarrhea for the past couple days, non-bloody. Patient has never had colonoscopy before.

## 2018-10-11 NOTE — PROGRESS NOTE ADULT - SUBJECTIVE AND OBJECTIVE BOX
INTERVAL HPI/OVERNIGHT EVENTS:    pt reports pain improved, collection not amenable to drainage per IR        MEDICATIONS  (STANDING):  aspirin enteric coated 81 milliGRAM(s) Oral daily  ciprofloxacin   IVPB 400 milliGRAM(s) IV Intermittent every 12 hours  enoxaparin Injectable 40 milliGRAM(s) SubCutaneous daily  influenza   Vaccine 0.5 milliLiter(s) IntraMuscular once  lactated ringers. 1000 milliLiter(s) (75 mL/Hr) IV Continuous <Continuous>  metroNIDAZOLE  IVPB 500 milliGRAM(s) IV Intermittent every 8 hours  nadolol 80 milliGRAM(s) Oral daily    MEDICATIONS  (PRN):  ALPRAZolam 0.25 milliGRAM(s) Oral daily PRN anxiety      Vital Signs Last 24 Hrs  T(C): 36.7 (11 Oct 2018 18:22), Max: 36.9 (11 Oct 2018 03:48)  T(F): 98.1 (11 Oct 2018 18:22), Max: 98.4 (11 Oct 2018 03:48)  HR: 56 (11 Oct 2018 18:22) (48 - 59)  BP: 106/63 (11 Oct 2018 18:22) (106/63 - 119/75)  BP(mean): --  RR: 18 (11 Oct 2018 18:22) (13 - 18)  SpO2: 96% (11 Oct 2018 18:22) (96% - 100%)  I&O's Detail    11 Oct 2018 07:01  -  11 Oct 2018 21:13  --------------------------------------------------------  IN:    IV PiggyBack: 100 mL    lactated ringers.: 75 mL  Total IN: 175 mL    OUT:    Voided: 175 mL  Total OUT: 175 mL    Total NET: 0 mL          REVIEW OF SYSTEMS:  CONSTITUTIONAL: No weakness, fevers or chills  EYES/ENT: No visual changes;  No vertigo or throat pain   NECK: No pain or stiffness  RESPIRATORY: No cough, wheezing, hemoptysis; No shortness of breath  CARDIOVASCULAR: No chest pain or palpitations  GASTROINTESTINAL: No abdominal or epigastric pain. No nausea, vomiting, or hematemesis; No diarrhea or constipation. No melena or hematochezia.  GENITOURINARY: No dysuria, frequency or hematuria  NEUROLOGICAL: No numbness or weakness  SKIN: No itching, burning, rashes, or lesions   All other review of systems is negative unless indicated above.    Physical Exam:  General: WN/WD NAD  Neurology: A&Ox3, nonfocal, BUTCHER x 4  Respiratory: CTA B/L  CV: RRR, S1S2, no murmurs, rubs or gallops  Abdominal: Soft, mildly distended, + LLQ and suprapubic tenderness +BS, Last BM  Extremities: No edema, + peripheral pulses        LABS:                        12.3   6.61  )-----------( 265      ( 11 Oct 2018 08:10 )             37.3     10-11    138  |  107  |  11  ----------------------------<  101<H>  4.1   |  20<L>  |  0.84    Ca    8.5      11 Oct 2018 05:09  Phos  3.3     10-11  Mg     2.3     10-11    TPro  7.0  /  Alb  3.5  /  TBili  0.5  /  DBili  x   /  AST  21  /  ALT  9<L>  /  AlkPhos  70  10-10    PT/INR - ( 10 Oct 2018 21:47 )   PT: 14.0 sec;   INR: 1.28 ratio         PTT - ( 10 Oct 2018 21:47 )  PTT:26.0 sec  Urinalysis Basic - ( 10 Oct 2018 21:47 )    Color: Yellow / Appearance: Clear / S.030 / pH: x  Gluc: x / Ketone: Negative  / Bili: Negative / Urobili: Negative   Blood: x / Protein: 30 mg/dL / Nitrite: Negative   Leuk Esterase: Negative / RBC: 0 /hpf / WBC 3 /hpf   Sq Epi: x / Non Sq Epi: 0 /hpf / Bacteria: Negative        RADIOLOGY & ADDITIONAL STUDIES:

## 2018-10-11 NOTE — ED ADULT NURSE REASSESSMENT NOTE - NS ED NURSE REASSESS COMMENT FT1
Pt admitted, awaiting bed, VS repeated, c/o anxiety/palpitations but refusing PRN medication offered.
Pt medicated for pain as per MD order. Brandi HAN made aware of patient's bradycardia, as per MD "the patient is baseline bradycardic and has an AICD. His blood pressure is ok, please give morphine as ordered." Actions carried out as ordered. VS done prior to medication administration. Pt given oral contrast for pending CT ABD, verbalized understanding
Pt resting comfortably, awaiting bed. LR running, pt maintained NPO as ordered
pt having allergic reaction to zosyn hanging at bedside. zosyn removed from bedside. NS infusing. pt given 25mg benadryl IVP. pt has large red welts with swelling around welts noted. pt Hermes itching, swelling to tongue or lips or difficulty breathing. will continue to monitor.
Pain reassessment: pt reports unchanged pain in groin, Gujral MD made aware. Pt pending CT

## 2018-10-12 LAB
ANION GAP SERPL CALC-SCNC: 12 MMOL/L — SIGNIFICANT CHANGE UP (ref 5–17)
BUN SERPL-MCNC: 12 MG/DL — SIGNIFICANT CHANGE UP (ref 7–23)
CALCIUM SERPL-MCNC: 8.9 MG/DL — SIGNIFICANT CHANGE UP (ref 8.4–10.5)
CHLORIDE SERPL-SCNC: 106 MMOL/L — SIGNIFICANT CHANGE UP (ref 96–108)
CO2 SERPL-SCNC: 20 MMOL/L — LOW (ref 22–31)
CREAT SERPL-MCNC: 0.8 MG/DL — SIGNIFICANT CHANGE UP (ref 0.5–1.3)
GLUCOSE SERPL-MCNC: 79 MG/DL — SIGNIFICANT CHANGE UP (ref 70–99)
HCT VFR BLD CALC: 39.9 % — SIGNIFICANT CHANGE UP (ref 39–50)
HGB BLD-MCNC: 13.3 G/DL — SIGNIFICANT CHANGE UP (ref 13–17)
MAGNESIUM SERPL-MCNC: 2.2 MG/DL — SIGNIFICANT CHANGE UP (ref 1.6–2.6)
MCHC RBC-ENTMCNC: 27.9 PG — SIGNIFICANT CHANGE UP (ref 27–34)
MCHC RBC-ENTMCNC: 33.3 GM/DL — SIGNIFICANT CHANGE UP (ref 32–36)
MCV RBC AUTO: 83.6 FL — SIGNIFICANT CHANGE UP (ref 80–100)
PHOSPHATE SERPL-MCNC: 3.5 MG/DL — SIGNIFICANT CHANGE UP (ref 2.5–4.5)
PLATELET # BLD AUTO: 341 K/UL — SIGNIFICANT CHANGE UP (ref 150–400)
POTASSIUM SERPL-MCNC: 5.2 MMOL/L — SIGNIFICANT CHANGE UP (ref 3.5–5.3)
POTASSIUM SERPL-SCNC: 5.2 MMOL/L — SIGNIFICANT CHANGE UP (ref 3.5–5.3)
RBC # BLD: 4.77 M/UL — SIGNIFICANT CHANGE UP (ref 4.2–5.8)
RBC # FLD: 11.9 % — SIGNIFICANT CHANGE UP (ref 10.3–14.5)
SODIUM SERPL-SCNC: 138 MMOL/L — SIGNIFICANT CHANGE UP (ref 135–145)
WBC # BLD: 5.85 K/UL — SIGNIFICANT CHANGE UP (ref 3.8–10.5)
WBC # FLD AUTO: 5.85 K/UL — SIGNIFICANT CHANGE UP (ref 3.8–10.5)

## 2018-10-12 RX ORDER — DEXTROSE MONOHYDRATE, SODIUM CHLORIDE, AND POTASSIUM CHLORIDE 50; .745; 4.5 G/1000ML; G/1000ML; G/1000ML
1000 INJECTION, SOLUTION INTRAVENOUS
Qty: 0 | Refills: 0 | Status: DISCONTINUED | OUTPATIENT
Start: 2018-10-12 | End: 2018-10-14

## 2018-10-12 RX ORDER — ACETAMINOPHEN 500 MG
1000 TABLET ORAL ONCE
Qty: 0 | Refills: 0 | Status: COMPLETED | OUTPATIENT
Start: 2018-10-12 | End: 2018-10-12

## 2018-10-12 RX ADMIN — Medication 200 MILLIGRAM(S): at 17:45

## 2018-10-12 RX ADMIN — Medication 1000 MILLIGRAM(S): at 16:53

## 2018-10-12 RX ADMIN — Medication 100 MILLIGRAM(S): at 05:38

## 2018-10-12 RX ADMIN — ENOXAPARIN SODIUM 40 MILLIGRAM(S): 100 INJECTION SUBCUTANEOUS at 12:02

## 2018-10-12 RX ADMIN — Medication 100 MILLIGRAM(S): at 11:13

## 2018-10-12 RX ADMIN — Medication 100 MILLIGRAM(S): at 18:38

## 2018-10-12 RX ADMIN — Medication 200 MILLIGRAM(S): at 08:29

## 2018-10-12 RX ADMIN — Medication 81 MILLIGRAM(S): at 12:00

## 2018-10-12 RX ADMIN — Medication 400 MILLIGRAM(S): at 16:52

## 2018-10-12 NOTE — PROGRESS NOTE ADULT - SUBJECTIVE AND OBJECTIVE BOX
Red Surgery Daily Progress Note    Subjective: Pt seen and examined at bedside. C/o RLQ pain and dizziness. Passing flatus/BM.  Denies cp, sob, palpitations, HA, fever, chills, N/V.      Objective:  Vital Signs Last 24 Hrs  T(C): 36.6 (12 Oct 2018 06:48), Max: 36.7 (11 Oct 2018 08:25)  T(F): 97.8 (12 Oct 2018 06:48), Max: 98.1 (11 Oct 2018 08:25)  HR: 57 (12 Oct 2018 06:48) (50 - 59)  BP: 150/85 (12 Oct 2018 06:48) (106/63 - 150/85)  BP(mean): --  RR: 18 (12 Oct 2018 06:48) (17 - 18)  SpO2: 99% (12 Oct 2018 06:48) (96% - 99%)    I&O's Summary    11 Oct 2018 07:01  -  12 Oct 2018 07:00  --------------------------------------------------------  IN: 1375 mL / OUT: 750 mL / NET: 625 mL        Physical Exam:  Constitutional: resting in bed, NAD, A&O x3  Respiratory:  unlabored breathing  Gastrointestinal: Abdomen soft, non distended, tender to palpation over right lower quadrant with mild guarding   Extremities:  No edema, no calf tenderness      LABS:                        12.3   6.61  )-----------( 265      ( 11 Oct 2018 08:10 )             37.3     10-11    138  |  107  |  11  ----------------------------<  101<H>  4.1   |  20<L>  |  0.84    Ca    8.5      11 Oct 2018 05:09  Phos  3.3     10-11  Mg     2.3     10-11    TPro  7.0  /  Alb  3.5  /  TBili  0.5  /  DBili  x   /  AST  21  /  ALT  9<L>  /  AlkPhos  70  10-10    PT/INR - ( 10 Oct 2018 21:47 )   PT: 14.0 sec;   INR: 1.28 ratio         PTT - ( 10 Oct 2018 21:47 )  PTT:26.0 sec  Urinalysis Basic - ( 10 Oct 2018 21:47 )    Color: Yellow / Appearance: Clear / S.030 / pH: x  Gluc: x / Ketone: Negative  / Bili: Negative / Urobili: Negative   Blood: x / Protein: 30 mg/dL / Nitrite: Negative   Leuk Esterase: Negative / RBC: 0 /hpf / WBC 3 /hpf   Sq Epi: x / Non Sq Epi: 0 /hpf / Bacteria: Negative        RADIOLOGY & ADDITIONAL STUDIES:

## 2018-10-12 NOTE — PROGRESS NOTE ADULT - ASSESSMENT
Assessment: 54 yo male with history of congenital ASD s/p repair (2015) presented with 2 weeks of abdominal pain, found to have acute diverticulitis (1st episode) with an abscess 3.6x3.6 cm (Hinchey I/II). Patient is afebrile with stable vital sign. There is no leukocytosis on the initial lab or left shift.     Plan:   -NPO/IVF  -Cipro/Flagyl  -As per IR yesterday, collection not drainable  -Pain control  -DVT PPX  -OOB/Amb  -IS  -f/u labs    [id number]

## 2018-10-12 NOTE — DISCUSSION/SUMMARY
[FreeTextEntry1] : 53M with history of congenital ASD s/p repair (2015) presented with 2 weeks of abdominal pain, found to have acute diverticulitis (1st episode) with an abscess 3.6x3.6 cm (Hinchey I/II). Admitted under surgery for medical management of diverticulitis. will make follow up appointment upon discharge notification from hospital.

## 2018-10-13 LAB
ANION GAP SERPL CALC-SCNC: 14 MMOL/L — SIGNIFICANT CHANGE UP (ref 5–17)
BUN SERPL-MCNC: 5 MG/DL — LOW (ref 7–23)
CALCIUM SERPL-MCNC: 8.4 MG/DL — SIGNIFICANT CHANGE UP (ref 8.4–10.5)
CHLORIDE SERPL-SCNC: 104 MMOL/L — SIGNIFICANT CHANGE UP (ref 96–108)
CO2 SERPL-SCNC: 23 MMOL/L — SIGNIFICANT CHANGE UP (ref 22–31)
CREAT SERPL-MCNC: 0.81 MG/DL — SIGNIFICANT CHANGE UP (ref 0.5–1.3)
GLUCOSE SERPL-MCNC: 115 MG/DL — HIGH (ref 70–99)
HCT VFR BLD CALC: 37.6 % — LOW (ref 39–50)
HGB BLD-MCNC: 12.4 G/DL — LOW (ref 13–17)
MAGNESIUM SERPL-MCNC: 2.1 MG/DL — SIGNIFICANT CHANGE UP (ref 1.6–2.6)
MCHC RBC-ENTMCNC: 27.6 PG — SIGNIFICANT CHANGE UP (ref 27–34)
MCHC RBC-ENTMCNC: 33 GM/DL — SIGNIFICANT CHANGE UP (ref 32–36)
MCV RBC AUTO: 83.6 FL — SIGNIFICANT CHANGE UP (ref 80–100)
PHOSPHATE SERPL-MCNC: 2.7 MG/DL — SIGNIFICANT CHANGE UP (ref 2.5–4.5)
PLATELET # BLD AUTO: 266 K/UL — SIGNIFICANT CHANGE UP (ref 150–400)
POTASSIUM SERPL-MCNC: 3.9 MMOL/L — SIGNIFICANT CHANGE UP (ref 3.5–5.3)
POTASSIUM SERPL-SCNC: 3.9 MMOL/L — SIGNIFICANT CHANGE UP (ref 3.5–5.3)
RBC # BLD: 4.5 M/UL — SIGNIFICANT CHANGE UP (ref 4.2–5.8)
RBC # FLD: 11.9 % — SIGNIFICANT CHANGE UP (ref 10.3–14.5)
SODIUM SERPL-SCNC: 141 MMOL/L — SIGNIFICANT CHANGE UP (ref 135–145)
WBC # BLD: 5.18 K/UL — SIGNIFICANT CHANGE UP (ref 3.8–10.5)
WBC # FLD AUTO: 5.18 K/UL — SIGNIFICANT CHANGE UP (ref 3.8–10.5)

## 2018-10-13 RX ORDER — ONDANSETRON 8 MG/1
4 TABLET, FILM COATED ORAL ONCE
Qty: 0 | Refills: 0 | Status: DISCONTINUED | OUTPATIENT
Start: 2018-10-13 | End: 2018-10-13

## 2018-10-13 RX ORDER — ACETAMINOPHEN 500 MG
650 TABLET ORAL ONCE
Qty: 0 | Refills: 0 | Status: COMPLETED | OUTPATIENT
Start: 2018-10-13 | End: 2018-10-13

## 2018-10-13 RX ADMIN — Medication 81 MILLIGRAM(S): at 11:21

## 2018-10-13 RX ADMIN — Medication 200 MILLIGRAM(S): at 17:04

## 2018-10-13 RX ADMIN — ENOXAPARIN SODIUM 40 MILLIGRAM(S): 100 INJECTION SUBCUTANEOUS at 11:21

## 2018-10-13 RX ADMIN — Medication 650 MILLIGRAM(S): at 01:53

## 2018-10-13 RX ADMIN — Medication 650 MILLIGRAM(S): at 02:23

## 2018-10-13 RX ADMIN — Medication 100 MILLIGRAM(S): at 11:21

## 2018-10-13 RX ADMIN — Medication 100 MILLIGRAM(S): at 04:26

## 2018-10-13 RX ADMIN — Medication 100 MILLIGRAM(S): at 18:43

## 2018-10-13 RX ADMIN — Medication 200 MILLIGRAM(S): at 05:41

## 2018-10-13 NOTE — PROGRESS NOTE ADULT - SUBJECTIVE AND OBJECTIVE BOX
Red Surgery Daily Progress Note    Subjective: Pt seen and examined at bedside. C/o RLQ pain. Passing flatus/BM.  no nausea or vomiting.       Objective:  Vital Signs Last 24 Hrs  T(C): 36.6 (12 Oct 2018 06:48), Max: 36.7 (11 Oct 2018 08:25)  T(F): 97.8 (12 Oct 2018 06:48), Max: 98.1 (11 Oct 2018 08:25)  HR: 57 (12 Oct 2018 06:48) (50 - 59)  BP: 150/85 (12 Oct 2018 06:48) (106/63 - 150/85)  BP(mean): --  RR: 18 (12 Oct 2018 06:48) (17 - 18)  SpO2: 99% (12 Oct 2018 06:48) (96% - 99%)    I&O's Summary    11 Oct 2018 07:01  -  12 Oct 2018 07:00  --------------------------------------------------------  IN: 1375 mL / OUT: 750 mL / NET: 625 mL        Physical Exam:  Constitutional: resting in bed, NAD, A&O x3  Respiratory:  unlabored breathing  Gastrointestinal: Abdomen soft, non distended, tender to palpation over right lower quadrant with mild guarding   Extremities:  No edema, no calf tenderness      LABS:                        12.3   6.61  )-----------( 265      ( 11 Oct 2018 08:10 )             37.3     10-11    138  |  107  |  11  ----------------------------<  101<H>  4.1   |  20<L>  |  0.84    Ca    8.5      11 Oct 2018 05:09  Phos  3.3     10-11  Mg     2.3     10-11    TPro  7.0  /  Alb  3.5  /  TBili  0.5  /  DBili  x   /  AST  21  /  ALT  9<L>  /  AlkPhos  70  10-10    PT/INR - ( 10 Oct 2018 21:47 )   PT: 14.0 sec;   INR: 1.28 ratio         PTT - ( 10 Oct 2018 21:47 )  PTT:26.0 sec  Urinalysis Basic - ( 10 Oct 2018 21:47 )    Color: Yellow / Appearance: Clear / S.030 / pH: x  Gluc: x / Ketone: Negative  / Bili: Negative / Urobili: Negative   Blood: x / Protein: 30 mg/dL / Nitrite: Negative   Leuk Esterase: Negative / RBC: 0 /hpf / WBC 3 /hpf   Sq Epi: x / Non Sq Epi: 0 /hpf / Bacteria: Negative        RADIOLOGY & ADDITIONAL STUDIES:

## 2018-10-13 NOTE — PROGRESS NOTE ADULT - ASSESSMENT
Assessment: 54 yo male with history of congenital ASD s/p repair (2015) presented with 2 weeks of abdominal pain, found to have acute diverticulitis (1st episode) with an abscess 3.6x3.6 cm (Hinchey I/II). Patient is afebrile with stable vital sign. There is no leukocytosis on the initial lab or left shift.     Plan:   -CLD  -Cipro/Flagyl  -Pain control  -DVT PPX  -OOB/Amb  -f/u labs

## 2018-10-13 NOTE — PROGRESS NOTE ADULT - SUBJECTIVE AND OBJECTIVE BOX
Mr. Winkler complains of suprapubic pain. He denies any nausea or vomiting. He is passing flatus.    ICU Vital Signs Last 24 Hrs  T(C): 37.1 (13 Oct 2018 04:53), Max: 37.1 (13 Oct 2018 04:53)  T(F): 98.7 (13 Oct 2018 04:53), Max: 98.7 (13 Oct 2018 04:53)  HR: 57 (13 Oct 2018 04:53) (50 - 61)  BP: 125/77 (13 Oct 2018 04:53) (113/74 - 137/76)  BP(mean): --  ABP: --  ABP(mean): --  RR: 18 (13 Oct 2018 04:53) (16 - 18)  SpO2: 97% (13 Oct 2018 04:53) (97% - 98%)      General: comfortable in bed, NAD, AOx3  Abd: Soft, not distended, tender in the suprapubic area, without rebound or guarding.    53 year old man with diverticulitis  1. Continue antibiotics  2. Continue clear liquids, if pain worsens would back down to NPO

## 2018-10-14 LAB
ANION GAP SERPL CALC-SCNC: 16 MMOL/L — SIGNIFICANT CHANGE UP (ref 5–17)
BUN SERPL-MCNC: <4 MG/DL — LOW (ref 7–23)
CALCIUM SERPL-MCNC: 8.7 MG/DL — SIGNIFICANT CHANGE UP (ref 8.4–10.5)
CHLORIDE SERPL-SCNC: 105 MMOL/L — SIGNIFICANT CHANGE UP (ref 96–108)
CO2 SERPL-SCNC: 20 MMOL/L — LOW (ref 22–31)
CREAT SERPL-MCNC: 0.84 MG/DL — SIGNIFICANT CHANGE UP (ref 0.5–1.3)
GLUCOSE SERPL-MCNC: 99 MG/DL — SIGNIFICANT CHANGE UP (ref 70–99)
HCT VFR BLD CALC: 38.6 % — LOW (ref 39–50)
HGB BLD-MCNC: 12.8 G/DL — LOW (ref 13–17)
MAGNESIUM SERPL-MCNC: 2.2 MG/DL — SIGNIFICANT CHANGE UP (ref 1.6–2.6)
MCHC RBC-ENTMCNC: 27.8 PG — SIGNIFICANT CHANGE UP (ref 27–34)
MCHC RBC-ENTMCNC: 33.2 GM/DL — SIGNIFICANT CHANGE UP (ref 32–36)
MCV RBC AUTO: 83.7 FL — SIGNIFICANT CHANGE UP (ref 80–100)
PHOSPHATE SERPL-MCNC: 3.3 MG/DL — SIGNIFICANT CHANGE UP (ref 2.5–4.5)
PLATELET # BLD AUTO: 275 K/UL — SIGNIFICANT CHANGE UP (ref 150–400)
POTASSIUM SERPL-MCNC: 3.8 MMOL/L — SIGNIFICANT CHANGE UP (ref 3.5–5.3)
POTASSIUM SERPL-SCNC: 3.8 MMOL/L — SIGNIFICANT CHANGE UP (ref 3.5–5.3)
RBC # BLD: 4.61 M/UL — SIGNIFICANT CHANGE UP (ref 4.2–5.8)
RBC # FLD: 12 % — SIGNIFICANT CHANGE UP (ref 10.3–14.5)
SODIUM SERPL-SCNC: 141 MMOL/L — SIGNIFICANT CHANGE UP (ref 135–145)
WBC # BLD: 3.1 K/UL — LOW (ref 3.8–10.5)
WBC # FLD AUTO: 3.1 K/UL — LOW (ref 3.8–10.5)

## 2018-10-14 PROCEDURE — 74177 CT ABD & PELVIS W/CONTRAST: CPT | Mod: 26

## 2018-10-14 RX ADMIN — ENOXAPARIN SODIUM 40 MILLIGRAM(S): 100 INJECTION SUBCUTANEOUS at 12:30

## 2018-10-14 RX ADMIN — Medication 100 MILLIGRAM(S): at 12:30

## 2018-10-14 RX ADMIN — Medication 100 MILLIGRAM(S): at 03:13

## 2018-10-14 RX ADMIN — Medication 200 MILLIGRAM(S): at 06:09

## 2018-10-14 RX ADMIN — Medication 100 MILLIGRAM(S): at 17:15

## 2018-10-14 RX ADMIN — Medication 81 MILLIGRAM(S): at 12:30

## 2018-10-14 RX ADMIN — Medication 200 MILLIGRAM(S): at 17:15

## 2018-10-14 NOTE — PROGRESS NOTE ADULT - SUBJECTIVE AND OBJECTIVE BOX
Mr. Winkler is more comfortable, he states the abdominal pain is gone, he denies nausea or vomiting, he denies fever or chills. He is passing flatus. He has been ambulating. He has tolerated clear liquids.    ICU Vital Signs Last 24 Hrs  T(C): 36.9 (14 Oct 2018 05:56), Max: 37 (13 Oct 2018 13:58)  T(F): 98.5 (14 Oct 2018 05:56), Max: 98.6 (13 Oct 2018 13:58)  HR: 50 (14 Oct 2018 05:56) (50 - 60)  BP: 147/83 (14 Oct 2018 05:56) (131/73 - 147/83)  BP(mean): --  ABP: --  ABP(mean): --  RR: 18 (14 Oct 2018 05:56) (18 - 18)  SpO2: 98% (14 Oct 2018 05:56) (97% - 98%)    General: comfortable, NAD, AOx3, moving all extremities  Abd: Soft, minimal distension, not tender, no rebound, no guarding    53 year old man with acute diverticulitis  1. Repeat CT scan today  2. Advance diet today, NPO after midnight  3. Continue antibiotics

## 2018-10-15 ENCOUNTER — TRANSCRIPTION ENCOUNTER (OUTPATIENT)
Age: 53
End: 2018-10-15

## 2018-10-15 VITALS
TEMPERATURE: 98 F | OXYGEN SATURATION: 99 % | DIASTOLIC BLOOD PRESSURE: 78 MMHG | SYSTOLIC BLOOD PRESSURE: 139 MMHG | HEART RATE: 55 BPM | RESPIRATION RATE: 18 BRPM

## 2018-10-15 LAB
ANION GAP SERPL CALC-SCNC: 10 MMOL/L — SIGNIFICANT CHANGE UP (ref 5–17)
BUN SERPL-MCNC: 8 MG/DL — SIGNIFICANT CHANGE UP (ref 7–23)
CALCIUM SERPL-MCNC: 9.1 MG/DL — SIGNIFICANT CHANGE UP (ref 8.4–10.5)
CHLORIDE SERPL-SCNC: 104 MMOL/L — SIGNIFICANT CHANGE UP (ref 96–108)
CO2 SERPL-SCNC: 27 MMOL/L — SIGNIFICANT CHANGE UP (ref 22–31)
CREAT SERPL-MCNC: 0.98 MG/DL — SIGNIFICANT CHANGE UP (ref 0.5–1.3)
GLUCOSE SERPL-MCNC: 103 MG/DL — HIGH (ref 70–99)
HCT VFR BLD CALC: 40.6 % — SIGNIFICANT CHANGE UP (ref 39–50)
HGB BLD-MCNC: 13.3 G/DL — SIGNIFICANT CHANGE UP (ref 13–17)
MAGNESIUM SERPL-MCNC: 2.3 MG/DL — SIGNIFICANT CHANGE UP (ref 1.6–2.6)
MCHC RBC-ENTMCNC: 27.4 PG — SIGNIFICANT CHANGE UP (ref 27–34)
MCHC RBC-ENTMCNC: 32.8 GM/DL — SIGNIFICANT CHANGE UP (ref 32–36)
MCV RBC AUTO: 83.7 FL — SIGNIFICANT CHANGE UP (ref 80–100)
PHOSPHATE SERPL-MCNC: 4 MG/DL — SIGNIFICANT CHANGE UP (ref 2.5–4.5)
PLATELET # BLD AUTO: 283 K/UL — SIGNIFICANT CHANGE UP (ref 150–400)
POTASSIUM SERPL-MCNC: 4.1 MMOL/L — SIGNIFICANT CHANGE UP (ref 3.5–5.3)
POTASSIUM SERPL-SCNC: 4.1 MMOL/L — SIGNIFICANT CHANGE UP (ref 3.5–5.3)
RBC # BLD: 4.85 M/UL — SIGNIFICANT CHANGE UP (ref 4.2–5.8)
RBC # FLD: 12.1 % — SIGNIFICANT CHANGE UP (ref 10.3–14.5)
SODIUM SERPL-SCNC: 141 MMOL/L — SIGNIFICANT CHANGE UP (ref 135–145)
WBC # BLD: 3.84 K/UL — SIGNIFICANT CHANGE UP (ref 3.8–10.5)
WBC # FLD AUTO: 3.84 K/UL — SIGNIFICANT CHANGE UP (ref 3.8–10.5)

## 2018-10-15 RX ORDER — MOXIFLOXACIN HYDROCHLORIDE TABLETS, 400 MG 400 MG/1
1 TABLET, FILM COATED ORAL
Qty: 28 | Refills: 0 | OUTPATIENT
Start: 2018-10-15 | End: 2018-10-28

## 2018-10-15 RX ORDER — METRONIDAZOLE 500 MG
1 TABLET ORAL
Qty: 42 | Refills: 0 | OUTPATIENT
Start: 2018-10-15 | End: 2018-10-28

## 2018-10-15 RX ADMIN — Medication 200 MILLIGRAM(S): at 05:24

## 2018-10-15 RX ADMIN — Medication 81 MILLIGRAM(S): at 13:52

## 2018-10-15 RX ADMIN — Medication 100 MILLIGRAM(S): at 02:07

## 2018-10-15 NOTE — PROGRESS NOTE ADULT - SUBJECTIVE AND OBJECTIVE BOX
Red Team Surgery Progress Note     Subjective/24hour Events: No acute events overnight. Pain controlled. Tolerating diet. No N/V. No CP or SOB.    Vital Signs:  Vital Signs Last 24 Hrs  T(C): 36.4 (15 Oct 2018 05:23), Max: 36.9 (14 Oct 2018 05:56)  T(F): 97.6 (15 Oct 2018 05:23), Max: 98.5 (14 Oct 2018 05:56)  HR: 53 (15 Oct 2018 05:23) (50 - 58)  BP: 138/81 (15 Oct 2018 05:23) (114/71 - 156/81)  BP(mean): --  RR: 18 (15 Oct 2018 05:23) (18 - 18)  SpO2: 98% (15 Oct 2018 05:23) (96% - 99%)    CAPILLARY BLOOD GLUCOSE          I&O's Detail    13 Oct 2018 07:01  -  14 Oct 2018 07:00  --------------------------------------------------------  IN:    dextrose 5% + sodium chloride 0.45% with potassium chloride 20 mEq/L: 600 mL    Oral Fluid: 760 mL    Solution: 200 mL    Solution: 200 mL  Total IN: 1760 mL    OUT:    Voided: 3480 mL  Total OUT: 3480 mL    Total NET: -1720 mL      14 Oct 2018 07:01  -  15 Oct 2018 05:38  --------------------------------------------------------  IN:    dextrose 5% + sodium chloride 0.45% with potassium chloride 20 mEq/L: 600 mL    Oral Fluid: 420 mL    Solution: 200 mL    Solution: 100 mL  Total IN: 1320 mL    OUT:    Voided: 1100 mL  Total OUT: 1100 mL    Total NET: 220 mL            MEDICATIONS  (STANDING):  aspirin enteric coated 81 milliGRAM(s) Oral daily  ciprofloxacin   IVPB 400 milliGRAM(s) IV Intermittent every 12 hours  enoxaparin Injectable 40 milliGRAM(s) SubCutaneous daily  influenza   Vaccine 0.5 milliLiter(s) IntraMuscular once  metroNIDAZOLE  IVPB 500 milliGRAM(s) IV Intermittent every 8 hours  nadolol 80 milliGRAM(s) Oral daily    MEDICATIONS  (PRN):  ALPRAZolam 0.25 milliGRAM(s) Oral daily PRN anxiety        Physical Exam:  Gen: NAD  LS: nml respiratory effort  Card: pulse regularly present  GI: abd soft, nontender  Ext: warm      Labs:    10-14    141  |  105  |  <4<L>  ----------------------------<  99  3.8   |  20<L>  |  0.84    Ca    8.7      14 Oct 2018 07:25  Phos  3.3     10-14  Mg     2.2     10-14                              12.8   3.10  )-----------( 275      ( 14 Oct 2018 08:08 )             38.6               Imaging: Red Team Surgery Progress Note     Subjective/24hour Events: No acute events overnight. Pain controlled. Tolerating low fiber diet. No N/V. No CP or SOB.    Vital Signs:  Vital Signs Last 24 Hrs  T(C): 36.4 (15 Oct 2018 05:23), Max: 36.9 (14 Oct 2018 05:56)  T(F): 97.6 (15 Oct 2018 05:23), Max: 98.5 (14 Oct 2018 05:56)  HR: 53 (15 Oct 2018 05:23) (50 - 58)  BP: 138/81 (15 Oct 2018 05:23) (114/71 - 156/81)  BP(mean): --  RR: 18 (15 Oct 2018 05:23) (18 - 18)  SpO2: 98% (15 Oct 2018 05:23) (96% - 99%)    CAPILLARY BLOOD GLUCOSE          I&O's Detail    13 Oct 2018 07:01  -  14 Oct 2018 07:00  --------------------------------------------------------  IN:    dextrose 5% + sodium chloride 0.45% with potassium chloride 20 mEq/L: 600 mL    Oral Fluid: 760 mL    Solution: 200 mL    Solution: 200 mL  Total IN: 1760 mL    OUT:    Voided: 3480 mL  Total OUT: 3480 mL    Total NET: -1720 mL      14 Oct 2018 07:01  -  15 Oct 2018 05:38  --------------------------------------------------------  IN:    dextrose 5% + sodium chloride 0.45% with potassium chloride 20 mEq/L: 600 mL    Oral Fluid: 420 mL    Solution: 200 mL    Solution: 100 mL  Total IN: 1320 mL    OUT:    Voided: 1100 mL  Total OUT: 1100 mL    Total NET: 220 mL            MEDICATIONS  (STANDING):  aspirin enteric coated 81 milliGRAM(s) Oral daily  ciprofloxacin   IVPB 400 milliGRAM(s) IV Intermittent every 12 hours  enoxaparin Injectable 40 milliGRAM(s) SubCutaneous daily  influenza   Vaccine 0.5 milliLiter(s) IntraMuscular once  metroNIDAZOLE  IVPB 500 milliGRAM(s) IV Intermittent every 8 hours  nadolol 80 milliGRAM(s) Oral daily    MEDICATIONS  (PRN):  ALPRAZolam 0.25 milliGRAM(s) Oral daily PRN anxiety        Physical Exam:  Gen: NAD  LS: nml respiratory effort  Card: pulse regularly present  GI: abd soft, nontender  Ext: warm      Labs:    10-14    141  |  105  |  <4<L>  ----------------------------<  99  3.8   |  20<L>  |  0.84    Ca    8.7      14 Oct 2018 07:25  Phos  3.3     10-14  Mg     2.2     10-14                              12.8   3.10  )-----------( 275      ( 14 Oct 2018 08:08 )             38.6               Imaging:  EXAM:  CT ABDOMEN AND PELVIS IC                          PROCEDURE DATE:  10/14/2018      INTERPRETATION:  CLINICAL INFORMATION: Follow-up sigmoid diverticulitis.    COMPARISON: CT abdomen and pelvis 10/11/2018.    PROCEDURE:   CT of the Abdomen and Pelvis was performed with intravenous contrast.   Intravenous contrast: 90 ml Omnipaque 350. 10 ml discarded.  Oral contrast: None.  Sagittal and coronal reformats were performed.    FINDINGS:    LOWER CHEST: Cardiomegaly. AICD lead partially visualized. Sternotomy.    LIVER: Within normal limits.  BILE DUCTS: Normal caliber.  GALLBLADDER: Within normal limits.  SPLEEN: Within normal limits.  PANCREAS: Within normal limits.  ADRENALS: Within normal limits.  KIDNEYS/URETERS: Within normallimits.    BLADDER: Within normal limits.  REPRODUCTIVE ORGANS: The prostate is enlarged.    BOWEL: Perforated sigmoid diverticulitis. Decreased size of adjacent gas   and fluid collection, currently approximately 3.0 x 1.9 cm (3, 117),   previously 3.6 x 3.6 cm. Thickening of an adjacent loop of small bowel is   also decreased.   PERITONEUM: Trace pelvic ascites.  VESSELS:  Within normal limits.  RETROPERITONEUM: No lymphadenopathy.    ABDOMINAL WALL: Tiny fat-containing umbilical hernia.  BONES: Within normal limits.    IMPRESSION:   Interval decrease of fluid and gas collection adjacent to perforated   sigmoid diverticulitis.    Decrease of surrounding inflammatory changes in the pelvis.

## 2018-10-15 NOTE — DISCHARGE NOTE ADULT - MEDICATION SUMMARY - MEDICATIONS TO TAKE
I will START or STAY ON the medications listed below when I get home from the hospital:    Flagyl 500 mg oral tablet  -- 1 tab(s) by mouth 3 times a day   -- Do not drink alcoholic beverages when taking this medication.  Finish all this medication unless otherwise directed by prescriber.  May discolor urine or feces.    -- Indication: For Diverticulitis of large intestine with perforation and abscess without bleeding    aspirin 81 mg oral delayed release tablet  -- 1 tab(s) by mouth once a day  -- Indication: For cardio protection    Xanax 0.25 mg oral tablet  -- 0.25 milligram(s) by mouth once a day, As Needed -for anxiety MDD:0.25 mg daily  -- Avoid grapefruit and grapefruit juice while taking this medication.  Caution federal law prohibits the transfer of this drug to any person other  than the person for whom it was prescribed.  Do not take this drug if you are pregnant.  May cause drowsiness.  Alcohol may intensify this effect.  Use care when operating dangerous machinery.    -- Indication: For anxiety    nadolol 80 mg oral tablet  -- 1 tab(s) by mouth once a day  -- Indication: For control heart rate, blood pressure    Cipro 500 mg oral tablet  -- 1 tab(s) by mouth every 12 hours   -- Avoid prolonged or excessive exposure to direct and/or artificial sunlight while taking this medication.  Check with your doctor before becoming pregnant.  Do not take dairy products, antacids, or iron preparations within one hour of this medication.  Finish all this medication unless otherwise directed by prescriber.  Medication should be taken with plenty of water.    -- Indication: For Diverticulitis of large intestine with perforation and abscess without bleeding

## 2018-10-15 NOTE — DISCHARGE NOTE ADULT - CARE PLAN
Principal Discharge DX:	Perforated diverticulum of large intestine  Goal:	treat diverticulitis  Assessment and plan of treatment:	antibiotics - cipro and flagyl for 2 weeks     follow up with Dr. Vidal / Dr. Griffith in 2 weeks     notify Dr. Vidal/ Dr. Griffith if develop fever, chills, increased abdominal pain, unable to tolerate food

## 2018-10-15 NOTE — PROGRESS NOTE ADULT - ATTENDING COMMENTS
Mr. Winkler is comfortable in bed, he denies any abdominal pain, denies any nausea or vomiting. He is passing flatus. He has never had a colonoscopy in the past.    Vital Signs Last 24 Hrs  T(C): 36.6 (12 Oct 2018 09:23), Max: 36.7 (11 Oct 2018 18:22)  T(F): 97.8 (12 Oct 2018 09:23), Max: 98.1 (11 Oct 2018 18:22)  HR: 60 (12 Oct 2018 09:23) (50 - 60)  BP: 113/74 (12 Oct 2018 09:23) (106/63 - 150/85)  BP(mean): --  RR: 16 (12 Oct 2018 09:23) (16 - 18)  SpO2: 97% (12 Oct 2018 09:23) (96% - 99%)    General: comfortable in bed, NAD, AOx3  Abd: Soft, not tender, and not distended, no rebound, no guarding    53 year old man with diverticulitis  1. Continue antibiotics  2. Advance to clear liquids. If pain worsens with PO intake will need to remain NPO.  3. Plan for repeat CT scan Sunday.
Mr. Winkler is comfortable in bed, he denies any abdominal pain, denies any nausea or vomiting. He is passing flatus. He has never had a colonoscopy in the past.    Vital Signs Last 24 Hrs  T(C): 36.6 (12 Oct 2018 09:23), Max: 36.7 (11 Oct 2018 18:22)  T(F): 97.8 (12 Oct 2018 09:23), Max: 98.1 (11 Oct 2018 18:22)  HR: 60 (12 Oct 2018 09:23) (50 - 60)  BP: 113/74 (12 Oct 2018 09:23) (106/63 - 150/85)  BP(mean): --  RR: 16 (12 Oct 2018 09:23) (16 - 18)  SpO2: 97% (12 Oct 2018 09:23) (96% - 99%)    General: comfortable in bed, NAD, AOx3  Abd: Soft, not tender, and not distended, no rebound, no guarding    53 year old man with diverticulitis  1. Continue antibiotics  2. Advance to clear liquids. If pain worsens with PO intake will need to remain NPO.  3. Plan for repeat CT scan Sunday.
Mr. Winkler is comfortable in bed, he denies any abdominal pain, denies nausea or vomiting, and has had GI function.    ICU Vital Signs Last 24 Hrs  T(C): 36.4 (15 Oct 2018 05:23), Max: 36.8 (14 Oct 2018 17:23)  T(F): 97.6 (15 Oct 2018 05:23), Max: 98.3 (14 Oct 2018 17:23)  HR: 53 (15 Oct 2018 05:23) (51 - 58)  BP: 138/81 (15 Oct 2018 05:23) (114/71 - 156/81)  BP(mean): --  ABP: --  ABP(mean): --  RR: 18 (15 Oct 2018 05:23) (18 - 18)  SpO2: 98% (15 Oct 2018 05:23) (96% - 99%)      General: Comfortable in bed, NAD, AOx3  Abd: Soft, not tender, not distended, no rebound, no guarding    53 year old man with acute diverticulitis  1. Continue LRD  2. Discharge to home with 2 weeks of PO antibiotics. Patient will eventually need colonoscopy.

## 2018-10-15 NOTE — PROGRESS NOTE ADULT - ASSESSMENT
52 yo male with history of congenital ASD s/p repair (2015) presented with 2 weeks of abdominal pain, found to have acute diverticulitis (1st episode) with an abscess 3.6x3.6 cm (Hinchey I/II). Patient is afebrile with stable vital sign. There is no leukocytosis on the initial lab or left shift.     Plan:   - Pain control:   - Low fiber DASH/TLC diet  - C/w cipro/Flagyl  - Encourage OOB/Amb  - DVT ppx: Lovenox 52 yo male with history of congenital ASD s/p repair (2015) presented with 2 weeks of abdominal pain, found to have acute diverticulitis (1st episode) with an abscess 3.6x3.6 cm (Hinchey I/II). Patient is afebrile with stable vital sign. There is no leukocytosis on the initial lab or left shift.     Plan:   - Low fiber DASH/TLC diet  - C/w cipro/Flagyl  - Encourage OOB/Amb  - DVT ppx: Lovenox  - D/c today on 2wk abx

## 2018-10-15 NOTE — PROGRESS NOTE ADULT - REASON FOR ADMISSION
Acute diverticulitis

## 2018-10-15 NOTE — DISCHARGE NOTE ADULT - PATIENT PORTAL LINK FT
You can access the [facility] Patient Portal, offered by [facility], by registering with the following website: [email or web address]

## 2018-10-15 NOTE — DISCHARGE NOTE ADULT - CARE PROVIDER_API CALL
Reggie Vidal (MD), Surgery  [address]  [address]  [place] [address]  Phone: [phone]  Fax: [phone]

## 2018-10-15 NOTE — DISCHARGE NOTE ADULT - PLAN OF CARE
treat diverticulitis antibiotics - cipro and flagyl for 2 weeks     follow up with Dr. Vidal / Dr. Griffith in 2 weeks     notify Dr. Vidal/ Dr. Griffith if develop fever, chills, increased abdominal pain, unable to tolerate food

## 2018-10-15 NOTE — DISCHARGE NOTE ADULT - HOSPITAL COURSE
Pt had repeat CT scan showing improvement. Pt advanced to LRD. Pt tolerating diet, no abdominal pain discharged with 2 weeks Cipro/Flagyl to follow up with Dr Vidal 52 yo male with history of congenital ASD s/p repair in 2015 complicated by cardiac arrest requiring AICD placement presented with abdominal pain for 2 weeks. Patient was seen earlier today in the cardiac clinic for a routine work up and was sent in to the ER for further work up of the abdominal pain. Per patient, he has never had similar pain before. He feels that the pain is getting better and he is eating regular diet at home without worsening pain. Patient denies fever, chills, nausea or vomiting. Patient states that he has also been having diarrhea for the past couple days, non-bloody. Patient has never had colonoscopy before. CT scan showed Perforated sigmoid diverticulitis with an adjacent loculated pocket of gas and liquid measuring 3.6 cm Pt admitted NPO/IVF/IV Abx. Patients pain improved started on clears.   Pt had repeat CT scan showing improvement. Pt advanced to LRD. Pt tolerating diet, no abdominal pain discharged with 2 weeks Cipro/Flagyl to follow up with Dr Vidal

## 2018-10-24 ENCOUNTER — APPOINTMENT (OUTPATIENT)
Dept: INTERNAL MEDICINE | Facility: CLINIC | Age: 53
End: 2018-10-24

## 2018-11-01 ENCOUNTER — NON-APPOINTMENT (OUTPATIENT)
Age: 53
End: 2018-11-01

## 2018-11-01 ENCOUNTER — APPOINTMENT (OUTPATIENT)
Dept: INTERNAL MEDICINE | Facility: CLINIC | Age: 53
End: 2018-11-01

## 2018-11-01 ENCOUNTER — OUTPATIENT (OUTPATIENT)
Dept: OUTPATIENT SERVICES | Facility: HOSPITAL | Age: 53
LOS: 1 days | End: 2018-11-01
Payer: SELF-PAY

## 2018-11-01 VITALS
WEIGHT: 143 LBS | DIASTOLIC BLOOD PRESSURE: 70 MMHG | SYSTOLIC BLOOD PRESSURE: 120 MMHG | BODY MASS INDEX: 22.98 KG/M2 | HEIGHT: 66 IN

## 2018-11-01 DIAGNOSIS — Z87.74 PERSONAL HISTORY OF (CORRECTED) CONGENITAL MALFORMATIONS OF HEART AND CIRCULATORY SYSTEM: Chronic | ICD-10-CM

## 2018-11-01 DIAGNOSIS — Z95.810 PRESENCE OF AUTOMATIC (IMPLANTABLE) CARDIAC DEFIBRILLATOR: Chronic | ICD-10-CM

## 2018-11-01 DIAGNOSIS — I10 ESSENTIAL (PRIMARY) HYPERTENSION: ICD-10-CM

## 2018-11-01 DIAGNOSIS — R00.2 PALPITATIONS: ICD-10-CM

## 2018-11-01 PROCEDURE — G0463: CPT

## 2018-11-01 RX ORDER — SERTRALINE HYDROCHLORIDE 50 MG/1
50 TABLET, FILM COATED ORAL DAILY
Qty: 30 | Refills: 1 | Status: DISCONTINUED | COMMUNITY
Start: 2018-11-01 | End: 2018-11-01

## 2018-11-01 RX ORDER — SERTRALINE HYDROCHLORIDE 50 MG/1
50 TABLET, FILM COATED ORAL
Qty: 30 | Refills: 1 | Status: ACTIVE | COMMUNITY
Start: 2018-11-01 | End: 1900-01-01

## 2018-11-01 RX ORDER — NADOLOL 80 MG/1
80 TABLET ORAL DAILY
Qty: 30 | Refills: 1 | Status: ACTIVE | COMMUNITY
Start: 2018-11-01 | End: 1900-01-01

## 2018-11-10 NOTE — PLAN
[FreeTextEntry1] : Abdominal pain\par -possibly 2/2 inguinal hernia on right\par - Patient sent to ED \par - f/u UA and urine cx\par

## 2018-11-10 NOTE — ASSESSMENT
[FreeTextEntry1] : 52 yo male with hx a/ flutter, congenital SD repair c/b subsequent Vtach/cardiac arrest during repair (2015), presenting with abdominal pain and diarrhea x1 week, with right inguinal swelling and tenderness, nonreducible on exam.

## 2018-11-10 NOTE — HISTORY OF PRESENT ILLNESS
[Post-hospitalization from ___ Hospital] : Post-hospitalization from [unfilled] Hospital [FreeTextEntry2] : 53 M with PMH of congenital ASD repair 2015 with subsequent Vtach/cardiac arrest during repair with subsequent AICD placement (EF 50%), recent admission to [facility] for Aflutter w/ HR 240s presented to the ED with dizziness and burning chest pain.  Patient was admitted and seen by cardiology and electrophysiology. His previous medications of metoprolol and flecainide were not continued. He was instead started on Nadolol and a PRN Ativan 0.5 for anxiety. \par \par Pt presenting today with new onset abdominal pain x1 week associated with diarrhea, subjective fever and chills. Patient describes pain as sharp, intermittent, lower abdomen R>L and groin region. Patient reports 3-4 episodes of loose stool x1 week. He denies nausea, vomiting, melena, hematochezia. he denies recent travel, sick contacts, or new foods. Patient denies dysuria or frequency. \par \par

## 2018-11-10 NOTE — PHYSICAL EXAM
[No Acute Distress] : no acute distress [Well Nourished] : well nourished [Well Developed] : well developed [Well-Appearing] : well-appearing [Normal Sclera/Conjunctiva] : normal sclera/conjunctiva [PERRL] : pupils equal round and reactive to light [EOMI] : extraocular movements intact [Normal Outer Ear/Nose] : the outer ears and nose were normal in appearance [Normal Oropharynx] : the oropharynx was normal [No JVD] : no jugular venous distention [Supple] : supple [No Lymphadenopathy] : no lymphadenopathy [Thyroid Normal, No Nodules] : the thyroid was normal and there were no nodules present [No Respiratory Distress] : no respiratory distress  [Clear to Auscultation] : lungs were clear to auscultation bilaterally [No Accessory Muscle Use] : no accessory muscle use [Normal Rate] : normal rate  [Regular Rhythm] : with a regular rhythm [Normal S1, S2] : normal S1 and S2 [No Murmur] : no murmur heard [No Carotid Bruits] : no carotid bruits [No Abdominal Bruit] : a ~M bruit was not heard ~T in the abdomen [No Varicosities] : no varicosities [Pedal Pulses Present] : the pedal pulses are present [No Edema] : there was no peripheral edema [No Extremity Clubbing/Cyanosis] : no extremity clubbing/cyanosis [No Palpable Aorta] : no palpable aorta [Soft] : abdomen soft [Non-distended] : non-distended [No Masses] : no abdominal mass palpated [Normal Bowel Sounds] : normal bowel sounds [Normal Posterior Cervical Nodes] : no posterior cervical lymphadenopathy [Normal Anterior Cervical Nodes] : no anterior cervical lymphadenopathy [No CVA Tenderness] : no CVA  tenderness [No Spinal Tenderness] : no spinal tenderness [No Joint Swelling] : no joint swelling [Grossly Normal Strength/Tone] : grossly normal strength/tone [No Rash] : no rash [Normal Gait] : normal gait [Coordination Grossly Intact] : coordination grossly intact [No Focal Deficits] : no focal deficits [Deep Tendon Reflexes (DTR)] : deep tendon reflexes were 2+ and symmetric [Normal Affect] : the affect was normal [Normal Insight/Judgement] : insight and judgment were intact [Urethral Meatus] : meatus normal [Penis Abnormality] : normal circumcised penis [Scrotum] : the scrotum was normal [Testes Tenderness] : no tenderness of the testes [Testes Mass (___cm)] : there were no testicular masses [de-identified] : (+) TTP lower abdomen, no rebound tenderness. No rigidity. (+) suprapubic and groin tenderness b/l [FreeTextEntry1] : Right inguinal region swollen and tender to palpation.

## 2018-11-10 NOTE — REVIEW OF SYSTEMS
[Fever] : fever [Chills] : chills [Abdominal Pain] : abdominal pain [Diarrhea] : diarrhea [Negative] : Neurological [Nausea] : no nausea [Constipation] : no constipation [Vomiting] : no vomiting [Melena] : no melena [Dysuria] : no dysuria [Incontinence] : no incontinence [Hematuria] : no hematuria [FreeTextEntry8] : groin pain

## 2018-11-23 PROBLEM — R00.2 PALPITATIONS: Status: ACTIVE | Noted: 2017-11-03

## 2018-11-23 NOTE — PLAN
[FreeTextEntry1] : #Palpitations\par - Unclear etiology\par - EKG unchanged substantially from previous ones\par - Patient instructed to follow up with EP/cards clinic in the next few days for a device interrogation.\par - He was also informed to immediately go to the emergency room if he felt shocks and syncopal episodes.

## 2018-11-23 NOTE — REVIEW OF SYSTEMS
[Chest Pain] : chest pain [Palpitations] : palpitations [Dizziness] : dizziness [Negative] : Heme/Lymph [Claudication] : no  leg claudication [Lower Ext Edema] : no lower extremity edema [Orthopena] : no orthopnea [Paroysmal Nocturnal Dyspnea] : no paroysmal nocturnal dyspnea [Headache] : no headache [Fainting] : no fainting [Confusion] : no confusion [Unsteady Walk] : no ataxia [Memory Loss] : no memory loss

## 2018-11-23 NOTE — HISTORY OF PRESENT ILLNESS
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 Patient states he is feeling light headed and dizzy and occasionally notices chest palpitations. He denies feeling any shocks, synocpal episodes, (endorses pre-syncope).

## 2018-12-13 ENCOUNTER — APPOINTMENT (OUTPATIENT)
Dept: CARDIOLOGY | Facility: HOSPITAL | Age: 53
End: 2018-12-13

## 2018-12-26 PROCEDURE — 85730 THROMBOPLASTIN TIME PARTIAL: CPT

## 2018-12-26 PROCEDURE — 71045 X-RAY EXAM CHEST 1 VIEW: CPT

## 2018-12-26 PROCEDURE — 82803 BLOOD GASES ANY COMBINATION: CPT

## 2018-12-26 PROCEDURE — 82330 ASSAY OF CALCIUM: CPT

## 2018-12-26 PROCEDURE — 81001 URINALYSIS AUTO W/SCOPE: CPT

## 2018-12-26 PROCEDURE — 80053 COMPREHEN METABOLIC PANEL: CPT

## 2018-12-26 PROCEDURE — 84295 ASSAY OF SERUM SODIUM: CPT

## 2018-12-26 PROCEDURE — 96375 TX/PRO/DX INJ NEW DRUG ADDON: CPT

## 2018-12-26 PROCEDURE — 82565 ASSAY OF CREATININE: CPT

## 2018-12-26 PROCEDURE — 82435 ASSAY OF BLOOD CHLORIDE: CPT

## 2018-12-26 PROCEDURE — 99285 EMERGENCY DEPT VISIT HI MDM: CPT | Mod: 25

## 2018-12-26 PROCEDURE — 83735 ASSAY OF MAGNESIUM: CPT

## 2018-12-26 PROCEDURE — 93005 ELECTROCARDIOGRAM TRACING: CPT

## 2018-12-26 PROCEDURE — 85610 PROTHROMBIN TIME: CPT

## 2018-12-26 PROCEDURE — 85014 HEMATOCRIT: CPT

## 2018-12-26 PROCEDURE — 85027 COMPLETE CBC AUTOMATED: CPT

## 2018-12-26 PROCEDURE — 80048 BASIC METABOLIC PNL TOTAL CA: CPT

## 2018-12-26 PROCEDURE — 82947 ASSAY GLUCOSE BLOOD QUANT: CPT

## 2018-12-26 PROCEDURE — 83605 ASSAY OF LACTIC ACID: CPT

## 2018-12-26 PROCEDURE — 84132 ASSAY OF SERUM POTASSIUM: CPT

## 2018-12-26 PROCEDURE — 74177 CT ABD & PELVIS W/CONTRAST: CPT

## 2018-12-26 PROCEDURE — 84100 ASSAY OF PHOSPHORUS: CPT

## 2018-12-26 PROCEDURE — 96374 THER/PROPH/DIAG INJ IV PUSH: CPT | Mod: XU

## 2019-02-13 NOTE — DISCHARGE NOTE ADULT - NS AS DC PROVIDER CONTACT Y/N MULTI
Subjective   Shey King is a 67 y.o. female.   Chief Compliant: The patient presents with Knee Pain (bilateral); Urinary Tract Infection (history of uti); Diabetes; and Psoriasis    Urinary Tract Infection    This is a recurrent (No symptoms today. ) problem. Pertinent negatives include no chills, discharge, flank pain, frequency, hematuria, hesitancy, nausea, possible pregnancy, sweats, urgency or vomiting. Her past medical history is significant for recurrent UTIs.   Thyroid Problem   Presents for follow-up (Known hypothyroidism.  Feels her symptoms are stable) visit. Patient reports no diaphoresis. The symptoms have been stable.   Pain   This is a recurrent (right sided lower back pain, bilateral knee and wrist pain.  ) problem. The current episode started more than 1 year ago. The problem occurs intermittently. The problem has been waxing and waning. Associated symptoms include arthralgias, myalgias, a rash (new patch to her left elbow rough and itchy at times), swollen glands (continued swelling in nodes right side of neck and right axilla.  Biopsy earlier this year showed inflammatory ) and urinary symptoms. Pertinent negatives include no chills, diaphoresis, fever, nausea or vomiting. She has tried rest, heat, NSAIDs and lying down for the symptoms. The treatment provided mild relief.   Knee Pain      Diabetes   She presents for her follow-up diabetic visit. She has type 2 diabetes mellitus. Her disease course has been fluctuating (Under alot of stress recently with two deaths very close to her ). There are no hypoglycemic associated symptoms. Pertinent negatives for hypoglycemia include no sweats. There are no diabetic associated symptoms. There are no hypoglycemic complications. Symptoms are stable. There are no diabetic complications. Risk factors for coronary artery disease include diabetes mellitus, dyslipidemia, family history, obesity and sedentary lifestyle. Current diabetic treatment includes  "diet. She is compliant with treatment most of the time. She is following a generally healthy diet. Meal planning includes avoidance of concentrated sweets. She has not had a previous visit with a dietitian. She rarely participates in exercise. Home blood sugar record trend: notmonitoring. An ACE inhibitor/angiotensin II receptor blocker is not being taken.   Rash   This is a new problem. The current episode started more than 1 month ago. Location: Right elbow. The rash is characterized by redness, scaling and itchiness. She was exposed to nothing. Pertinent negatives include no fever or vomiting. Past treatments include anti-itch cream. The treatment provided mild relief.      The following portions of the patient's history were reviewed and updated as appropriate: allergies, current medications, past family history, past medical history, past social history, past surgical history and problem list.      Review of Systems   Constitutional: Negative for activity change (afraid to do much activity to aggravate pain ), appetite change, chills, diaphoresis and fever.   HENT: Negative.         Swollen lymph nodes     Respiratory: Negative.    Cardiovascular: Negative.    Gastrointestinal: Negative for nausea and vomiting.   Genitourinary: Negative for flank pain, frequency, hematuria, hesitancy and urgency.   Musculoskeletal: Positive for arthralgias, back pain and myalgias.        Denies any changes     Skin: Positive for rash (new patch to her left elbow rough and itchy at times).   Psychiatric/Behavioral:        Recent death of her best friend and her co workers teenage daughter     All other systems reviewed and are negative.      Procedures    Vitals: Blood pressure 131/80, pulse 87, temperature 98.5 °F (36.9 °C), temperature source Oral, height 157.5 cm (62\"), weight 88.2 kg (194 lb 6.4 oz), SpO2 98 %, not currently breastfeeding.     Allergies:   Allergies   Allergen Reactions   • Bactrim " [Sulfamethoxazole-Trimethoprim]    • Ciprofloxacin    • Penicillins    • Steri-Strip Compound Benzoin [Benzoin Compound]    • Sulfa Antibiotics           Objective   Physical Exam   Constitutional: She is oriented to person, place, and time. She appears well-developed and well-nourished. No distress.   HENT:   Head: Normocephalic.   Right Ear: External ear normal.   Left Ear: External ear normal.   Nose: Nose normal.   Mouth/Throat: Oropharynx is clear and moist. No oropharyngeal exudate.   Eyes: Conjunctivae are normal. Right eye exhibits no discharge. Left eye exhibits no discharge.   Neck: Trachea normal. No thyroid mass and no thyromegaly present.   Cardiovascular: Normal rate, regular rhythm and normal heart sounds.   No murmur heard.  Pulmonary/Chest: Effort normal and breath sounds normal.   Abdominal: Soft. Bowel sounds are normal. She exhibits no distension. There is tenderness in the suprapubic area. There is no guarding and no CVA tenderness.   Musculoskeletal:        Right shoulder: Normal.        Right knee: She exhibits bony tenderness. Tenderness found.        Left knee: She exhibits bony tenderness. Tenderness found.        Lumbar back: She exhibits bony tenderness.   Lymphadenopathy:     She has cervical adenopathy (right sided with node present righ taxillary).   Neurological: She is alert and oriented to person, place, and time.   Skin: Skin is warm and dry. She is not diaphoretic.   Psychiatric: She has a normal mood and affect. Her behavior is normal.   Nursing note and vitals reviewed.      During this visit the following were done:  Labs Reviewed [x]    Labs Ordered [x]    Radiology Reports Reviewed []    Radiology Ordered []    PCP Records Reviewed []    Referring Provider Records Reviewed []    ER Records Reviewed []    Hospital Records Reviewed []    History Obtained From Family []    Radiology Images Reviewed []    Other Reviewed [x]    Records Requested []      Assessment/Plan   Discussed  with patient impression and plan, patient verbalizes understanding  Patient missed appt with Gi and Endo scheduling due to deaths and will reschedule      Shey was seen today for knee pain, urinary tract infection, diabetes and psoriasis.    Diagnoses and all orders for this visit:    Type 2 diabetes mellitus without complication, without long-term current use of insulin (CMS/HCC)  -     CBC & Differential  -     Comprehensive Metabolic Panel  -     Hemoglobin A1c  -     MicroAlbumin, Urine, Random - Urine, Clean Catch  -     Lipid Panel  -     Magnesium  -     TSH  -     Vitamin B12  -     Vitamin D 25 Hydroxy  -     CBC Auto Differential    Frequent UTI  -     POCT urinalysis dipstick, automated    Adult onset hypothyroidism  -     TSH    Arthralgia of both knees  -     CBC & Differential  -     JULIO CESAR  -     Rheumatoid Factor  -     Sedimentation Rate  -     Uric Acid  -     CBC Auto Differential    Morbidly obese (CMS/[facility])    Rash and nonspecific skin eruption    Other orders  -     SYNTHROID 100 MCG tablet; Take 1 tablet by mouth Daily.      Patient's Body mass index is 35.56 kg/m². BMI is above normal parameters. Recommendations include: educational material, no follow-up required and nutrition counseling.          Yes

## 2019-04-05 NOTE — ED PROVIDER NOTE - PMH
AICD (automatic cardioverter/defibrillator) present    ASD (atrial septal defect)    Hypertension Additional Progress Note...

## 2019-05-14 NOTE — ED ADULT TRIAGE NOTE - HEIGHT IN CM
Gastroenterology Progress Note    Holly Blackwell is a 73 y.o. female patient. Hospitalization Day:4    I am seeing the patient today for anemia. SUBJECTIVE:    Patient appears stable. Denies abdominal pain, bloating, nausea vomiting. Overall she is doing well. Appears to have iron deficiency. Yesterday patient had a colonoscopy done and was found to have multiple polyps. Histology revealed tubular adenomas. Physical    VITALS:  BP (!) 173/63   Pulse 86   Temp 97.5 °F (36.4 °C) (Oral)   Resp 16   Ht 5' 1\" (1.549 m)   Wt 274 lb 11.1 oz (124.6 kg)   SpO2 96%   BMI 51.90 kg/m²   TEMPERATURE:  Current - Temp: 97.5 °F (36.4 °C); Max - Temp  Av.9 °F (36.6 °C)  Min: 97.5 °F (36.4 °C)  Max: 98.6 °F (37 °C)    General:  Alert and oriented,  No apparent distress  Skin- without jaundice  Eyes: anicteric sclera  Cardiac: RRR, Nl s1s2, without murmurs  Lungs CTA Bilaterally, normal effort  Abdomen soft, ND, NT, no HSM, Bowel sounds normal  Ext: without edema  Neuro: no asterixis     Data    Data Review:    Recent Labs     19  0440 19  0803 19  0644   WBC 5.6 5.3 4.1   HGB 8.8* 8.4* 7.5*   HCT 28.5* 28.0* 24.8*   MCV 73.0* 73.1* 73.3*    162 135*     Recent Labs     19  0440 19  0803    142   K 4.1 3.8    104   CO2 26 24   BUN 28* 19   CREATININE 0.95* 0.98*     No results for input(s): AST, ALT, ALB, BILIDIR, BILITOT, ALKPHOS in the last 72 hours. No results for input(s): LIPASE, AMYLASE in the last 72 hours. No results for input(s): PROTIME, INR in the last 72 hours. No results for input(s): PTT in the last 72 hours. Radiology Review:     ASSESSMENT:  Active Problems:    GI bleed    Polyp of colon  Resolved Problems:    * No resolved hospital problems. *      The conditions that I am treating are Stable and are improving    PLAN :  1. Discussed with the patient regarding colonoscopy findings. 2. Advised intravenous iron therapy.   3. From GI point 167.64

## 2019-06-24 NOTE — ED ADULT TRIAGE NOTE - SPO2 (%)
Please let the patient know to follow-up with PCP or cardiology regarding blood pressure.  If she is having any symptoms of shortness of breath more than her normal, chest pain or pressure she should go to the ER.   97

## 2019-07-10 NOTE — PATIENT PROFILE ADULT. - FUNCTIONAL SCREEN CURRENT LEVEL: EATING, MLM
(0) independent Rotation Flap Text: The defect edges were debeveled with a #15c scalpel blade.  Given the location of the defect, shape of the defect and the proximity to free margins a rotation flap was deemed most appropriate.  Using a sterile surgical marker, an appropriate rotation flap was drawn incorporating the defect and placing the expected incisions within the relaxed skin tension lines where possible.    The area thus outlined was incised deep to adipose tissue with a #15 scalpel blade.  The skin margins were undermined to an appropriate distance in all directions utilizing iris scissors.

## 2019-08-14 NOTE — PATIENT PROFILE ADULT. - NSALCOHOLAMT_GEN_A_CORE_SD
Applied 24 hr holter per Dr Mary Mcintosh dx: palps. Pt has #[id number] & due back on 8/15. Chargeable visit.   [facility]
1-2 drinks

## 2019-08-14 NOTE — ED PROVIDER NOTE - MEDICAL DECISION MAKING DETAILS
balance training/GOAL: pt will negotiate a flight of steps (I) w/i 2wks/transfer training/bed mobility training/gait training
See attending physician attestation note.

## 2020-06-02 NOTE — ED PROVIDER NOTE - CROS ED NEURO ALL NEG
Prenatal Visit    Moncho Mena is a 23 y.o. female  at 25w3d    The patient was seen and evaluated. She has no complaints. She reports positive fetal movements. She denies contractions, vaginal bleeding and leakage of fluid. Signs and symptoms of  labor as well as labor were reviewed. The S/S of Pre-Eclampsia were reviewed with the patient in detail. She is to report any of these if they occur. She currently denies any of these. The problem list reflects the active issues addressed during today's visit    Vitals:  Vitals:    20 1401   BP: 124/64   Pulse: 94         Labs: The patient is RH +, Rhogam not indicated  ABO/Rh   Date Value Ref Range Status   2020 A POSITIVE  Final       1 hour GTT test is ordered    Assessment & Plan:  Moncho Mena is a 23 y.o. female  at 25w3d   - 28 week labs ordered, including (CBC, 1 hr GTT, U/A C&S), the patient is to complete this in the next 4 weeks. - The Nuchal Translucency testing was reviewed and found to be normal   - A single marker MSAFP was reviewed and found to be normal.    - The patients anatomy ultrasound has been ordered and reviewed with patient. Patient Active Problem List    Diagnosis Date Noted    Susceptible to varicella (non-immune), currently pregnant 2020    Hx of headaches 2020    Nabothian cysts 2020     Noted on pelvic exam at initial exam on 3/17/20      Hx of blood transfusion () 2020     Due to GI bleed secondary to Motrin use      Family history of sickle cell trait (Pt negative) 2020     Maunt       Hx of GI bleed () 2018     Due Motrin use. Pt received blood transfusion        REAL in 4 weeks    The patient was counseled on Labor & Delivery. Route of delivery and counseling on vaginal, operative vaginal, and  sections were completed with the risks of each to both the patient as well as her baby.  The possibility of a blood transfusion was
- - -

## 2020-07-02 NOTE — ED ADULT TRIAGE NOTE - ACCOMPANIED BY
Patient visible for meals on the unit  Pleasant and cooperative upon approach  Patient denied any unmet needs  Denied SI  Compliant with medications  Will monitor  Non family member

## 2020-09-17 NOTE — ED ADULT NURSE NOTE - PSH
AICD (automatic cardioverter/defibrillator) present    Spontaneous ASD closure Consent: Written consent was obtained and risks were reviewed including but not limited to scarring, infection, bleeding, scabbing, incomplete removal, nerve damage and allergy to anesthesia.

## 2020-10-13 NOTE — ED PROVIDER NOTE - PROGRESS NOTE DETAILS
DISPLAY PLAN FREE TEXT AR[employer]:  Pt in no acute distress with stable vital signs.  EKG unchanged from previous.  Cardiology called after initial troponin and recommending troponin cycling and to see pt prior to discharge.  When asked cardiology reports that given same complaint as 1 mo ago with negative cath and negative device interrogation at that time there is no need to recath/repeat device interrogation.  Stable at time of signout pending repeat troponin and cardiology consult.

## 2020-11-06 NOTE — ED CDU PROVIDER DISPOSITION NOTE - CLINICAL COURSE
54 yo male PMHx ASD repair 2015 with subsequent vtach/cardiac arrest during repair with subsequent AICD placement presented to the ED c/o chest pain and dizziness. Patient was out shopping and began to develop dizziness and pressure like chest pain w/o radiation but with associated headache and diaphoresis and allegedly collapsed without LOC. EMS was called by staff of the store and he was found 1 block down from store still c/o chest pain. EMS gave 162 mg ASA and he was brought to ED. Symptoms have been going on for the past week with some associated shortness of breath.   In ED CBC/CMP wnl. Initial trop 11 and repeat was 10 ~3 hours later. TTE in ED revealed no pericardial effusion. CXR clear lungs. EP interrogated pacemaker and was NSR. Cards fellow was consulted and recommended CDU for further workup with exercise nuclear stress test.   Overnight in CDU patient reported 1 episode of palpitations without chest pain or dizziness, lasting ~ 5 minutes. EKG was performed at that time which showed upright tw in V4-6 now changed from previously inverted on initial EKG. ED FT MD Dr. Lopez made aware and advised repeating EKG in 15 minutes to assess further changes. Repeat EKG done after appeared unchanged from most recent and was shown to both Dr. Lopez and nocturnal cardiologist Dr. Villa who was down in CDU at the time evaluating another patient and both advised to just continue monitoring since no dynamic ischemic changes. Patient did well the rest of the night and was without return of any symptoms including cp, dizziness, or palpitations. Stress test in AM resulted as ________________________. 54 yo male PMHx ASD repair 2015 with subsequent vtach/cardiac arrest during repair with subsequent AICD placement presented to the ED c/o chest pain and dizziness. Patient was out shopping and began to develop dizziness and pressure like chest pain w/o radiation but with associated headache and diaphoresis and allegedly collapsed without LOC. EMS was called by staff of the store and he was found 1 block down from store still c/o chest pain. EMS gave 162 mg ASA and he was brought to ED. Symptoms have been going on for the past week with some associated shortness of breath.   In ED CBC/CMP wnl. Initial trop 11 and repeat was 10 ~3 hours later. TTE in ED revealed no pericardial effusion. CXR clear lungs. EP interrogated pacemaker and was NSR. Cards fellow was consulted and recommended CDU for further workup with exercise nuclear stress test.   Overnight in CDU patient reported 1 episode of palpitations without chest pain or dizziness, lasting ~ 5 minutes. EKG was performed at that time which showed upright tw in V4-6 now changed from previously inverted on initial EKG. ED FT MD Dr. Lopez made aware and advised repeating EKG in 15 minutes to assess further changes. Repeat EKG done after appeared unchanged from most recent and was shown to both Dr. Lopez and nocturnal cardiologist Dr. Villa who was down in CDU at the time evaluating another patient and both advised to just continue monitoring since no dynamic ischemic changes. Patient did well the rest of the night and was without return of any symptoms including cp, dizziness, or palpitations. Stress test in AM reveals infarct, Cardiology Dr Al Pickering) cleared for d/c for outpatient cardiology follow up . Case discussed w Dr Lyons Oxybutynin Counseling:  I discussed with the patient the risks of oxybutynin including but not limited to skin rash, drowsiness, dry mouth, difficulty urinating, and blurred vision.

## 2021-01-01 NOTE — PROGRESS NOTE ADULT - PROBLEM/PLAN-1
DISPLAY PLAN FREE TEXT [Immunizations are up to date] : Immunizations are up to date [Nasal Congestion] : nasal congestion [Nl] : Allergy/Immunology [Synagis Injection] : no synagis injection

## 2021-01-13 NOTE — PROGRESS NOTE ADULT - SUBJECTIVE AND OBJECTIVE BOX
Dr Swati Lau    Electronically faxed 1/5 office visit note to Anushka Landon  supervisor's office  Patient is a 52y old  Male who presents with a chief complaint of chest pressure (02 Feb 2018 16:52)      SUBJECTIVE / OVERNIGHT EVENTS:    MEDICATIONS  (STANDING):  flecainide 50 milliGRAM(s) Oral every 12 hours  heparin  Infusion.  Unit(s)/Hr (8 mL/Hr) IV Continuous <Continuous>  metoprolol succinate ER 25 milliGRAM(s) Oral daily    MEDICATIONS  (PRN):  heparin  Injectable 4100 Unit(s) IV Push every 6 hours PRN For aPTT less than 40  nitroglycerin     SubLingual 0.4 milliGRAM(s) SubLingual every 5 minutes PRN Chest Pain        CAPILLARY BLOOD GLUCOSE        I&O's Summary    02 Feb 2018 07:01  -  03 Feb 2018 07:00  --------------------------------------------------------  IN: 1035 mL / OUT: 0 mL / NET: 1035 mL        PHYSICAL EXAM:  Vital Signs Last 24 Hrs  T(C): 36.6 (03 Feb 2018 04:34), Max: 36.6 (02 Feb 2018 19:15)  T(F): 97.8 (03 Feb 2018 04:34), Max: 97.9 (02 Feb 2018 19:15)  HR: 66 (03 Feb 2018 06:04) (65 - 77)  BP: 126/78 (03 Feb 2018 06:04) (126/78 - 146/83)  BP(mean): --  RR: 18 (03 Feb 2018 04:34) (17 - 18)  SpO2: 99% (03 Feb 2018 04:34) (97% - 100%)    GENERAL: NAD, well-developed  	HEAD:  Atraumatic, Normocephalic  	EYES: EOMI, PERRLA, conjunctiva and sclera clear  	NECK: Supple, No JVD  	CHEST/LUNG: Clear to auscultation bilaterally; No wheeze  	HEART: Regular rate and rhythm; No murmurs, rubs, or gallops  	ABDOMEN: Soft, Nontender, Nondistended; Bowel sounds present  	EXTREMITIES:  2+ Peripheral Pulses, No clubbing, cyanosis, or edema  	PSYCH: AAOx3  	NEUROLOGY: non-focal  SKIN: wound on right shin, erythematous surrounding with yellow pus in center, nontender        LABS:  (02-03 @ 05:08)                        16.6  6.4 )-----------( 207                 46.5    Neutrophils = -- (--%)  Lymphocytes = -- (--%)  Eosinophils = -- (--%)  Basophils = -- (--%)  Monocytes = -- (--%)  Bands = --%    WBC Trend: 6.4<--, 6.3<--, 6.7<--  Hb Trend: 16.6<--, 16.0<--, 16.1<--, 16.1<--  Plt Trend: 207<--, 190<--, 211<--, 181<--  02-03    141  |  104  |  15  ----------------------------<  115<H>  4.3   |  23  |  0.84    Ca    9.4      03 Feb 2018 05:08  Phos  3.3     02-03  Mg     2.2     02-03    TPro  7.1  /  Alb  4.8  /  TBili  0.5  /  DBili  x   /  AST  22  /  ALT  20  /  AlkPhos  57  02-01    Creatinine Trend: 0.84<--, 1.07<--, 0.89<--  ( 01 Feb 2018 11:56 )   PT: 10.0 sec;   INR: 0.93 ratio;       PTT:72.0 sec  CARDIAC MARKERS ( 01 Feb 2018 20:20 )  Trop <0.01 ng/mL /  U/L / CKMB x       CARDIAC MARKERS ( 01 Feb 2018 11:56 )  Trop <0.01 ng/mL /  U/L / CKMB x               Microbiology:  Urine Cx:  Blood Cx:    RADIOLOGY & ADDITIONAL TESTS:  X- Ray:  CT:  Ultrasound:  [ ] imaging personally reviewed and interpreted by me    Consultant(s) Notes Reviewed:      Care Discussed with Consultants/Other Providers: Patient is a 52y old  Male who presents with a chief complaint of chest pressure (02 Feb 2018 16:52)      SUBJECTIVE / OVERNIGHT EVENTS: No acute events overnight. Patient states he only had one episode of "spark" feeling in his chest. Denies actual chest pain, pressure, palpitations, SOB, diaphoresis, HA, lightheadedness, N/V.     MEDICATIONS  (STANDING):  flecainide 50 milliGRAM(s) Oral every 12 hours  heparin  Infusion.  Unit(s)/Hr (8 mL/Hr) IV Continuous <Continuous>  metoprolol succinate ER 25 milliGRAM(s) Oral daily    MEDICATIONS  (PRN):  heparin  Injectable 4100 Unit(s) IV Push every 6 hours PRN For aPTT less than 40  nitroglycerin     SubLingual 0.4 milliGRAM(s) SubLingual every 5 minutes PRN Chest Pain        I&O's Summary    02 Feb 2018 07:01  -  03 Feb 2018 07:00  --------------------------------------------------------  IN: 1035 mL / OUT: 0 mL / NET: 1035 mL        PHYSICAL EXAM:  Vital Signs Last 24 Hrs  T(C): 36.6 (03 Feb 2018 04:34), Max: 36.6 (02 Feb 2018 19:15)  T(F): 97.8 (03 Feb 2018 04:34), Max: 97.9 (02 Feb 2018 19:15)  HR: 66 (03 Feb 2018 06:04) (65 - 77)  BP: 126/78 (03 Feb 2018 06:04) (126/78 - 146/83)  BP(mean): --  RR: 18 (03 Feb 2018 04:34) (17 - 18)  SpO2: 99% (03 Feb 2018 04:34) (97% - 100%)    GENERAL: NAD, well-developed  	HEAD:  Atraumatic, Normocephalic  	EYES: EOMI, PERRLA, conjunctiva and sclera clear  	NECK: Supple, No JVD  	CHEST/LUNG: Clear to auscultation bilaterally; No wheeze  	HEART: Regular rate and rhythm; No murmurs, rubs, or gallops  	ABDOMEN: Soft, Nontender, Nondistended; Bowel sounds present  	EXTREMITIES:  2+ Peripheral Pulses, No clubbing, cyanosis, or edema  	PSYCH: AAOx3  	NEUROLOGY: non-focal  SKIN: wound on right shin, erythematous surrounding with yellow pus in center, nontender        LABS:  (02-03 @ 05:08)                        16.6  6.4 )-----------( 207                 46.5    Neutrophils = -- (--%)  Lymphocytes = -- (--%)  Eosinophils = -- (--%)  Basophils = -- (--%)  Monocytes = -- (--%)  Bands = --%    WBC Trend: 6.4<--, 6.3<--, 6.7<--  Hb Trend: 16.6<--, 16.0<--, 16.1<--, 16.1<--  Plt Trend: 207<--, 190<--, 211<--, 181<--  02-03    141  |  104  |  15  ----------------------------<  115<H>  4.3   |  23  |  0.84    Ca    9.4      03 Feb 2018 05:08  Phos  3.3     02-03  Mg     2.2     02-03    TPro  7.1  /  Alb  4.8  /  TBili  0.5  /  DBili  x   /  AST  22  /  ALT  20  /  AlkPhos  57  02-01    Creatinine Trend: 0.84<--, 1.07<--, 0.89<--  ( 01 Feb 2018 11:56 )   PT: 10.0 sec;   INR: 0.93 ratio;       PTT:72.0 sec  CARDIAC MARKERS ( 01 Feb 2018 20:20 )  Trop <0.01 ng/mL /  U/L / CKMB x       CARDIAC MARKERS ( 01 Feb 2018 11:56 )  Trop <0.01 ng/mL /  U/L / CKMB x           TTE:  Conclusions:  1. Moderate concentric left ventricular hypertrophy.  2. Normal left ventricular systolic function. Septal motion  consistent with right ventricular overload.  3. Mild right atrial enlargement.  Dilated coronary sinus.  4. Normal right ventricular size and function. A device  wire is noted in the right heart.  5. Estimated pulmonary artery systolic pressure equals 31  mm Hg, assuming right atrial pressure equals 8  mm Hg,  consistent with normal pulmonary pressures.  *** Compared with echocardiogram of 10/24/2017, no  significant changes noted.      Consultant(s) Notes Reviewed:  cardiology    Care Discussed with Consultants/Other Providers:

## 2021-01-22 NOTE — PATIENT PROFILE ADULT. - SURGICAL SITE INCISION
Pt states she just saw Dr Abhijit Carter 1/19/21 and states her pain is no better. CXR done and negative. She states she has been taking Ibuprofen and heat with no relief.  She did not try the Voltaren cream which was recommended at visit since she has a lot o no

## 2021-04-01 NOTE — ED PROVIDER NOTE - MUSCULOSKELETAL, MLM
clothing, phone, /eyeglasses Spine appears normal, range of motion is not limited, no muscle or joint tenderness

## 2021-06-04 NOTE — ED CLERICAL - BED REQUESTED
02-Jun-2018 17:31 In clinic device check with Device RN .  Please see link for full device report.  Patient was informed of results and next follow up during today's visit.

## 2021-06-19 NOTE — DISCHARGE NOTE ADULT - FUNCTIONAL SCREEN CURRENT LEVEL: BATHING, MLM
has been bed bound greater than a month and has been " combative" as per complaint from nursing home/aggressive behavior
(0) independent

## 2021-06-20 NOTE — PROCEDURE NOTE - INTERROGATION NOTE: ZONE I (MS)
Remote device check.  Please see link for full device report.  Patient was informed of results and next follow up via mail.     146

## 2021-08-06 NOTE — ED PROVIDER NOTE - CROS ED RESP ALL NEG
[General Appearance - Well Developed] : well developed [Normal Appearance] : normal appearance [Well Groomed] : well groomed [General Appearance - Well Nourished] : well nourished [No Deformities] : no deformities [General Appearance - In No Acute Distress] : no acute distress [] : no respiratory distress [Respiration, Rhythm And Depth] : normal respiratory rhythm and effort [Exaggerated Use Of Accessory Muscles For Inspiration] : no accessory muscle use [Auscultation Breath Sounds / Voice Sounds] : lungs were clear to auscultation bilaterally - - - [Heart Rate And Rhythm] : heart rate and rhythm were normal [Heart Sounds] : normal S1 and S2 [Abdomen Soft] : soft [Abnormal Walk] : normal gait [FreeTextEntry1] : No LE Edema  [Skin Color & Pigmentation] : normal skin color and pigmentation [Oriented To Time, Place, And Person] : oriented to person, place, and time

## 2021-08-26 NOTE — PATIENT PROFILE ADULT. - AS [facility]  Return Patient    Chief Complaint:   Chief Complaint   Patient presents with    Nail Problem     nail trim/last saw Helio Julien WEN 7/8/21    Callouses       HPI: Kehinde Edwards is a 65 y.o. female who presents to the office today for follow up of painful callouses, pain right foot and left, growing rapidly. Periodic debridements help. Painful at work. Works on her feet at [employer]. Currently denies F/C/N/V. Allergies   Allergen Reactions    Codeine Other (See Comments)     Stomach pain        Past Medical History:   Diagnosis Date    DDD (degenerative disc disease), thoracolumbar     Osteoarthritis        Prior to Admission medications    Medication Sig Start Date End Date Taking? Authorizing Provider   levothyroxine (SYNTHROID) 100 MCG tablet TAKE 1 TABLET BY MOUTH EVERY DAY 1/7/21  Yes Historical Provider, MD   vitamin D3 (CHOLECALCIFEROL) 125 MCG (5000 UT) TABS tablet Take 5,000 Units by mouth daily   Yes Historical Provider, MD   sertraline (ZOLOFT) 50 MG tablet Take 50 mg by mouth daily   Yes Historical Provider, MD   traMADol (ULTRAM) 50 MG tablet Take 50 mg by mouth.  1/7/20  Yes Historical Provider, MD   celecoxib (CELEBREX) 200 MG capsule Take 200 mg by mouth 6/27/18  Yes Historical Provider, MD       Past Surgical History:   Procedure Laterality Date    APPENDECTOMY      CARPAL TUNNEL RELEASE      FOOT SURGERY      GALLBLADDER SURGERY         Family History   Problem Relation Age of Onset    Arthritis Mother     Cancer Mother     Arthritis Father     Arthritis Brother     Diabetes Maternal Uncle     Arthritis Maternal Grandmother     Cancer Maternal Grandmother     Diabetes Maternal Grandmother     Cancer Maternal Grandfather        Social History     Tobacco Use    Smoking status: Current Every Day Smoker     Packs/day: 0.50     Years: 40.00     Pack years: 20.00     Types: Cigarettes    Smokeless tobacco: Never Used   Substance Use Topics    Alcohol use: Not on file       Review of Systems   Constitutional: Negative for chills, diaphoresis, fatigue, fever and unexpected weight change. Cardiovascular: Negative for leg swelling. Gastrointestinal: Negative for constipation, diarrhea, nausea and vomiting. Musculoskeletal: Positive for gait problem. Negative for arthralgias and joint swelling. Skin: Negative for color change, pallor, rash and wound. Neurological: Negative for weakness and numbness. Lower Extremity Physical Examination:     Vitals: There were no vitals filed for this visit. General: AAO x 3 in NAD. Vascular: DP and PT pulses palpable 2/4, Bilateral.  CFT <3 seconds, Bilateral.  Hair growth present to the level of the digits, Bilateral.  Edema absent, Bilateral.  Varicosities absent, Bilateral. Erythema absent, Bilateral    Neurological:  Sensation present to light touch to level of digits, Bilateral.    Musculoskeletal: Muscle strength 5/5, Bilateral.  Decreased fat pad sub 5th met bilateral, adductovarus left 5th toe. No Pain dorsal and medial navicular right, and resolved at the T-N joint, insertion of the PT tendon also. Integument: Warm, dry, supple, Bilateral.  Hyperkeratosis with hard central core, 3 under right 5th met head, one right sub 1st met head, left 5th toe. Radiographs: 3 views right Foot:  Severe degenerative changes at the talo-navicular joint. Fracture of the dorsal portion of the navicular, non-displaced and age indeterminate Generalized osteopenia. Asessment: Patient is a 65 y.o. female with:   1. Benign neoplasm of skin of foot, right    2. Benign neoplasm of skin of foot, left    3. Pain in both feet    4. Acquired plantar keratoderma          Plan: Patient examined and evaluated. Current condition and treatment options discussed in detail. Verbal and written instructions given to patient. Contact office with any questions/problems/concerns.      The lesion(s) was partially debrided, enucleated, and silver nitrate was applied with an apperature pad under occlusion. The patient will leave in place for 24-48 hours and than remove. The patient tolerated the procedure well and without complication. Wear good shoes    No orders of the defined types were placed in this encounter. No orders of the defined types were placed in this encounter.        RTC in 9 weeks    8/26/2021 (4) no limitation

## 2021-10-08 NOTE — ED PROVIDER NOTE - TOBACCO USE
ASSESSMENT:    lower GI bleed with a possible soft tissue prominence in the low rectum - the patient was advised to have surgery for a mass visualized on a prior colonoscopy which she refused due to her advanced age - remains hemodynamically stable with a stable H/H following transfusion of 3 units of PRBCs - the patient and family have refused colonoscopy - anticoagulation has been cautiously restarted with no evidence of recurrent bleeding    dyspnea - multifactorial -> resolved  1) decreased oxygen carrying capacity due to profound anemia  2) restrictive lung disease due to obesity limiting diaphragmatic excursion and kyphosis limiting chest wall excursion  3) unlikely to have a pulmonary embolism on full dose A/C  4) no evidence of pulmonary edema or pleural effusions despite underlying cardiac disease following IV fluids and PRBCs    PLAN/RECOMMENDATIONS:    stable oxygenation on room air  observe off systemic steroids, antibiotics and pulmonary medications  cardiology follow-up noted  cardiac meds: xarelto/digoxin/toprol XL/lasix  follow hemodynamics and H/H on A/C  remeron/seroquel/haldol as needed  bowel regimen  GI prophylaxis - protonix    Will follow with you. Plan of care discussed with the patient at bedside. No pulmonary objection to discharge.    Nikunj Whitaker MD, [facility]  [phone]  Pulmonary Medicine     ASSESSMENT:    lower GI bleed with a possible soft tissue prominence in the low rectum - the patient was advised to have surgery for a mass visualized on a prior colonoscopy which she refused due to her advanced age - remains hemodynamically stable with a stable H/H following transfusion of 3 units of PRBCs - the patient and family have refused colonoscopy - anticoagulation has been cautiously restarted with no evidence of recurrent bleeding    dyspnea - multifactorial -> resolved  1) decreased oxygen carrying capacity due to profound anemia  2) restrictive lung disease due to obesity limiting diaphragmatic excursion and kyphosis limiting chest wall excursion  3) unlikely to have a pulmonary embolism on full dose A/C  4) no evidence of pulmonary edema or pleural effusions despite underlying cardiac disease following IV fluids and PRBCs    PLAN/RECOMMENDATIONS:    stable oxygenation on room air  observe off systemic steroids, antibiotics and pulmonary medications  cardiology follow-up noted  cardiac meds: xarelto/digoxin/toprol XL/lasix  follow hemodynamics and H/H on A/C  remeron/seroquel/haldol as needed  bowel regimen  GI prophylaxis - protonix    Will follow with you. Plan of care discussed with the patient at bedside. No pulmonary objection to discharge.    The patient's respiratory status remains stable. Please call over the weekend with questions or clinical changes. Dr. Li Vaughn is on call for the practice. Cell # [phone]      Nikunj Whitaker MD, [facility]  [phone]  Pulmonary Medicine     Never smoker

## 2022-01-28 NOTE — ED PROVIDER NOTE - SECONDARY DIAGNOSIS.
Normal vision: sees adequately in most situations; can see medication labels, newsprint
Defibrillator discharge

## 2022-02-17 NOTE — H&P ADULT - PROBLEM SELECTOR PLAN 1
Alert & oriented; no sensory, motor or coordination deficits, normal reflexes s/p ICD firing  Admit to CCU  Keep K+.4.0 and Mg >2.0  D/C Flecainide and start Sotalol 80mg bid  Use Lidocaine, not Amiodarone, for sustained VT.   Likely will recommend repeat catheter ablation if patient agrees.

## 2022-05-09 NOTE — BEHAVIORAL HEALTH ASSESSMENT NOTE - JUDGMENT (REGARDING EVERYDAY EVENTS)
Fair Picato Pregnancy And Lactation Text: This medication is Pregnancy Category C. It is unknown if this medication is excreted in breast milk.

## 2022-05-14 NOTE — ED CDU PROVIDER SUBSEQUENT DAY NOTE - SOCIAL CONCERNS
Physical Therapy  Visit Type: initial evaluation  Co-treat with: Occupational therapist  Precautions:  Medical precautions: ; standard precautions. 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 Patient had a seizure early this morning that was witnessed by his brother. Patient was then transported to the emergency department where he had another seizure that was witnessed. Patient has been attending partial hospitalization program regularly. History limited due to post ictal state.       Lines:     Basic: IV and telemetry      Lines in chart and on patient reviewed, precautions maintained throughout session.    SUBJECTIVE  Patient agreed to participate in therapy this date.  \"I feel ok today.\"  Patient / Family Goal: maximize function and return home     OBJECTIVE     Oriented to person, place, time and situation     Arousal alertness: appropriate responses to stimuli  Patient activity tolerance: 2 to 1 activity to rest  Strength (out of 5 unless otherwise indicated)   WFL: LLE, RLE    Bed Mobility:        Supine to sit: independent  Training completed:      No cueing or assist needed.  Transfers:      Sit to stand: independent    Stand to sit: independent    Stand pivot: independent  Training completed:    Tasks: sit to stand, stand to sit and stand pivot    No LOB or safety concerns  Gait/Ambulation:     Assistive device: none    Distance (ft): 250    Pattern: within functional limits    Surface: even and tile  Training Completed:    Tasks: gait training on level surfaces    No LOB or safety concerns; pt using IV pole at times, but no needed for support or balance.  Stair Mobility:    Number of steps: 10;     Assistance: modified  independent    Rails: right rail only  Training completed:    Tasks: stair training    Intermittently uses railing; able to turn at the top of the stairs without assist or cueing.  Pt able to complete with reciprocal pattern.      Interventions     Skilled input: Verbal instruction/cues  Verbal Consent: Writer verbally educated and received verbal consent for hand placement, positioning of patient, and techniques to be performed today from patient for clothing adjustments for techniques as described above and how they are pertinent to the patient's plan of care.       ASSESSMENT      Patient seen on  nursing unit.  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 Patient had a seizure early this morning that was witnessed by his brother. Patient was then transported to the emergency department where he had another seizure that was witnessed. Patient has been attending partial hospitalization program regularly. History limited due to post ictal state.         Patient lives his brothers in house with a flight of stairs to enter.  Pt is independent without a device.  For safe return to prior living situation, the patient needs to be independent with mobility.      Physical therapy evaluation focused on assessing pt's functional mobility and balance. Patient is currently functioning at independent  for overall mobility.  Pt completed all transfers, gait without a device and ascended/descended 10 stairs with independence.      Patient presents at baseline . Patient currently has assistance to return to their previous living situation from his brothers.  Pt does not have inpatient therapy needs and will be removed from caseload.  Pt is safe to discharge home once medically cleared.        Discharge Recommendations       Recommendation for Discharge: PT WI: Home             PT/OT Mobility Equipment for Discharge: no needs   PT/OT ADL Equipment for Discharge: None anticipated          Visit#: 1    Skilled therapy is not required due to pt is at baseline  Predicted patient presentation: Low (stable) - Patient comorbidities and complexities, as defined above, will have little effect on progress for prescribed plan of care.    End of Session:   Location: in bed  Safety measures: call light within reach, equipment intact and lines intact  Handoff to: nurse  Education Provided:   Learning assessment:  - Primary learner: patient  - Are they ready to learn: yes  - Preferred learning style: verbal  - Barriers to learning: no barriers apparent at this time  Education provided during session:  - Receiving education: patient  - Results of above outlined education: Verbalizes understanding and Demonstrates understanding    PLAN    Suggestions for next session as indicated: Plan: discharge from PT    Frequency Comments: discharge from PT      Agreement to plan and goals: patient agrees with goals and treatment plan      Documented in the chart in the following areas: Prior Level of Function. Pain. Assessment.      Admitting diagnosis: Sinus tachycardia (R00.0);Breakthrough seizure (CMS/HCC) (G40.919);Non compliance w medication regimen (Z91.14);Alcohol withdrawal syndrome without complication (CMS/HCC) (F10.230)    Co-morbidities and problem list:   Patient Active Problem List:   Localization-related symptomatic epilepsy and epileptic syndromes with complex partial seizures, intractable, without status epilepticus (CMS/HCC)   Cellulitis and abscess of mouth   Alcohol abuse   Status epilepticus (CMS/HCC)   Breakthrough seizure (CMS/HCC)   Altered mental status   Suicide attempt by multiple drug overdose (CMS/HCC)      Lina Del Toro, PT  [phone] pager  For immediate concerns, please page rehab aide at [phone]      Therapy procedure time and  total treatment time can be found documented on the Time Entry flowsheet   None

## 2022-08-10 NOTE — DISCHARGE NOTE ADULT - CARE PROVIDER_API CALL
León Avery), Cardiac Electrophysiology; Cardiology  [address] [place] [address]  Phone: [phone]  Fax: [phone] Pedro Saavedra), Cardiac Electrophysiology; Cardiovascular Disease  [address] [place], [place] [id number]  Phone: [phone]  Fax: [phone] Crescentic Advancement Flap Text: The defect edges were debeveled with a #15 scalpel blade.  Given the location of the defect and the proximity to free margins a crescentic advancement flap was deemed most appropriate.  Using a sterile surgical marker, the appropriate advancement flap was drawn incorporating the defect and placing the expected incisions within the relaxed skin tension lines where possible.    The area thus outlined was incised deep to adipose tissue with a #15 scalpel blade.  The skin margins were undermined to an appropriate distance in all directions utilizing iris scissors.

## 2022-08-16 NOTE — ED ADULT NURSE NOTE - CCCP TRG CHIEF CMPLNT
chest pain Detail Level: Zone General Sunscreen Counseling: I recommended a broad spectrum sunscreen with a SPF of 30 or higher.  I explained that SPF 30 sunscreens block approximately 97 percent of the sun's harmful rays.  Sunscreens should be applied at least 15 minutes prior to expected sun exposure and then every 2 hours after that as long as sun exposure continues. If swimming or exercising sunscreen should be reapplied every 45 minutes to an hour after getting wet or sweating.  One ounce, or the equivalent of a shot glass full of sunscreen, is adequate to protect the skin not covered by a bathing suit. I also recommended a lip balm with a sunscreen as well. Sun protective clothing can be used in lieu of sunscreen but must be worn the entire time you are exposed to the sun's rays.

## 2022-08-17 NOTE — H&P ADULT - HISTORY OF PRESENT ILLNESS
Received refill requests for the following meds below.  Pt last seen on 7/20/22.  Routing to Dr. Briscoe on behalf of Raysa who is out of clinic.      Thanks, Jaqueline      Pending Prescriptions:                       Disp   Refills    levothyroxine (SYNTHROID/LEVOTHROID) 50 M*90 tab*3            Sig: Take 1 tablet (50 mcg) by mouth daily    traZODone (DESYREL) 100 MG tablet         90 tab*3            Sig: Take 1 tablet (100 mg) by mouth At Bedtime    omeprazole (PRILOSEC) 20 MG DR capsule    90 cap*3            Sig: Take 1 capsule (20 mg) by mouth daily         51 yo gentleman former smoker w/PMHx unclear cardiac surgery for "hole in my heart" suspect VSD/ASD approx 3 yrs ago at [facility] with an ICD placed perioperatively for cardiac arrest per patient p/w lightheadedness during exercise. Reports that he exercises 5x a week cardio including cycling and light weight lifting. He reports that today he felt lightheaded without any associated chest pain, palpitations, SOB, or nausea/vomiting, diaphoresis. He does report that if he does strenuous lifting he describes his chest as "uncomfortable" denying pain or pressure. He does not take any medications, and quit smoking last week. Previous 1 PPD for 30 yrs. Reports that last week he experienced a "shock" from his defibrillator precipitated by only palpitations. He did seek medical care at that time and has not seen a cardiologist for 1 yr.

## 2022-11-14 NOTE — ED ADULT NURSE REASSESSMENT NOTE - COMFORT CARE
repositioned/po fluids offered/darkened lights/plan of care explained/ambulated to bathroom/warm blanket provided
cognition

## 2023-01-26 NOTE — ED PROVIDER NOTE - CROS ED EYES ALL NEG
- - - Dermal Autograft Text: The defect edges were debeveled with a #15 scalpel blade.  Given the location of the defect, shape of the defect and the proximity to free margins a dermal autograft was deemed most appropriate.  Using a sterile surgical marker, the primary defect shape was transferred to the donor site. The area thus outlined was incised deep to adipose tissue with a #15 scalpel blade.  The harvested graft was then trimmed of adipose and epidermal tissue until only dermis was left.  The skin graft was then placed in the primary defect and oriented appropriately.

## 2023-05-30 NOTE — ED ADULT TRIAGE NOTE - CCCP TRG CHIEF CMPLNT
palpitations/dizzy [MRI] : MRI [FreeTextEntry1] : EXAM: [id number] - MR SPINE LUMBAR - ORDERED BY: TERA LOVE\par \par \par PROCEDURE DATE: 04/03/2023\par \par \par \par INTERPRETATION: CLINICAL INFORMATION: Lumbar radiculopathy.\par \par ADDITIONAL CLINICAL INFORMATION: Other Condition see Clinical Info\par \par TECHNIQUE: Multiplanar, multisequence MRI was performed of the lumbar spine.\par IV Contrast: NONE\par \par PRIOR STUDIES: No priors available for comparison.\par \par FINDINGS:\par \par LOCALIZER: No additional findings.\par BONES: Degenerative facet edema at L4/L5 on the right. Focal degenerative endplate edema asymmetric to the left at L5/S1.\par ALIGNMENT: Levo convexity of the lumbar spine centered at L4. Mild leftward lateral listhesis of L4 and L5. Mild anterolisthesis of L3 on L4 and L4 and L5. Mild retrolisthesis of L5 on S1.\par SACROILIAC JOINTS/SACRUM: There is no sacral fracture. The SI joints are partially visualized but are intact.\par CONUS AND CAUDA EQUINA: The distal cord and conus are normal in signal. Conus terminates at L1-L2..\par VISUALIZED INTRAPELVIC/INTRA-ABDOMINAL SOFT TISSUES: Right renal T2 hyperintensity, most consistent with a cyst.\par PARASPINAL SOFT TISSUES: Normal.\par \par \par INDIVIDUAL LEVELS:\par \par LOWER THORACIC SPINE: Minimal disc bulge at T11/T12 without spinal canal or foraminal narrowing.\par \par L1-L2: No spinal canal or neuroforaminal stenosis.\par L2-L3: No spinal canal or neuroforaminal stenosis.\par L3-L4: Small disc bulge and bilateral facet arthrosis and thickening of the ligamentum flavum resulting mild spinal canal narrowing and mild foraminal narrowing.\par L4-L5: Anterolisthesis with uncovering of the disc and post right lateral recess/proximal foraminal protrusion and advanced facet arthrosis, resulting mild spinal canal narrowing and mild right foraminal narrowing.\par L5-S1: Disc bulge with superimposed left lateral recess/foraminal protrusion and bilateral facet arthrosis, resulting in narrowing of the lateral recesses, left greater than right with mass effect upon the descending left S1 nerve root and moderate to severe left foraminal narrowing.\par \par \par IMPRESSION:\par \par Lumbar spondylosis, most notably at L5/S1, resulting in lateral recess narrowing with mass effect upon the descending left S1 nerve root and moderate to severe left foraminal narrowing.\par \par --- End of Report ---\par \par \par \par \par \par \par MIGUELANGEL BROWN MD; Attending Radiologist\par This document has been electronically signed. Apr 8 2023 1:29PM

## 2023-06-26 NOTE — ED ADULT TRIAGE NOTE - HEART RATE (BEATS/MIN)
Being discharged from [address] today needs asap cysto with trus prostate bx  Has ceballos now in addition to his PCNs which will be maintained for now 57

## 2023-09-29 NOTE — ED PROVIDER NOTE - CONSULTING PHYSICIAN
Health Maintenance Due   Topic Date Due   • Diabetes Eye Exam  Never done   • Hepatitis B Vaccine (For Physician/APC Discussion) (1 of 3 - Risk 3-dose series) Never done   • COVID-19 Vaccine (5 - Moderna series) 03/13/2023   • Influenza Vaccine (1) 09/01/2023        Patient is due for topics listed above, she wishes to proceed with Immunization(s) Influenza, but is not proceeding with Immunization(s) COVID-19 and Hep B and Diabetes Eye Exam at this time.   
EP

## 2023-11-07 NOTE — DISCHARGE NOTE ADULT - NS DC ANGIO PCI YN
11/07/2023  Fredy Taylor is a 54 y.o., male.      Pre-op Assessment    I have reviewed the Patient Summary Reports.       I have reviewed the Medications.     Review of Systems  Anesthesia Hx:  No problems with previous Anesthesia   History of prior surgery of interest to airway management or planning:  Previous anesthesia: General, MAC, Nerve Block 7/10/2018  Right Knee Arthroscopy, ACL Repair, Chondroplasty with general anesthesia.  Procedure performed at an [facility].     for 7/10/2018 Right Knee Arthroscopy, ACL Repair, Chondroplasty.  Procedure performed at an [facility].  5/16/2022  Colonoscopy with MAC.  Procedure performed at an [facility]. Airway issues documented on chart review include mask, easy, laryngeal mask airway used       Denies Personal Hx of Anesthesia complications.                    Social:  Smoker, Social Alcohol Use Started smoking 2/9/1983, smokes 0.5 ppd for 40 years      Hematology/Oncology:  Hematology Normal   Oncology Normal                                   EENT/Dental:  chronic allergic rhinitis Hay fever          Cardiovascular:  Exercise tolerance: good   Hypertension    Denies CAD.       Denies Angina.     hyperlipidemia  Denies GUILLERMO.                            Pulmonary:     Denies Asthma.   Denies Shortness of breath.  Sleep Apnea           Education provided regarding risk of obstructive sleep apnea            Renal/:  Renal/ Normal  Denies Chronic Renal Disease.   Vasectomy             Hepatic/GI:  Hepatic/GI Normal     Denies GERD. Denies Liver Disease.            Musculoskeletal:     Old complex tear of lateral meniscus of right knee,  Rupture of anterior cruciate ligament of right knee, sequela,  Internal derangement of left knee,  H/O Right Knee Arthroscopy, Repair of ACL, Chondroplasty, Synovectomy,   Rupture of ulnar collateral ligament of  thumb, S/P Tendon Repair              Neurological:  Neurology Normal   Denies CVA.    Denies Headaches. Denies Seizures.                                Endocrine:  Denies Diabetes. Denies Hypothyroidism.        Obesity / BMI > 30  Psych:    depression               Past Medical History:   Diagnosis Date    Elevated cholesterol     Hay fever     Hypertension     Sleep apnea      Past Surgical History:   Procedure Laterality Date    ARTHROSCOPIC REPAIR OF ANTERIOR CRUCIATE LIGAMENT Right 7/10/2018    Procedure: REPAIR, KNEE, ACL, ARTHROSCOPIC;  Surgeon: Ryland Coffey MD;  Location: [facility];  Service: Orthopedics;  Laterality: Right;  regional without catheter adductor; Clonidine/Epi/Keterolac/Ropivacaine inj 30cc    CHONDROPLASTY Right 7/10/2018    Procedure: CHONDROPLASTY;  Surgeon: Ryland Coffey MD;  Location: [facility];  Service: Orthopedics;  Laterality: Right;    COLONOSCOPY N/A 5/16/2022    Procedure: COLONOSCOPY;  Surgeon: Inez Loredo MD;  Location: [facility] (4TH FLR);  Service: Colon and Rectal;  Laterality: N/A;  order created from outside referral  Fully Vaccinated, prep instr mailed -ml    INJECTION OF STEROID Right 7/10/2018    Procedure: INJECTION, STEROID; amniox right knee;  Surgeon: Ryland Coffey MD;  Location: [facility];  Service: Orthopedics;  Laterality: Right;    KNEE SURGERY      SYNOVECTOMY Right 7/10/2018    Procedure: SYNOVECTOMY;  Surgeon: Ryland Coffey MD;  Location: [facility];  Service: Orthopedics;  Laterality: Right;    TENDON REPAIR Left     thumb    VASECTOMY         Anesthesia Assessment: Preoperative EQUATION    Planned Procedure: Procedure(s) (LRB):  RECONSTRUCTION, KNEE, ACL, ARTHROSCOPIC (Right)  ALL RECONSTRUCTION (Right)  ARTHROSCOPY,KNEE,WITH MENISCUS REPAIR (Right)  ARTHROSCOPY, KNEE, WITH CHONDROPLASTY (Right)  LYSIS, ADHESIONS, KNEE, ARTHROSCOPIC (Right)  Requested Anesthesia Type:General  Surgeon: Ryland Coffey MD  Service: Orthopedics  Known or anticipated  Date of Surgery:11/9/2023    Surgeon notes: reviewed    Electronic QUestionnaire Assessment completed via nurse interview with patient.        Triage considerations:     The patient has no apparent active cardiac condition (No unstable coronary Syndrome such as severe unstable angina or recent [<1 month] myocardial infarction, decompensated CHF, severe valvular   disease or significant arrhythmia)    Previous anesthesia records:[facility] General, MAC, Nerve block for post-op pain, Easy airway, and No problems      Last PCP note: > 1 year ago , within [facility]     Patient is medically optimized for surgery per outside PCP.       Other important co-morbidities: HLD, HTN, Obesity, JANNETH, and Smoker                Instructions given. (See in Nurse's note)      Ht: 5'10  Wt: 240 lb  BMI: 34.48  Vaccinated   no

## 2024-01-28 NOTE — DISCHARGE NOTE ADULT - NS AS DC AMI YN
no
You can access the FollowMyHealth Patient Portal offered by [facility] by registering at the following website: [email or web address]. By joining [facility]’s FollowMyHealth portal, you will also be able to view your health information using other applications (apps) compatible with our system.

## 2024-12-06 NOTE — PROGRESS NOTE ADULT - ASSESSMENT
52M with history of ASD repaired at [facility] in 2015 c/b cardiac arrest and ICD placement presenting with chest pressure, left arm heaviness and left sided headache 2/2 recurrent PVCs vs NSTEMI, plan for cath today no
